# Patient Record
Sex: FEMALE | Race: WHITE | NOT HISPANIC OR LATINO | URBAN - METROPOLITAN AREA
[De-identification: names, ages, dates, MRNs, and addresses within clinical notes are randomized per-mention and may not be internally consistent; named-entity substitution may affect disease eponyms.]

---

## 2017-02-06 ENCOUNTER — APPOINTMENT (RX ONLY)
Dept: URBAN - METROPOLITAN AREA CLINIC 23 | Facility: CLINIC | Age: 77
Setting detail: DERMATOLOGY
End: 2017-02-06

## 2017-02-06 DIAGNOSIS — L57.0 ACTINIC KERATOSIS: ICD-10-CM

## 2017-02-06 DIAGNOSIS — L82.1 OTHER SEBORRHEIC KERATOSIS: ICD-10-CM

## 2017-02-06 DIAGNOSIS — Z85.828 PERSONAL HISTORY OF OTHER MALIGNANT NEOPLASM OF SKIN: ICD-10-CM

## 2017-02-06 PROBLEM — J30.1 ALLERGIC RHINITIS DUE TO POLLEN: Status: ACTIVE | Noted: 2017-02-06

## 2017-02-06 PROBLEM — L55.1 SUNBURN OF SECOND DEGREE: Status: ACTIVE | Noted: 2017-02-06

## 2017-02-06 PROBLEM — L85.3 XEROSIS CUTIS: Status: ACTIVE | Noted: 2017-02-06

## 2017-02-06 PROBLEM — D48.5 NEOPLASM OF UNCERTAIN BEHAVIOR OF SKIN: Status: ACTIVE | Noted: 2017-02-06

## 2017-02-06 PROCEDURE — A4550 SURGICAL TRAYS: HCPCS

## 2017-02-06 PROCEDURE — ? BIOPSY BY SHAVE METHOD

## 2017-02-06 PROCEDURE — 17003 DESTRUCT PREMALG LES 2-14: CPT

## 2017-02-06 PROCEDURE — 17000 DESTRUCT PREMALG LESION: CPT

## 2017-02-06 PROCEDURE — ? TREATMENT REGIMEN

## 2017-02-06 PROCEDURE — ? LIQUID NITROGEN

## 2017-02-06 PROCEDURE — 11100: CPT | Mod: 59

## 2017-02-06 PROCEDURE — 99214 OFFICE O/P EST MOD 30 MIN: CPT | Mod: 25

## 2017-02-06 PROCEDURE — ? OTHER

## 2017-02-06 PROCEDURE — ? PRESCRIPTION

## 2017-02-06 PROCEDURE — ? COUNSELING

## 2017-02-06 RX ORDER — FLUOROURACIL 2 G/40G
CREAM TOPICAL
Qty: 1 | Refills: 3 | COMMUNITY
Start: 2017-02-06

## 2017-02-06 RX ADMIN — FLUOROURACIL: 2 CREAM TOPICAL at 00:00

## 2017-02-06 ASSESSMENT — LOCATION ZONE DERM
LOCATION ZONE: LEG
LOCATION ZONE: ARM
LOCATION ZONE: NOSE
LOCATION ZONE: LIP
LOCATION ZONE: TOE
LOCATION ZONE: FACE
LOCATION ZONE: NECK

## 2017-02-06 ASSESSMENT — LOCATION DETAILED DESCRIPTION DERM
LOCATION DETAILED: RIGHT INFERIOR LATERAL NECK
LOCATION DETAILED: RIGHT INFERIOR VERMILION BORDER
LOCATION DETAILED: RIGHT PROXIMAL DORSAL FOREARM
LOCATION DETAILED: LEFT ANTERIOR DISTAL THIGH
LOCATION DETAILED: RIGHT POPLITEAL SKIN
LOCATION DETAILED: LEFT DISTAL CALF
LOCATION DETAILED: RIGHT DISTAL PRETIBIAL REGION
LOCATION DETAILED: LEFT SUPERIOR LATERAL FOREHEAD
LOCATION DETAILED: LEFT ANTERIOR LATERAL DISTAL UPPER ARM
LOCATION DETAILED: NASAL DORSUM
LOCATION DETAILED: LEFT PROXIMAL DORSAL FOREARM
LOCATION DETAILED: RIGHT SUPERIOR LATERAL FOREHEAD
LOCATION DETAILED: LEFT DISTAL PRETIBIAL REGION
LOCATION DETAILED: RIGHT SUPERIOR LATERAL MALAR CHEEK
LOCATION DETAILED: RIGHT LATERAL PROXIMAL PRETIBIAL REGION
LOCATION DETAILED: LEFT PROXIMAL LATERAL CALF
LOCATION DETAILED: LEFT POPLITEAL SKIN
LOCATION DETAILED: RIGHT LATERAL MALAR CHEEK
LOCATION DETAILED: LEFT ANTERIOR PROXIMAL UPPER ARM
LOCATION DETAILED: RIGHT DORSAL 2ND TOE
LOCATION DETAILED: RIGHT ANTERIOR DISTAL THIGH

## 2017-02-06 ASSESSMENT — LOCATION SIMPLE DESCRIPTION DERM
LOCATION SIMPLE: LEFT THIGH
LOCATION SIMPLE: RIGHT FOREHEAD
LOCATION SIMPLE: NOSE
LOCATION SIMPLE: RIGHT POPLITEAL SKIN
LOCATION SIMPLE: LEFT POPLITEAL SKIN
LOCATION SIMPLE: RIGHT 2ND TOE
LOCATION SIMPLE: LEFT FOREARM
LOCATION SIMPLE: RIGHT FOREARM
LOCATION SIMPLE: RIGHT THIGH
LOCATION SIMPLE: LEFT CALF
LOCATION SIMPLE: LEFT FOREHEAD
LOCATION SIMPLE: RIGHT CHEEK
LOCATION SIMPLE: NECK
LOCATION SIMPLE: LEFT PRETIBIAL REGION
LOCATION SIMPLE: LEFT UPPER ARM
LOCATION SIMPLE: RIGHT PRETIBIAL REGION
LOCATION SIMPLE: RIGHT LOWER LIP

## 2017-02-06 NOTE — PROCEDURE: LIQUID NITROGEN
Number Of Freeze-Thaw Cycles: 1 freeze-thaw cycle
Consent: The patient's verbal consent was obtained including but not limited to risks of crusting, scabbing, blistering, scarring, darker or lighter pigmentary change, recurrence, incomplete removal and infection.
Detail Level: Detailed
Render Post-Care Instructions In Note?: no
Post-Care Instructions: I reviewed with the patient in detail post-care instructions. Patient is to wear sunprotection, and avoid picking at any of the treated lesions. Pt may apply Vaseline to crusted or scabbing areas. Will re-check at follow up
Duration Of Freeze Thaw-Cycle (Seconds): 5

## 2017-02-06 NOTE — PROCEDURE: BIOPSY BY SHAVE METHOD
Type Of Destruction Used: Curettage
Biopsy Type: H and E
Hemostasis: Aluminum Chloride
X Size Of Lesion In Cm: 0
Post-Care Instructions: I reviewed with the patient in detail post-care instructions. Patient is to keep the biopsy site dry overnight, and then apply bacitracin twice daily until healed. Patient may apply hydrogen peroxide soaks to remove any crusting.
Detail Level: Detailed
Electrodesiccation Text: The wound bed was treated with electrodesiccation after the biopsy was performed.
Accession #: PC
Dressing: pressure dressing with telfa
Wound Care: Vaseline
Bill For Surgical Tray: yes
Billing Type: Third-Party Bill
Cryotherapy Text: The wound bed was treated with cryotherapy after the biopsy was performed.
Notification Instructions: Patient will be notified of biopsy results. However, patient instructed to call the office if not contacted within 2 weeks.
Electrodesiccation And Curettage Text: The wound bed was treated with electrodesiccation and curettage after the biopsy was performed.
Destruction After The Procedure: No
Consent: Written consent was obtained and risks were reviewed including but not limited to scarring, infection, bleeding, scabbing, incomplete removal, nerve damage and allergy to anesthesia.
Silver Nitrate Text: The wound bed was treated with silver nitrate after the biopsy was performed.
Biopsy Method: Dermablade
Anesthesia Volume In Cc: 0.5
Anesthesia Type: 1% lidocaine with 1:200,000 epinephrine and a 1:10 solution of 8.4% sodium bicarbonate

## 2017-02-06 NOTE — PROCEDURE: OTHER
Note Text (......Xxx Chief Complaint.): This diagnosis correlates with the
Detail Level: Detailed
Other (Free Text): LN2

## 2017-03-08 ENCOUNTER — APPOINTMENT (RX ONLY)
Dept: URBAN - METROPOLITAN AREA CLINIC 24 | Facility: CLINIC | Age: 77
Setting detail: DERMATOLOGY
End: 2017-03-08

## 2017-03-08 DIAGNOSIS — L57.8 OTHER SKIN CHANGES DUE TO CHRONIC EXPOSURE TO NONIONIZING RADIATION: ICD-10-CM

## 2017-03-08 PROBLEM — C44.729 SQUAMOUS CELL CARCINOMA OF SKIN OF LEFT LOWER LIMB, INCLUDING HIP: Status: ACTIVE | Noted: 2017-03-08

## 2017-03-08 PROBLEM — L55.1 SUNBURN OF SECOND DEGREE: Status: ACTIVE | Noted: 2017-03-08

## 2017-03-08 PROCEDURE — ? COUNSELING

## 2017-03-08 PROCEDURE — 11602 EXC TR-EXT MAL+MARG 1.1-2 CM: CPT

## 2017-03-08 PROCEDURE — 12032 INTMD RPR S/A/T/EXT 2.6-7.5: CPT

## 2017-03-08 PROCEDURE — ? EXCISION

## 2017-03-08 PROCEDURE — ? OTHER

## 2017-03-08 ASSESSMENT — LOCATION SIMPLE DESCRIPTION DERM
LOCATION SIMPLE: LEFT CHEEK
LOCATION SIMPLE: LEFT PRETIBIAL REGION
LOCATION SIMPLE: INFERIOR FOREHEAD
LOCATION SIMPLE: RIGHT CHEEK
LOCATION SIMPLE: RIGHT PRETIBIAL REGION

## 2017-03-08 ASSESSMENT — LOCATION ZONE DERM
LOCATION ZONE: LEG
LOCATION ZONE: FACE

## 2017-03-08 ASSESSMENT — LOCATION DETAILED DESCRIPTION DERM
LOCATION DETAILED: LEFT PROXIMAL PRETIBIAL REGION
LOCATION DETAILED: INFERIOR MID FOREHEAD
LOCATION DETAILED: RIGHT CENTRAL MALAR CHEEK
LOCATION DETAILED: RIGHT DISTAL PRETIBIAL REGION
LOCATION DETAILED: LEFT CENTRAL MALAR CHEEK

## 2017-03-08 NOTE — PROCEDURE: OTHER
Other (Free Text): Pt was counseled to continue treating forehead cheeks and eyebrows for 2 add'l weeks.  Pt can treat lips every other day or ever third day if area becomes too sensitive.  Pt would like to challenge treating the pretibial area due to her number of previous skin cancers.  Pt advised to treat for two weeks and stop if no reaction occurs or continue for 2 add'l weeks if she does she a reaction
Note Text (......Xxx Chief Complaint.): This diagnosis correlates with the
Detail Level: Simple

## 2017-03-22 ENCOUNTER — APPOINTMENT (RX ONLY)
Dept: URBAN - METROPOLITAN AREA CLINIC 23 | Facility: CLINIC | Age: 77
Setting detail: DERMATOLOGY
End: 2017-03-22

## 2017-03-22 DIAGNOSIS — Z48.01 ENCOUNTER FOR CHANGE OR REMOVAL OF SURGICAL WOUND DRESSING: ICD-10-CM

## 2017-03-22 PROCEDURE — ? SUTURE REMOVAL (GLOBAL PERIOD)

## 2017-03-22 ASSESSMENT — LOCATION SIMPLE DESCRIPTION DERM: LOCATION SIMPLE: LEFT CALF

## 2017-03-22 ASSESSMENT — LOCATION ZONE DERM: LOCATION ZONE: LEG

## 2017-03-22 ASSESSMENT — LOCATION DETAILED DESCRIPTION DERM: LOCATION DETAILED: LEFT DISTAL CALF

## 2017-03-22 NOTE — PROCEDURE: SUTURE REMOVAL (GLOBAL PERIOD)
Add 38022 Cpt? (Important Note: In 2017 The Use Of 56108 Is Being Tracked By Cms To Determine Future Global Period Reimbursement For Global Periods): no
Detail Level: Detailed

## 2017-06-26 ENCOUNTER — APPOINTMENT (RX ONLY)
Dept: URBAN - METROPOLITAN AREA CLINIC 23 | Facility: CLINIC | Age: 77
Setting detail: DERMATOLOGY
End: 2017-06-26

## 2017-06-26 DIAGNOSIS — D485 NEOPLASM OF UNCERTAIN BEHAVIOR OF SKIN: ICD-10-CM

## 2017-06-26 DIAGNOSIS — L82.0 INFLAMED SEBORRHEIC KERATOSIS: ICD-10-CM

## 2017-06-26 DIAGNOSIS — Z41.9 ENCOUNTER FOR PROCEDURE FOR PURPOSES OTHER THAN REMEDYING HEALTH STATE, UNSPECIFIED: ICD-10-CM

## 2017-06-26 DIAGNOSIS — L82.1 OTHER SEBORRHEIC KERATOSIS: ICD-10-CM

## 2017-06-26 DIAGNOSIS — Z85.828 PERSONAL HISTORY OF OTHER MALIGNANT NEOPLASM OF SKIN: ICD-10-CM

## 2017-06-26 PROBLEM — L85.3 XEROSIS CUTIS: Status: ACTIVE | Noted: 2017-06-26

## 2017-06-26 PROBLEM — L57.0 ACTINIC KERATOSIS: Status: ACTIVE | Noted: 2017-06-26

## 2017-06-26 PROBLEM — L55.1 SUNBURN OF SECOND DEGREE: Status: ACTIVE | Noted: 2017-06-26

## 2017-06-26 PROBLEM — D48.5 NEOPLASM OF UNCERTAIN BEHAVIOR OF SKIN: Status: ACTIVE | Noted: 2017-06-26

## 2017-06-26 PROCEDURE — 11100: CPT | Mod: 59

## 2017-06-26 PROCEDURE — ? LIQUID NITROGEN

## 2017-06-26 PROCEDURE — A4550 SURGICAL TRAYS: HCPCS

## 2017-06-26 PROCEDURE — 17111 DESTRUCTION B9 LESIONS 15/>: CPT

## 2017-06-26 PROCEDURE — ? BIOPSY BY SHAVE METHOD

## 2017-06-26 PROCEDURE — ? OTHER

## 2017-06-26 PROCEDURE — ? COSMETIC CONSULTATION: BROWN SPOTS

## 2017-06-26 PROCEDURE — ? COUNSELING

## 2017-06-26 PROCEDURE — 99214 OFFICE O/P EST MOD 30 MIN: CPT | Mod: 25

## 2017-06-26 ASSESSMENT — LOCATION DETAILED DESCRIPTION DERM
LOCATION DETAILED: RIGHT ANTERIOR SHOULDER
LOCATION DETAILED: RIGHT DISTAL LATERAL CALF
LOCATION DETAILED: LEFT CENTRAL TEMPLE
LOCATION DETAILED: RIGHT PROXIMAL PRETIBIAL REGION
LOCATION DETAILED: RIGHT POPLITEAL SKIN
LOCATION DETAILED: UPPER STERNUM
LOCATION DETAILED: RIGHT DISTAL CALF
LOCATION DETAILED: RIGHT LATERAL PROXIMAL PRETIBIAL REGION
LOCATION DETAILED: RIGHT CLAVICULAR NECK
LOCATION DETAILED: LEFT PROXIMAL LATERAL CALF
LOCATION DETAILED: RIGHT ANTERIOR DISTAL THIGH
LOCATION DETAILED: LEFT MEDIAL SUPERIOR CHEST
LOCATION DETAILED: STERNAL NOTCH
LOCATION DETAILED: RIGHT CLAVICULAR SKIN
LOCATION DETAILED: RIGHT DISTAL PRETIBIAL REGION
LOCATION DETAILED: RIGHT MEDIAL SUPERIOR CHEST
LOCATION DETAILED: LEFT CLAVICULAR SKIN
LOCATION DETAILED: LEFT LATERAL SUPERIOR CHEST
LOCATION DETAILED: RIGHT LATERAL SUPERIOR CHEST
LOCATION DETAILED: LEFT POPLITEAL SKIN
LOCATION DETAILED: RIGHT DORSAL 2ND TOE
LOCATION DETAILED: LEFT ANTERIOR SHOULDER
LOCATION DETAILED: LEFT DISTAL CALF
LOCATION DETAILED: RIGHT PROXIMAL CALF
LOCATION DETAILED: LEFT PROXIMAL CALF

## 2017-06-26 ASSESSMENT — LOCATION SIMPLE DESCRIPTION DERM
LOCATION SIMPLE: RIGHT THIGH
LOCATION SIMPLE: RIGHT PRETIBIAL REGION
LOCATION SIMPLE: RIGHT CLAVICULAR SKIN
LOCATION SIMPLE: RIGHT CALF
LOCATION SIMPLE: RIGHT SHOULDER
LOCATION SIMPLE: LEFT CLAVICULAR SKIN
LOCATION SIMPLE: LEFT CALF
LOCATION SIMPLE: LEFT TEMPLE
LOCATION SIMPLE: RIGHT POPLITEAL SKIN
LOCATION SIMPLE: RIGHT ANTERIOR NECK
LOCATION SIMPLE: RIGHT 2ND TOE
LOCATION SIMPLE: LEFT SHOULDER
LOCATION SIMPLE: LEFT POPLITEAL SKIN
LOCATION SIMPLE: CHEST

## 2017-06-26 ASSESSMENT — LOCATION ZONE DERM
LOCATION ZONE: LEG
LOCATION ZONE: TRUNK
LOCATION ZONE: TOE
LOCATION ZONE: NECK
LOCATION ZONE: FACE
LOCATION ZONE: ARM

## 2017-06-26 NOTE — HPI: SKIN LESION
How Severe Is Your Skin Lesion?: moderate
Has Your Skin Lesion Been Treated?: not been treated
Is This A New Presentation, Or A Follow-Up?: Skin Lesion
Additional History: Patient states at she treated the left lower with Efudex x4 months .

## 2017-06-26 NOTE — PROCEDURE: LIQUID NITROGEN
Number Of Freeze-Thaw Cycles: 1 freeze-thaw cycle
Add 52 Modifier (Optional): no
Detail Level: Detailed
Medical Necessity Clause: This procedure was medically necessary because the lesions that were treated were irritated and enlarging
Consent: The patient's verbal consent was obtained including but not limited to risks of crusting, scabbing, blistering, scarring, darker or lighter pigmentary change, recurrence, incomplete removal and infection.
Pared With?: 15 blade
Medical Necessity Information: It is in your best interest to select a reason for this procedure from the list below. All of these items fulfill various CMS LCD requirements except the new and changing color options.
Post-Care Instructions: I reviewed with the patient in detail post-care instructions. Patient is to wear sunprotection, and avoid picking at any of the treated lesions. Pt may apply Vaseline to crusted or scabbing areas.

## 2017-06-26 NOTE — PROCEDURE: BIOPSY BY SHAVE METHOD
Destruction After The Procedure: No
Electrodesiccation And Curettage Text: The wound bed was treated with electrodesiccation and curettage after the biopsy was performed.
Dressing: pressure dressing with telfa
Type Of Destruction Used: Curettage
Consent: Written consent was obtained and risks were reviewed including but not limited to scarring, infection, bleeding, scabbing, incomplete removal, nerve damage and allergy to anesthesia.
X Size Of Lesion In Cm: 0
Anesthesia Type: 1% lidocaine with 1:200,000 epinephrine and a 1:10 solution of 8.4% sodium bicarbonate
Wound Care: Vaseline
Biopsy Type: H and E
Detail Level: Detailed
Notification Instructions: Patient will be notified of biopsy results. However, patient instructed to call the office if not contacted within 2 weeks.
Anesthesia Volume In Cc: 0.5
Electrodesiccation Text: The wound bed was treated with electrodesiccation after the biopsy was performed.
Billing Type: Third-Party Bill
Post-Care Instructions: I reviewed with the patient in detail post-care instructions. Patient is to keep the biopsy site dry overnight, and then apply bacitracin twice daily until healed. Patient may apply hydrogen peroxide soaks to remove any crusting.
Biopsy Method: Dermablade
Bill For Surgical Tray: yes
Silver Nitrate Text: The wound bed was treated with silver nitrate after the biopsy was performed.
Cryotherapy Text: The wound bed was treated with cryotherapy after the biopsy was performed.
Hemostasis: Aluminum Chloride

## 2017-08-28 ENCOUNTER — APPOINTMENT (RX ONLY)
Dept: URBAN - METROPOLITAN AREA CLINIC 23 | Facility: CLINIC | Age: 77
Setting detail: DERMATOLOGY
End: 2017-08-28

## 2017-08-28 DIAGNOSIS — L82.0 INFLAMED SEBORRHEIC KERATOSIS: ICD-10-CM

## 2017-08-28 DIAGNOSIS — L98.0 PYOGENIC GRANULOMA: ICD-10-CM

## 2017-08-28 PROBLEM — I10 ESSENTIAL (PRIMARY) HYPERTENSION: Status: ACTIVE | Noted: 2017-08-28

## 2017-08-28 PROBLEM — L85.3 XEROSIS CUTIS: Status: ACTIVE | Noted: 2017-08-28

## 2017-08-28 PROCEDURE — ? SILVER NITRATE

## 2017-08-28 PROCEDURE — ? LIQUID NITROGEN

## 2017-08-28 PROCEDURE — ? COUNSELING

## 2017-08-28 PROCEDURE — 17110 DESTRUCTION B9 LES UP TO 14: CPT

## 2017-08-28 PROCEDURE — 99212 OFFICE O/P EST SF 10 MIN: CPT | Mod: 25

## 2017-08-28 ASSESSMENT — LOCATION ZONE DERM
LOCATION ZONE: LEG
LOCATION ZONE: TOE
LOCATION ZONE: FEET

## 2017-08-28 ASSESSMENT — LOCATION DETAILED DESCRIPTION DERM
LOCATION DETAILED: LEFT DORSAL FOOT
LOCATION DETAILED: RIGHT LATERAL 2ND TOE
LOCATION DETAILED: RIGHT DORSAL 2ND TOE
LOCATION DETAILED: LEFT KNEE
LOCATION DETAILED: LEFT DISTAL PRETIBIAL REGION

## 2017-08-28 ASSESSMENT — LOCATION SIMPLE DESCRIPTION DERM
LOCATION SIMPLE: LEFT FOOT
LOCATION SIMPLE: LEFT PRETIBIAL REGION
LOCATION SIMPLE: LEFT KNEE
LOCATION SIMPLE: RIGHT 2ND TOE

## 2017-08-28 NOTE — PROCEDURE: LIQUID NITROGEN
Medical Necessity Clause: This procedure was medically necessary because the lesions that were treated were irritated and enlarging
Medical Necessity Information: It is in your best interest to select a reason for this procedure from the list below. All of these items fulfill various CMS LCD requirements except the new and changing color options.
Include Z78.9 (Other Specified Conditions Influencing Health Status) As An Associated Diagnosis?: No
Post-Care Instructions: I reviewed with the patient in detail post-care instructions. Patient is to wear sunprotection, and avoid picking at any of the treated lesions. Pt may apply Vaseline to crusted or scabbing areas.
Consent: The patient's verbal consent was obtained including but not limited to risks of crusting, scabbing, blistering, scarring, darker or lighter pigmentary change, recurrence, incomplete removal and infection.
Number Of Freeze-Thaw Cycles: 1 freeze-thaw cycle
Detail Level: Detailed

## 2017-08-28 NOTE — PROCEDURE: SILVER NITRATE
Add 05882 Code?: No
Detail Level: Detailed
Procedure Details: A silver nitrate stick was used for chemical cautery.

## 2017-09-18 ENCOUNTER — HOSPITAL ENCOUNTER (OUTPATIENT)
Dept: MRI IMAGING | Age: 77
Discharge: HOME | End: 2017-09-18
Attending: PHYSICAL MEDICINE & REHABILITATION
Payer: MEDICARE

## 2017-09-18 DIAGNOSIS — R26.9 GAIT DISORDER: ICD-10-CM

## 2017-09-18 DIAGNOSIS — R26.89 SHUFFLING GAIT: ICD-10-CM

## 2017-09-18 PROCEDURE — 70551 MRI BRAIN STEM W/O DYE: CPT

## 2017-09-21 ENCOUNTER — HOSPITAL ENCOUNTER (OUTPATIENT)
Dept: GENERAL RADIOLOGY | Age: 77
Discharge: HOME | End: 2017-09-21
Payer: MEDICARE

## 2017-09-21 DIAGNOSIS — R53.1 WEAKNESS: ICD-10-CM

## 2017-09-21 DIAGNOSIS — R26.89 SHUFFLING GAIT: ICD-10-CM

## 2017-09-21 DIAGNOSIS — R26.9 GAIT DISORDER: ICD-10-CM

## 2017-09-21 PROCEDURE — 72100 X-RAY EXAM L-S SPINE 2/3 VWS: CPT

## 2017-09-25 ENCOUNTER — HOSPITAL ENCOUNTER (OUTPATIENT)
Dept: LAB | Age: 77
Discharge: HOME | End: 2017-09-25
Payer: MEDICARE

## 2017-09-25 ENCOUNTER — APPOINTMENT (OUTPATIENT)
Dept: PHYSICAL THERAPY | Age: 77
End: 2017-09-25
Attending: PHYSICAL MEDICINE & REHABILITATION

## 2017-09-25 LAB
ALBUMIN SERPL-MCNC: 3.3 G/DL (ref 3.2–4.6)
ALBUMIN/GLOB SERPL: 0.6 {RATIO} (ref 1.2–3.5)
ALP SERPL-CCNC: 60 U/L (ref 50–136)
ALT SERPL-CCNC: 30 U/L (ref 12–65)
ANION GAP SERPL CALC-SCNC: 9 MMOL/L (ref 7–16)
AST SERPL-CCNC: 24 U/L (ref 15–37)
BASOPHILS # BLD: 0 K/UL (ref 0–0.2)
BASOPHILS NFR BLD: 1 % (ref 0–2)
BILIRUB SERPL-MCNC: 0.4 MG/DL (ref 0.2–1.1)
BUN SERPL-MCNC: 41 MG/DL (ref 8–23)
CALCIUM SERPL-MCNC: 8.9 MG/DL (ref 8.3–10.4)
CHLORIDE SERPL-SCNC: 102 MMOL/L (ref 98–107)
CO2 SERPL-SCNC: 26 MMOL/L (ref 21–32)
CREAT SERPL-MCNC: 1.72 MG/DL (ref 0.6–1)
DIFFERENTIAL METHOD BLD: ABNORMAL
EOSINOPHIL # BLD: 0.1 K/UL (ref 0–0.8)
EOSINOPHIL NFR BLD: 2 % (ref 0.5–7.8)
ERYTHROCYTE [DISTWIDTH] IN BLOOD BY AUTOMATED COUNT: 13.3 % (ref 11.9–14.6)
GLOBULIN SER CALC-MCNC: 5.1 G/DL (ref 2.3–3.5)
GLUCOSE SERPL-MCNC: 107 MG/DL (ref 65–100)
HCT VFR BLD AUTO: 35.6 % (ref 35.8–46.3)
HGB BLD-MCNC: 12.5 G/DL (ref 11.7–15.4)
IMM GRANULOCYTES # BLD: 0 K/UL (ref 0–0.5)
IMM GRANULOCYTES NFR BLD: 0 % (ref 0–5)
LYMPHOCYTES # BLD: 1.5 K/UL (ref 0.5–4.6)
LYMPHOCYTES NFR BLD: 30 % (ref 13–44)
MCH RBC QN AUTO: 32.1 PG (ref 26.1–32.9)
MCHC RBC AUTO-ENTMCNC: 35.1 G/DL (ref 31.4–35)
MCV RBC AUTO: 91.5 FL (ref 79.6–97.8)
MONOCYTES # BLD: 0.3 K/UL (ref 0.1–1.3)
MONOCYTES NFR BLD: 6 % (ref 4–12)
NEUTS SEG # BLD: 3 K/UL (ref 1.7–8.2)
NEUTS SEG NFR BLD: 61 % (ref 43–78)
PLATELET # BLD AUTO: 223 K/UL (ref 150–450)
PMV BLD AUTO: 9.4 FL (ref 10.8–14.1)
POTASSIUM SERPL-SCNC: 3.7 MMOL/L (ref 3.5–5.1)
PROT SERPL-MCNC: 8.4 G/DL (ref 6.3–8.2)
RBC # BLD AUTO: 3.89 M/UL (ref 4.05–5.25)
SODIUM SERPL-SCNC: 137 MMOL/L (ref 136–145)
T4 FREE SERPL-MCNC: 1 NG/DL (ref 0.78–1.46)
T4 SERPL-MCNC: 12.8 UG/DL (ref 4.8–13.9)
TSH SERPL DL<=0.005 MIU/L-ACNC: 1.16 UIU/ML (ref 0.36–3.74)
VIT B12 SERPL-MCNC: 493 PG/ML (ref 193–986)
WBC # BLD AUTO: 4.8 K/UL (ref 4.3–11.1)

## 2017-09-25 PROCEDURE — 82607 VITAMIN B-12: CPT | Performed by: PHYSICAL MEDICINE & REHABILITATION

## 2017-09-25 PROCEDURE — 80053 COMPREHEN METABOLIC PANEL: CPT | Performed by: PHYSICAL MEDICINE & REHABILITATION

## 2017-09-25 PROCEDURE — 36415 COLL VENOUS BLD VENIPUNCTURE: CPT | Performed by: PHYSICAL MEDICINE & REHABILITATION

## 2017-09-25 PROCEDURE — 85025 COMPLETE CBC W/AUTO DIFF WBC: CPT | Performed by: PHYSICAL MEDICINE & REHABILITATION

## 2017-09-25 PROCEDURE — 84436 ASSAY OF TOTAL THYROXINE: CPT | Performed by: PHYSICAL MEDICINE & REHABILITATION

## 2017-09-25 PROCEDURE — 84439 ASSAY OF FREE THYROXINE: CPT | Performed by: PHYSICAL MEDICINE & REHABILITATION

## 2017-09-25 PROCEDURE — 84481 FREE ASSAY (FT-3): CPT | Performed by: PHYSICAL MEDICINE & REHABILITATION

## 2017-09-25 PROCEDURE — 84443 ASSAY THYROID STIM HORMONE: CPT | Performed by: PHYSICAL MEDICINE & REHABILITATION

## 2017-09-26 LAB — T3FREE SERPL-MCNC: 2.7 PG/ML (ref 2–4.4)

## 2017-10-04 ENCOUNTER — HOSPITAL ENCOUNTER (OUTPATIENT)
Dept: PHYSICAL THERAPY | Age: 77
Discharge: HOME | End: 2017-10-04
Attending: PHYSICAL MEDICINE & REHABILITATION
Payer: MEDICARE

## 2017-10-04 PROCEDURE — 97161 PT EVAL LOW COMPLEX 20 MIN: CPT

## 2017-10-04 PROCEDURE — G8980 MOBILITY D/C STATUS: HCPCS

## 2017-10-04 PROCEDURE — G8979 MOBILITY GOAL STATUS: HCPCS

## 2017-10-04 PROCEDURE — G8978 MOBILITY CURRENT STATUS: HCPCS

## 2017-10-04 NOTE — THERAPY EVALUATION
Venkat Castañeda  : 1940  [place] / [place] / Product Type: Medicare /  [address]  at [facility]  [address] [facility], [address]  [phone]   [phone]         OUTPATIENT PHYSICAL THERAPY:Initial Assessment and Discharge 10/4/2017    ICD-10: Treatment Diagnosis: Unsteadiness on feet (R26.81)  Precautions/Allergies:   Latex; Ceftin [cefuroxime axetil]; and Sulfa (sulfonamide antibiotics)   Fall Risk Score: 0 (? 5 = High Risk)  MD Orders: evaluate for gait and balance. eval and treat  MEDICAL/REFERRING DIAGNOSIS:  Gait disorder [R26.9]  Shuffling gait [R26.89]   DATE OF ONSET: progressive   REFERRING PHYSICIAN: Yoel Bailey, *  RETURN PHYSICIAN APPOINTMENT: unknown     ASSESSMENT (Date: 10/4/17):  Ms. Jose Parkinson presents to physical therapy with a history of shuffling gait, some incontinence, and worsening balance. She demonstrates no tremor or rigidity. She reports she is going to be assessed by Dr. Lizbeth Connell for normal pressure hydrocephalus. She reports that she would like to wait and start therapy after she does that. She was assessed today for gait and balance for baseline numbers to aid and assist in diagnosing and treating her in the future. She will be discharged at this time. Please reconsult PT as needed. 1.  1.      Regarding Jayla Greenfield's therapy, I certify that the treatment plan above will be carried out by a therapist or under their direction. Thank you for this referral,  Tiana West, DPT, NCS     Referring Physician Signature: Yoel Bailey, *              Date                    HISTORY:   Patient Stated Goal:        I want to get better  History of Present Injury/Illness (Reason for Referral):  Patient reports her balance and gait have been an issue for several years. Says it has gotten progressively worse. She is dragging her R foot.   She is scheduled to see  Lizbeth Connell Oct 31th. She has period incontinent. She doesn't think her has any memory issues but she said her  might think differently. She thinks she may have NPH. She doesn't think she wants to do therapy right now until she finds out more from Dr. Lizbeth Connell. Past Medical History/Comorbidities:   Ms. Jose Parkinson  has a past medical history of CAD (coronary artery disease) (4/26/2016); Gait disorder (4/26/2016); Hypertension; Hypertriglyceridemia (4/26/2016); Psychotic disorder; and Small vessel disease, cerebrovascular (4/26/2016). Ms. Jose Parkinson  has no past surgical history on file. Social History/Living Environment:     lives with .   driving  Prior Level of Function/Work/Activity:  retired    Current Medications:    Current Outpatient Prescriptions:     FLUoxetine (PROZAC) 20 mg tablet, TAKE ONE CAPSULE BY MOUTH EVERY DAY, Disp: 30 Tab, Rfl: 5    atenolol (TENORMIN) 100 mg tablet, TAKE 1 TABLET BY MOUTH EVERY DAY, Disp: 30 Tab, Rfl: 5    conjugated estrogens (PREMARIN) 0.625 mg tablet, TAKE 1 TABLET BY MOUTH EVERY DAY, Disp: 30 Tab, Rfl: 5    triamterene-hydroCHLOROthiazide (MAXZIDE) 37.5-25 mg per tablet, TAKE 1 TABLET BY MOUTH TWICE DAILY, Disp: 180 Tab, Rfl: 3    fluticasone (FLONASE) 50 mcg/actuation nasal spray, nightly., Disp: , Rfl:      Date Last Reviewed:  10/4/2017   Number of Personal Factors/Comorbidities that affect the Plan of Care: 0: LOW COMPLEXITY   EXAMINATION:   Observation/Orthostatic Postural Assessment:          Rounded shoulders, forward head   Mental Status:          functional  Palpation:          No increased muscle tone noted  Sensation:         functional  Skin Integrity:          intact  Vision:          functional  Balance:          Good static balance, fair dynamic balance     Lower Extremity:   Strength PROM   Action R L R  L    Hip Flexion       Hip Extension       Hip Abduction       Hip Adduction       Knee Flexion       Knee Extension       Dorsi Flexion Plantar Flexion       Inversion       Eversion                 Upper Extremity:           Functional Mobility:         Gait/Ambulation:  Ambulates with slow, shuffling gait pattern, decreased heel strike R>L, wide WILLY, some toe out, decreased gait speed, decreased step length. No assistive device         Transfers:  Slow, modified independent. Some UE use for balance         Bed Mobility:  WFL        Stairs:  NT        Wheelchair:  NT              Body Structures Involved:  1. Nerves  2. Joints  3. Muscles  4. Ligaments Body Functions Affected:  1. Genitourinary  2. Neuromusculoskeletal  3. Movement Related Activities and Participation Affected:  1. Mobility  2. Domestic Life  3. Interpersonal Interactions and Relationships  4. Community, Social and [place]   Number of elements that affect the Plan of Care: 4+: HIGH COMPLEXITY   CLINICAL PRESENTATION:   Presentation: Evolving clinical presentation with changing clinical characteristics: MODERATE COMPLEXITY   CLINICAL DECISION MAKING:   Outcome Measure: Tool Used: Schwab Balance Scale  Score:  Initial: 45/56 Most Recent: X/56 (Date: -- )   Interpretation of Score: Each section is scored on a 0-4 scale, 0 representing the patients inability to perform the task and 4 representing independence. The scores of each section are added together for a total score of 56. The higher the patients score, the more independent the patient is. Any score below 45 indicates increased risk for falls. Score 56 55-45 44-34 33-23 22-12 11-1 0   Modifier CH CI CJ CK CL CM CN     ? Mobility - Walking and Moving Around:     - CURRENT STATUS: CI - 1%-19% impaired, limited or restricted    - GOAL STATUS: CI - 1%-19% impaired, limited or restricted    - D/C STATUS:  CI - 1%-19% impaired, limited or restricted    Tool Used: Timed Up and Go (TUG)  Score:  Initial: 13.7 seconds Most Recent: X seconds (Date: -- )   Interpretation of Score:  The test measures, in seconds, the time taken by an individual to stand up from a standard arm chair (seat height 46 cm [18 in], arm height 65 cm [25.6 in]), walk a distance of 3 meters (118 in, approx 10 ft), turn, walk back to the chair and sit down. If the individual takes longer than 14 seconds to complete TUG, this indicates risk for falls. Score 7 7.5-10.5 11-14 14.5-17.5 18-21 21.5-24.5 25+   Modifier CH CI CJ CK CL CM CN        Use of outcome tool(s) and clinical judgement create a POC that gives a: Clear prediction of patient's progress: LOW COMPLEXITY   TREATMENT:   (In addition to Assessment/Re-Assessment sessions the following treatments were rendered)  Pre Treatment Symptoms: see above    Assessment only today, no treatment provided. Treatment/Session Assessment:  See assessment above.   · Pain/ Symptoms: Initial:   0/10 Post Session:  0/10     Total Treatment Duration:  PT Patient Time In/Time Out  Time In: 1330  Time Out: [address] [facility]

## 2017-10-06 NOTE — PROGRESS NOTES
Ambulatory/Rehab Services H2 Model Falls Risk Assessment    Risk Factor Pts. ·   Confusion/Disorientation/Impulsivity  []    4 ·   Symptomatic Depression  []   2 ·   Altered Elimination  []   1 ·   Dizziness/Vertigo  []   1 ·   Gender (Male)  []   1 ·   Any administered antiepileptics (anticonvulsants):  []   2 ·   Any administered benzodiazepines:  []   1 ·   Visual Impairment (specify):  []   1 ·   Portable Oxygen Use  []   1 ·   Orthostatic ? BP  []   1 ·   History of Recent Falls (within 3 mos.)  []   5     Ability to Rise from Chair (choose one) Pts. ·   Ability to rise in a single movement  [x]   0 ·   Pushes up, successful in one attempt  []   1 ·   Multiple attempts, but successful  []   3 ·   Unable to rise without assistance  []   4   Total: (5 or greater = High Risk) 0     Falls Prevention Plan:   []                Physical Limitations to Exercise (specify):   []                Mobility Assistance Device (type):   []                Exercise/Equipment Adaptation (specify):    ©2010 [facility] [place] Patent #[id number].  Federal Law prohibits the replication, distribution or use without written permission from [facility] [employer]

## 2017-10-31 PROBLEM — R26.9 GAIT DISTURBANCE: Status: ACTIVE | Noted: 2017-10-31

## 2017-10-31 PROBLEM — G91.2 NPH (NORMAL PRESSURE HYDROCEPHALUS) (HCC): Status: ACTIVE | Noted: 2017-10-31

## 2017-11-02 ENCOUNTER — HOSPITAL ENCOUNTER (OUTPATIENT)
Dept: INTERVENTIONAL RADIOLOGY/VASCULAR | Age: 77
Discharge: HOME | End: 2017-11-02
Attending: NEUROLOGICAL SURGERY
Payer: MEDICARE

## 2017-11-02 VITALS
RESPIRATION RATE: 17 BRPM | TEMPERATURE: 98.7 F | HEART RATE: 59 BPM | HEIGHT: 67 IN | DIASTOLIC BLOOD PRESSURE: 68 MMHG | SYSTOLIC BLOOD PRESSURE: 133 MMHG | OXYGEN SATURATION: 98 % | WEIGHT: 140 LBS | BODY MASS INDEX: 21.97 KG/M2

## 2017-11-02 DIAGNOSIS — G91.2 NPH (NORMAL PRESSURE HYDROCEPHALUS) (HCC): ICD-10-CM

## 2017-11-02 PROCEDURE — 74011000250 HC RX REV CODE- 250: Performed by: PHYSICIAN ASSISTANT

## 2017-11-02 PROCEDURE — 62270 DX LMBR SPI PNXR: CPT

## 2017-11-02 PROCEDURE — 77030003666 HC NDL SPINAL BD -A

## 2017-11-02 PROCEDURE — 77030014143 HC TY PUNC LUMBR BD -A

## 2017-11-02 RX ORDER — LIDOCAINE HYDROCHLORIDE 20 MG/ML
1-20 INJECTION, SOLUTION INFILTRATION; PERINEURAL ONCE
Status: COMPLETED | OUTPATIENT
Start: 2017-11-02 | End: 2017-11-02

## 2017-11-02 RX ADMIN — LIDOCAINE HYDROCHLORIDE 60 MG: 20 INJECTION, SOLUTION INFILTRATION; PERINEURAL at 10:17

## 2017-11-02 NOTE — PROGRESS NOTES
LP complete. Opening pressure 11. 19 ml of CSF removed. Site covered with sterile dressing. Will transfer to recovery.

## 2017-11-02 NOTE — IP AVS SNAPSHOT
[address] 
[phone] Patient: Jourdan Webster MRN: [id number] :1940 About your hospitalization You were admitted on:  2017 You last received care in the:  [facility] You were discharged on:  2017 Why you were hospitalized Your primary diagnosis was:  Not on File Discharge Orders None A check jacob indicates which time of day the medication should be taken. My Medications ASK your physician about these medications Instructions Each Dose to Equal  
 Morning Noon Evening Bedtime  
 atenolol 100 mg tablet Commonly known as:  TENORMIN Your last dose was: Your next dose is: TAKE 1 TABLET BY MOUTH EVERY DAY  
     
   
   
   
  
 conjugated estrogens 0.625 mg tablet Commonly known as:  PREMARIN Your last dose was: Your next dose is: TAKE 1 TABLET BY MOUTH EVERY DAY FLUoxetine 20 mg tablet Commonly known as:  PROzac Your last dose was: Your next dose is: TAKE ONE CAPSULE BY MOUTH EVERY DAY  
     
   
   
   
  
 fluticasone 50 mcg/actuation nasal spray Commonly known as:  Lindy Shames Your last dose was: Your next dose is:    
   
   
 nightly. triamterene-hydroCHLOROthiazide 37.5-25 mg per tablet Commonly known as:  Levie Minors Your last dose was: Your next dose is: TAKE 1 TABLET BY MOUTH TWICE DAILY Discharge Instructions [facility] [address] Department of Interventional Radiology 
[phone] Office 
[phone] Fax POST LUMBAR PUNCTURE/MYELOGRAM/INTRATHECAL CHEMOTHERAPY DISCHARGE INSTRUCTIONS General Information: 
Lumbar Puncture: A LP is done to help diagnose several disorders, like pseudo tumor, migraines, meningitis, and multiple sclerosis. It involves a puncture (usually in the lower spine) into the sac that protects the spinal column. A sample of the fluid in that space is removed and tested in the lab. Myelogram: A Myelogram involves a lumbar puncture, and instead of removing fluid, contrast will be injected into the sac surrounding the spinal column. It is done to visualize the spinal column, nerve roots, spinal canal, vertebral discs and disc space. It is usually done to diagnose back pain with unknown cause or in preparation for surgery. After the injection, a CT scan will be done, usually within two hours of the injection. Intrathecal Chemotherapy:  
   Chemotherapy can be given in many forms. Intrathecal chemo involves a lumbar puncture, and instead of removing fluid, the chemo will be injected into the space. After any of procedures, you will be asked to lie flat on your back for 4-6 hours to prevent complications. You should also rest for 24 hours after you go home, and force fluids. If you have a headache, you should take Tylenol or acetaminophen. Call If: 
   You should call your Physician and/or the Radiology Nurse if you develop a headache that is not relieved by Tylenol, and worsens when you stand and eases when you lie down, you need to call. You may have developed what is referred to as a spinal headache. Our physician's will probably advise you to be on strict bed rest for 24 hours, to drink lots of fluids and caffeine. If this does not help the head pain, call again the next day. You should call if you have bleeding other than a small spot on your bandage. You should call if you have any numbness, tingling, weakness, fever, chills, urinary retention, severe itching, rash, welts, swelling, or confusion.   
 
Follow-Up Instructions: See the doctor who ordered your procedure as he/she has instructed. If you had a Lumbar Puncture or Myelogram, your results should be available to your ordering doctor in 3-5 business days. You can remove your dressing in 24 hours and shower regularly. Do not bathe or swim for 72 hours. To Reach Us: If you have any questions about your procedure, please call the Interventional Radiology department at [phone]. After business hours (5pm) and weekends, call the answering service at [phone] and ask for the Radiologist on call to be paged. Interventional Radiology General Nurse Discharge After general anesthesia or intravenous sedation, for 24 hours or while taking prescription Narcotics: · Limit your activities · Do not drive and operate hazardous machinery · Do not make important personal or business decisions · Do  not drink alcoholic beverages · If you have not urinated within 8 hours after discharge, please contact your surgeon on call. * Please give a list of your current medications to your Primary Care Provider. * Please update this list whenever your medications are discontinued, doses are 
   changed, or new medications (including over-the-counter products) are added. * Please carry medication information at all times in case of emergency situations. These are general instructions for a healthy lifestyle: No smoking/ No tobacco products/ Avoid exposure to second hand smoke Surgeon General's Warning:  Quitting smoking now greatly reduces serious risk to your health. Obesity, smoking, and sedentary lifestyle greatly increases your risk for illness A healthy diet, regular physical exercise & weight monitoring are important for maintaining a healthy lifestyle You may be retaining fluid if you have a history of heart failure or if you experience any of the following symptoms:  Weight gain of 3 pounds or more overnight or 5 pounds in a week, increased swelling in our hands or feet or shortness of breath while lying flat in bed. Please call your doctor as soon as you notice any of these symptoms; do not wait until your next office visit. Recognize signs and symptoms of STROKE: 
F-face looks uneven A-arms unable to move or move unevenly S-speech slurred or non-existent T-time-call 911 as soon as signs and symptoms begin-DO NOT go Back to bed or wait to see if you get better-TIME IS BRAIN. Patient Signature: 
Date: 11/2/2017 Discharging Nurse: Jim Cox RN 
 
 
 
 
[facility] Transitions of Care Introducing [employer] [address] Santoyo offers a voluntary care coordination program to provide high quality service and care to [facility] fee-for-service beneficiaries. [facility] was designed to help you enhance your health and well-being through the following services: ? Transitions of Care  support for individuals who are transitioning from one care setting to another (example: Hospital to home). ? Chronic and Complex Care Coordination  support for individuals and caregivers of those with serious or chronic illnesses or with more than one chronic (ongoing) condition and those who take a number of different medications. If you meet specific medical criteria, a [address] [place] [address] Rd may call you directly to coordinate your care with your primary care physician and your other care providers. For questions about the [facility] programs, please, contact your physicians office. For general questions or additional information about Accountable Care Organizations: 
Please visit www.medicare.gov/acos. html or call 1-800-MEDICARE (8-391.889.1097) TTY users should call 4-665.950.9519. Introducing [facility]! Renetta Lawson introduces [facility] patient portal. Now you can access parts of your medical record, email your doctor's office, and request medication refills online. 1. In your internet browser, go to [email or web address] 2. Click on the First Time User? Click Here link in the Sign In box. You will see the New Member Sign Up page. 3. Enter your [employer] Access Code exactly as it appears below. You will not need to use this code after youve completed the sign-up process. If you do not sign up before the expiration date, you must request a new code. · [employer] Access Code: [email or web address] Expires: 1/7/2018  7:52 AM 
 
4. Enter the last four digits of your Social Security Number (xxxx) and Date of Birth (mm/dd/yyyy) as indicated and click Submit. You will be taken to the next sign-up page. 5. Create a T[employer] ID. This will be your [employer] login ID and cannot be changed, so think of one that is secure and easy to remember. 6. Create a [employer] password. You can change your password at any time. 7. Enter your Password Reset Question and Answer. This can be used at a later time if you forget your password. 8. Enter your e-mail address. You will receive e-mail notification when new information is available in [address] 9. Click Sign Up. You can now view and download portions of your medical record. 10. Click the Download Summary menu link to download a portable copy of your medical information. If you have questions, please visit the Frequently Asked Questions section of the [employer] website. Remember, [employer] is NOT to be used for urgent needs. For medical emergencies, dial 911. Now available from your iPhone and Android! Unresulted Labs-Please follow up with your PCP about these lab tests Order Current Status IR SPINAL PUNCTURE LUMBAR DIAGNOSTIC In process Providers Seen During Your Hospitalization Provider Specialty Primary office phone Shiva Adams MD Neurosurgery [phone] Your Primary Care Physician (PCP) Primary Care Physician Office Phone Office Fax Brandon Berry [phone] You are allergic to the following Allergen Reactions Latex Unknown (comments) Ceftin (Cefuroxime Axetil) Rash  
    
 Sulfa (Sulfonamide Antibiotics) Rash Other (comments) KIDNEY MALFUNCTION Recent Documentation Height Weight BMI OB Status Smoking Status 1.689 m 63.5 kg 22.26 kg/m2 Postmenopausal Never Smoker Emergency Contacts Name Discharge Info Relation Home Work Mobile Iván Greenfield DISCHARGE CAREGIVER [3] Spouse [3] [phone] Patient Belongings The following personal items are in your possession at time of discharge: 
                             
 
  
  
 Please provide this summary of care documentation to your next provider. Signatures-by signing, you are acknowledging that this After Visit Summary has been reviewed with you and you have received a copy. Patient Signature:  ____________________________________________________________ Date:  ____________________________________________________________  
  
[facility] Provider Signature:  ____________________________________________________________ Date:  ____________________________________________________________

## 2017-11-02 NOTE — PROCEDURES
Department of Interventional Radiology  [phone]        Interventional Radiology Brief Procedure Note    Patient: Timothy Veliz MRN: [id number]  SSN: [id number]    YOB: 1940  Age: 76 y.o.   Sex: female      Date of Procedure: 11/2/2017    Pre-Procedure Diagnosis: NPH    Post-Procedure Diagnosis: SAME    Procedure(s): Lumbar Puncture    Brief Description of Procedure: fluoro guided LP @ L4/5, 19 ml CSF removed and discarded    Performed By: Barry Alex PA-C     Assistants: None    Anesthesia:Lidocaine    Estimated Blood Loss: None    Specimens: None    Implants: None    Findings: opening pressure 11 cm M65    Complications: None    Recommendations: bedrest     Follow Up: Dr. J Luis Mendoza    Signed By: Barry Alex PA-C     November 2, 2017

## 2017-11-02 NOTE — DISCHARGE INSTRUCTIONS
[address] [facility] [address]  Department of Interventional Radiology  [phone] Office  [phone] Fax  POST LUMBAR PUNCTURE/MYELOGRAM/INTRATHECAL CHEMOTHERAPY DISCHARGE INSTRUCTIONS  General Information:  Lumbar Puncture: A LP is done to help diagnose several disorders, like pseudo tumor, migraines, meningitis, and multiple sclerosis. It involves a puncture (usually in the lower spine) into the sac that protects the spinal column. A sample of the fluid in that space is removed and tested in the lab. Myelogram:     A Myelogram involves a lumbar puncture, and instead of removing fluid, contrast will be injected into the sac surrounding the spinal column. It is done to visualize the spinal column, nerve roots, spinal canal, vertebral discs and disc space. It is usually done to diagnose back pain with unknown cause or in preparation for surgery. After the injection, a CT scan will be done, usually within two hours of the injection. Intrathecal Chemotherapy:      Chemotherapy can be given in many forms. Intrathecal chemo involves a lumbar puncture, and instead of removing fluid, the chemo will be injected into the space. After any of procedures, you will be asked to lie flat on your back for 4-6 hours to prevent complications. You should also rest for 24 hours after you go home, and force fluids. If you have a headache, you should take Tylenol or acetaminophen. Call If:     You should call your Physician and/or the Radiology Nurse if you develop a headache that is not relieved by Tylenol, and worsens when you stand and eases when you lie down, you need to call. You may have developed what is referred to as a spinal headache. Our physician's will probably advise you to be on strict bed rest for 24 hours, to drink lots of fluids and caffeine. If this does not help the head pain, call again the next day.  You should call if you have bleeding other than a small spot on your bandage. You should call if you have any numbness, tingling, weakness, fever, chills, urinary retention, severe itching, rash, welts, swelling, or confusion. Follow-Up Instructions: See the doctor who ordered your procedure as he/she has instructed. If you had a Lumbar Puncture or Myelogram, your results should be available to your ordering doctor in 3-5 business days. You can remove your dressing in 24 hours and shower regularly. Do not bathe or swim for 72 hours. To Reach Us: If you have any questions about your procedure, please call the Interventional Radiology department at [phone]. After business hours (5pm) and weekends, call the answering service at [phone] and ask for the Radiologist on call to be paged. Interventional Radiology General Nurse Discharge    After general anesthesia or intravenous sedation, for 24 hours or while taking prescription Narcotics:  · Limit your activities  · Do not drive and operate hazardous machinery  · Do not make important personal or business decisions  · Do  not drink alcoholic beverages  · If you have not urinated within 8 hours after discharge, please contact your surgeon on call. * Please give a list of your current medications to your Primary Care Provider. * Please update this list whenever your medications are discontinued, doses are     changed, or new medications (including over-the-counter products) are added. * Please carry medication information at all times in case of emergency situations. These are general instructions for a healthy lifestyle:    No smoking/ No tobacco products/ Avoid exposure to second hand smoke  Surgeon General's Warning:  Quitting smoking now greatly reduces serious risk to your health.     Obesity, smoking, and sedentary lifestyle greatly increases your risk for illness  A healthy diet, regular physical exercise & weight monitoring are important for maintaining a healthy lifestyle    You may be retaining fluid if you have a history of heart failure or if you experience any of the following symptoms:  Weight gain of 3 pounds or more overnight or 5 pounds in a week, increased swelling in our hands or feet or shortness of breath while lying flat in bed. Please call your doctor as soon as you notice any of these symptoms; do not wait until your next office visit. Recognize signs and symptoms of STROKE:  F-face looks uneven    A-arms unable to move or move unevenly    S-speech slurred or non-existent    T-time-call 911 as soon as signs and symptoms begin-DO NOT go       Back to bed or wait to see if you get better-TIME IS BRAIN. Patient Signature:  Date: 11/2/2017  Discharging Nurse:  Scott Shoemaker RN

## 2017-11-02 NOTE — IP AVS SNAPSHOT
Bridget Meza 
 
 
 [address] 
[phone] Patient: Enedelia Moore MRN: [id number] :1940 My Medications ASK your physician about these medications Instructions Each Dose to Equal  
 Morning Noon Evening Bedtime  
 atenolol 100 mg tablet Commonly known as:  TENORMIN Your last dose was: Your next dose is: TAKE 1 TABLET BY MOUTH EVERY DAY  
     
   
   
   
  
 conjugated estrogens 0.625 mg tablet Commonly known as:  PREMARIN Your last dose was: Your next dose is: TAKE 1 TABLET BY MOUTH EVERY DAY FLUoxetine 20 mg tablet Commonly known as:  PROzac Your last dose was: Your next dose is: TAKE ONE CAPSULE BY MOUTH EVERY DAY  
     
   
   
   
  
 fluticasone 50 mcg/actuation nasal spray Commonly known as:  Sabine Whitehead Your last dose was: Your next dose is:    
   
   
 nightly. triamterene-hydroCHLOROthiazide 37.5-25 mg per tablet Commonly known as:  Amari Rodríguez Your last dose was: Your next dose is: TAKE 1 TABLET BY MOUTH TWICE DAILY

## 2017-11-02 NOTE — PROGRESS NOTES
TRANSFER - OUT REPORT:    Verbal report given to \[facility]\"" RN(name) on Pascual Camacho  being transferred to IR recovery(unit) for routine post - op       Report consisted of patients Situation, Background, Assessment and   Recommendations(SBAR). Information from the following report(s) Procedure Summary and MAR was reviewed with the receiving nurse. Lines:       Opportunity for questions and clarification was provided.       Patient transported with:   Registered Nurse

## 2017-11-02 NOTE — PROGRESS NOTES
Recovery period without difficulty. Pt alert and oriented and denies pain. Dressing is clean, dry, and intact. Reviewed discharge instructions with patient and spouse, both verbalized understanding. Pt escorted to lobby discharge area via wheelchair. Vital signs and Destiny score completed.

## 2017-12-13 ENCOUNTER — APPOINTMENT (RX ONLY)
Dept: URBAN - METROPOLITAN AREA CLINIC 23 | Facility: CLINIC | Age: 77
Setting detail: DERMATOLOGY
End: 2017-12-13

## 2017-12-20 ENCOUNTER — HOSPITAL ENCOUNTER (OUTPATIENT)
Dept: PHYSICAL THERAPY | Age: 77
Discharge: HOME | End: 2017-12-20
Attending: PHYSICAL MEDICINE & REHABILITATION
Payer: MEDICARE

## 2017-12-20 DIAGNOSIS — R26.9 GAIT DISTURBANCE: ICD-10-CM

## 2017-12-20 DIAGNOSIS — G91.2 NPH (NORMAL PRESSURE HYDROCEPHALUS) (HCC): ICD-10-CM

## 2017-12-20 PROCEDURE — 97162 PT EVAL MOD COMPLEX 30 MIN: CPT

## 2017-12-20 PROCEDURE — G8978 MOBILITY CURRENT STATUS: HCPCS

## 2017-12-20 PROCEDURE — G8979 MOBILITY GOAL STATUS: HCPCS

## 2017-12-20 NOTE — THERAPY EVALUATION
Pascual   : 1940  [place]  Secondary: [address], [address], [place] [address] [facility]  [phone]   [phone]         OUTPATIENT PHYSICAL THERAPY:Initial Assessment 2017    ICD-10: Treatment Diagnosis: Unsteadiness on feet (R26.81)  Precautions/Allergies:   Latex; Ceftin [cefuroxime axetil]; and Sulfa (sulfonamide antibiotics)   Fall Risk Score: 5 (? 5 = High Risk)  MD Orders: evaluate and treat MEDICAL/REFERRING DIAGNOSIS:  NPH (normal pressure hydrocephalus) [G91.2]  Gait disturbance [R26.9]   DATE OF ONSET: few months ago  REFERRING PHYSICIAN: Ale Herrera, *  RETURN PHYSICIAN APPOINTMENT: unknown     ASSESSMENT (Date: 17):  Ms. Reinier Peña presents to physical therapy with a diagnosis of NPH and a recent visit to [facility]. At [facility], they decided to attempt conservative treatment and treat her with a diuretic. She is to report back to them in 2 month to assess for improvements. While taking the diuretic, she would benefit from skilled PT to improve her motor functional and mobility. She demonstrates decreased gait, decreased balance, and decreased functional mobility. She will be out of town for 10 days in January and will therefore not be able to attend therapy during that time. PROBLEM LIST (Impacting functional limitations):  1. Decreased Strength  2. Decreased ADL/Functional Activities  3. Decreased Transfer Abilities  4. Decreased Ambulation Ability/Technique  5. Decreased Balance  6. Decreased Activity Tolerance  7. Decreased [place] with Home Exercise Program INTERVENTIONS PLANNED:  1. Balance Exercise  2. Family Education  3. Gait Training  4. Heat  5. Home Exercise Program (HEP)  6. Manual Therapy  7. Neuromuscular Re-education/Strengthening  8. Therapeutic Activites  9. Therapeutic Exercise/Strengthening  10.  Transfer Training TREATMENT PLAN:  Effective Dates: 12/20/17 TO 3/20/18. Frequency/Duration: 2 times a week for 12 weeks  GOALS: (Goals have been discussed and agreed upon with patient.)  Short-Term Functional Goals: Time Frame: 6 weeks  1. Patient will be compliant with HEP. 2. Patient will have an increase on the tinajero balance to 42/56 in order to improve her functional balance. 3. Patient will have a decrease on the TUG to 13 seconds indicating improved gait mechanics. Discharge Goals: Time Frame: 12 weeks  1. Patient will be independent with HEP. 2. Patient will have an increase on the tinajero balance to 47/56 in order to decrease her risk of falls. 3. Patient will have a decrease on the TUG to less than 12 seconds in order to improve her functional mobility. 4. Patient will demonstrate turning with 4 steps or less indicating improve movement patterns. Rehabilitation Potential For Stated Goals: Good  Regarding Jayla Greenfield's therapy, I certify that the treatment plan above will be carried out by a therapist or under their direction. Thank you for this referral,  Jerrica Chaudhry, RICCIT, NCS     Referring Physician Signature: Ranjith Nesbitt, *              Date                    HISTORY:   Patient Stated Goal:        I want to be back to normal  History of Present Injury/Illness (Reason for Referral):  Patient reports she went to [facility] and they confirmed Dr. Ladonna London diagnosis of NPH. They are going to try diamox for 60 days which is a diuretic. At that time, they will send a video to [facility] and they will decide see if they need to do surgery. Feb 15th is 60 days. Nathan said to do PT in the meantime to help with mobility. She reports no falls but says she is getting more fearful of falling. Patient reports still having some memory issues and some incontinence as well as gait difficulties.    Past Medical History/Comorbidities:   Ms. Jaida Donovan  has a past medical history of CAD (coronary artery disease) (4/26/2016); Gait disorder (4/26/2016); Hypertension; Hypertriglyceridemia (4/26/2016); Psychotic disorder; and Small vessel disease, cerebrovascular (4/26/2016). Ms. Nathaniel Enriquez  has no past surgical history on file. Social History/Living Environment:     lives with   Prior Level of Function/Work/Activity:  Retired. Use to exercises 3 days a week but is currently unable to    Current Medications:    Current Outpatient Prescriptions:     atenolol (TENORMIN) 50 mg tablet, Take 2 tab by mouth daily, Disp: 60 Tab, Rfl: 3    FLUoxetine (PROZAC) 20 mg tablet, TAKE ONE CAPSULE BY MOUTH EVERY DAY, Disp: 30 Tab, Rfl: 5    conjugated estrogens (PREMARIN) 0.625 mg tablet, TAKE 1 TABLET BY MOUTH EVERY DAY, Disp: 30 Tab, Rfl: 5    triamterene-hydroCHLOROthiazide (MAXZIDE) 37.5-25 mg per tablet, TAKE 1 TABLET BY MOUTH TWICE DAILY, Disp: 180 Tab, Rfl: 3    fluticasone (FLONASE) 50 mcg/actuation nasal spray, nightly., Disp: , Rfl:    Diamox     Date Last Reviewed:  12/20/2017   Number of Personal Factors/Comorbidities that affect the Plan of Care: 1-2: MODERATE COMPLEXITY   EXAMINATION:   Observation/Orthostatic Postural Assessment:           Forward head, rounded shoulders   Mental Status:          Functional but does report some episodes of memory issues  Palpation:          No increased muscle tone noted  Sensation:         intact  Skin Integrity:          intact  Vision:          Functional   Balance:          Good static balance, decreased dynamic balance     Lower Extremity:   Strength PROM   Action R L R  L    Hip Flexion 4      Hip Extension 4      Hip Abduction 4      Hip Adduction       Knee Flexion       Knee Extension 4+      Dorsi Flexion 4+      Plantar Flexion       Inversion       Eversion                 Upper Extremity:         Grossly 4+/5  Functional Mobility:         Gait/Ambulation:  Ambulates with slow gait speed, shuffling gait pattern with decreased heel strike R>L, wide WILLY, flexed knees, some toe out B, decreased step length, no assistive device         Transfers:  Slow, push to stand, wide WILLY, few attempts to stand. Slow, shuffling turn with several steps during turning        Bed Mobility:  Functional         Stairs:  NT        Wheelchair:  NT              Body Structures Involved:  1. Nerves  2. Bones  3. Joints  4. Muscles Body Functions Affected:  1. Mental  2. Genitourinary  3. Neuromusculoskeletal  4. Movement Related Activities and Participation Affected:  1. Mobility  2. Self Care  3. Domestic Life  4. Interpersonal Interactions and Relationships   Number of elements that affect the Plan of Care: 4+: HIGH COMPLEXITY   CLINICAL PRESENTATION:   Presentation: Evolving clinical presentation with changing clinical characteristics: MODERATE COMPLEXITY   CLINICAL DECISION MAKING:   Outcome Measure: Tool Used: Schwab Balance Scale  Score:  Initial: 39/56 Most Recent: X/56 (Date: -- )   Interpretation of Score: Each section is scored on a 0-4 scale, 0 representing the patients inability to perform the task and 4 representing independence. The scores of each section are added together for a total score of 56. The higher the patients score, the more independent the patient is. Any score below 45 indicates increased risk for falls. Score 56 55-45 44-34 33-23 22-12 11-1 0   Modifier CH CI CJ CK CL CM CN     ? Mobility - Walking and Moving Around:     - CURRENT STATUS: CJ - 20%-39% impaired, limited or restricted    - GOAL STATUS: CI - 1%-19% impaired, limited or restricted    - D/C STATUS:  ---------------To be determined---------------    Tool Used: Timed Up and Go (TUG)  Score:  Initial: 15.16 seconds Most Recent: X seconds (Date: -- )   Interpretation of Score:  The test measures, in seconds, the time taken by an individual to stand up from a standard arm chair (seat height 46 cm [18 in], arm height 65 cm [25.6 in]), walk a distance of 3 meters (118 in, approx 10 ft), turn, walk back to the chair and sit down. If the individual takes longer than 14 seconds to complete TUG, this indicates risk for falls. Score 7 7.5-10.5 11-14 14.5-17.5 18-21 21.5-24.5 25+   Modifier CH CI CJ CK CL CM CN         Medical Necessity:   · Patient is expected to demonstrate progress in balance, coordination and functional technique to improve safety during ADLs and IADLs. · Patient demonstrates good rehab potential due to higher previous functional level. Reason for Services/Other Comments:  · Patient continues to require modification of therapeutic interventions to increase complexity of exercises. Use of outcome tool(s) and clinical judgement create a POC that gives a: Questionable prediction of patient's progress: MODERATE COMPLEXITY   TREATMENT:   (In addition to Assessment/Re-Assessment sessions the following treatments were rendered)  Pre Treatment Symptoms: see above. Therapeutic Exercise: ( ):  Exercises per grid below to improve mobility, balance and coordination. Required moderate visual, verbal and tactile cues to promote proper body alignment and promote proper body mechanics. Progressed complexity of movement as indicated. Date:   Date:   Date:     Activity/Exercise Parameters Parameters Parameters   NuStep      Heel raises with 3 second hold      LSVT sit to stand      PWR standing reach for weight shift      PWR marching gait with arm swing      LSVT forward step out      LSVT side step out            Treatment/Session Assessment:  See assessment above. · Pain/ Symptoms: Initial:   0/10 Post Session:  0/10 ·   Compliance with Program/Exercises: Will assess as treatment progresses. · Recommendations/Intent for next treatment session: \"Next visit will focus on advancements to more challenging activities and reduction in assistance provided\".   Total Treatment Duration:  PT Patient Time In/Time Out  Time In: 0830  Time Out: [address] [facility], T

## 2017-12-20 NOTE — PROGRESS NOTES
Ambulatory/Rehab Services H2 Model Falls Risk Assessment    Risk Factor Pts. ·   Confusion/Disorientation/Impulsivity  [x]    4 ·   Symptomatic Depression  []   2 ·   Altered Elimination  []   1 ·   Dizziness/Vertigo  []   1 ·   Gender (Male)  []   1 ·   Any administered antiepileptics (anticonvulsants):  []   2 ·   Any administered benzodiazepines:  []   1 ·   Visual Impairment (specify):  []   1 ·   Portable Oxygen Use  []   1 ·   Orthostatic ? BP  []   1 ·   History of Recent Falls (within 3 mos.)  []   5     Ability to Rise from Chair (choose one) Pts. ·   Ability to rise in a single movement  []   0 ·   Pushes up, successful in one attempt  [x]   1 ·   Multiple attempts, but successful  []   3 ·   Unable to rise without assistance  []   4   Total: (5 or greater = High Risk) 5     Falls Prevention Plan:   []                Physical Limitations to Exercise (specify):   []                Mobility Assistance Device (type):   []                Exercise/Equipment Adaptation (specify):    ©2010 [facility] of [address] Patent #[id number].  Federal Law prohibits the replication, distribution or use without written permission from [facility] [employer]

## 2017-12-27 ENCOUNTER — HOSPITAL ENCOUNTER (OUTPATIENT)
Dept: PHYSICAL THERAPY | Age: 77
End: 2017-12-27
Attending: PHYSICAL MEDICINE & REHABILITATION
Payer: MEDICARE

## 2017-12-29 ENCOUNTER — HOSPITAL ENCOUNTER (OUTPATIENT)
Dept: PHYSICAL THERAPY | Age: 77
Discharge: HOME | End: 2017-12-29
Attending: PHYSICAL MEDICINE & REHABILITATION
Payer: MEDICARE

## 2017-12-29 PROCEDURE — 97110 THERAPEUTIC EXERCISES: CPT

## 2017-12-29 NOTE — PROGRESS NOTES
Angelica Bryson  : 1940  [place]  [place] [address] [place] [address] at [facility]  [address] [facility], [address]  [phone]   [phone]         OUTPATIENT PHYSICAL THERAPY:Daily Note 2017    ICD-10: Treatment Diagnosis: Unsteadiness on feet (R26.81)  Precautions/Allergies:   Latex; Ceftin [cefuroxime axetil]; and Sulfa (sulfonamide antibiotics)   Fall Risk Score: 5 (? 5 = High Risk)  MD Orders: evaluate and treat MEDICAL/REFERRING DIAGNOSIS:  gait distrubance   DATE OF ONSET: few months ago  REFERRING PHYSICIAN: Aliyah Cruz, *  RETURN PHYSICIAN APPOINTMENT: unknown     ASSESSMENT (Date: 17):  Ms. Nuha Blankenship presents to physical therapy with a diagnosis of NPH and a recent visit to [facility]. At [facility], they decided to attempt conservative treatment and treat her with a diuretic. She is to report back to them in 2 month to assess for improvements. While taking the diuretic, she would benefit from skilled PT to improve her motor functional and mobility. She demonstrates decreased gait, decreased balance, and decreased functional mobility. She will be out of town for 10 days in January and will therefore not be able to attend therapy during that time. PROBLEM LIST (Impacting functional limitations):  1. Decreased Strength  2. Decreased ADL/Functional Activities  3. Decreased Transfer Abilities  4. Decreased Ambulation Ability/Technique  5. Decreased Balance  6. Decreased Activity Tolerance  7. Decreased [place] with Home Exercise Program INTERVENTIONS PLANNED:  1. Balance Exercise  2. Family Education  3. Gait Training  4. Heat  5. Home Exercise Program (HEP)  6. Manual Therapy  7. Neuromuscular Re-education/Strengthening  8. Therapeutic Activites  9. Therapeutic Exercise/Strengthening  10. Transfer Training   TREATMENT PLAN:  Effective Dates: 17 TO 3/20/18. Frequency/Duration: 2 times a week for 12 weeks  GOALS: (Goals have been discussed and agreed upon with patient.)  Short-Term Functional Goals: Time Frame: 6 weeks  1. Patient will be compliant with HEP. 2. Patient will have an increase on the tinajero balance to 42/56 in order to improve her functional balance. 3. Patient will have a decrease on the TUG to 13 seconds indicating improved gait mechanics. Discharge Goals: Time Frame: 12 weeks  1. Patient will be independent with HEP. 2. Patient will have an increase on the tinajero balance to 47/56 in order to decrease her risk of falls. 3. Patient will have a decrease on the TUG to less than 12 seconds in order to improve her functional mobility. 4. Patient will demonstrate turning with 4 steps or less indicating improve movement patterns. Rehabilitation Potential For Stated Goals: Good  Regarding Jayla Grenefield's therapy, I certify that the treatment plan above will be carried out by a therapist or under their direction. Thank you for this referral,  Oralia Chow, PTA, NCS     Referring Physician Signature: Crispin Mauricio, *              Date                    HISTORY:   Patient Stated Goal:        I want to be back to normal  History of Present Injury/Illness (Reason for Referral):  Patient reports she went to [facility] and they confirmed Dr. Roldan Diss diagnosis of NPH. They are going to try diamox for 60 days which is a diuretic. At that time, they will send a video to [facility] and they will decide see if they need to do surgery. Feb 15th is 60 days. Nathan said to do PT in the meantime to help with mobility. She reports no falls but says she is getting more fearful of falling. Patient reports still having some memory issues and some incontinence as well as gait difficulties. Past Medical History/Comorbidities:   Ms. Jevon Payton  has a past medical history of CAD (coronary artery disease) (4/26/2016); Gait disorder (4/26/2016);  Hypertension; Hypertriglyceridemia (4/26/2016); Psychotic disorder; and Small vessel disease, cerebrovascular (4/26/2016). Ms. Abner Bradford  has no past surgical history on file. Social History/Living Environment:     lives with   Prior Level of Function/Work/Activity:  Retired. Use to exercises 3 days a week but is currently unable to    Current Medications:    Current Outpatient Prescriptions:     niacinamide 500 mg tablet, TK 1 T PO  BID, Disp: , Rfl: 3    atenolol (TENORMIN) 50 mg tablet, Take 2 tab by mouth daily, Disp: 60 Tab, Rfl: 3    FLUoxetine (PROZAC) 20 mg tablet, TAKE ONE CAPSULE BY MOUTH EVERY DAY, Disp: 30 Tab, Rfl: 5    conjugated estrogens (PREMARIN) 0.625 mg tablet, TAKE 1 TABLET BY MOUTH EVERY DAY, Disp: 30 Tab, Rfl: 5    triamterene-hydroCHLOROthiazide (MAXZIDE) 37.5-25 mg per tablet, TAKE 1 TABLET BY MOUTH TWICE DAILY, Disp: 180 Tab, Rfl: 3    fluticasone (FLONASE) 50 mcg/actuation nasal spray, nightly., Disp: , Rfl:    Diamox     Date Last Reviewed:  12/29/2017   Number of Personal Factors/Comorbidities that affect the Plan of Care: 1-2: MODERATE COMPLEXITY   EXAMINATION:   Observation/Orthostatic Postural Assessment:           Forward head, rounded shoulders   Mental Status:          Functional but does report some episodes of memory issues  Palpation:          No increased muscle tone noted  Sensation:         intact  Skin Integrity:          intact  Vision:          Functional   Balance:          Good static balance, decreased dynamic balance     Lower Extremity:   Strength PROM   Action R L R  L    Hip Flexion 4      Hip Extension 4      Hip Abduction 4      Hip Adduction       Knee Flexion       Knee Extension 4+      Dorsi Flexion 4+      Plantar Flexion       Inversion       Eversion                 Upper Extremity:         Grossly 4+/5  Functional Mobility:         Gait/Ambulation:  Ambulates with slow gait speed, shuffling gait pattern with decreased heel strike R>L, wide WILLY, flexed knees, some toe out B, decreased step length, no assistive device         Transfers:  Slow, push to stand, wide WILLY, few attempts to stand. Slow, shuffling turn with several steps during turning        Bed Mobility:  Functional         Stairs:  NT        Wheelchair:  NT              Body Structures Involved:  1. Nerves  2. Bones  3. Joints  4. Muscles Body Functions Affected:  1. Mental  2. Genitourinary  3. Neuromusculoskeletal  4. Movement Related Activities and Participation Affected:  1. Mobility  2. Self Care  3. Domestic Life  4. Interpersonal Interactions and Relationships   Number of elements that affect the Plan of Care: 4+: HIGH COMPLEXITY   CLINICAL PRESENTATION:   Presentation: Evolving clinical presentation with changing clinical characteristics: MODERATE COMPLEXITY   CLINICAL DECISION MAKING:   Outcome Measure: Tool Used: Schwab Balance Scale  Score:  Initial: 39/56 Most Recent: X/56 (Date: -- )   Interpretation of Score: Each section is scored on a 0-4 scale, 0 representing the patients inability to perform the task and 4 representing independence. The scores of each section are added together for a total score of 56. The higher the patients score, the more independent the patient is. Any score below 45 indicates increased risk for falls. Score 56 55-45 44-34 33-23 22-12 11-1 0   Modifier CH CI CJ CK CL CM CN     ? Mobility - Walking and Moving Around:     - CURRENT STATUS: CJ - 20%-39% impaired, limited or restricted    - GOAL STATUS: CI - 1%-19% impaired, limited or restricted    - D/C STATUS:  ---------------To be determined---------------    Tool Used: Timed Up and Go (TUG)  Score:  Initial: 15.16 seconds Most Recent: X seconds (Date: -- )   Interpretation of Score:  The test measures, in seconds, the time taken by an individual to stand up from a standard arm chair (seat height 46 cm [18 in], arm height 65 cm [25.6 in]), walk a distance of 3 meters (118 in, approx 10 ft), turn, walk back to the chair and sit down. If the individual takes longer than 14 seconds to complete TUG, this indicates risk for falls. Score 7 7.5-10.5 11-14 14.5-17.5 18-21 21.5-24.5 25+   Modifier CH CI CJ CK CL CM CN         Medical Necessity:   · Patient is expected to demonstrate progress in balance, coordination and functional technique to improve safety during ADLs and IADLs. · Patient demonstrates good rehab potential due to higher previous functional level. Reason for Services/Other Comments:  · Patient continues to require modification of therapeutic interventions to increase complexity of exercises. Use of outcome tool(s) and clinical judgement create a POC that gives a: Questionable prediction of patient's progress: MODERATE COMPLEXITY   TREATMENT:   (In addition to Assessment/Re-Assessment sessions the following treatments were rendered)  Pre Treatment Symptoms: Reports she went to [place] and back with no difficulties. No complaints of pain today. Therapeutic Exercise: ( 50 minutes ):  Exercises per grid below to improve mobility, balance and coordination. Required moderate visual, verbal and tactile cues to promote proper body alignment and promote proper body mechanics. Progressed complexity of movement as indicated. Date:  12-29-17 Date:   Date:     Activity/Exercise Parameters Parameters Parameters   NuStep Level 2  10 minutes      Heel raises with 3 second hold 10 x 3 sec hold      LSVT sit to stand X 10 with no hands     PWR standing reach for weight shift 2 x 10 B     PWR marching gait with arm swing 3 laps on track with cues for arm swing     LSVT forward step out X 10 B with 1rail     LSVT side step out X 10 with 1 rail           Treatment/Session Assessment:  Patient needed verbal cues for bigger steps and more weight shift with certain activities. We tried the walking poles to assist with arm swing, but was confusing for patient.   She performed better with just verbal cues for arm swing and bigger steps. No pain upon departure. She was given handouts for 3 exercises for HEP. · Pain/ Symptoms: Initial:   0/10 Post Session:  0/10 ·   Compliance with Program/Exercises: Will assess as treatment progresses. · Recommendations/Intent for next treatment session: \"Next visit will focus on advancements to more challenging activities and reduction in assistance provided\".   Total Treatment Duration: 50 minutes   PT Patient Time In/Time Out  Time In: 0830  Time Out: 25 [facility]

## 2018-01-02 ENCOUNTER — HOSPITAL ENCOUNTER (OUTPATIENT)
Dept: PHYSICAL THERAPY | Age: 78
Discharge: HOME | End: 2018-01-02
Attending: PHYSICAL MEDICINE & REHABILITATION
Payer: MEDICARE

## 2018-01-02 PROCEDURE — 97110 THERAPEUTIC EXERCISES: CPT

## 2018-01-02 NOTE — PROGRESS NOTES
Levi Guevara  : 1940  [place]  [place] [address] [place] [address] at [facility], 101 [facility], [address]  [phone]   [phone]         OUTPATIENT PHYSICAL THERAPY:Daily Note 2018    ICD-10: Treatment Diagnosis: Unsteadiness on feet (R26.81)  Precautions/Allergies:   Latex; Ceftin [cefuroxime axetil]; and Sulfa (sulfonamide antibiotics)   Fall Risk Score: 5 (? 5 = High Risk)  MD Orders: evaluate and treat MEDICAL/REFERRING DIAGNOSIS:  gait distrubance   DATE OF ONSET: few months ago  REFERRING PHYSICIAN: Melanie Diaz, *  RETURN PHYSICIAN APPOINTMENT: unknown     ASSESSMENT (Date: 17):  Ms. Shun Corona presents to physical therapy with a diagnosis of NPH and a recent visit to [facility]. At [facility], they decided to attempt conservative treatment and treat her with a diuretic. She is to report back to them in 2 month to assess for improvements. While taking the diuretic, she would benefit from skilled PT to improve her motor functional and mobility. She demonstrates decreased gait, decreased balance, and decreased functional mobility. She will be out of town for 10 days in January and will therefore not be able to attend therapy during that time. PROBLEM LIST (Impacting functional limitations):  1. Decreased Strength  2. Decreased ADL/Functional Activities  3. Decreased Transfer Abilities  4. Decreased Ambulation Ability/Technique  5. Decreased Balance  6. Decreased Activity Tolerance  7. Decreased [place] with Home Exercise Program INTERVENTIONS PLANNED:  1. Balance Exercise  2. Family Education  3. Gait Training  4. Heat  5. Home Exercise Program (HEP)  6. Manual Therapy  7. Neuromuscular Re-education/Strengthening  8. Therapeutic Activites  9. Therapeutic Exercise/Strengthening  10. Transfer Training   TREATMENT PLAN:  Effective Dates: 17 TO 3/20/18. Frequency/Duration: 2 times a week for 12 weeks  GOALS: (Goals have been discussed and agreed upon with patient.)  Short-Term Functional Goals: Time Frame: 6 weeks  1. Patient will be compliant with HEP. 2. Patient will have an increase on the tinajero balance to 42/56 in order to improve her functional balance. 3. Patient will have a decrease on the TUG to 13 seconds indicating improved gait mechanics. Discharge Goals: Time Frame: 12 weeks  1. Patient will be independent with HEP. 2. Patient will have an increase on the tinajero balance to 47/56 in order to decrease her risk of falls. 3. Patient will have a decrease on the TUG to less than 12 seconds in order to improve her functional mobility. 4. Patient will demonstrate turning with 4 steps or less indicating improve movement patterns. Rehabilitation Potential For Stated Goals: Good  Regarding Jayla Greenfield's therapy, I certify that the treatment plan above will be carried out by a therapist or under their direction. Thank you for this referral,  Penny Rapp, DPT, NCS     Referring Physician Signature: Yojana April, *              Date                    HISTORY:   Patient Stated Goal:        I want to be back to normal  History of Present Injury/Illness (Reason for Referral):  Patient reports she went to [facility] and they confirmed Dr. Henyr Rascon diagnosis of NPH. They are going to try diamox for 60 days which is a diuretic. At that time, they will send a video to [facility] and they will decide see if they need to do surgery. Feb 15th is 60 days. Nathan said to do PT in the meantime to help with mobility. She reports no falls but says she is getting more fearful of falling. Patient reports still having some memory issues and some incontinence as well as gait difficulties. Past Medical History/Comorbidities:   Ms. Arslan Manzo  has a past medical history of CAD (coronary artery disease) (4/26/2016); Gait disorder (4/26/2016);  Hypertension; Hypertriglyceridemia (4/26/2016); Psychotic disorder; and Small vessel disease, cerebrovascular (4/26/2016). Ms. Nara Mitchell  has no past surgical history on file. Social History/Living Environment:     lives with   Prior Level of Function/Work/Activity:  Retired. Use to exercises 3 days a week but is currently unable to    Current Medications:    Current Outpatient Prescriptions:     niacinamide 500 mg tablet, TK 1 T PO  BID, Disp: , Rfl: 3    atenolol (TENORMIN) 50 mg tablet, Take 2 tab by mouth daily, Disp: 60 Tab, Rfl: 3    FLUoxetine (PROZAC) 20 mg tablet, TAKE ONE CAPSULE BY MOUTH EVERY DAY, Disp: 30 Tab, Rfl: 5    conjugated estrogens (PREMARIN) 0.625 mg tablet, TAKE 1 TABLET BY MOUTH EVERY DAY, Disp: 30 Tab, Rfl: 5    triamterene-hydroCHLOROthiazide (MAXZIDE) 37.5-25 mg per tablet, TAKE 1 TABLET BY MOUTH TWICE DAILY, Disp: 180 Tab, Rfl: 3    fluticasone (FLONASE) 50 mcg/actuation nasal spray, nightly., Disp: , Rfl:    Diamox     Date Last Reviewed:  1/2/2018   Number of Personal Factors/Comorbidities that affect the Plan of Care: 1-2: MODERATE COMPLEXITY   EXAMINATION:   Observation/Orthostatic Postural Assessment:           Forward head, rounded shoulders   Mental Status:          Functional but does report some episodes of memory issues  Palpation:          No increased muscle tone noted  Sensation:         intact  Skin Integrity:          intact  Vision:          Functional   Balance:          Good static balance, decreased dynamic balance     Lower Extremity:   Strength PROM   Action R L R  L    Hip Flexion 4      Hip Extension 4      Hip Abduction 4      Hip Adduction       Knee Flexion       Knee Extension 4+      Dorsi Flexion 4+      Plantar Flexion       Inversion       Eversion                 Upper Extremity:         Grossly 4+/5  Functional Mobility:         Gait/Ambulation:  Ambulates with slow gait speed, shuffling gait pattern with decreased heel strike R>L, wide WILLY, flexed knees, some toe out B, decreased step length, no assistive device         Transfers:  Slow, push to stand, wide WILLY, few attempts to stand. Slow, shuffling turn with several steps during turning        Bed Mobility:  Functional         Stairs:  NT        Wheelchair:  NT              Body Structures Involved:  1. Nerves  2. Bones  3. Joints  4. Muscles Body Functions Affected:  1. Mental  2. Genitourinary  3. Neuromusculoskeletal  4. Movement Related Activities and Participation Affected:  1. Mobility  2. Self Care  3. Domestic Life  4. Interpersonal Interactions and Relationships   Number of elements that affect the Plan of Care: 4+: HIGH COMPLEXITY   CLINICAL PRESENTATION:   Presentation: Evolving clinical presentation with changing clinical characteristics: MODERATE COMPLEXITY   CLINICAL DECISION MAKING:   Outcome Measure: Tool Used: Schwab Balance Scale  Score:  Initial: 39/56 Most Recent: X/56 (Date: -- )   Interpretation of Score: Each section is scored on a 0-4 scale, 0 representing the patients inability to perform the task and 4 representing independence. The scores of each section are added together for a total score of 56. The higher the patients score, the more independent the patient is. Any score below 45 indicates increased risk for falls. Score 56 55-45 44-34 33-23 22-12 11-1 0   Modifier CH CI CJ CK CL CM CN     ? Mobility - Walking and Moving Around:     - CURRENT STATUS: CJ - 20%-39% impaired, limited or restricted    - GOAL STATUS: CI - 1%-19% impaired, limited or restricted    - D/C STATUS:  ---------------To be determined---------------    Tool Used: Timed Up and Go (TUG)  Score:  Initial: 15.16 seconds Most Recent: X seconds (Date: -- )   Interpretation of Score:  The test measures, in seconds, the time taken by an individual to stand up from a standard arm chair (seat height 46 cm [18 in], arm height 65 cm [25.6 in]), walk a distance of 3 meters (118 in, approx 10 ft), turn, walk back to the chair and sit down. If the individual takes longer than 14 seconds to complete TUG, this indicates risk for falls. Score 7 7.5-10.5 11-14 14.5-17.5 18-21 21.5-24.5 25+   Modifier CH CI CJ CK CL CM CN         Medical Necessity:   · Patient is expected to demonstrate progress in balance, coordination and functional technique to improve safety during ADLs and IADLs. · Patient demonstrates good rehab potential due to higher previous functional level. Reason for Services/Other Comments:  · Patient continues to require modification of therapeutic interventions to increase complexity of exercises. Use of outcome tool(s) and clinical judgement create a POC that gives a: Questionable prediction of patient's progress: MODERATE COMPLEXITY   TREATMENT:   (In addition to Assessment/Re-Assessment sessions the following treatments were rendered)  Pre Treatment Symptoms: Patient reports R sided soreness today. No pain. Therapeutic Exercise: ( 40 minutes ):  Exercises per grid below to improve mobility, balance and coordination. Required moderate visual, verbal and tactile cues to promote proper body alignment and promote proper body mechanics. Progressed complexity of movement as indicated. Date:  12-29-17 Date:  1/2/17 Date:     Activity/Exercise Parameters Parameters Parameters   NuStep Level 2  10 minutes  Level 2 x 8 minutes     Calf stretch on incline board  2 x 60 sec hold    Heel raises with 3 second hold 10 x 3 sec hold  X 10 reps    Gait initiation   Holding to half wall with visual markers to increase step size.   Also cues for start and stop to break up the movements    LSVT sit to stand X 10 with no hands     PWR standing reach for weight shift 2 x 10 B     PWR marching gait with arm swing 3 laps on track with cues for arm swing     LSVT forward step out X 10 B with 1rail X 10 reps at half wall with a lot of tactile cues    LSVT side step out X 10 with 1 rail X 10 reps B at half wall with a lot of tactile cues          Treatment/Session Assessment:  Patient had a hard time following commands today and needed tactile cues to perform exercises. She continues to need a lot of cueing to improve movement size and accuracy. She continues to benefit from skilled PT to improve functional mobility and balance. · Pain/ Symptoms: Initial:   0/10 Post Session:  0/10 ·   Compliance with Program/Exercises: Will assess as treatment progresses. · Recommendations/Intent for next treatment session: \"Next visit will focus on advancements to more challenging activities and reduction in assistance provided\".   Total Treatment Duration:   PT Patient Time In/Time Out  Time In: 0900  Time Out: [address], DPT

## 2018-01-04 ENCOUNTER — HOSPITAL ENCOUNTER (OUTPATIENT)
Dept: PHYSICAL THERAPY | Age: 78
Discharge: HOME | End: 2018-01-04
Attending: PHYSICAL MEDICINE & REHABILITATION
Payer: MEDICARE

## 2018-01-04 PROCEDURE — 97110 THERAPEUTIC EXERCISES: CPT

## 2018-01-04 NOTE — PROGRESS NOTES
Patrick Fournier  : 1940  [place]  [place]: [place] [address] [place] [address] at [facility], 101 [facility], [address]  [phone]   [phone]         OUTPATIENT PHYSICAL THERAPY:Daily Note 2018    ICD-10: Treatment Diagnosis: Unsteadiness on feet (R26.81)  Precautions/Allergies:   Latex; Ceftin [cefuroxime axetil]; and Sulfa (sulfonamide antibiotics)   Fall Risk Score: 5 (? 5 = High Risk)  MD Orders: evaluate and treat MEDICAL/REFERRING DIAGNOSIS:  gait distrubance   DATE OF ONSET: few months ago  REFERRING PHYSICIAN: Blake Cortes, *  RETURN PHYSICIAN APPOINTMENT: unknown     ASSESSMENT (Date: 17):  Ms. Triny Gracia presents to physical therapy with a diagnosis of NPH and a recent visit to [facility]. At [facility], they decided to attempt conservative treatment and treat her with a diuretic. She is to report back to them in 2 month to assess for improvements. While taking the diuretic, she would benefit from skilled PT to improve her motor functional and mobility. She demonstrates decreased gait, decreased balance, and decreased functional mobility. She will be out of town for 10 days in January and will therefore not be able to attend therapy during that time. PROBLEM LIST (Impacting functional limitations):  1. Decreased Strength  2. Decreased ADL/Functional Activities  3. Decreased Transfer Abilities  4. Decreased Ambulation Ability/Technique  5. Decreased Balance  6. Decreased Activity Tolerance  7. Decreased [place] with Home Exercise Program INTERVENTIONS PLANNED:  1. Balance Exercise  2. Family Education  3. Gait Training  4. Heat  5. Home Exercise Program (HEP)  6. Manual Therapy  7. Neuromuscular Re-education/Strengthening  8. Therapeutic Activites  9. Therapeutic Exercise/Strengthening  10. Transfer Training   TREATMENT PLAN:  Effective Dates: 17 TO 3/20/18. Frequency/Duration: 2 times a week for 12 weeks  GOALS: (Goals have been discussed and agreed upon with patient.)  Short-Term Functional Goals: Time Frame: 6 weeks  1. Patient will be compliant with HEP. 2. Patient will have an increase on the tinajero balance to 42/56 in order to improve her functional balance. 3. Patient will have a decrease on the TUG to 13 seconds indicating improved gait mechanics. Discharge Goals: Time Frame: 12 weeks  1. Patient will be independent with HEP. 2. Patient will have an increase on the tinajero balance to 47/56 in order to decrease her risk of falls. 3. Patient will have a decrease on the TUG to less than 12 seconds in order to improve her functional mobility. 4. Patient will demonstrate turning with 4 steps or less indicating improve movement patterns. Rehabilitation Potential For Stated Goals: Good  Regarding Jayla Greenfield's therapy, I certify that the treatment plan above will be carried out by a therapist or under their direction. Thank you for this referral,  Berwyn Spatz, DPT, NCS     Referring Physician Signature: Marjorie Case, *              Date                    HISTORY:   Patient Stated Goal:        I want to be back to normal  History of Present Injury/Illness (Reason for Referral):  Patient reports she went to [facility] and they confirmed Dr. Anel Mayo diagnosis of NPH. They are going to try diamox for 60 days which is a diuretic. At that time, they will send a video to [facility] and they will decide see if they need to do surgery. Feb 15th is 60 days. Nathan said to do PT in the meantime to help with mobility. She reports no falls but says she is getting more fearful of falling. Patient reports still having some memory issues and some incontinence as well as gait difficulties. Past Medical History/Comorbidities:   Ms. Connor Hollis  has a past medical history of CAD (coronary artery disease) (4/26/2016); Gait disorder (4/26/2016);  Hypertension; Hypertriglyceridemia (4/26/2016); Psychotic disorder; and Small vessel disease, cerebrovascular (4/26/2016). Ms. Nuha Blankenship  has no past surgical history on file. Social History/Living Environment:     lives with   Prior Level of Function/Work/Activity:  Retired. Use to exercises 3 days a week but is currently unable to    Current Medications:    Current Outpatient Prescriptions:     niacinamide 500 mg tablet, TK 1 T PO  BID, Disp: , Rfl: 3    atenolol (TENORMIN) 50 mg tablet, Take 2 tab by mouth daily, Disp: 60 Tab, Rfl: 3    FLUoxetine (PROZAC) 20 mg tablet, TAKE ONE CAPSULE BY MOUTH EVERY DAY, Disp: 30 Tab, Rfl: 5    conjugated estrogens (PREMARIN) 0.625 mg tablet, TAKE 1 TABLET BY MOUTH EVERY DAY, Disp: 30 Tab, Rfl: 5    triamterene-hydroCHLOROthiazide (MAXZIDE) 37.5-25 mg per tablet, TAKE 1 TABLET BY MOUTH TWICE DAILY, Disp: 180 Tab, Rfl: 3    fluticasone (FLONASE) 50 mcg/actuation nasal spray, nightly., Disp: , Rfl:    Diamox     Date Last Reviewed:  1/4/2018   Number of Personal Factors/Comorbidities that affect the Plan of Care: 1-2: MODERATE COMPLEXITY   EXAMINATION:   Observation/Orthostatic Postural Assessment:           Forward head, rounded shoulders   Mental Status:          Functional but does report some episodes of memory issues  Palpation:          No increased muscle tone noted  Sensation:         intact  Skin Integrity:          intact  Vision:          Functional   Balance:          Good static balance, decreased dynamic balance     Lower Extremity:   Strength PROM   Action R L R  L    Hip Flexion 4      Hip Extension 4      Hip Abduction 4      Hip Adduction       Knee Flexion       Knee Extension 4+      Dorsi Flexion 4+      Plantar Flexion       Inversion       Eversion                 Upper Extremity:         Grossly 4+/5  Functional Mobility:         Gait/Ambulation:  Ambulates with slow gait speed, shuffling gait pattern with decreased heel strike R>L, wide WILLY, flexed knees, some toe out B, decreased step length, no assistive device         Transfers:  Slow, push to stand, wide WILLY, few attempts to stand. Slow, shuffling turn with several steps during turning        Bed Mobility:  Functional         Stairs:  NT        Wheelchair:  NT              Body Structures Involved:  1. Nerves  2. Bones  3. Joints  4. Muscles Body Functions Affected:  1. Mental  2. Genitourinary  3. Neuromusculoskeletal  4. Movement Related Activities and Participation Affected:  1. Mobility  2. Self Care  3. Domestic Life  4. Interpersonal Interactions and Relationships   Number of elements that affect the Plan of Care: 4+: HIGH COMPLEXITY   CLINICAL PRESENTATION:   Presentation: Evolving clinical presentation with changing clinical characteristics: MODERATE COMPLEXITY   CLINICAL DECISION MAKING:   Outcome Measure: Tool Used: Schwab Balance Scale  Score:  Initial: 39/56 Most Recent: X/56 (Date: -- )   Interpretation of Score: Each section is scored on a 0-4 scale, 0 representing the patients inability to perform the task and 4 representing independence. The scores of each section are added together for a total score of 56. The higher the patients score, the more independent the patient is. Any score below 45 indicates increased risk for falls. Score 56 55-45 44-34 33-23 22-12 11-1 0   Modifier CH CI CJ CK CL CM CN     ? Mobility - Walking and Moving Around:     - CURRENT STATUS: CJ - 20%-39% impaired, limited or restricted    - GOAL STATUS: CI - 1%-19% impaired, limited or restricted    - D/C STATUS:  ---------------To be determined---------------    Tool Used: Timed Up and Go (TUG)  Score:  Initial: 15.16 seconds Most Recent: X seconds (Date: -- )   Interpretation of Score:  The test measures, in seconds, the time taken by an individual to stand up from a standard arm chair (seat height 46 cm [18 in], arm height 65 cm [25.6 in]), walk a distance of 3 meters (118 in, approx 10 ft), turn, walk back to the chair and sit down. If the individual takes longer than 14 seconds to complete TUG, this indicates risk for falls. Score 7 7.5-10.5 11-14 14.5-17.5 18-21 21.5-24.5 25+   Modifier CH CI CJ CK CL CM CN         Medical Necessity:   · Patient is expected to demonstrate progress in balance, coordination and functional technique to improve safety during ADLs and IADLs. · Patient demonstrates good rehab potential due to higher previous functional level. Reason for Services/Other Comments:  · Patient continues to require modification of therapeutic interventions to increase complexity of exercises. Use of outcome tool(s) and clinical judgement create a POC that gives a: Questionable prediction of patient's progress: MODERATE COMPLEXITY   TREATMENT:   (In addition to Assessment/Re-Assessment sessions the following treatments were rendered)  Pre Treatment Symptoms: Patient reports feeling like therapy is helping. Therapeutic Exercise: ( 40 minutes ):  Exercises per grid below to improve mobility, balance and coordination. Required moderate visual, verbal and tactile cues to promote proper body alignment and promote proper body mechanics. Progressed complexity of movement as indicated. Date:  12-29-17 Date:  1/2/18 Date:  1/4/18   Activity/Exercise Parameters Parameters Parameters   NuStep Level 2  10 minutes  Level 2 x 8 minutes  Level 2 x 8 minutes - verbal cues to maintain BIG movements   Calf stretch on incline board  2 x 60 sec hold X 60 sec hold   Heel raises with 3 second hold 10 x 3 sec hold  X 10 reps X 15 reps   Gait initiation   Holding to half wall with visual markers to increase step size. Also cues for start and stop to break up the movements Holding to half wall with visual markers to increase step size.   Also cues for start and stop to break up the movements   LSVT sit to stand X 10 with no hands     PWR standing reach for weight shift 2 x 10 B     PWR marching gait with arm swing 3 laps on track with cues for arm swing     LSVT forward step out X 10 B with 1rail X 10 reps at half wall with a lot of tactile cues    LSVT side step out X 10 with 1 rail X 10 reps B at half wall with a lot of tactile cues X 10 reps B at half wall with less cuing today   BIG walking   Verbal and visual cues for arm swing x 120'   PWR backward step   X 10 reps B with lots of cues   Ambulation    X 120' with cues to maintain arm swing         Treatment/Session Assessment:  Patient continues to need cues to improve functional mobility. She does improve well with verbal and visual cues but needs frequent reminders. She continues to benefit from skilled PT to improve functional mobility and balance. · Pain/ Symptoms: Initial:   0/10 Post Session:  0/10 ·   Compliance with Program/Exercises: Will assess as treatment progresses. · Recommendations/Intent for next treatment session: \"Next visit will focus on advancements to more challenging activities and reduction in assistance provided\".   Total Treatment Duration:   PT Patient Time In/Time Out  Time In: 1305  Time Out: [address] DPT

## 2018-01-17 ENCOUNTER — HOSPITAL ENCOUNTER (OUTPATIENT)
Dept: PHYSICAL THERAPY | Age: 78
Discharge: HOME | End: 2018-01-17
Attending: PHYSICAL MEDICINE & REHABILITATION
Payer: MEDICARE

## 2018-01-18 NOTE — PROGRESS NOTES
Valdez Beasley  : 1940  [place]  Secondary: [address]  [address], [address], [place], [facility]  [phone]   [phone]         OUTPATIENT PHYSICAL THERAPY:Daily Note 2018    ICD-10: Treatment Diagnosis: Unsteadiness on feet (R26.81)  Precautions/Allergies:   Latex; Ceftin [cefuroxime axetil]; and Sulfa (sulfonamide antibiotics)   Fall Risk Score: 5 (? 5 = High Risk)  MD Orders: evaluate and treat MEDICAL/REFERRING DIAGNOSIS:  gait distrubance   DATE OF ONSET: few months ago  REFERRING PHYSICIAN: Prasad Cleveland, *  RETURN PHYSICIAN APPOINTMENT: unknown     ASSESSMENT (Date: 17):  Ms. Miko Wood presents to physical therapy with a diagnosis of NPH and a recent visit to [facility]. At [facility], they decided to attempt conservative treatment and treat her with a diuretic. She is to report back to them in 2 month to assess for improvements. While taking the diuretic, she would benefit from skilled PT to improve her motor functional and mobility. She demonstrates decreased gait, decreased balance, and decreased functional mobility. She will be out of town for 10 days in January and will therefore not be able to attend therapy during that time. PROBLEM LIST (Impacting functional limitations):  1. Decreased Strength  2. Decreased ADL/Functional Activities  3. Decreased Transfer Abilities  4. Decreased Ambulation Ability/Technique  5. Decreased Balance  6. Decreased Activity Tolerance  7. Decreased Bronson with Home Exercise Program INTERVENTIONS PLANNED:  1. Balance Exercise  2. Family Education  3. Gait Training  4. Heat  5. Home Exercise Program (HEP)  6. Manual Therapy  7. Neuromuscular Re-education/Strengthening  8. Therapeutic Activites  9. Therapeutic Exercise/Strengthening  10. Transfer Training   TREATMENT PLAN:  Effective Dates: 17 TO 3/20/18. Frequency/Duration: 2 times a week for 12 weeks  GOALS: (Goals have been discussed and agreed upon with patient.)  Short-Term Functional Goals: Time Frame: 6 weeks  1. Patient will be compliant with HEP. 2. Patient will have an increase on the tinajero balance to 42/56 in order to improve her functional balance. 3. Patient will have a decrease on the TUG to 13 seconds indicating improved gait mechanics. Discharge Goals: Time Frame: 12 weeks  1. Patient will be independent with HEP. 2. Patient will have an increase on the tinajero balance to 47/56 in order to decrease her risk of falls. 3. Patient will have a decrease on the TUG to less than 12 seconds in order to improve her functional mobility. 4. Patient will demonstrate turning with 4 steps or less indicating improve movement patterns. Rehabilitation Potential For Stated Goals: Good  Regarding Jayla Greenfield's therapy, I certify that the treatment plan above will be carried out by a therapist or under their direction. Thank you for this referral,  Seth Deluca DPT, NCS     Referring Physician Signature: Jason Bassett, *              Date                    HISTORY:   Patient Stated Goal:        I want to be back to normal  History of Present Injury/Illness (Reason for Referral):  Patient reports she went to [facility] and they confirmed Dr. Dejon Rodriguez diagnosis of NPH. They are going to try diamox for 60 days which is a diuretic. At that time, they will send a video to [facility] and they will decide see if they need to do surgery. Feb 15th is 60 days. Nathan said to do PT in the meantime to help with mobility. She reports no falls but says she is getting more fearful of falling. Patient reports still having some memory issues and some incontinence as well as gait difficulties. Past Medical History/Comorbidities:   Ms. Abner Bradford  has a past medical history of CAD (coronary artery disease) (4/26/2016); Gait disorder (4/26/2016);  Hypertension; Hypertriglyceridemia (4/26/2016); Psychotic disorder; and Small vessel disease, cerebrovascular (4/26/2016). Ms. Connor Hollis  has no past surgical history on file. Social History/Living Environment:     lives with   Prior Level of Function/Work/Activity:  Retired. Use to exercises 3 days a week but is currently unable to    Current Medications:    Current Outpatient Prescriptions:     FLUoxetine (PROZAC) 20 mg tablet, TAKE ONE CAPSULE BY MOUTH EVERY DAY, Disp: 30 Tab, Rfl: 5    conjugated estrogens (PREMARIN) 0.625 mg tablet, TAKE 1 TABLET BY MOUTH EVERY DAY, Disp: 30 Tab, Rfl: 5    triamterene-hydroCHLOROthiazide (MAXZIDE) 37.5-25 mg per tablet, TAKE 1 TABLET BY MOUTH TWICE DAILY, Disp: 180 Tab, Rfl: 3    niacinamide 500 mg tablet, TK 1 T PO  BID, Disp: , Rfl: 3    atenolol (TENORMIN) 50 mg tablet, Take 2 tab by mouth daily, Disp: 60 Tab, Rfl: 3    fluticasone (FLONASE) 50 mcg/actuation nasal spray, nightly., Disp: , Rfl:    Diamox     Date Last Reviewed:  1/19/2018   Number of Personal Factors/Comorbidities that affect the Plan of Care: 1-2: MODERATE COMPLEXITY   EXAMINATION:   Observation/Orthostatic Postural Assessment:           Forward head, rounded shoulders   Mental Status:          Functional but does report some episodes of memory issues  Palpation:          No increased muscle tone noted  Sensation:         intact  Skin Integrity:          intact  Vision:          Functional   Balance:          Good static balance, decreased dynamic balance     Lower Extremity:   Strength PROM   Action R L R  L    Hip Flexion 4      Hip Extension 4      Hip Abduction 4      Hip Adduction       Knee Flexion       Knee Extension 4+      Dorsi Flexion 4+      Plantar Flexion       Inversion       Eversion                 Upper Extremity:         Grossly 4+/5  Functional Mobility:         Gait/Ambulation:  Ambulates with slow gait speed, shuffling gait pattern with decreased heel strike R>L, wide WILLY, flexed knees, some toe out B, decreased step length, no assistive device         Transfers:  Slow, push to stand, wide WILLY, few attempts to stand. Slow, shuffling turn with several steps during turning        Bed Mobility:  Functional         Stairs:  NT        Wheelchair:  NT              Body Structures Involved:  1. Nerves  2. Bones  3. Joints  4. Muscles Body Functions Affected:  1. Mental  2. Genitourinary  3. Neuromusculoskeletal  4. Movement Related Activities and Participation Affected:  1. Mobility  2. Self Care  3. Domestic Life  4. Interpersonal Interactions and Relationships   Number of elements that affect the Plan of Care: 4+: HIGH COMPLEXITY   CLINICAL PRESENTATION:   Presentation: Evolving clinical presentation with changing clinical characteristics: MODERATE COMPLEXITY   CLINICAL DECISION MAKING:   Outcome Measure: Tool Used: Schwab Balance Scale  Score:  Initial: 39/56 Most Recent: X/56 (Date: -- )   Interpretation of Score: Each section is scored on a 0-4 scale, 0 representing the patients inability to perform the task and 4 representing independence. The scores of each section are added together for a total score of 56. The higher the patients score, the more independent the patient is. Any score below 45 indicates increased risk for falls. Score 56 55-45 44-34 33-23 22-12 11-1 0   Modifier CH CI CJ CK CL CM CN     ? Mobility - Walking and Moving Around:     - CURRENT STATUS: CJ - 20%-39% impaired, limited or restricted    - GOAL STATUS: CI - 1%-19% impaired, limited or restricted    - D/C STATUS:  ---------------To be determined---------------    Tool Used: Timed Up and Go (TUG)  Score:  Initial: 15.16 seconds Most Recent: X seconds (Date: -- )   Interpretation of Score:  The test measures, in seconds, the time taken by an individual to stand up from a standard arm chair (seat height 46 cm [18 in], arm height 65 cm [25.6 in]), walk a distance of 3 meters (118 in, approx 10 ft), turn, walk back to the chair and sit down. If the individual takes longer than 14 seconds to complete TUG, this indicates risk for falls. Score 7 7.5-10.5 11-14 14.5-17.5 18-21 21.5-24.5 25+   Modifier CH CI CJ CK CL CM CN         Medical Necessity:   · Patient is expected to demonstrate progress in balance, coordination and functional technique to improve safety during ADLs and IADLs. · Patient demonstrates good rehab potential due to higher previous functional level. Reason for Services/Other Comments:  · Patient continues to require modification of therapeutic interventions to increase complexity of exercises. Use of outcome tool(s) and clinical judgement create a POC that gives a: Questionable prediction of patient's progress: MODERATE COMPLEXITY   TREATMENT:   (In addition to Assessment/Re-Assessment sessions the following treatments were rendered)  Pre Treatment Symptoms: Patient reports she saw Dr. Ron Hall at [address] this week. He wants an MRI and another lumbar puncture. She doesn't get that until April. Therapeutic Exercise: ( 40 minutes ):  Exercises per grid below to improve mobility, balance and coordination. Required moderate visual, verbal and tactile cues to promote proper body alignment and promote proper body mechanics. Progressed complexity of movement as indicated. Date:  12-29-17 Date:  1/2/18 Date:  1/4/18 Date:  1/19/18   Activity/Exercise Parameters Parameters Parameters Parameters    NuStep Level 2  10 minutes  Level 2 x 8 minutes  Level 2 x 8 minutes - verbal cues to maintain BIG movements X 10 minutes level 1 on new NuStep    Calf stretch on incline board  2 x 60 sec hold X 60 sec hold    Heel raises with 3 second hold 10 x 3 sec hold  X 10 reps X 15 reps X 20 reps    Gait initiation   Holding to half wall with visual markers to increase step size. Also cues for start and stop to break up the movements Holding to half wall with visual markers to increase step size.   Also cues for start and stop to break up the movements    LSVT sit to stand X 10 with no hands      PWR standing reach for weight shift 2 x 10 B      PWR marching gait with arm swing 3 laps on track with cues for arm swing      LSVT forward step out X 10 B with 1rail X 10 reps at half wall with a lot of tactile cues  X 10 reps B at half wall stepping over dots with cues for starts and stops   LSVT side step out X 10 with 1 rail X 10 reps B at half wall with a lot of tactile cues X 10 reps B at half wall with less cuing today X 10 reps B at half wall stepping over dots with cues for starts and stops   BIG walking   Verbal and visual cues for arm swing x 120' Verbal and visual cues for arm swing, step size, foot clearance and turns. Working on starts and stops, in and out of tight spaces. Forward and back walking with cuing. Blocked practice x 20 minutes. PWR backward step   X 10 reps B with lots of cues    Ambulation    X 120' with cues to maintain arm swing          Treatment/Session Assessment:  Patient reported she stopped taking the diuretic because she didn't think it was helping. She is following up with a new MD who is going to run more tests. She was educated on strategies to improve step size and mobility. She demonstrated understanding but will assess at next visit for carryover. She has some difficulty following commands. · Pain/ Symptoms: Initial:   0/10 Post Session:  0/10 ·   Compliance with Program/Exercises: Will assess as treatment progresses. · Recommendations/Intent for next treatment session: \"Next visit will focus on advancements to more challenging activities and reduction in assistance provided\".   Total Treatment Duration:   PT Patient Time In/Time Out  Time In: 0900  Time Out: [address] [facility], [facility]

## 2018-01-19 ENCOUNTER — HOSPITAL ENCOUNTER (OUTPATIENT)
Dept: PHYSICAL THERAPY | Age: 78
Discharge: HOME | End: 2018-01-19
Attending: PHYSICAL MEDICINE & REHABILITATION
Payer: MEDICARE

## 2018-01-19 PROCEDURE — 97110 THERAPEUTIC EXERCISES: CPT

## 2018-01-22 ENCOUNTER — HOSPITAL ENCOUNTER (OUTPATIENT)
Dept: ULTRASOUND IMAGING | Age: 78
Discharge: HOME | End: 2018-01-22
Attending: FAMILY MEDICINE
Payer: MEDICARE

## 2018-01-22 DIAGNOSIS — E01.0 THYROMEGALY: ICD-10-CM

## 2018-01-22 PROCEDURE — 76536 US EXAM OF HEAD AND NECK: CPT

## 2018-01-24 ENCOUNTER — APPOINTMENT (RX ONLY)
Dept: URBAN - METROPOLITAN AREA CLINIC 23 | Facility: CLINIC | Age: 78
Setting detail: DERMATOLOGY
End: 2018-01-24

## 2018-01-24 DIAGNOSIS — L72.0 EPIDERMAL CYST: ICD-10-CM

## 2018-01-24 DIAGNOSIS — D49.2 NEOPLASM OF UNSPECIFIED BEHAVIOR OF BONE, SOFT TISSUE, AND SKIN: ICD-10-CM

## 2018-01-24 PROBLEM — L85.3 XEROSIS CUTIS: Status: ACTIVE | Noted: 2018-01-24

## 2018-01-24 PROBLEM — D04.72 CARCINOMA IN SITU OF SKIN OF LEFT LOWER LIMB, INCLUDING HIP: Status: ACTIVE | Noted: 2018-01-24

## 2018-01-24 PROCEDURE — 11100: CPT | Mod: 59

## 2018-01-24 PROCEDURE — 10040 EXTRACTION: CPT

## 2018-01-24 PROCEDURE — ? ACNE SURGERY

## 2018-01-24 PROCEDURE — 17262 DSTRJ MAL LES T/A/L 1.1-2.0: CPT

## 2018-01-24 PROCEDURE — ? BIOPSY BY SHAVE METHOD

## 2018-01-24 PROCEDURE — ? CURETTAGE AND DESTRUCTION

## 2018-01-24 PROCEDURE — A4550 SURGICAL TRAYS: HCPCS

## 2018-01-24 ASSESSMENT — LOCATION SIMPLE DESCRIPTION DERM
LOCATION SIMPLE: POSTERIOR SCALP
LOCATION SIMPLE: RIGHT 2ND TOE

## 2018-01-24 ASSESSMENT — LOCATION DETAILED DESCRIPTION DERM
LOCATION DETAILED: RIGHT LATERAL 2ND TOE
LOCATION DETAILED: RIGHT SUPERIOR OCCIPITAL SCALP

## 2018-01-24 ASSESSMENT — LOCATION ZONE DERM
LOCATION ZONE: SCALP
LOCATION ZONE: TOE

## 2018-01-24 NOTE — HPI: SKIN LESION (SQUAMOUS CELL CARCINOMA)
How Severe Is Your Skin Cancer?: mild
Is This A New Presentation, Or A Follow-Up?: Squamous Cell Carcinoma
When Was Squamous Cell Carcinoma Biopsied? (Optional): 12/13/17 by Dr. Kelvin Morgan at the [facility] by showed squamous cell in situ

## 2018-01-24 NOTE — PROCEDURE: ACNE SURGERY
Render Post-Care Instructions In Note?: no
Consent was obtained and risks were reviewed including but not limited to scarring, infection, bleeding, scabbing, incomplete removal, and allergy to anesthesia.
Prep Text (Optional): Prior to removal the treatment areas were prepped in the usual fashion.
Detail Level: Zone
Post-Care Instructions: I reviewed with the patient in detail post-care instructions. Patient is to keep the treatment areaas dry overnight, and then apply bacitracin twice daily until healed. Patient may apply hydrogen peroxide soaks to remove any crusting.
Acne Type: Comedonal Lesions
Extraction Method: 30 gauge needle and comedo extractor

## 2018-01-24 NOTE — PROCEDURE: CURETTAGE AND DESTRUCTION
Size Of Lesion In Cm: 0.9
Anesthesia Volume In Cc: 3
Cautery Type: electrodesiccation
Additional Information: (Optional): The wound was cleaned, and a pressure dressing was applied.  The patient received detailed post-op instructions.\\nAfter initial curretting procedure was converted from Mohs to ED&C
Medication Injected: 5-Fluorouracil
Render Post-Care Instructions In Note?: no
Total Volume (Ccs): 1
Post-Care Instructions: I reviewed with the patient in detail post-care instructions. Patient is to keep the area dry for 48 hours, and not to engage in any swimming until the area is healed. Should the patient develop any fevers, chills, bleeding, severe pain patient will contact the office immediately.
Anesthesia Type: 1% lidocaine with epinephrine
Size Of Lesion After Curettage: 1.5
Bill For Surgical Tray: yes
Bill As A Line Item Or As Units: Line Item
What Was Performed First?: Curettage
Consent was obtained from the patient. The risks, benefits and alternatives to therapy were discussed in detail. Specifically, the risks of infection, scarring, bleeding, prolonged wound healing, nerve injury, incomplete removal, allergy to anesthesia and recurrence were addressed. Alternatives to ED&C, such as: surgical removal and XRT were also discussed.  Prior to the procedure, the treatment site was clearly identified and confirmed by the patient.
Detail Level: Detailed

## 2018-01-24 NOTE — PROCEDURE: BIOPSY BY SHAVE METHOD
Dressing: pressure dressing with telfa
Detail Level: Detailed
Anesthesia Volume In Cc: 1
Additional Anesthesia Volume In Cc (Will Not Render If 0): 0
Biopsy Method: Dermablade
Render Post-Care Instructions In Note?: no
Accession #: pc
Electrodesiccation Text: The wound bed was treated with electrodesiccation after the biopsy was performed.
Hemostasis: Electrocautery
Biopsy Type: H and E
Notification Instructions: Patient will be notified of biopsy results. However, patient instructed to call the office if not contacted within 2 weeks.
Outside Pathology Billing: outside pathology read only
Silver Nitrate Text: The wound bed was treated with silver nitrate after the biopsy was performed.
Post-Care Instructions: I reviewed with the patient in detail post-care instructions. Patient is to keep the biopsy site dry overnight, and then apply bacitracin twice daily until healed. Patient may apply hydrogen peroxide soaks to remove any crusting.
Anesthesia Type: 1% lidocaine with 1:200,000 epinephrine and a 1:10 solution of 8.4% sodium bicarbonate
Wound Care: Vaseline
Type Of Destruction Used: Curettage
Cryotherapy Text: The wound bed was treated with cryotherapy after the biopsy was performed.
Consent: Written consent was obtained and risks were reviewed including but not limited to scarring, infection, bleeding, scabbing, incomplete removal, nerve damage and allergy to anesthesia.
Electrodesiccation And Curettage Text: The wound bed was treated with electrodesiccation and curettage after the biopsy was performed.
Billing Type: Third-Party Bill
Bill For Surgical Tray: yes

## 2018-01-25 ENCOUNTER — HOSPITAL ENCOUNTER (OUTPATIENT)
Dept: PHYSICAL THERAPY | Age: 78
End: 2018-01-25
Attending: PHYSICAL MEDICINE & REHABILITATION
Payer: MEDICARE

## 2018-01-30 ENCOUNTER — APPOINTMENT (OUTPATIENT)
Dept: PHYSICAL THERAPY | Age: 78
End: 2018-01-30
Attending: PHYSICAL MEDICINE & REHABILITATION
Payer: MEDICARE

## 2018-02-01 ENCOUNTER — APPOINTMENT (OUTPATIENT)
Dept: PHYSICAL THERAPY | Age: 78
End: 2018-02-01
Attending: PHYSICAL MEDICINE & REHABILITATION

## 2018-02-06 ENCOUNTER — APPOINTMENT (OUTPATIENT)
Dept: PHYSICAL THERAPY | Age: 78
End: 2018-02-06
Attending: PHYSICAL MEDICINE & REHABILITATION

## 2018-02-08 ENCOUNTER — APPOINTMENT (OUTPATIENT)
Dept: PHYSICAL THERAPY | Age: 78
End: 2018-02-08
Attending: PHYSICAL MEDICINE & REHABILITATION

## 2018-02-13 ENCOUNTER — RX ONLY (OUTPATIENT)
Age: 78
Setting detail: RX ONLY
End: 2018-02-13

## 2018-02-13 ENCOUNTER — APPOINTMENT (RX ONLY)
Dept: URBAN - METROPOLITAN AREA CLINIC 24 | Facility: CLINIC | Age: 78
Setting detail: DERMATOLOGY
End: 2018-02-13

## 2018-02-13 PROBLEM — C44.42 SQUAMOUS CELL CARCINOMA OF SKIN OF SCALP AND NECK: Status: ACTIVE | Noted: 2018-02-13

## 2018-02-13 PROCEDURE — A4550 SURGICAL TRAYS: HCPCS

## 2018-02-13 PROCEDURE — 17311 MOHS 1 STAGE H/N/HF/G: CPT

## 2018-02-13 PROCEDURE — ? MOHS SURGERY

## 2018-02-13 PROCEDURE — 12032 INTMD RPR S/A/T/EXT 2.6-7.5: CPT

## 2018-02-13 RX ORDER — CEPHALEXIN 500 MG/1
CAPSULE ORAL
Qty: 21 | Refills: 0 | Status: ERX

## 2018-02-13 NOTE — PROCEDURE: MOHS SURGERY
Additional Secondary Defect Width (In Cm): -
Advancement Flap (Double) Text: The defect edges were debeveled with a #15 scalpel blade.  Given the location of the defect and the proximity to free margins a double advancement flap was deemed most appropriate.  Using a sterile surgical marker, the appropriate advancement flaps were drawn incorporating the defect and placing the expected incisions within the relaxed skin tension lines where possible.    The area thus outlined was incised deep to adipose tissue with a #15 scalpel blade.  The skin margins were undermined to an appropriate distance in all directions utilizing iris scissors.
Primary Defect Width In Cm (Final Defect Size - Required For Flaps/Grafts): 0
Island Pedicle Flap-Requiring Vessel Identification Text: The defect edges were debeveled with a #15 scalpel blade.  Given the location of the defect, shape of the defect and the proximity to free margins an island pedicle advancement flap was deemed most appropriate.  Using a sterile surgical marker, an appropriate advancement flap was drawn, based on the axial vessel mentioned above, incorporating the defect, outlining the appropriate donor tissue and placing the expected incisions within the relaxed skin tension lines where possible.    The area thus outlined was incised deep to adipose tissue with a #15 scalpel blade.  The skin margins were undermined to an appropriate distance in all directions around the primary defect and laterally outward around the island pedicle utilizing iris scissors.  There was minimal undermining beneath the pedicle flap.
Quadrant Reporting?: no
Manual Repair Warning Statement: We plan on removing the manually selected variable below in favor of our much easier automatic structured text blocks found in the previous tab. We decided to do this to help make the flow better and give you the full power of structured data. Manual selection is never going to be ideal in our platform and I would encourage you to avoid using manual selection from this point on, especially since I will be sunsetting this feature. It is important that you do one of two things with the customized text below. First, you can save all of the text in a word file so you can have it for future reference. Second, transfer the text to the appropriate area in the Library tab. Lastly, if there is a flap or graft type which we do not have you need to let us know right away so I can add it in before the variable is hidden. No need to panic, we plan to give you roughly 6 months to make the change.
Graft Basting Suture (Optional): 5-0 PDS
Consent (Marginal Mandibular)/Introductory Paragraph: The rationale for Mohs was explained to the patient and consent was obtained. The risks, benefits and alternatives to therapy were discussed in detail. Specifically, the risks of damage to the marginal mandibular branch of the facial nerve, infection, scarring, bleeding, prolonged wound healing, incomplete removal, allergy to anesthesia, and recurrence were addressed. Prior to the procedure, the treatment site was clearly identified and confirmed by the patient. All components of Universal Protocol/PAUSE Rule completed.
Ear Wedge Repair Text: A wedge excision was completed by carrying down an excision through the full thickness of the ear and cartilage with an inward facing Burow's triangle. The wound was then closed in a layered fashion.
Trilobed Flap Text: The defect edges were debeveled with a #15 scalpel blade.  Given the location of the defect and the proximity to free margins a trilobed flap was deemed most appropriate.  Using a sterile surgical marker, an appropriate trilobed flap drawn around the defect.    The area thus outlined was incised deep to adipose tissue with a #15 scalpel blade.  The skin margins were undermined to an appropriate distance in all directions utilizing iris scissors.
Stage 15: Additional Anesthesia Type: 1% lidocaine with epinephrine
Show Quadrant Variables In The Stage Tabs: Yes
Purse String (Simple) Text: Given the location of the defect and the characteristics of the surrounding skin a pursestring closure was deemed most appropriate.  Undermining was performed circumfirentially around the surgical defect.  A purstring suture was then placed and tightened.
Location Indication Override (Is Already Calculated Based On Selected Body Location): Area M
Closure 4 Information: This tab is for additional flaps and grafts above and beyond our usual structured repairs.  Please note if you enter information here it will not currently bill and you will need to add the billing information manually.
Estimated Blood Loss (Cc): minimal
Keystone Flap Text: The defect edges were debeveled with a #15 scalpel blade.  Given the location of the defect, shape of the defect a keystone flap was deemed most appropriate.  Using a sterile surgical marker, an appropriate keystone flap was drawn incorporating the defect, outlining the appropriate donor tissue and placing the expected incisions within the relaxed skin tension lines where possible. The area thus outlined was incised deep to adipose tissue with a #15 scalpel blade.  The skin margins were undermined to an appropriate distance in all directions around the primary defect and laterally outward around the flap utilizing iris scissors.
Hemostasis: Electrocautery
Transposition Flap Text: The defect edges were debeveled with a #15 scalpel blade.  Given the location of the defect and the proximity to free margins a transposition flap was deemed most appropriate.  Using a sterile surgical marker, an appropriate transposition flap was drawn incorporating the defect.    The area thus outlined was incised deep to adipose tissue with a #15 scalpel blade.  The skin margins were undermined to an appropriate distance in all directions utilizing iris scissors.
Intermediate Repair Preamble Text (Leave Blank If You Do Not Want): Undermining was performed with blunt dissection.
Consent (Ear)/Introductory Paragraph: The rationale for Mohs was explained to the patient and consent was obtained. The risks, benefits and alternatives to therapy were discussed in detail. Specifically, the risks of ear deformity, infection, scarring, bleeding, prolonged wound healing, incomplete removal, allergy to anesthesia, nerve injury and recurrence were addressed. Prior to the procedure, the treatment site was clearly identified and confirmed by the patient. All components of Universal Protocol/PAUSE Rule completed.
Anesthesia Volume In Cc: 4.5
Anesthesia Volume In Cc: 2
Consent (Spinal Accessory)/Introductory Paragraph: The rationale for Mohs was explained to the patient and consent was obtained. The risks, benefits and alternatives to therapy were discussed in detail. Specifically, the risks of damage to the spinal accessory nerve, infection, scarring, bleeding, prolonged wound healing, incomplete removal, allergy to anesthesia, and recurrence were addressed. Prior to the procedure, the treatment site was clearly identified and confirmed by the patient. All components of Universal Protocol/PAUSE Rule completed.
Bilobed Transposition Flap Text: The defect edges were debeveled with a #15 scalpel blade.  Given the location of the defect and the proximity to free margins a bilobed transposition flap was deemed most appropriate.  Using a sterile surgical marker, an appropriate bilobe flap drawn around the defect.    The area thus outlined was incised deep to adipose tissue with a #15 scalpel blade.  The skin margins were undermined to an appropriate distance in all directions utilizing iris scissors.
Z Plasty Text: The lesion was extirpated to the level of the fat with a #15 scalpel blade.  Given the location of the defect, shape of the defect and the proximity to free margins a Z-plasty was deemed most appropriate for repair.  Using a sterile surgical marker, the appropriate transposition arms of the Z-plasty were drawn incorporating the defect and placing the expected incisions within the relaxed skin tension lines where possible.    The area thus outlined was incised deep to adipose tissue with a #15 scalpel blade.  The skin margins were undermined to an appropriate distance in all directions utilizing iris scissors.  The opposing transposition arms were then transposed into place in opposite direction and anchored with interrupted buried subcutaneous sutures.
Consent (Temporal Branch)/Introductory Paragraph: The rationale for Mohs was explained to the patient and consent was obtained. The risks, benefits and alternatives to therapy were discussed in detail. Specifically, the risks of damage to the temporal branch of the facial nerve, infection, scarring, bleeding, prolonged wound healing, incomplete removal, allergy to anesthesia, and recurrence were addressed. Prior to the procedure, the treatment site was clearly identified and confirmed by the patient. All components of Universal Protocol/PAUSE Rule completed.
Mauc Instructions: By selecting yes to the question below the MAUC number will be added into the note.  This will be calculated automatically based on the diagnosis chosen, the size entered, the body zone selected (H,M,L) and the specific indications you chose. You will also have the option to override the Mohs AUC if you disagree with the automatically calculated number and this option is found in the Case Summary tab.
Consent 2/Introductory Paragraph: Mohs surgery was explained to the patient and consent was obtained. The risks, benefits and alternatives to therapy were discussed in detail. Specifically, the risks of infection, scarring, bleeding, prolonged wound healing, incomplete removal, allergy to anesthesia, nerve injury and recurrence were addressed. Prior to the procedure, the treatment site was clearly identified and confirmed by the patient. All components of Universal Protocol/PAUSE Rule completed.
Complex Repair Preamble Text (Leave Blank If You Do Not Want): Extensive wide undermining was performed.
Mohs Histo Method Verbiage: The section(s) were then chromacoded and processed in the Mohs lab using the Mohs protocol and submitted for frozen section.
Stage 3: Additional Anesthesia Type: 0.5% lidocaine with 1:200,000 epinephrine and a 1:10 solution of 8.4% sodium bicarbonate
Simple / Intermediate / Complex Repair - Final Wound Length In Cm: 4
Helical Rim Advancement Flap Text: The defect edges were debeveled with a #15 blade scalpel.  Given the location of the defect and the proximity to free margins (helical rim) a double helical rim advancement flap was deemed most appropriate.  Using a sterile surgical marker, the appropriate advancement flaps were drawn incorporating the defect and placing the expected incisions between the helical rim and antihelix where possible.  The area thus outlined was incised through and through with a #15 scalpel blade.  With a skin hook and iris scissors, the flaps were gently and sharply undermined and freed up.
V-Y Plasty Text: The defect edges were debeveled with a #15 scalpel blade.  Given the location of the defect, shape of the defect and the proximity to free margins an V-Y advancement flap was deemed most appropriate.  Using a sterile surgical marker, an appropriate advancement flap was drawn incorporating the defect and placing the expected incisions within the relaxed skin tension lines where possible.    The area thus outlined was incised deep to adipose tissue with a #15 scalpel blade.  The skin margins were undermined to an appropriate distance in all directions utilizing iris scissors.
Referred To Plastics For Closure Text (Leave Blank If You Do Not Want): After obtaining clear surgical margins the patient was sent to plastics for surgical repair.  The patient understands they will receive post-surgical care and follow-up from the referring physician's office.
Modified Advancement Flap Text: The defect edges were debeveled with a #15 scalpel blade.  Given the location of the defect, shape of the defect and the proximity to free margins a modified advancement flap was deemed most appropriate.  Using a sterile surgical marker, an appropriate advancement flap was drawn incorporating the defect and placing the expected incisions within the relaxed skin tension lines where possible.    The area thus outlined was incised deep to adipose tissue with a #15 scalpel blade.  The skin margins were undermined to an appropriate distance in all directions utilizing iris scissors.
Eye Protection Verbiage: Before proceeding with the stage, a plastic scleral shield was inserted. The globe was anesthetized with a few drops of 1% lidocaine with 1:100,000 epinephrine. Then, an appropriate sized scleral shield was chosen and coated with lacrilube ointment. The shield was gently inserted and left in place for the duration of each stage. After the stage was completed, the shield was gently removed.
Muscle Hinge Flap Text: The defect edges were debeveled with a #15 scalpel blade.  Given the size, depth and location of the defect and the proximity to free margins a muscle hinge flap was deemed most appropriate.  Using a sterile surgical marker, an appropriate hinge flap was drawn incorporating the defect. The area thus outlined was incised with a #15 scalpel blade.  The skin margins were undermined to an appropriate distance in all directions utilizing iris scissors.
Composite Graft Text: The defect edges were debeveled with a #15 scalpel blade.  Given the location of the defect, shape of the defect, the proximity to free margins and the fact the defect was full thickness a composite graft was deemed most appropriate.  The defect was outline and then transferred to the donor site.  A full thickness graft was then excised from the donor site. The graft was then placed in the primary defect, oriented appropriately and then sutured into place.  The secondary defect was then repaired using a primary closure.
Bi-Rhombic Flap Text: The defect edges were debeveled with a #15 scalpel blade.  Given the location of the defect and the proximity to free margins a bi-rhombic flap was deemed most appropriate.  Using a sterile surgical marker, an appropriate rhombic flap was drawn incorporating the defect. The area thus outlined was incised deep to adipose tissue with a #15 scalpel blade.  The skin margins were undermined to an appropriate distance in all directions utilizing iris scissors.
Complex Repair And Flap Additional Text (Will Appearing After The Standard Complex Repair Text): The complex repair was not sufficient to completely close the primary defect. The remaining additional defect was repaired with the flap mentioned below.
W Plasty Text: The lesion was extirpated to the level of the fat with a #15 scalpel blade.  Given the location of the defect, shape of the defect and the proximity to free margins a W-plasty was deemed most appropriate for repair.  Using a sterile surgical marker, the appropriate transposition arms of the W-plasty were drawn incorporating the defect and placing the expected incisions within the relaxed skin tension lines where possible.    The area thus outlined was incised deep to adipose tissue with a #15 scalpel blade.  The skin margins were undermined to an appropriate distance in all directions utilizing iris scissors.  The opposing transposition arms were then transposed into place in opposite direction and anchored with interrupted buried subcutaneous sutures.
Bilobed Flap Text: The defect edges were debeveled with a #15 scalpel blade.  Given the location of the defect and the proximity to free margins a bilobe flap was deemed most appropriate.  Using a sterile surgical marker, an appropriate bilobe flap drawn around the defect.    The area thus outlined was incised deep to adipose tissue with a #15 scalpel blade.  The skin margins were undermined to an appropriate distance in all directions utilizing iris scissors.
Paramedian Forehead Flap Text: A decision was made to reconstruct the defect utilizing an interpolation axial flap and a staged reconstruction.  A telfa template was made of the defect.  This telfa template was then used to outline the paramedian forehead pedicle flap.  The donor area for the pedicle flap was then injected with anesthesia.  The flap was excised through the skin and subcutaneous tissue down to the layer of the underlying musculature.  The pedicle flap was carefully excised within this deep plane to maintain its blood supply.  The edges of the donor site were undermined.   The donor site was closed in a primary fashion.  The pedicle was then rotated into position and sutured.  Once the tube was sutured into place, adequate blood supply was confirmed with blanching and refill.  The pedicle was then wrapped with xeroform gauze and dressed appropriately with a telfa and gauze bandage to ensure continued blood supply and protect the attached pedicle.
Island Pedicle Flap Text: The defect edges were debeveled with a #15 scalpel blade.  Given the location of the defect, shape of the defect and the proximity to free margins an island pedicle advancement flap was deemed most appropriate.  Using a sterile surgical marker, an appropriate advancement flap was drawn incorporating the defect, outlining the appropriate donor tissue and placing the expected incisions within the relaxed skin tension lines where possible.    The area thus outlined was incised deep to adipose tissue with a #15 scalpel blade.  The skin margins were undermined to an appropriate distance in all directions around the primary defect and laterally outward around the island pedicle utilizing iris scissors.  There was minimal undermining beneath the pedicle flap.
Purse String (Intermediate) Text: Given the location of the defect and the characteristics of the surrounding skin a pursestring intermediate closure was deemed most appropriate.  Undermining was performed circumfirentially around the surgical defect.  A purstring suture was then placed and tightened.
Hatchet Flap Text: The defect edges were debeveled with a #15 scalpel blade.  Given the location of the defect, shape of the defect and the proximity to free margins a hatchet flap was deemed most appropriate.  Using a sterile surgical marker, an appropriate hatchet flap was drawn incorporating the defect and placing the expected incisions within the relaxed skin tension lines where possible.    The area thus outlined was incised deep to adipose tissue with a #15 scalpel blade.  The skin margins were undermined to an appropriate distance in all directions utilizing iris scissors.
Cheiloplasty (Less Than 50%) Text: A decision was made to reconstruct the defect with a  cheiloplasty.  The defect was undermined extensively.  Additional obicularis oris muscle was excised with a 15 blade scalpel.  The defect was converted into a full thickness wedge, of less than 50% of the vertical height of the lip, to facilite a better cosmetic result.  Small vessels were then tied off with 5-0 monocyrl. The obicularis oris, superficial fascia, adipose and dermis were then reapproximated.  After the deeper layers were approximated the epidermis was reapproximated with particular care given to realign the vermillion border.
Postop Diagnosis: same
No Residual Tumor Seen Histology Text: There were no malignant cells seen in the sections examined.
Subsequent Stages Histo Method Verbiage: Using a similar technique to that described above, a thin layer of tissue was removed from all areas where tumor was visible on the previous stage.  The tissue was again oriented, mapped, dyed, and processed as above.
Bcc Histology Text: There were numerous aggregates of basaloid cells.
O-Z Plasty Text: The defect edges were debeveled with a #15 scalpel blade.  Given the location of the defect, shape of the defect and the proximity to free margins an O-Z plasty (double transposition flap) was deemed most appropriate.  Using a sterile surgical marker, the appropriate transposition flaps were drawn incorporating the defect and placing the expected incisions within the relaxed skin tension lines where possible.    The area thus outlined was incised deep to adipose tissue with a #15 scalpel blade.  The skin margins were undermined to an appropriate distance in all directions utilizing iris scissors.  Hemostasis was achieved with electrocautery.  The flaps were then transposed into place, one clockwise and the other counterclockwise, and anchored with interrupted buried subcutaneous sutures.
Cheek Interpolation Flap Text: A decision was made to reconstruct the defect utilizing an interpolation axial flap and a staged reconstruction.  A telfa template was made of the defect.  This telfa template was then used to outline the Cheek Interpolation flap.  The donor area for the pedicle flap was then injected with anesthesia.  The flap was excised through the skin and subcutaneous tissue down to the layer of the underlying musculature.  The interpolation flap was carefully excised within this deep plane to maintain its blood supply.  The edges of the donor site were undermined.   The donor site was closed in a primary fashion.  The pedicle was then rotated into position and sutured.  Once the tube was sutured into place, adequate blood supply was confirmed with blanching and refill.  The pedicle was then wrapped with xeroform gauze and dressed appropriately with a telfa and gauze bandage to ensure continued blood supply and protect the attached pedicle.
Bcc Infiltrative Histology Text: There were numerous aggregates of basaloid cells demonstrating an infiltrative pattern.
Burow's Advancement Flap Text: The defect edges were debeveled with a #15 scalpel blade.  Given the location of the defect and the proximity to free margins a Burow's advancement flap was deemed most appropriate.  Using a sterile surgical marker, the appropriate advancement flap was drawn incorporating the defect and placing the expected incisions within the relaxed skin tension lines where possible.    The area thus outlined was incised deep to adipose tissue with a #15 scalpel blade.  The skin margins were undermined to an appropriate distance in all directions utilizing iris scissors.
Medical Necessity Statement: Based on my medical judgement, Mohs surgery is the most appropriate treatment for this cancer compared to other treatments.
Referred To Asc For Closure Text (Leave Blank If You Do Not Want): After obtaining clear surgical margins the patient was sent to an ASC for surgical repair.  The patient understands they will receive post-surgical care and follow-up from the ASC physician.
Referred To Otolaryngology For Closure Text (Leave Blank If You Do Not Want): After obtaining clear surgical margins the patient was sent to otolaryngology for surgical repair.  The patient understands they will receive post-surgical care and follow-up from the referring physician's office.
Dorsal Nasal Flap Text: The defect edges were debeveled with a #15 scalpel blade.  Given the location of the defect and the proximity to free margins a dorsal nasal flap was deemed most appropriate.  Using a sterile surgical marker, an appropriate dorsal nasal flap was drawn around the defect.    The area thus outlined was incised deep to adipose tissue with a #15 scalpel blade.  The skin margins were undermined to an appropriate distance in all directions utilizing iris scissors.
Surgeon: Hitesh gonzales
Bilateral Helical Rim Advancement Flap Text: The defect edges were debeveled with a #15 blade scalpel.  Given the location of the defect and the proximity to free margins (helical rim) a bilateral helical rim advancement flap was deemed most appropriate.  Using a sterile surgical marker, the appropriate advancement flaps were drawn incorporating the defect and placing the expected incisions between the helical rim and antihelix where possible.  The area thus outlined was incised through and through with a #15 scalpel blade.  With a skin hook and iris scissors, the flaps were gently and sharply undermined and freed up.
O-T Plasty Text: The defect edges were debeveled with a #15 scalpel blade.  Given the location of the defect, shape of the defect and the proximity to free margins an O-T plasty was deemed most appropriate.  Using a sterile surgical marker, an appropriate O-T plasty was drawn incorporating the defect and placing the expected incisions within the relaxed skin tension lines where possible.    The area thus outlined was incised deep to adipose tissue with a #15 scalpel blade.  The skin margins were undermined to an appropriate distance in all directions utilizing iris scissors.
Detail Level: Detailed
Inflammation Suggestive Of Cancer Camouflage Histology Text: There was a dense lymphocytic infiltrate which prevented adequate histologic evaluation of adjacent structures.
Graft Donor Site Bandage (Optional-Leave Blank If You Don't Want In Note): Steri-strips and a pressure bandage were applied to the donor site.
Ear Star Wedge Flap Text: The defect edges were debeveled with a #15 blade scalpel.  Given the location of the defect and the proximity to free margins (helical rim) an ear star wedge flap was deemed most appropriate.  Using a sterile surgical marker, the appropriate flap was drawn incorporating the defect and placing the expected incisions between the helical rim and antihelix where possible.  The area thus outlined was incised through and through with a #15 scalpel blade.
Referred To Mid-Level For Closure Text (Leave Blank If You Do Not Want): After obtaining clear surgical margins the patient was sent to a mid-level provider for surgical repair.  The patient understands they will receive post-surgical care and follow-up from the mid-level provider.
O-T Advancement Flap Text: The defect edges were debeveled with a #15 scalpel blade.  Given the location of the defect, shape of the defect and the proximity to free margins an O-T advancement flap was deemed most appropriate.  Using a sterile surgical marker, an appropriate advancement flap was drawn incorporating the defect and placing the expected incisions within the relaxed skin tension lines where possible.    The area thus outlined was incised deep to adipose tissue with a #15 scalpel blade.  The skin margins were undermined to an appropriate distance in all directions utilizing iris scissors.
Advancement-Rotation Flap Text: The defect edges were debeveled with a #15 scalpel blade.  Given the location of the defect, shape of the defect and the proximity to free margins an advancement-rotation flap was deemed most appropriate.  Using a sterile surgical marker, an appropriate flap was drawn incorporating the defect and placing the expected incisions within the relaxed skin tension lines where possible. The area thus outlined was incised deep to adipose tissue with a #15 scalpel blade.  The skin margins were undermined to an appropriate distance in all directions utilizing iris scissors.
Donor Site Anesthesia Type: same as repair anesthesia
Consent Type: Consent 1 (Standard)
Repair Anesthesia Method: local infiltration
Consent (Scalp)/Introductory Paragraph: The rationale for Mohs was explained to the patient and consent was obtained. The risks, benefits and alternatives to therapy were discussed in detail. Specifically, the risks of changes in hair growth pattern secondary to repair, infection, scarring, bleeding, prolonged wound healing, incomplete removal, allergy to anesthesia, nerve injury and recurrence were addressed. Prior to the procedure, the treatment site was clearly identified and confirmed by the patient. All components of Universal Protocol/PAUSE Rule completed.
Dermal Autograft Text: The defect edges were debeveled with a #15 scalpel blade.  Given the location of the defect, shape of the defect and the proximity to free margins a dermal autograft was deemed most appropriate.  Using a sterile surgical marker, the primary defect shape was transferred to the donor site. The area thus outlined was incised deep to adipose tissue with a #15 scalpel blade.  The harvested graft was then trimmed of adipose and epidermal tissue until only dermis was left.  The skin graft was then placed in the primary defect and oriented appropriately.
Cartilage Graft Text: The defect edges were debeveled with a #15 scalpel blade.  Given the location of the defect, shape of the defect, the fact the defect involved a full thickness cartilage defect a cartilage graft was deemed most appropriate.  An appropriate donor site was identified, cleansed, and anesthetized. The cartilage graft was then harvested and transferred to the recipient site, oriented appropriately and then sutured into place.  The secondary defect was then repaired using a primary closure.
Full Thickness Lip Wedge Repair (Flap) Text: Given the location of the defect and the proximity to free margins a full thickness wedge repair was deemed most appropriate.  Using a sterile surgical marker, the appropriate repair was drawn incorporating the defect and placing the expected incisions perpendicular to the vermillion border.  The vermillion border was also meticulously outlined to ensure appropriate reapproximation during the repair.  The area thus outlined was incised through and through with a #15 scalpel blade.  The muscularis and dermis were reaproximated with deep sutures following hemostasis. Care was taken to realign the vermillion border before proceeding with the superficial closure.  Once the vermillion was realigned the superfical and mucosal closure was finished.
Area L Indication Text: Tumors in this location are included in Area L (trunk and extremities).  Mohs surgery is indicated for larger tumors, 2 cm or larger, in these anatomic locations.
Posterior Auricular Interpolation Flap Text: A decision was made to reconstruct the defect utilizing an interpolation axial flap and a staged reconstruction.  A telfa template was made of the defect.  This telfa template was then used to outline the posterior auricular interpolation flap.  The donor area for the pedicle flap was then injected with anesthesia.  The flap was excised through the skin and subcutaneous tissue down to the layer of the underlying musculature.  The pedicle flap was carefully excised within this deep plane to maintain its blood supply.  The edges of the donor site were undermined.   The donor site was closed in a primary fashion.  The pedicle was then rotated into position and sutured.  Once the tube was sutured into place, adequate blood supply was confirmed with blanching and refill.  The pedicle was then wrapped with xeroform gauze and dressed appropriately with a telfa and gauze bandage to ensure continued blood supply and protect the attached pedicle.
Mohs Method Verbiage: An incision at a 45 degree angle following the standard Mohs approach was done and the specimen was harvested as a microscopic controlled layer.
Initial Size Of Lesion: 1.3
No Repair - Repaired With Adjacent Surgical Defect Text (Leave Blank If You Do Not Want): After obtaining clear surgical margins the defect was repaired concurrently with another surgical defect which was in close approximation.
Epidermal Sutures: 4-0 Prolene
Rhombic Flap Text: The defect edges were debeveled with a #15 scalpel blade.  Given the location of the defect and the proximity to free margins a rhombic flap was deemed most appropriate.  Using a sterile surgical marker, an appropriate rhombic flap was drawn incorporating the defect.    The area thus outlined was incised deep to adipose tissue with a #15 scalpel blade.  The skin margins were undermined to an appropriate distance in all directions utilizing iris scissors.
Area H Indication Text: Tumors in this location are included in Area H (eyelids, eyebrows, nose, lips, chin, ear, pre-auricular, post-auricular, temple, genitalia, hands, feet, ankles and areola).  Tissue conservation is critical in these anatomic locations.
Secondary Intention Text (Leave Blank If You Do Not Want): The defect will heal with secondary intention.
Mohs Rapid Report Verbiage: The area of clinically evident tumor was marked with skin marking ink and appropriately hatched.  The initial incision was made following the Mohs approach through the skin.  The specimen was taken to the lab, divided into the necessary number of pieces, chromacoded and processed according to the Mohs protocol.  This was repeated in successive stages until a tumor free defect was achieved.
V-Y Flap Text: The defect edges were debeveled with a #15 scalpel blade.  Given the location of the defect, shape of the defect and the proximity to free margins a V-Y flap was deemed most appropriate.  Using a sterile surgical marker, an appropriate advancement flap was drawn incorporating the defect and placing the expected incisions within the relaxed skin tension lines where possible.    The area thus outlined was incised deep to adipose tissue with a #15 scalpel blade.  The skin margins were undermined to an appropriate distance in all directions utilizing iris scissors.
Dressing: dry sterile dressing
O-L Flap Text: The defect edges were debeveled with a #15 scalpel blade.  Given the location of the defect, shape of the defect and the proximity to free margins an O-L flap was deemed most appropriate.  Using a sterile surgical marker, an appropriate advancement flap was drawn incorporating the defect and placing the expected incisions within the relaxed skin tension lines where possible.    The area thus outlined was incised deep to adipose tissue with a #15 scalpel blade.  The skin margins were undermined to an appropriate distance in all directions utilizing iris scissors.
Rotation Flap Text: The defect edges were debeveled with a #15 scalpel blade.  Given the location of the defect, shape of the defect and the proximity to free margins a rotation flap was deemed most appropriate.  Using a sterile surgical marker, an appropriate rotation flap was drawn incorporating the defect and placing the expected incisions within the relaxed skin tension lines where possible.    The area thus outlined was incised deep to adipose tissue with a #15 scalpel blade.  The skin margins were undermined to an appropriate distance in all directions utilizing iris scissors.
Tarsorrhaphy Text: A tarsorrhaphy was performed using Frost sutures.
Star Wedge Flap Text: The defect edges were debeveled with a #15 scalpel blade.  Given the location of the defect, shape of the defect and the proximity to free margins a star wedge flap was deemed most appropriate.  Using a sterile surgical marker, an appropriate rotation flap was drawn incorporating the defect and placing the expected incisions within the relaxed skin tension lines where possible. The area thus outlined was incised deep to adipose tissue with a #15 scalpel blade.  The skin margins were undermined to an appropriate distance in all directions utilizing iris scissors.
Melolabial Transposition Flap Text: The defect edges were debeveled with a #15 scalpel blade.  Given the location of the defect and the proximity to free margins a melolabial flap was deemed most appropriate.  Using a sterile surgical marker, an appropriate melolabial transposition flap was drawn incorporating the defect.    The area thus outlined was incised deep to adipose tissue with a #15 scalpel blade.  The skin margins were undermined to an appropriate distance in all directions utilizing iris scissors.
Skin Substitute Text: The defect edges were debeveled with a #15 scalpel blade.  Given the location of the defect, shape of the defect and the proximity to free margins a skin substitute graft was deemed most appropriate.  The graft material was trimmed to fit the size of the defect. The graft was then placed in the primary defect and oriented appropriately.
Consent (Nose)/Introductory Paragraph: The rationale for Mohs was explained to the patient and consent was obtained. The risks, benefits and alternatives to therapy were discussed in detail. Specifically, the risks of nasal deformity, changes in the flow of air through the nose, infection, scarring, bleeding, prolonged wound healing, incomplete removal, allergy to anesthesia, nerve injury and recurrence were addressed. Prior to the procedure, the treatment site was clearly identified and confirmed by the patient. All components of Universal Protocol/PAUSE Rule completed.
Melolabial Interpolation Flap Text: A decision was made to reconstruct the defect utilizing an interpolation axial flap and a staged reconstruction.  A telfa template was made of the defect.  This telfa template was then used to outline the melolabial interpolation flap.  The donor area for the pedicle flap was then injected with anesthesia.  The flap was excised through the skin and subcutaneous tissue down to the layer of the underlying musculature.  The pedicle flap was carefully excised within this deep plane to maintain its blood supply.  The edges of the donor site were undermined.   The donor site was closed in a primary fashion.  The pedicle was then rotated into position and sutured.  Once the tube was sutured into place, adequate blood supply was confirmed with blanching and refill.  The pedicle was then wrapped with xeroform gauze and dressed appropriately with a telfa and gauze bandage to ensure continued blood supply and protect the attached pedicle.
Island Pedicle Flap With Canthal Suspension Text: The defect edges were debeveled with a #15 scalpel blade.  Given the location of the defect, shape of the defect and the proximity to free margins an island pedicle advancement flap was deemed most appropriate.  Using a sterile surgical marker, an appropriate advancement flap was drawn incorporating the defect, outlining the appropriate donor tissue and placing the expected incisions within the relaxed skin tension lines where possible. The area thus outlined was incised deep to adipose tissue with a #15 scalpel blade.  The skin margins were undermined to an appropriate distance in all directions around the primary defect and laterally outward around the island pedicle utilizing iris scissors.  There was minimal undermining beneath the pedicle flap. A suspension suture was placed in the canthal tendon to prevent tension and prevent ectropion.
S Plasty Text: Given the location and shape of the defect, and the orientation of relaxed skin tension lines, an S-plasty was deemed most appropriate for repair.  Using a sterile surgical marker, the appropriate outline of the S-plasty was drawn, incorporating the defect and placing the expected incisions within the relaxed skin tension lines where possible.  The area thus outlined was incised deep to adipose tissue with a #15 scalpel blade.  The skin margins were undermined to an appropriate distance in all directions utilizing iris scissors. The skin flaps were advanced over the defect.  The opposing margins were then approximated with interrupted buried subcutaneous sutures.
Same Histology In Subsequent Stages Text: The pattern and morphology of the tumor is as described in the first stage.
Consent 3/Introductory Paragraph: I gave the patient a chance to ask questions they had about the procedure.  Following this I explained the Mohs procedure and consent was obtained. The risks, benefits and alternatives to therapy were discussed in detail. Specifically, the risks of infection, scarring, bleeding, prolonged wound healing, incomplete removal, allergy to anesthesia, nerve injury and recurrence were addressed. Prior to the procedure, the treatment site was clearly identified and confirmed by the patient. All components of Universal Protocol/PAUSE Rule completed.
Graft Donor Site Epidermal Sutures (Optional): 6-0 Prolene
Wound Care (No Sutures): Petrolatum
Referred To Oculoplastics For Closure Text (Leave Blank If You Do Not Want): After obtaining clear surgical margins the patient was sent to oculoplastics for surgical repair.  The patient understands they will receive post-surgical care and follow-up from the referring physician's office.
Unna Boot Text: An Unna boot was placed to help immobilize the limb and facilitate more rapid healing.
Mucosal Advancement Flap Text: Given the location of the defect, shape of the defect and the proximity to free margins a mucosal advancement flap was deemed most appropriate. Incisions were made with a 15 blade scalpel in the appropriate fashion along the cutaneous vermillion border and the mucosal lip. The remaining actinically damaged mucosal tissue was excised.  The mucosal advancement flap was then elevated to the gingival sulcus with care taken to preserve the neurovascular structures and advanced into the primary defect. Care was taken to ensure that precise realignment of the vermillion border was achieved.
H Plasty Text: Given the location of the defect, shape of the defect and the proximity to free margins a H-plasty was deemed most appropriate for repair.  Using a sterile surgical marker, the appropriate advancement arms of the H-plasty were drawn incorporating the defect and placing the expected incisions within the relaxed skin tension lines where possible. The area thus outlined was incised deep to adipose tissue with a #15 scalpel blade. The skin margins were undermined to an appropriate distance in all directions utilizing iris scissors.  The opposing advancement arms were then advanced into place in opposite direction and anchored with interrupted buried subcutaneous sutures.
Interpolation Flap Text: A decision was made to reconstruct the defect utilizing an interpolation axial flap and a staged reconstruction.  A telfa template was made of the defect.  This telfa template was then used to outline the interpolation flap.  The donor area for the pedicle flap was then injected with anesthesia.  The flap was excised through the skin and subcutaneous tissue down to the layer of the underlying musculature.  The interpolation flap was carefully excised within this deep plane to maintain its blood supply.  The edges of the donor site were undermined.   The donor site was closed in a primary fashion.  The pedicle was then rotated into position and sutured.  Once the tube was sutured into place, adequate blood supply was confirmed with blanching and refill.  The pedicle was then wrapped with xeroform gauze and dressed appropriately with a telfa and gauze bandage to ensure continued blood supply and protect the attached pedicle.
Localized Dermabrasion With Wire Brush Text: The patient was draped in routine manner.  Localized dermabrasion using 3 x 17 mm wire brush was performed in routine manner to papillary dermis. This spot dermabrasion is being performed to complete skin cancer reconstruction. It also will eliminate the other sun damaged precancerous cells that are known to be part of the regional effect of a lifetime's worth of sun exposure. This localized dermabrasion is therapeutic and should not be considered cosmetic in any regard.
Consent (Lip)/Introductory Paragraph: The rationale for Mohs was explained to the patient and consent was obtained. The risks, benefits and alternatives to therapy were discussed in detail. Specifically, the risks of lip deformity, changes in the oral aperture, infection, scarring, bleeding, prolonged wound healing, incomplete removal, allergy to anesthesia, nerve injury and recurrence were addressed. Prior to the procedure, the treatment site was clearly identified and confirmed by the patient. All components of Universal Protocol/PAUSE Rule completed.
Home Suture Removal Text: Patient was provided instructions on removing sutures and will remove their sutures at home.  If they have any questions or difficulties they will call the office.
Cheek-To-Nose Interpolation Flap Text: A decision was made to reconstruct the defect utilizing an interpolation axial flap and a staged reconstruction.  A telfa template was made of the defect.  This telfa template was then used to outline the Cheek-To-Nose Interpolation flap.  The donor area for the pedicle flap was then injected with anesthesia.  The flap was excised through the skin and subcutaneous tissue down to the layer of the underlying musculature.  The interpolation flap was carefully excised within this deep plane to maintain its blood supply.  The edges of the donor site were undermined.   The donor site was closed in a primary fashion.  The pedicle was then rotated into position and sutured.  Once the tube was sutured into place, adequate blood supply was confirmed with blanching and refill.  The pedicle was then wrapped with xeroform gauze and dressed appropriately with a telfa and gauze bandage to ensure continued blood supply and protect the attached pedicle.
Epidermal Closure: running
Consent (Near Eyelid Margin)/Introductory Paragraph: The rationale for Mohs was explained to the patient and consent was obtained. The risks, benefits and alternatives to therapy were discussed in detail. Specifically, the risks of ectropion or eyelid deformity, infection, scarring, bleeding, prolonged wound healing, incomplete removal, allergy to anesthesia, nerve injury and recurrence were addressed. Prior to the procedure, the treatment site was clearly identified and confirmed by the patient. All components of Universal Protocol/PAUSE Rule completed.
Advancement Flap (Single) Text: The defect edges were debeveled with a #15 scalpel blade.  Given the location of the defect and the proximity to free margins a single advancement flap was deemed most appropriate.  Using a sterile surgical marker, an appropriate advancement flap was drawn incorporating the defect and placing the expected incisions within the relaxed skin tension lines where possible.    The area thus outlined was incised deep to adipose tissue with a #15 scalpel blade.  The skin margins were undermined to an appropriate distance in all directions utilizing iris scissors.
Alar Island Pedicle Flap Text: The defect edges were debeveled with a #15 scalpel blade.  Given the location of the defect, shape of the defect and the proximity to the alar rim an island pedicle advancement flap was deemed most appropriate.  Using a sterile surgical marker, an appropriate advancement flap was drawn incorporating the defect, outlining the appropriate donor tissue and placing the expected incisions within the nasal ala running parallel to the alar rim. The area thus outlined was incised with a #15 scalpel blade.  The skin margins were undermined minimally to an appropriate distance in all directions around the primary defect and laterally outward around the island pedicle utilizing iris scissors.  There was minimal undermining beneath the pedicle flap.
Complex Repair And Graft Additional Text (Will Appearing After The Standard Complex Repair Text): The complex repair was not sufficient to completely close the primary defect. The remaining additional defect was repaired with the graft mentioned below.
Area M Indication Text: Tumors in this location are included in Area M (cheek, forehead, scalp, neck, jawline and pretibial skin).  Mohs surgery is indicated for tumors 1 cm or larger in these anatomic locations.
Cheiloplasty (Complex) Text: A decision was made to reconstruct the defect with a  cheiloplasty.  The defect was undermined extensively.  Additional obicularis oris muscle was excised with a 15 blade scalpel.  The defect was converted into a full thickness wedge to facilite a better cosmetic result.  Small vessels were then tied off with 5-0 monocyrl. The obicularis oris, superficial fascia, adipose and dermis were then reapproximated.  After the deeper layers were approximated the epidermis was reapproximated with particular care given to realign the vermillion border.
Additional Epidermal Closure (Optional): vertical mattress
Number Of Stages: 1
A-T Advancement Flap Text: The defect edges were debeveled with a #15 scalpel blade.  Given the location of the defect, shape of the defect and the proximity to free margins an A-T advancement flap was deemed most appropriate.  Using a sterile surgical marker, an appropriate advancement flap was drawn incorporating the defect and placing the expected incisions within the relaxed skin tension lines where possible.    The area thus outlined was incised deep to adipose tissue with a #15 scalpel blade.  The skin margins were undermined to an appropriate distance in all directions utilizing iris scissors.
Repair Type: Intermediate Layered Repair
Tissue Cultured Epidermal Autograft Text: The defect edges were debeveled with a #15 scalpel blade.  Given the location of the defect, shape of the defect and the proximity to free margins a tissue cultured epidermal autograft was deemed most appropriate.  The graft was then trimmed to fit the size of the defect.  The graft was then placed in the primary defect and oriented appropriately.
Tumor Debulked?: curette
Wound Care: Bacitracin
Xenograft Text: The defect edges were debeveled with a #15 scalpel blade.  Given the location of the defect, shape of the defect and the proximity to free margins a xenograft was deemed most appropriate.  The graft was then trimmed to fit the size of the defect.  The graft was then placed in the primary defect and oriented appropriately.
Epidermal Autograft Text: The defect edges were debeveled with a #15 scalpel blade.  Given the location of the defect, shape of the defect and the proximity to free margins an epidermal autograft was deemed most appropriate.  Using a sterile surgical marker, the primary defect shape was transferred to the donor site. The epidermal graft was then harvested.  The skin graft was then placed in the primary defect and oriented appropriately.
Post-Care Instructions: I reviewed with the patient in detail post-care instructions. Patient is not to engage in any heavy lifting, exercise, or swimming for the next 14 days. Should the patient develop any fevers, chills, bleeding, severe pain patient will contact the office immediately..\\n.
Mastoid Interpolation Flap Text: A decision was made to reconstruct the defect utilizing an interpolation axial flap and a staged reconstruction.  A telfa template was made of the defect.  This telfa template was then used to outline the mastoid interpolation flap.  The donor area for the pedicle flap was then injected with anesthesia.  The flap was excised through the skin and subcutaneous tissue down to the layer of the underlying musculature.  The pedicle flap was carefully excised within this deep plane to maintain its blood supply.  The edges of the donor site were undermined.   The donor site was closed in a primary fashion.  The pedicle was then rotated into position and sutured.  Once the tube was sutured into place, adequate blood supply was confirmed with blanching and refill.  The pedicle was then wrapped with xeroform gauze and dressed appropriately with a telfa and gauze bandage to ensure continued blood supply and protect the attached pedicle.
Spiral Flap Text: The defect edges were debeveled with a #15 scalpel blade.  Given the location of the defect, shape of the defect and the proximity to free margins a spiral flap was deemed most appropriate.  Using a sterile surgical marker, an appropriate rotation flap was drawn incorporating the defect and placing the expected incisions within the relaxed skin tension lines where possible. The area thus outlined was incised deep to adipose tissue with a #15 scalpel blade.  The skin margins were undermined to an appropriate distance in all directions utilizing iris scissors.
Deep Sutures: 4-0 PDS
Mohs Case Number: [id number]
Partial Purse String (Intermediate) Text: Given the location of the defect and the characteristics of the surrounding skin an intermediate purse string closure was deemed most appropriate.  Undermining was performed circumfirentially around the surgical defect.  A purse string suture was then placed and tightened. Wound tension only allowed a partial closure of the circular defect.
Stage 2: Additional Anesthesia Type: 1% lidocaine with 1:100,000 epinephrine
Repair Performed By Another Provider Text (Leave Blank If You Do Not Want): After obtaining clear surgical margins the defect was repaired by another provider.
Ftsg Text: The defect edges were debeveled with a #15 scalpel blade.  Given the location of the defect, shape of the defect and the proximity to free margins a full thickness skin graft was deemed most appropriate.  Using a sterile surgical marker, the primary defect shape was transferred to the donor site. The area thus outlined was incised deep to adipose tissue with a #15 scalpel blade.  The harvested graft was then trimmed of adipose tissue until only dermis and epidermis was left.  The skin margins of the secondary defect were undermined to an appropriate distance in all directions utilizing iris scissors.  The secondary defect was closed with interrupted buried subcutaneous sutures.  The skin edges were then re-apposed with running  sutures.  The skin graft was then placed in the primary defect and oriented appropriately.
Split-Thickness Skin Graft Text: The defect edges were debeveled with a #15 scalpel blade.  Given the location of the defect, shape of the defect and the proximity to free margins a split thickness skin graft was deemed most appropriate.  Using a sterile surgical marker, the primary defect shape was transferred to the donor site. The split thickness graft was then harvested.  The skin graft was then placed in the primary defect and oriented appropriately.
Surgeon/Pathologist Verbiage (Will Incorporate Name Of Surgeon From Intro If Not Blank): operated in two distinct and integrated capacities as the surgeon and pathologist.
Closure 2 Information: This tab is for additional flaps and grafts, including complex repair and grafts and complex repair and flaps. You can also specify a different location for the additional defect, if the location is the same you do not need to select a new one. We will insert the automated text for the repair you select below just as we do for solitary flaps and grafts. Please note that at this time if you select a location with a different insurance zone you will need to override the ICD10 and CPT if appropriate.
Anesthesia Type: 1% lidocaine without epinephrine
Partial Purse String (Simple) Text: Given the location of the defect and the characteristics of the surrounding skin a simple purse string closure was deemed most appropriate.  Undermining was performed circumfirentially around the surgical defect.  A purse string suture was then placed and tightened. Wound tension only allowed a partial closure of the circular defect.
Alternatives Discussed Intro (Do Not Add Period): I discussed alternative treatments to Mohs surgery and specifically discussed the risks and benefits of
Anesthesia Type: 1% lidocaine without epinephrine and a 1:10 solution of 8.4% sodium bicarbonate
Consent 1/Introductory Paragraph: The rationale for Mohs was explained to the patient and consent was obtained. The risks, benefits and alternatives to therapy were discussed in detail. Specifically, the risks of infection, scarring, bleeding, prolonged wound healing, incomplete removal, allergy to anesthesia, nerve injury and recurrence were addressed. Prior to the procedure, the treatment site was clearly identified and confirmed by the patient. All components of Universal Protocol/PAUSE Rule completed.
Crescentic Advancement Flap Text: The defect edges were debeveled with a #15 scalpel blade.  Given the location of the defect and the proximity to free margins a crescentic advancement flap was deemed most appropriate.  Using a sterile surgical marker, the appropriate advancement flap was drawn incorporating the defect and placing the expected incisions within the relaxed skin tension lines where possible.    The area thus outlined was incised deep to adipose tissue with a #15 scalpel blade.  The skin margins were undermined to an appropriate distance in all directions utilizing iris scissors.
Double Island Pedicle Flap Text: The defect edges were debeveled with a #15 scalpel blade.  Given the location of the defect, shape of the defect and the proximity to free margins a double island pedicle advancement flap was deemed most appropriate.  Using a sterile surgical marker, an appropriate advancement flap was drawn incorporating the defect, outlining the appropriate donor tissue and placing the expected incisions within the relaxed skin tension lines where possible.    The area thus outlined was incised deep to adipose tissue with a #15 scalpel blade.  The skin margins were undermined to an appropriate distance in all directions around the primary defect and laterally outward around the island pedicle utilizing iris scissors.  There was minimal undermining beneath the pedicle flap.

## 2018-02-23 NOTE — PROGRESS NOTES
I am accessing Ms. Greenfield's chart as a part of our department's internal chart auditing process. I certify that Ms. Nuha Blankenship is, or was, a patient in our department.   Thank you,  Nina Espinoza, PTA  2/23/2018

## 2018-02-26 ENCOUNTER — APPOINTMENT (RX ONLY)
Dept: URBAN - METROPOLITAN AREA CLINIC 23 | Facility: CLINIC | Age: 78
Setting detail: DERMATOLOGY
End: 2018-02-26

## 2018-02-26 DIAGNOSIS — Z48.01 ENCOUNTER FOR CHANGE OR REMOVAL OF SURGICAL WOUND DRESSING: ICD-10-CM

## 2018-02-26 PROCEDURE — ? SUTURE REMOVAL (GLOBAL PERIOD)

## 2018-02-26 ASSESSMENT — LOCATION SIMPLE DESCRIPTION DERM: LOCATION SIMPLE: SCALP

## 2018-02-26 ASSESSMENT — LOCATION DETAILED DESCRIPTION DERM: LOCATION DETAILED: LEFT SUPERIOR PARIETAL SCALP

## 2018-02-26 ASSESSMENT — LOCATION ZONE DERM: LOCATION ZONE: SCALP

## 2018-02-26 NOTE — PROCEDURE: SUTURE REMOVAL (GLOBAL PERIOD)
Detail Level: Detailed
Add 95170 Cpt? (Important Note: In 2017 The Use Of 31372 Is Being Tracked By Cms To Determine Future Global Period Reimbursement For Global Periods): no

## 2018-03-21 ENCOUNTER — HOSPITAL ENCOUNTER (OUTPATIENT)
Dept: PHYSICAL THERAPY | Age: 78
Discharge: HOME | End: 2018-03-21
Payer: MEDICARE

## 2018-03-21 PROCEDURE — G8978 MOBILITY CURRENT STATUS: HCPCS

## 2018-03-21 PROCEDURE — G8979 MOBILITY GOAL STATUS: HCPCS

## 2018-03-21 PROCEDURE — 97162 PT EVAL MOD COMPLEX 30 MIN: CPT

## 2018-03-21 PROCEDURE — 97110 THERAPEUTIC EXERCISES: CPT

## 2018-03-21 NOTE — PROGRESS NOTES
Beverly Vazquez  : 1940  [place]  [place]: [place] [address] [place] [address] [facility], [address]  [phone]   [phone]         OUTPATIENT PHYSICAL THERAPY:Discontinuation Summary 3/21/2018    ICD-10: Treatment Diagnosis: Unsteadiness on feet (R26.81)  Precautions/Allergies:   Latex; Ceftin [cefuroxime axetil]; and Sulfa (sulfonamide antibiotics)   Fall Risk Score: 5 (? 5 = High Risk)  MD Orders: evaluate and treat MEDICAL/REFERRING DIAGNOSIS:  gait distrubance   DATE OF ONSET: few months ago  REFERRING PHYSICIAN: Xiomara Pope, *  RETURN PHYSICIAN APPOINTMENT: unknown     ASSESSMENT (Date 3/1/18): Beverly Vazquez has been seen in physical therapy from 17 to 18 for 5 visits. Treatment has been discontinued at this time due to patient deciding to have surgery. The below goals were met prior to discontinuation. Some goals were not met due to patients current condition. Thank you for this referral.        PROBLEM LIST (Impacting functional limitations):  1. Decreased Strength  2. Decreased ADL/Functional Activities  3. Decreased Transfer Abilities  4. Decreased Ambulation Ability/Technique  5. Decreased Balance  6. Decreased Activity Tolerance  7. Decreased [place] with Home Exercise Program INTERVENTIONS PLANNED:  1. Balance Exercise  2. Family Education  3. Gait Training  4. Heat  5. Home Exercise Program (HEP)  6. Manual Therapy  7. Neuromuscular Re-education/Strengthening  8. Therapeutic Activites  9. Therapeutic Exercise/Strengthening  10. Transfer Training   TREATMENT PLAN:  Effective Dates: 17 TO 3/20/18. Frequency/Duration: 2 times a week for 12 weeks  GOALS: (Goals have been discussed and agreed upon with patient.)  Short-Term Functional Goals: Time Frame: 6 weeks  1. Patient will be compliant with HEP.   2. Patient will have an increase on the tinajero balance to 42/56 in order to improve her functional balance. 3. Patient will have a decrease on the TUG to 13 seconds indicating improved gait mechanics. Discharge Goals: Time Frame: 12 weeks  1. Patient will be independent with HEP. 2. Patient will have an increase on the tinajero balance to 47/56 in order to decrease her risk of falls. 3. Patient will have a decrease on the TUG to less than 12 seconds in order to improve her functional mobility. 4. Patient will demonstrate turning with 4 steps or less indicating improve movement patterns. Rehabilitation Potential For Stated Goals: Good  Regarding Jayla Greenfield's therapy, I certify that the treatment plan above will be carried out by a therapist or under their direction.   Thank you for this referral,  Sienna Rosales, RICCIT, NCS

## 2018-03-21 NOTE — PROGRESS NOTES
Margarita Ruiz  : 1940  [place]  Secondary: [address] [facility] [address], [place], [facility]  [phone]   [phone]         OUTPATIENT PHYSICAL THERAPY:Initial Assessment 3/22/2018    ICD-10: Treatment Diagnosis: Unsteadiness on feet (R26.81)  Precautions/Allergies:   Latex; Ceftin [cefuroxime axetil]; and Sulfa (sulfonamide antibiotics)   Fall Risk Score: 5 (? 5 = High Risk)  MD Orders: evaluate and treat MEDICAL/REFERRING DIAGNOSIS:  Presence of cerebrospinal fluid drainage device [Z98.2]  Unspecified abnormalities of gait and mobility [R26.9]  (Idiopathic) normal pressure hydrocephalus [G91.2]   DATE OF ONSET: few months ago  REFERRING PHYSICIAN: Manisha Swartz MD  RETURN PHYSICIAN APPOINTMENT: Monday      ASSESSMENT (Date: 3/21/18):  Ms. Erica Morrell presents to physical therapy with a diagnosis of NPH. She was seen by me a few months ago and now returns s/p shunt placement. Since the shunt placement, she has improved her tinajero balance score by 5 points and decreased her timed up and go by 2 seconds. She is still a high risk of falls and has multiple gait deficits that need to be addressed. She is also stiff and weak from months of decrease mobility. She would benefit from skilled PT to improve functional mobility and decrease her risk of falls. PROBLEM LIST (Impacting functional limitations):  1. Decreased Strength  2. Decreased ADL/Functional Activities  3. Decreased Transfer Abilities  4. Decreased Ambulation Ability/Technique  5. Decreased Balance  6. Decreased Activity Tolerance  7. Decreased [place] with Home Exercise Program INTERVENTIONS PLANNED:  1. Balance Exercise  2. Family Education  3. Gait Training  4. Heat  5. Home Exercise Program (HEP)  6. Manual Therapy  7. Neuromuscular Re-education/Strengthening  8. Therapeutic Activites  9.  Therapeutic Exercise/Strengthening  10. Transfer Training   TREATMENT PLAN:  Effective Dates: 3/21/18 to 6/19/2018. Frequency/Duration: 2 times a week for 12 weeks  GOALS: (Goals have been discussed and agreed upon with patient.)  Short-Term Functional Goals: Time Frame: 6 weeks  1. Patient will be compliant with HEP. 2. Patient will have an increase on the tinajero balance to 46/56 in order to improve her functional balance. 3. Patient will have a decrease on the TUG to 12 seconds indicating improved gait mechanics. Discharge Goals: Time Frame: 12 weeks  1. Patient will be independent with HEP. 2. Patient will have an increase on the tinajero balance to 48/56 in order to decrease her risk of falls. 3. Patient will have a decrease on the TUG to less than 11 seconds in order to improve her functional mobility. 4. Patient will demonstrate turning with 4 steps or less indicating improve movement patterns. 5. Patient will have an increase on the 6 minute walk test to 1100' indicating improve community mobility. Rehabilitation Potential For Stated Goals: Good  Regarding Jayla Greenfield's therapy, I certify that the treatment plan above will be carried out by a therapist or under their direction. Thank you for this referral,  Yael Marie DPT, NCS     Referring Physician Signature: Sheryl King MD              Date                    HISTORY:   Patient Stated Goal:        I want to be back to normal  History of Present Injury/Illness (Reason for Referral):  March 2018\" Shunt placement on the R side March 6th. Goes back to Dr. Ke Heaton on Monday.  is happy with her progress. She says she is now able to back up easier and her stride is getting longer. Sleeps with her head slightly elevated. Has been driving since surgery. Has not been using a walker since she went home. Dec 2017:   Patient reports she went to [facility] and they confirmed Dr. Jeanie Reed diagnosis of NPH.   They are going to try diamox for 60 days which is a diuretic. At that time, they will send a video to [facility] and they will decide see if they need to do surgery. Feb 15th is 60 days. Nathan said to do PT in the meantime to help with mobility. She reports no falls but says she is getting more fearful of falling. Patient reports still having some memory issues and some incontinence as well as gait difficulties. Past Medical History/Comorbidities:   Ms. Travis Johnson  has a past medical history of CAD (coronary artery disease) (4/26/2016); Gait disorder (4/26/2016); Hypertension; Hypertriglyceridemia (4/26/2016); Psychotic disorder; and Small vessel disease, cerebrovascular (4/26/2016). Ms. Travis Johnson  has no past surgical history on file. Social History/Living Environment:     lives with   Prior Level of Function/Work/Activity:  Retired. Use to exercises 3 days a week but is currently unable to    Current Medications:    Current Outpatient Prescriptions:     triamterene-hydroCHLOROthiazide (MAXZIDE) 37.5-25 mg per tablet, TAKE 1 TABLET BY MOUTH TWICE DAILY, Disp: 180 Tab, Rfl: 3    pantoprazole (PROTONIX) 40 mg tablet, Take 1 Tab by mouth daily. , Disp: 30 Tab, Rfl: 5    doxycycline (MONODOX) 100 mg capsule, Take 1 Cap by mouth two (2) times a day., Disp: 20 Cap, Rfl: 0    FLUoxetine (PROZAC) 20 mg tablet, TAKE ONE CAPSULE BY MOUTH EVERY DAY, Disp: 30 Tab, Rfl: 5    conjugated estrogens (PREMARIN) 0.625 mg tablet, TAKE 1 TABLET BY MOUTH EVERY DAY, Disp: 30 Tab, Rfl: 5    triamterene-hydroCHLOROthiazide (MAXZIDE) 37.5-25 mg per tablet, TAKE 1 TABLET BY MOUTH TWICE DAILY, Disp: 180 Tab, Rfl: 3    niacinamide 500 mg tablet, TK 1 T PO  BID, Disp: , Rfl: 3    atenolol (TENORMIN) 50 mg tablet, Take 2 tab by mouth daily, Disp: 60 Tab, Rfl: 3    fluticasone (FLONASE) 50 mcg/actuation nasal spray, nightly., Disp: , Rfl:    Diamox     Date Last Reviewed:  3/21/2018   Number of Personal Factors/Comorbidities that affect the Plan of Care: 1-2: MODERATE COMPLEXITY   EXAMINATION:   Observation/Orthostatic Postural Assessment: Forward head, rounded shoulders. Decreased trunk mobility with gait. Stands with wide WILLY  Mental Status:          Functional but does report some episodes of memory issues  Palpation:          No increased muscle tone noted  Sensation:         intact  Skin Integrity:          intact  Vision:          Functional   Balance:          Good static balance, decreased dynamic balance     Lower Extremity:   Strength PROM   Action R L R  L    Hip Flexion 4 4+     Hip Extension 4 4+     Hip Abduction 4 4+     Hip Adduction       Knee Flexion       Knee Extension 4+ 4+     Dorsi Flexion 4+ 4+     Plantar Flexion 4 4     Inversion       Eversion                 Upper Extremity:         Grossly 4+/5  Functional Mobility:         Gait/Ambulation:  Ambulates with slow gait speed, shuffling gait pattern with decreased heel strike R>L, wide WILLY, flexed knees, some toe out B, fluctuating step length- getting smaller with distance , no assistive device   940' today in 6 minute walk         Transfers:  Slow, able to stand without UE support, wide WILLY, few attempts to stand. Slow, shuffling turn with still with several steps during turning        Bed Mobility:  Functional         Stairs:  NT        Wheelchair:  NT              Body Structures Involved:  1. Nerves  2. Bones  3. Joints  4. Muscles Body Functions Affected:  1. Mental  2. Genitourinary  3. Neuromusculoskeletal  4. Movement Related Activities and Participation Affected:  1. Mobility  2. Self Care  3. Domestic Life  4. Interpersonal Interactions and Relationships   Number of elements that affect the Plan of Care: 4+: HIGH COMPLEXITY   CLINICAL PRESENTATION:   Presentation: Evolving clinical presentation with changing clinical characteristics: MODERATE COMPLEXITY   CLINICAL DECISION MAKING:   Outcome Measure:    Tool Used: Schwab Balance Scale  Score:  Initial: 44/56 Most Recent: X/56 (Date: -- ) Interpretation of Score: Each section is scored on a 0-4 scale, 0 representing the patients inability to perform the task and 4 representing independence. The scores of each section are added together for a total score of 56. The higher the patients score, the more independent the patient is. Any score below 45 indicates increased risk for falls. Score 56 55-45 44-34 33-23 22-12 11-1 0   Modifier CH CI CJ CK CL CM CN     ? Mobility - Walking and Moving Around:     - CURRENT STATUS: CJ - 20%-39% impaired, limited or restricted    - GOAL STATUS: CI - 1%-19% impaired, limited or restricted    - D/C STATUS:  ---------------To be determined---------------    Tool Used: Timed Up and Go (TUG)  Score:  Initial: 13.5 seconds Most Recent: X seconds (Date: -- )   Interpretation of Score: The test measures, in seconds, the time taken by an individual to stand up from a standard arm chair (seat height 46 cm [18 in], arm height 65 cm [25.6 in]), walk a distance of 3 meters (118 in, approx 10 ft), turn, walk back to the chair and sit down. If the individual takes longer than 14 seconds to complete TUG, this indicates risk for falls. Score 7 7.5-10.5 11-14 14.5-17.5 18-21 21.5-24.5 25+   Modifier CH CI CJ CK CL CM CN         Medical Necessity:   · Patient is expected to demonstrate progress in balance, coordination and functional technique to improve safety during ADLs and IADLs. · Patient demonstrates good rehab potential due to higher previous functional level. Reason for Services/Other Comments:  · Patient continues to require modification of therapeutic interventions to increase complexity of exercises.    Use of outcome tool(s) and clinical judgement create a POC that gives a: Questionable prediction of patient's progress: MODERATE COMPLEXITY   TREATMENT:   (In addition to Assessment/Re-Assessment sessions the following treatments were rendered)  Pre Treatment Symptoms: see above    Therapeutic Exercise: ( 10 minutes ):  Exercises per grid below to improve mobility, balance and coordination. Required moderate visual, verbal and tactile cues to promote proper body alignment and promote proper body mechanics. Progressed complexity of movement as indicated. Date:  1/2/18 Date:  1/4/18 Date:  1/19/18 Date:  3/22/18   Activity/Exercise Parameters Parameters Parameters  Parameters   NuStep Level 2 x 8 minutes  Level 2 x 8 minutes - verbal cues to maintain BIG movements X 10 minutes level 1 on new NuStep     Calf stretch on incline board 2 x 60 sec hold X 60 sec hold  2 x 30 sec hold   Heel raises with 3 second hold X 10 reps X 15 reps X 20 reps  X 20 reps B with cues for Power! Gait initiation  Holding to half wall with visual markers to increase step size. Also cues for start and stop to break up the movements Holding to half wall with visual markers to increase step size. Also cues for start and stop to break up the movements     LSVT sit to stand       PWR standing reach for weight shift       PWR marching gait with arm swing       LSVT forward step out X 10 reps at half wall with a lot of tactile cues  X 10 reps B at half wall stepping over dots with cues for starts and stops    LSVT side step out X 10 reps B at half wall with a lot of tactile cues X 10 reps B at half wall with less cuing today X 10 reps B at half wall stepping over dots with cues for starts and stops    BIG walking  Verbal and visual cues for arm swing x 120' Verbal and visual cues for arm swing, step size, foot clearance and turns. Working on starts and stops, in and out of tight spaces. Forward and back walking with cuing. Blocked practice x 20 minutes. Cues for arm swing that carried over throughout treatment session 3 x 120'    PWR backward step  X 10 reps B with lots of cues     Ambulation   X 120' with cues to maintain arm swing           Treatment/Session Assessment:  See assessment above.      · Pain/ Symptoms: Initial: 0/10 Post Session:  0/10 ·   Compliance with Program/Exercises: Will assess as treatment progresses. · Recommendations/Intent for next treatment session: \"Next visit will focus on advancements to more challenging activities and reduction in assistance provided\".   Total Treatment Duration:   PT Patient Time In/Time Out  Time In: 1030  Time Out: [address] [facility], [facility]

## 2018-03-22 NOTE — PROGRESS NOTES
Ambulatory/Rehab Services H2 Model Falls Risk Assessment    Risk Factor Pts. ·   Confusion/Disorientation/Impulsivity  []    4 ·   Symptomatic Depression  []   2 ·   Altered Elimination  []   1 ·   Dizziness/Vertigo  []   1 ·   Gender (Male)  []   1 ·   Any administered antiepileptics (anticonvulsants):  []   2 ·   Any administered benzodiazepines:  []   1 ·   Visual Impairment (specify):  []   1 ·   Portable Oxygen Use  []   1 ·   Orthostatic ? BP  []   1 ·   History of Recent Falls (within 3 mos.)  [x]   5     Ability to Rise from Chair (choose one) Pts. ·   Ability to rise in a single movement  [x]   0 ·   Pushes up, successful in one attempt  []   1 ·   Multiple attempts, but successful  []   3 ·   Unable to rise without assistance  []   4   Total: (5 or greater = High Risk) 5     Falls Prevention Plan:   []                Physical Limitations to Exercise (specify):   []                Mobility Assistance Device (type):   []                Exercise/Equipment Adaptation (specify):    ©2010 [facility] [place] Patent #[id number].  Federal Law prohibits the replication, distribution or use without written permission from [facility] [employer]

## 2018-03-23 ENCOUNTER — HOSPITAL ENCOUNTER (OUTPATIENT)
Dept: PHYSICAL THERAPY | Age: 78
Discharge: HOME | End: 2018-03-23
Payer: MEDICARE

## 2018-03-23 PROCEDURE — 97110 THERAPEUTIC EXERCISES: CPT

## 2018-03-23 NOTE — PROGRESS NOTES
Estiven Moise  : 1940  [place]  [place] [address] [place] [address] at [facility], [facility], [address]  [phone]   [phone]         OUTPATIENT PHYSICAL THERAPY:Daily Note 3/23/2018    ICD-10: Treatment Diagnosis: Unsteadiness on feet (R26.81)  Precautions/Allergies:   Latex; Ceftin [cefuroxime axetil]; and Sulfa (sulfonamide antibiotics)   Fall Risk Score: 5 (? 5 = High Risk)  MD Orders: evaluate and treat MEDICAL/REFERRING DIAGNOSIS:  Presence of cerebrospinal fluid drainage device [Z98.2]  Unspecified abnormalities of gait and mobility [R26.9]  (Idiopathic) normal pressure hydrocephalus [G91.2]   DATE OF ONSET: few months ago  REFERRING PHYSICIAN: Flor Marino MD  RETURN PHYSICIAN APPOINTMENT: Monday      ASSESSMENT (Date: 3/21/18):  Ms. Tressie Najjar presents to physical therapy with a diagnosis of NPH. She was seen by me a few months ago and now returns s/p shunt placement. Since the shunt placement, she has improved her tinajero balance score by 5 points and decreased her timed up and go by 2 seconds. She is still a high risk of falls and has multiple gait deficits that need to be addressed. She is also stiff and weak from months of decrease mobility. She would benefit from skilled PT to improve functional mobility and decrease her risk of falls. PROBLEM LIST (Impacting functional limitations):  1. Decreased Strength  2. Decreased ADL/Functional Activities  3. Decreased Transfer Abilities  4. Decreased Ambulation Ability/Technique  5. Decreased Balance  6. Decreased Activity Tolerance  7. Decreased [place] with Home Exercise Program INTERVENTIONS PLANNED:  1. Balance Exercise  2. Family Education  3. Gait Training  4. Heat  5. Home Exercise Program (HEP)  6. Manual Therapy  7. Neuromuscular Re-education/Strengthening  8. Therapeutic Activites  9.  Therapeutic Exercise/Strengthening  10. Transfer Training   TREATMENT PLAN:  Effective Dates: 3/21/18 to 6/19/2018. Frequency/Duration: 2 times a week for 12 weeks  GOALS: (Goals have been discussed and agreed upon with patient.)  Short-Term Functional Goals: Time Frame: 6 weeks  1. Patient will be compliant with HEP. 2. Patient will have an increase on the tinajero balance to 46/56 in order to improve her functional balance. 3. Patient will have a decrease on the TUG to 12 seconds indicating improved gait mechanics. Discharge Goals: Time Frame: 12 weeks  1. Patient will be independent with HEP. 2. Patient will have an increase on the tinajero balance to 48/56 in order to decrease her risk of falls. 3. Patient will have a decrease on the TUG to less than 11 seconds in order to improve her functional mobility. 4. Patient will demonstrate turning with 4 steps or less indicating improve movement patterns. 5. Patient will have an increase on the 6 minute walk test to 1100' indicating improve community mobility. Rehabilitation Potential For Stated Goals: Good  Regarding Jayla Greenfield's therapy, I certify that the treatment plan above will be carried out by a therapist or under their direction. Thank you for this referral,  RICCI MinorT, NCS     Referring Physician Signature: Chante Bradshaw MD              Date                    HISTORY:   Patient Stated Goal:        I want to be back to normal  History of Present Injury/Illness (Reason for Referral):  March 2018\" Shunt placement on the R side March 6th. Goes back to Dr. Adalberto Scott on Monday.  is happy with her progress. She says she is now able to back up easier and her stride is getting longer. Sleeps with her head slightly elevated. Has been driving since surgery. Has not been using a walker since she went home. Dec 2017:   Patient reports she went to [facility] and they confirmed Dr. Amy Carnes diagnosis of NPH.   They are going to try diamox for 60 days which is a diuretic. At that time, they will send a video to  [facility] and they will decide see if they need to do surgery. Feb 15th is 60 days. Nathan said to do PT in the meantime to help with mobility. She reports no falls but says she is getting more fearful of falling. Patient reports still having some memory issues and some incontinence as well as gait difficulties. Past Medical History/Comorbidities:   Ms. Miko Wood  has a past medical history of CAD (coronary artery disease) (4/26/2016); Gait disorder (4/26/2016); Hypertension; Hypertriglyceridemia (4/26/2016); Psychotic disorder; and Small vessel disease, cerebrovascular (4/26/2016). Ms. Miko Wood  has no past surgical history on file. Social History/Living Environment:     lives with   Prior Level of Function/Work/Activity:  Retired. Use to exercises 3 days a week but is currently unable to    Current Medications:    Current Outpatient Prescriptions:     triamterene-hydroCHLOROthiazide (MAXZIDE) 37.5-25 mg per tablet, TAKE 1 TABLET BY MOUTH TWICE DAILY, Disp: 180 Tab, Rfl: 3    pantoprazole (PROTONIX) 40 mg tablet, Take 1 Tab by mouth daily. , Disp: 30 Tab, Rfl: 5    doxycycline (MONODOX) 100 mg capsule, Take 1 Cap by mouth two (2) times a day., Disp: 20 Cap, Rfl: 0    FLUoxetine (PROZAC) 20 mg tablet, TAKE ONE CAPSULE BY MOUTH EVERY DAY, Disp: 30 Tab, Rfl: 5    conjugated estrogens (PREMARIN) 0.625 mg tablet, TAKE 1 TABLET BY MOUTH EVERY DAY, Disp: 30 Tab, Rfl: 5    triamterene-hydroCHLOROthiazide (MAXZIDE) 37.5-25 mg per tablet, TAKE 1 TABLET BY MOUTH TWICE DAILY, Disp: 180 Tab, Rfl: 3    niacinamide 500 mg tablet, TK 1 T PO  BID, Disp: , Rfl: 3    atenolol (TENORMIN) 50 mg tablet, Take 2 tab by mouth daily, Disp: 60 Tab, Rfl: 3    fluticasone (FLONASE) 50 mcg/actuation nasal spray, nightly., Disp: , Rfl:    Diamox     Date Last Reviewed:  3/23/2018   Number of Personal Factors/Comorbidities that affect the Plan of Care: 1-2: MODERATE COMPLEXITY   EXAMINATION:   Observation/Orthostatic Postural Assessment: Forward head, rounded shoulders. Decreased trunk mobility with gait. Stands with wide WILLY  Mental Status:          Functional but does report some episodes of memory issues  Palpation:          No increased muscle tone noted  Sensation:         intact  Skin Integrity:          intact  Vision:          Functional   Balance:          Good static balance, decreased dynamic balance     Lower Extremity:   Strength PROM   Action R L R  L    Hip Flexion 4 4+     Hip Extension 4 4+     Hip Abduction 4 4+     Hip Adduction       Knee Flexion       Knee Extension 4+ 4+     Dorsi Flexion 4+ 4+     Plantar Flexion 4 4     Inversion       Eversion                 Upper Extremity:         Grossly 4+/5  Functional Mobility:         Gait/Ambulation:  Ambulates with slow gait speed, shuffling gait pattern with decreased heel strike R>L, wide WILLY, flexed knees, some toe out B, fluctuating step length- getting smaller with distance , no assistive device   940' today in 6 minute walk         Transfers:  Slow, able to stand without UE support, wide WILLY, few attempts to stand. Slow, shuffling turn with still with several steps during turning        Bed Mobility:  Functional         Stairs:  NT        Wheelchair:  NT              Body Structures Involved:  1. Nerves  2. Bones  3. Joints  4. Muscles Body Functions Affected:  1. Mental  2. Genitourinary  3. Neuromusculoskeletal  4. Movement Related Activities and Participation Affected:  1. Mobility  2. Self Care  3. Domestic Life  4. Interpersonal Interactions and Relationships   Number of elements that affect the Plan of Care: 4+: HIGH COMPLEXITY   CLINICAL PRESENTATION:   Presentation: Evolving clinical presentation with changing clinical characteristics: MODERATE COMPLEXITY   CLINICAL DECISION MAKING:   Outcome Measure:    Tool Used: Schwab Balance Scale  Score:  Initial: 44/56 Most Recent: X/56 (Date: -- ) Interpretation of Score: Each section is scored on a 0-4 scale, 0 representing the patients inability to perform the task and 4 representing independence. The scores of each section are added together for a total score of 56. The higher the patients score, the more independent the patient is. Any score below 45 indicates increased risk for falls. Score 56 55-45 44-34 33-23 22-12 11-1 0   Modifier CH CI CJ CK CL CM CN     ? Mobility - Walking and Moving Around:     - CURRENT STATUS: CJ - 20%-39% impaired, limited or restricted    - GOAL STATUS: CI - 1%-19% impaired, limited or restricted    - D/C STATUS:  ---------------To be determined---------------    Tool Used: Timed Up and Go (TUG)  Score:  Initial: 13.5 seconds Most Recent: X seconds (Date: -- )   Interpretation of Score: The test measures, in seconds, the time taken by an individual to stand up from a standard arm chair (seat height 46 cm [18 in], arm height 65 cm [25.6 in]), walk a distance of 3 meters (118 in, approx 10 ft), turn, walk back to the chair and sit down. If the individual takes longer than 14 seconds to complete TUG, this indicates risk for falls. Score 7 7.5-10.5 11-14 14.5-17.5 18-21 21.5-24.5 25+   Modifier CH CI CJ CK CL CM CN         Medical Necessity:   · Patient is expected to demonstrate progress in balance, coordination and functional technique to improve safety during ADLs and IADLs. · Patient demonstrates good rehab potential due to higher previous functional level. Reason for Services/Other Comments:  · Patient continues to require modification of therapeutic interventions to increase complexity of exercises.    Use of outcome tool(s) and clinical judgement create a POC that gives a: Questionable prediction of patient's progress: MODERATE COMPLEXITY   TREATMENT:   (In addition to Assessment/Re-Assessment sessions the following treatments were rendered)  Pre Treatment Symptoms: patient reports no pain today.      Therapeutic Exercise: ( 40 minutes ):  Exercises per grid below to improve mobility, balance and coordination. Required moderate visual, verbal and tactile cues to promote proper body alignment and promote proper body mechanics. Progressed complexity of movement as indicated. Date:  1/4/18 Date:  1/19/18 Date:  3/21/18 Date:  3/23/18   Activity/Exercise Parameters Parameters  Parameters Parameters    NuStep Level 2 x 8 minutes - verbal cues to maintain BIG movements X 10 minutes level 1 on new NuStep   X 10 minutes level 3    Calf stretch on incline board X 60 sec hold  2 x 30 sec hold 2 x 30 sec hold   Heel raises with 3 second hold X 15 reps X 20 reps  X 20 reps B with cues for Power! X 20 reps with cues for Power! Gait initiation  Holding to half wall with visual markers to increase step size. Also cues for start and stop to break up the movements      Lunging into bosu working on BIG push off    X 20 reps B with verbal and visual cues for power! tramopline    Marching x 20 reps  Squat into heel raise (mini jump) to increase power x 15 reps   PWR marching gait with arm swing       LSVT forward step out  X 10 reps B at half wall stepping over dots with cues for starts and stops     LSVT side step out X 10 reps B at half wall with less cuing today X 10 reps B at half wall stepping over dots with cues for starts and stops     BIG walking Verbal and visual cues for arm swing x 120' Verbal and visual cues for arm swing, step size, foot clearance and turns. Working on starts and stops, in and out of tight spaces. Forward and back walking with cuing. Blocked practice x 20 minutes. Cues for arm swing that carried over throughout treatment session 3 x 120'  Cues to BIG push off, almost airborn to the next step x 20 reps    2 x 120' with good arm swing.   Step size diminishes with distance   PWR backward step X 10 reps B with lots of cues      Ambulation  X 120' with cues to maintain arm swing HS stretch    2 x 30 sec B   LTR    X 5 reps to each side   Hip flexor stretch off EOM    2 x 60 sec hold B         Treatment/Session Assessment:  Treatment focused on improving mobility thought increased ROM and movement patterns. She did well with cues. She continues to benefit from skilled PT to improve functional mobility and return to her PLOF. · Pain/ Symptoms: Initial:   0/10 Post Session:  0/10 ·   Compliance with Program/Exercises: Will assess as treatment progresses. · Recommendations/Intent for next treatment session: \"Next visit will focus on advancements to more challenging activities and reduction in assistance provided\".   Total Treatment Duration:   PT Patient Time In/Time Out  Time In: 0945  Time Out: [address] [facility], [facility]

## 2018-03-28 ENCOUNTER — HOSPITAL ENCOUNTER (OUTPATIENT)
Dept: PHYSICAL THERAPY | Age: 78
Discharge: HOME | End: 2018-03-28
Payer: MEDICARE

## 2018-03-28 PROCEDURE — 97110 THERAPEUTIC EXERCISES: CPT

## 2018-03-28 NOTE — PROGRESS NOTES
Selena Tejada  : 1940  [place]  [place] [address] [place] [address] at [facility], [facility], [address]  [phone]   [phone]         OUTPATIENT PHYSICAL THERAPY:Daily Note 3/28/2018    ICD-10: Treatment Diagnosis: Unsteadiness on feet (R26.81)  Precautions/Allergies:   Latex; Ceftin [cefuroxime axetil]; and Sulfa (sulfonamide antibiotics)   Fall Risk Score: 5 (? 5 = High Risk)  MD Orders: evaluate and treat MEDICAL/REFERRING DIAGNOSIS:  Presence of cerebrospinal fluid drainage device [Z98.2]  Unspecified abnormalities of gait and mobility [R26.9]  (Idiopathic) normal pressure hydrocephalus [G91.2]   DATE OF ONSET: few months ago  REFERRING PHYSICIAN: Jil Peters MD  RETURN PHYSICIAN APPOINTMENT: Monday      ASSESSMENT (Date: 3/21/18):  Ms. Dayami Novoa presents to physical therapy with a diagnosis of NPH. She was seen by me a few months ago and now returns s/p shunt placement. Since the shunt placement, she has improved her tinajero balance score by 5 points and decreased her timed up and go by 2 seconds. She is still a high risk of falls and has multiple gait deficits that need to be addressed. She is also stiff and weak from months of decrease mobility. She would benefit from skilled PT to improve functional mobility and decrease her risk of falls. PROBLEM LIST (Impacting functional limitations):  1. Decreased Strength  2. Decreased ADL/Functional Activities  3. Decreased Transfer Abilities  4. Decreased Ambulation Ability/Technique  5. Decreased Balance  6. Decreased Activity Tolerance  7. Decreased [place] with Home Exercise Program INTERVENTIONS PLANNED:  1. Balance Exercise  2. Family Education  3. Gait Training  4. Heat  5. Home Exercise Program (HEP)  6. Manual Therapy  7. Neuromuscular Re-education/Strengthening  8. Therapeutic Activites  9.  Therapeutic Exercise/Strengthening  10. Transfer Training   TREATMENT PLAN:  Effective Dates: 3/21/18 to 6/19/2018. Frequency/Duration: 2 times a week for 12 weeks  GOALS: (Goals have been discussed and agreed upon with patient.)  Short-Term Functional Goals: Time Frame: 6 weeks  1. Patient will be compliant with HEP. 2. Patient will have an increase on the tinajero balance to 46/56 in order to improve her functional balance. 3. Patient will have a decrease on the TUG to 12 seconds indicating improved gait mechanics. Discharge Goals: Time Frame: 12 weeks  1. Patient will be independent with HEP. 2. Patient will have an increase on the tinajero balance to 48/56 in order to decrease her risk of falls. 3. Patient will have a decrease on the TUG to less than 11 seconds in order to improve her functional mobility. 4. Patient will demonstrate turning with 4 steps or less indicating improve movement patterns. 5. Patient will have an increase on the 6 minute walk test to 1100' indicating improve community mobility. Rehabilitation Potential For Stated Goals: Good  Regarding Jayla Greenfield's therapy, I certify that the treatment plan above will be carried out by a therapist or under their direction. Thank you for this referral,  Joel Prieto, DPT, NCS     Referring Physician Signature: Myesha Cooper MD              Date                    HISTORY:   Patient Stated Goal:        I want to be back to normal  History of Present Injury/Illness (Reason for Referral):  March 2018\" Shunt placement on the R side March 6th. Goes back to Dr. Bertha Ceballos on Monday.  is happy with her progress. She says she is now able to back up easier and her stride is getting longer. Sleeps with her head slightly elevated. Has been driving since surgery. Has not been using a walker since she went home. Dec 2017:   Patient reports she went to [facility] and they confirmed Dr. Nathaly Payne diagnosis of NPH.   They are going to try diamox for 60 days which is a diuretic. At that time, they will send a video to [facility] and they will decide see if they need to do surgery. Feb 15th is 60 days. Nathan said to do PT in the meantime to help with mobility. She reports no falls but says she is getting more fearful of falling. Patient reports still having some memory issues and some incontinence as well as gait difficulties. Past Medical History/Comorbidities:   Ms. Noemi Phelps  has a past medical history of CAD (coronary artery disease) (4/26/2016); Gait disorder (4/26/2016); Hypertension; Hypertriglyceridemia (4/26/2016); Psychotic disorder; and Small vessel disease, cerebrovascular (4/26/2016). Ms. Noemi Phelps  has no past surgical history on file. Social History/Living Environment:     lives with   Prior Level of Function/Work/Activity:  Retired. Use to exercises 3 days a week but is currently unable to    Current Medications:    Current Outpatient Prescriptions:     atenolol (TENORMIN) 50 mg tablet, TAKE 2 TABLETS BY MOUTH DAILY, Disp: 60 Tab, Rfl: 3    triamterene-hydroCHLOROthiazide (MAXZIDE) 37.5-25 mg per tablet, TAKE 1 TABLET BY MOUTH TWICE DAILY, Disp: 180 Tab, Rfl: 3    pantoprazole (PROTONIX) 40 mg tablet, Take 1 Tab by mouth daily. , Disp: 30 Tab, Rfl: 5    doxycycline (MONODOX) 100 mg capsule, Take 1 Cap by mouth two (2) times a day., Disp: 20 Cap, Rfl: 0    FLUoxetine (PROZAC) 20 mg tablet, TAKE ONE CAPSULE BY MOUTH EVERY DAY, Disp: 30 Tab, Rfl: 5    conjugated estrogens (PREMARIN) 0.625 mg tablet, TAKE 1 TABLET BY MOUTH EVERY DAY, Disp: 30 Tab, Rfl: 5    triamterene-hydroCHLOROthiazide (MAXZIDE) 37.5-25 mg per tablet, TAKE 1 TABLET BY MOUTH TWICE DAILY, Disp: 180 Tab, Rfl: 3    niacinamide 500 mg tablet, TK 1 T PO  BID, Disp: , Rfl: 3    fluticasone (FLONASE) 50 mcg/actuation nasal spray, nightly., Disp: , Rfl:    Diamox     Date Last Reviewed:  3/28/2018   Number of Personal Factors/Comorbidities that affect the Plan of Care: 1-2: MODERATE COMPLEXITY   EXAMINATION:   Observation/Orthostatic Postural Assessment: Forward head, rounded shoulders. Decreased trunk mobility with gait. Stands with wide WILLY  Mental Status:          Functional but does report some episodes of memory issues  Palpation:          No increased muscle tone noted  Sensation:         intact  Skin Integrity:          intact  Vision:          Functional   Balance:          Good static balance, decreased dynamic balance     Lower Extremity:   Strength PROM   Action R L R  L    Hip Flexion 4 4+     Hip Extension 4 4+     Hip Abduction 4 4+     Hip Adduction       Knee Flexion       Knee Extension 4+ 4+     Dorsi Flexion 4+ 4+     Plantar Flexion 4 4     Inversion       Eversion                 Upper Extremity:         Grossly 4+/5  Functional Mobility:         Gait/Ambulation:  Ambulates with slow gait speed, shuffling gait pattern with decreased heel strike R>L, wide WILLY, flexed knees, some toe out B, fluctuating step length- getting smaller with distance , no assistive device   940' today in 6 minute walk         Transfers:  Slow, able to stand without UE support, wide WILLY, few attempts to stand. Slow, shuffling turn with still with several steps during turning        Bed Mobility:  Functional         Stairs:  NT        Wheelchair:  NT              Body Structures Involved:  1. Nerves  2. Bones  3. Joints  4. Muscles Body Functions Affected:  1. Mental  2. Genitourinary  3. Neuromusculoskeletal  4. Movement Related Activities and Participation Affected:  1. Mobility  2. Self Care  3. Domestic Life  4. Interpersonal Interactions and Relationships   Number of elements that affect the Plan of Care: 4+: HIGH COMPLEXITY   CLINICAL PRESENTATION:   Presentation: Evolving clinical presentation with changing clinical characteristics: MODERATE COMPLEXITY   CLINICAL DECISION MAKING:   Outcome Measure:    Tool Used: Schwab Balance Scale  Score:  Initial: 44/56 Most Recent: X/56 (Date: -- ) Interpretation of Score: Each section is scored on a 0-4 scale, 0 representing the patients inability to perform the task and 4 representing independence. The scores of each section are added together for a total score of 56. The higher the patients score, the more independent the patient is. Any score below 45 indicates increased risk for falls. Score 56 55-45 44-34 33-23 22-12 11-1 0   Modifier CH CI CJ CK CL CM CN     ? Mobility - Walking and Moving Around:     - CURRENT STATUS: CJ - 20%-39% impaired, limited or restricted    - GOAL STATUS: CI - 1%-19% impaired, limited or restricted    - D/C STATUS:  ---------------To be determined---------------    Tool Used: Timed Up and Go (TUG)  Score:  Initial: 13.5 seconds Most Recent: X seconds (Date: -- )   Interpretation of Score: The test measures, in seconds, the time taken by an individual to stand up from a standard arm chair (seat height 46 cm [18 in], arm height 65 cm [25.6 in]), walk a distance of 3 meters (118 in, approx 10 ft), turn, walk back to the chair and sit down. If the individual takes longer than 14 seconds to complete TUG, this indicates risk for falls. Score 7 7.5-10.5 11-14 14.5-17.5 18-21 21.5-24.5 25+   Modifier CH CI CJ CK CL CM CN         Medical Necessity:   · Patient is expected to demonstrate progress in balance, coordination and functional technique to improve safety during ADLs and IADLs. · Patient demonstrates good rehab potential due to higher previous functional level. Reason for Services/Other Comments:  · Patient continues to require modification of therapeutic interventions to increase complexity of exercises.    Use of outcome tool(s) and clinical judgement create a POC that gives a: Questionable prediction of patient's progress: MODERATE COMPLEXITY   TREATMENT:   (In addition to Assessment/Re-Assessment sessions the following treatments were rendered)  Pre Treatment Symptoms: patient reports no pain today. Says she did an exercises video yesterday and really needs to work on her trunk mobility. Therapeutic Exercise: ( 40 minutes ):  Exercises per grid below to improve mobility, balance and coordination. Required moderate visual, verbal and tactile cues to promote proper body alignment and promote proper body mechanics. Progressed complexity of movement as indicated. Date:  1/19/18 Date:  3/21/18 Date:  3/23/18 Date:  3/28/18   Activity/Exercise Parameters  Parameters Parameters  Parameters    NuStep X 10 minutes level 1 on new NuStep   X 10 minutes level 3  X 10 minutes level 3    Calf stretch on incline board  2 x 30 sec hold 2 x 30 sec hold X 60 sec B    Heel raises with 3 second hold X 20 reps  X 20 reps B with cues for Power! X 20 reps with cues for Power! PWR marching      Verbal and visual cues for movement pattern x 50'    Lunging into bosu working on BIG push off   X 20 reps B with verbal and visual cues for power! X 20 reps B with decreased cuing needed today    tramopline   Marching x 20 reps  Squat into heel raise (mini jump) to increase power x 15 reps    PWR marching gait with arm swing       LSVT forward step out X 10 reps B at half wall stepping over dots with cues for starts and stops      LSVT side step out X 10 reps B at half wall stepping over dots with cues for starts and stops      BIG walking Verbal and visual cues for arm swing, step size, foot clearance and turns. Working on starts and stops, in and out of tight spaces. Forward and back walking with cuing. Blocked practice x 20 minutes. Cues for arm swing that carried over throughout treatment session 3 x 120'  Cues to BIG push off, almost airborn to the next step x 20 reps    2 x 120' with good arm swing. Step size diminishes with distance With walking poles, therapist assisting with arm swing. Then verbal and visual cues for arm swing and heel strike.   X 500' total    PWR sidelying twist    X 10 reps each side   PWR supine reaching     Reaching for therapist hand x 5 reps B   HS stretch   2 x 30 sec B X 30 sec hold R   LTR   X 5 reps to each side X 5 reps to each side   Hip flexor stretch off EOM   2 x 60 sec hold B In sidelying 2 x 60 sec hold          Treatment/Session Assessment: patient did well with exercises today. Good carry over from last weeks exercises but lots of cues needed for new exercises. Still needs cues to correct gait pattern. Still very stiff gait pattern. She continues to benefit from skilled PT to improve functional mobility. · Pain/ Symptoms: Initial:   0/10 Post Session:  0/10 ·   Compliance with Program/Exercises: Will assess as treatment progresses. · Recommendations/Intent for next treatment session: \"Next visit will focus on advancements to more challenging activities and reduction in assistance provided\".   Total Treatment Duration:   PT Patient Time In/Time Out  Time In: 1100  Time Out: [address]

## 2018-04-03 ENCOUNTER — HOSPITAL ENCOUNTER (OUTPATIENT)
Dept: PHYSICAL THERAPY | Age: 78
Discharge: HOME | End: 2018-04-03
Payer: MEDICARE

## 2018-04-03 PROCEDURE — 97110 THERAPEUTIC EXERCISES: CPT

## 2018-04-03 NOTE — PROGRESS NOTES
Marisol Lind  : 1940  [place]  [place] [facility]  [facility] [place] [facility], [address] [facility], [address]  [phone]   [phone]         OUTPATIENT PHYSICAL THERAPY:Daily Note 4/3/2018    ICD-10: Treatment Diagnosis: Unsteadiness on feet (R26.81)  Precautions/Allergies:   Latex; Ceftin [cefuroxime axetil]; and Sulfa (sulfonamide antibiotics)   Fall Risk Score: 5 (? 5 = High Risk)  MD Orders: evaluate and treat MEDICAL/REFERRING DIAGNOSIS:  Presence of cerebrospinal fluid drainage device [Z98.2]  Unspecified abnormalities of gait and mobility [R26.9]  (Idiopathic) normal pressure hydrocephalus [G91.2]   DATE OF ONSET: few months ago  REFERRING PHYSICIAN: Alisa Zapata MD  RETURN PHYSICIAN APPOINTMENT: Monday      ASSESSMENT (Date: 3/21/18):  Ms. Yoav Bailey presents to physical therapy with a diagnosis of NPH. She was seen by me a few months ago and now returns s/p shunt placement. Since the shunt placement, she has improved her tinajero balance score by 5 points and decreased her timed up and go by 2 seconds. She is still a high risk of falls and has multiple gait deficits that need to be addressed. She is also stiff and weak from months of decrease mobility. She would benefit from skilled PT to improve functional mobility and decrease her risk of falls. PROBLEM LIST (Impacting functional limitations):  1. Decreased Strength  2. Decreased ADL/Functional Activities  3. Decreased Transfer Abilities  4. Decreased Ambulation Ability/Technique  5. Decreased Balance  6. Decreased Activity Tolerance  7. Decreased [place] with Home Exercise Program INTERVENTIONS PLANNED:  1. Balance Exercise  2. Family Education  3. Gait Training  4. Heat  5. Home Exercise Program (HEP)  6. Manual Therapy  7. Neuromuscular Re-education/Strengthening  8. Therapeutic Activites  9.  Therapeutic Exercise/Strengthening  10. Transfer Training   TREATMENT PLAN:  Effective Dates: 3/21/18 to 6/19/2018. Frequency/Duration: 2 times a week for 12 weeks  GOALS: (Goals have been discussed and agreed upon with patient.)  Short-Term Functional Goals: Time Frame: 6 weeks  1. Patient will be compliant with HEP. 2. Patient will have an increase on the tinajero balance to 46/56 in order to improve her functional balance. 3. Patient will have a decrease on the TUG to 12 seconds indicating improved gait mechanics. Discharge Goals: Time Frame: 12 weeks  1. Patient will be independent with HEP. 2. Patient will have an increase on the tinajero balance to 48/56 in order to decrease her risk of falls. 3. Patient will have a decrease on the TUG to less than 11 seconds in order to improve her functional mobility. 4. Patient will demonstrate turning with 4 steps or less indicating improve movement patterns. 5. Patient will have an increase on the 6 minute walk test to 1100' indicating improve community mobility. Rehabilitation Potential For Stated Goals: Good  Regarding Jayla Greenfield's therapy, I certify that the treatment plan above will be carried out by a therapist or under their direction. Thank you for this referral,  Leonardo Mcburney, DPT, NCS     Referring Physician Signature: Reyna Cruz MD              Date                    HISTORY:   Patient Stated Goal:        I want to be back to normal  History of Present Injury/Illness (Reason for Referral):  March 2018\" Shunt placement on the R side March 6th. Goes back to Dr. Amado Lovett on Monday.  is happy with her progress. She says she is now able to back up easier and her stride is getting longer. Sleeps with her head slightly elevated. Has been driving since surgery. Has not been using a walker since she went home. Dec 2017:   Patient reports she went to [facility] and they confirmed Dr. Eliecer Mendoza diagnosis of NPH.   They are going to try diamox for 60 days which is a diuretic. At that time, they will send a video to [facility] and they will decide see if they need to do surgery. Feb 15th is 60 days. Nathan said to do PT in the meantime to help with mobility. She reports no falls but says she is getting more fearful of falling. Patient reports still having some memory issues and some incontinence as well as gait difficulties. Past Medical History/Comorbidities:   Ms. Kellee Douglas  has a past medical history of CAD (coronary artery disease) (4/26/2016); Gait disorder (4/26/2016); Hypertension; Hypertriglyceridemia (4/26/2016); Psychotic disorder; and Small vessel disease, cerebrovascular (4/26/2016). Ms. Klelee Douglas  has no past surgical history on file. Social History/Living Environment:     lives with   Prior Level of Function/Work/Activity:  Retired. Use to exercises 3 days a week but is currently unable to    Current Medications:    Current Outpatient Prescriptions:     atenolol (TENORMIN) 50 mg tablet, TAKE 2 TABLETS BY MOUTH DAILY, Disp: 60 Tab, Rfl: 3    triamterene-hydroCHLOROthiazide (MAXZIDE) 37.5-25 mg per tablet, TAKE 1 TABLET BY MOUTH TWICE DAILY, Disp: 180 Tab, Rfl: 3    pantoprazole (PROTONIX) 40 mg tablet, Take 1 Tab by mouth daily. , Disp: 30 Tab, Rfl: 5    doxycycline (MONODOX) 100 mg capsule, Take 1 Cap by mouth two (2) times a day., Disp: 20 Cap, Rfl: 0    FLUoxetine (PROZAC) 20 mg tablet, TAKE ONE CAPSULE BY MOUTH EVERY DAY, Disp: 30 Tab, Rfl: 5    conjugated estrogens (PREMARIN) 0.625 mg tablet, TAKE 1 TABLET BY MOUTH EVERY DAY, Disp: 30 Tab, Rfl: 5    triamterene-hydroCHLOROthiazide (MAXZIDE) 37.5-25 mg per tablet, TAKE 1 TABLET BY MOUTH TWICE DAILY, Disp: 180 Tab, Rfl: 3    niacinamide 500 mg tablet, TK 1 T PO  BID, Disp: , Rfl: 3    fluticasone (FLONASE) 50 mcg/actuation nasal spray, nightly., Disp: , Rfl:    Diamox     Date Last Reviewed:  4/3/2018   Number of Personal Factors/Comorbidities that affect the Plan of Care: 1-2: MODERATE COMPLEXITY   EXAMINATION:   Observation/Orthostatic Postural Assessment: Forward head, rounded shoulders. Decreased trunk mobility with gait. Stands with wide WILLY  Mental Status:          Functional but does report some episodes of memory issues  Palpation:          No increased muscle tone noted  Sensation:         intact  Skin Integrity:          intact  Vision:          Functional   Balance:          Good static balance, decreased dynamic balance     Lower Extremity:   Strength PROM   Action R L R  L    Hip Flexion 4 4+     Hip Extension 4 4+     Hip Abduction 4 4+     Hip Adduction       Knee Flexion       Knee Extension 4+ 4+     Dorsi Flexion 4+ 4+     Plantar Flexion 4 4     Inversion       Eversion                 Upper Extremity:         Grossly 4+/5  Functional Mobility:         Gait/Ambulation:  Ambulates with slow gait speed, shuffling gait pattern with decreased heel strike R>L, wide WILLY, flexed knees, some toe out B, fluctuating step length- getting smaller with distance , no assistive device   940' today in 6 minute walk         Transfers:  Slow, able to stand without UE support, wide WILLY, few attempts to stand. Slow, shuffling turn with still with several steps during turning        Bed Mobility:  Functional         Stairs:  NT        Wheelchair:  NT              Body Structures Involved:  1. Nerves  2. Bones  3. Joints  4. Muscles Body Functions Affected:  1. Mental  2. Genitourinary  3. Neuromusculoskeletal  4. Movement Related Activities and Participation Affected:  1. Mobility  2. Self Care  3. Domestic Life  4. Interpersonal Interactions and Relationships   Number of elements that affect the Plan of Care: 4+: HIGH COMPLEXITY   CLINICAL PRESENTATION:   Presentation: Evolving clinical presentation with changing clinical characteristics: MODERATE COMPLEXITY   CLINICAL DECISION MAKING:   Outcome Measure:    Tool Used: Schwab Balance Scale  Score:  Initial: 44/56 Most Recent: X/56 (Date: -- ) Interpretation of Score: Each section is scored on a 0-4 scale, 0 representing the patients inability to perform the task and 4 representing independence. The scores of each section are added together for a total score of 56. The higher the patients score, the more independent the patient is. Any score below 45 indicates increased risk for falls. Score 56 55-45 44-34 33-23 22-12 11-1 0   Modifier CH CI CJ CK CL CM CN     ? Mobility - Walking and Moving Around:     - CURRENT STATUS: CJ - 20%-39% impaired, limited or restricted    - GOAL STATUS: CI - 1%-19% impaired, limited or restricted    - D/C STATUS:  ---------------To be determined---------------    Tool Used: Timed Up and Go (TUG)  Score:  Initial: 13.5 seconds Most Recent: X seconds (Date: -- )   Interpretation of Score: The test measures, in seconds, the time taken by an individual to stand up from a standard arm chair (seat height 46 cm [18 in], arm height 65 cm [25.6 in]), walk a distance of 3 meters (118 in, approx 10 ft), turn, walk back to the chair and sit down. If the individual takes longer than 14 seconds to complete TUG, this indicates risk for falls. Score 7 7.5-10.5 11-14 14.5-17.5 18-21 21.5-24.5 25+   Modifier CH CI CJ CK CL CM CN         Medical Necessity:   · Patient is expected to demonstrate progress in balance, coordination and functional technique to improve safety during ADLs and IADLs. · Patient demonstrates good rehab potential due to higher previous functional level. Reason for Services/Other Comments:  · Patient continues to require modification of therapeutic interventions to increase complexity of exercises.    Use of outcome tool(s) and clinical judgement create a POC that gives a: Questionable prediction of patient's progress: MODERATE COMPLEXITY   TREATMENT:   (In addition to Assessment/Re-Assessment sessions the following treatments were rendered)  Pre Treatment Symptoms: patient reports she needs to be better about doing her HEP. No pain. No falls. Therapeutic Exercise: ( 40 minutes ):  Exercises per grid below to improve mobility, balance and coordination. Required moderate visual, verbal and tactile cues to promote proper body alignment and promote proper body mechanics. Progressed complexity of movement as indicated. Date:  3/21/18 Date:  3/23/18 Date:  3/28/18 Date:  4/3/18   Activity/Exercise Parameters Parameters  Parameters  Parameters   NuStep  X 10 minutes level 3  X 10 minutes level 3  X 8 minutes level 3- needing cues for BIG movements   Calf stretch on incline board 2 x 30 sec hold 2 x 30 sec hold X 60 sec B  X 60 sec hold   Heel raises with 3 second hold X 20 reps B with cues for Power! X 20 reps with cues for Power! PWR marching     Verbal and visual cues for movement pattern x 50'     Lunging into bosu working on BIG push off  X 20 reps B with verbal and visual cues for power! X 20 reps B with decreased cuing needed today  X 20 reps B with improving ability   tramopline  Marching x 20 reps  Squat into heel raise (mini jump) to increase power x 15 reps     [place] walking on colored dots place far apart for BIG step     X 10 reps with a lot of cuing needed   LSVT forward step out       LSVT side step out       BIG walking Cues for arm swing that carried over throughout treatment session 3 x 120'  Cues to BIG push off, almost airborn to the next step x 20 reps    2 x 120' with good arm swing. Step size diminishes with distance With walking poles, therapist assisting with arm swing. Then verbal and visual cues for arm swing and heel strike. X 500' total  Cues for BIGness and cues to \"push yourself forward\". Good ability to correct, fair carryover.     PWR sidelying twist   X 10 reps each side X 10 reps each side   PWR supine reaching    Reaching for therapist hand x 5 reps B    HS stretch  2 x 30 sec B X 30 sec hold R X 30 sec hold B   LTR  X 5 reps to each side X 5 reps to each side    Hip flexor stretch off EOM  2 x 60 sec hold B In sidelying 2 x 60 sec hold  In sidelying 2 x 60 sec hold   LSVT twist    X 10 reps B with a lot of cues needed                       Treatment/Session Assessment: patient continues to have difficulty with gait and functional mobility. She continues to need therapy to address these things. If patient doesn't show progress, she may benefit from a neurology consult and possibly a sinemet trial.     · Pain/ Symptoms: Initial:   0/10 Post Session:  0/10 ·   Compliance with Program/Exercises: Will assess as treatment progresses. · Recommendations/Intent for next treatment session: \"Next visit will focus on advancements to more challenging activities and reduction in assistance provided\".   Total Treatment Duration:   PT Patient Time In/Time Out  Time In: 1105  Time Out: [address] [facility], [facility]

## 2018-04-05 ENCOUNTER — HOSPITAL ENCOUNTER (OUTPATIENT)
Dept: PHYSICAL THERAPY | Age: 78
Discharge: HOME | End: 2018-04-05
Payer: MEDICARE

## 2018-04-05 PROCEDURE — 97110 THERAPEUTIC EXERCISES: CPT

## 2018-04-05 NOTE — PROGRESS NOTES
Danielito Barbosa  : 1940  [place]  [place] [address] [place] [address] at [address], [address]  [phone]   [phone]         OUTPATIENT PHYSICAL THERAPY:Daily Note 2018    ICD-10: Treatment Diagnosis: Unsteadiness on feet (R26.81)  Precautions/Allergies:   Latex; Ceftin [cefuroxime axetil]; and Sulfa (sulfonamide antibiotics)   Fall Risk Score: 5 (? 5 = High Risk)  MD Orders: evaluate and treat MEDICAL/REFERRING DIAGNOSIS:  Presence of cerebrospinal fluid drainage device [Z98.2]  Unspecified abnormalities of gait and mobility [R26.9]  (Idiopathic) normal pressure hydrocephalus [G91.2]   DATE OF ONSET: few months ago  REFERRING PHYSICIAN: Andrew Melendez MD  RETURN PHYSICIAN APPOINTMENT: no more follow up scheduled      ASSESSMENT (Date: 3/21/18):  Ms. Brady Orosco presents to physical therapy with a diagnosis of NPH. She was seen by me a few months ago and now returns s/p shunt placement. Since the shunt placement, she has improved her tinajero balance score by 5 points and decreased her timed up and go by 2 seconds. She is still a high risk of falls and has multiple gait deficits that need to be addressed. She is also stiff and weak from months of decrease mobility. She would benefit from skilled PT to improve functional mobility and decrease her risk of falls. PROBLEM LIST (Impacting functional limitations):  1. Decreased Strength  2. Decreased ADL/Functional Activities  3. Decreased Transfer Abilities  4. Decreased Ambulation Ability/Technique  5. Decreased Balance  6. Decreased Activity Tolerance  7. Decreased [place] with Home Exercise Program INTERVENTIONS PLANNED:  1. Balance Exercise  2. Family Education  3. Gait Training  4. Heat  5. Home Exercise Program (HEP)  6. Manual Therapy  7. Neuromuscular Re-education/Strengthening  8. Therapeutic Activites  9.  Therapeutic Exercise/Strengthening  10. Transfer Training   TREATMENT PLAN:  Effective Dates: 3/21/18 to 6/19/2018. Frequency/Duration: 2 times a week for 12 weeks  GOALS: (Goals have been discussed and agreed upon with patient.)  Short-Term Functional Goals: Time Frame: 6 weeks  1. Patient will be compliant with HEP. 2. Patient will have an increase on the tinajero balance to 46/56 in order to improve her functional balance. 3. Patient will have a decrease on the TUG to 12 seconds indicating improved gait mechanics. Discharge Goals: Time Frame: 12 weeks  1. Patient will be independent with HEP. 2. Patient will have an increase on the tinajero balance to 48/56 in order to decrease her risk of falls. 3. Patient will have a decrease on the TUG to less than 11 seconds in order to improve her functional mobility. 4. Patient will demonstrate turning with 4 steps or less indicating improve movement patterns. 5. Patient will have an increase on the 6 minute walk test to 1100' indicating improve community mobility. Rehabilitation Potential For Stated Goals: Good  Regarding Jayla Greenfield's therapy, I certify that the treatment plan above will be carried out by a therapist or under their direction. Thank you for this referral,  Baldwin Closs, DPT, NCS     Referring Physician Signature: Fransisco Morgan MD              Date                    HISTORY:   Patient Stated Goal:        I want to be back to normal  History of Present Injury/Illness (Reason for Referral):  March 2018\" Shunt placement on the R side March 6th. Goes back to Dr. Christi Garcia on Monday.  is happy with her progress. She says she is now able to back up easier and her stride is getting longer. Sleeps with her head slightly elevated. Has been driving since surgery. Has not been using a walker since she went home. Dec 2017:   Patient reports she went to [facility] and they confirmed Dr. Ivone Baeza diagnosis of NPH.   They are going to try diamox for 60 days which is a diuretic. At that time, they will send a video to [facility] and they will decide see if they need to do surgery. Feb 15th is 60 days. Nathan said to do PT in the meantime to help with mobility. She reports no falls but says she is getting more fearful of falling. Patient reports still having some memory issues and some incontinence as well as gait difficulties. Past Medical History/Comorbidities:   Ms. Michael Gray  has a past medical history of CAD (coronary artery disease) (4/26/2016); Gait disorder (4/26/2016); Hypertension; Hypertriglyceridemia (4/26/2016); Psychotic disorder; and Small vessel disease, cerebrovascular (4/26/2016). Ms. Michael Gray  has no past surgical history on file. Social History/Living Environment:     lives with   Prior Level of Function/Work/Activity:  Retired. Use to exercises 3 days a week but is currently unable to    Current Medications:    Current Outpatient Prescriptions:     atenolol (TENORMIN) 50 mg tablet, TAKE 2 TABLETS BY MOUTH DAILY, Disp: 60 Tab, Rfl: 3    triamterene-hydroCHLOROthiazide (MAXZIDE) 37.5-25 mg per tablet, TAKE 1 TABLET BY MOUTH TWICE DAILY, Disp: 180 Tab, Rfl: 3    pantoprazole (PROTONIX) 40 mg tablet, Take 1 Tab by mouth daily. , Disp: 30 Tab, Rfl: 5    doxycycline (MONODOX) 100 mg capsule, Take 1 Cap by mouth two (2) times a day., Disp: 20 Cap, Rfl: 0    FLUoxetine (PROZAC) 20 mg tablet, TAKE ONE CAPSULE BY MOUTH EVERY DAY, Disp: 30 Tab, Rfl: 5    conjugated estrogens (PREMARIN) 0.625 mg tablet, TAKE 1 TABLET BY MOUTH EVERY DAY, Disp: 30 Tab, Rfl: 5    triamterene-hydroCHLOROthiazide (MAXZIDE) 37.5-25 mg per tablet, TAKE 1 TABLET BY MOUTH TWICE DAILY, Disp: 180 Tab, Rfl: 3    niacinamide 500 mg tablet, TK 1 T PO  BID, Disp: , Rfl: 3    fluticasone (FLONASE) 50 mcg/actuation nasal spray, nightly., Disp: , Rfl:    Diamox     Date Last Reviewed:  4/5/2018   Number of Personal Factors/Comorbidities that affect the Plan of Care: 1-2: MODERATE COMPLEXITY   EXAMINATION:   Observation/Orthostatic Postural Assessment: Forward head, rounded shoulders. Decreased trunk mobility with gait. Stands with wide WILLY  Mental Status:          Functional but does report some episodes of memory issues  Palpation:          No increased muscle tone noted  Sensation:         intact  Skin Integrity:          intact  Vision:          Functional   Balance:          Good static balance, decreased dynamic balance     Lower Extremity:   Strength PROM   Action R L R  L    Hip Flexion 4 4+     Hip Extension 4 4+     Hip Abduction 4 4+     Hip Adduction       Knee Flexion       Knee Extension 4+ 4+     Dorsi Flexion 4+ 4+     Plantar Flexion 4 4     Inversion       Eversion                 Upper Extremity:         Grossly 4+/5  Functional Mobility:         Gait/Ambulation:  Ambulates with slow gait speed, shuffling gait pattern with decreased heel strike R>L, wide WILLY, flexed knees, some toe out B, fluctuating step length- getting smaller with distance , no assistive device   940' today in 6 minute walk         Transfers:  Slow, able to stand without UE support, wide WILLY, few attempts to stand. Slow, shuffling turn with still with several steps during turning        Bed Mobility:  Functional         Stairs:  NT        Wheelchair:  NT              Body Structures Involved:  1. Nerves  2. Bones  3. Joints  4. Muscles Body Functions Affected:  1. Mental  2. Genitourinary  3. Neuromusculoskeletal  4. Movement Related Activities and Participation Affected:  1. Mobility  2. Self Care  3. Domestic Life  4. Interpersonal Interactions and Relationships   Number of elements that affect the Plan of Care: 4+: HIGH COMPLEXITY   CLINICAL PRESENTATION:   Presentation: Evolving clinical presentation with changing clinical characteristics: MODERATE COMPLEXITY   CLINICAL DECISION MAKING:   Outcome Measure:    Tool Used: Schwab Balance Scale  Score:  Initial: 44/56 Most Recent: X/56 (Date: -- ) Interpretation of Score: Each section is scored on a 0-4 scale, 0 representing the patients inability to perform the task and 4 representing independence. The scores of each section are added together for a total score of 56. The higher the patients score, the more independent the patient is. Any score below 45 indicates increased risk for falls. Score 56 55-45 44-34 33-23 22-12 11-1 0   Modifier CH CI CJ CK CL CM CN     ? Mobility - Walking and Moving Around:     - CURRENT STATUS: CJ - 20%-39% impaired, limited or restricted    - GOAL STATUS: CI - 1%-19% impaired, limited or restricted    - D/C STATUS:  ---------------To be determined---------------    Tool Used: Timed Up and Go (TUG)  Score:  Initial: 13.5 seconds Most Recent: X seconds (Date: -- )   Interpretation of Score: The test measures, in seconds, the time taken by an individual to stand up from a standard arm chair (seat height 46 cm [18 in], arm height 65 cm [25.6 in]), walk a distance of 3 meters (118 in, approx 10 ft), turn, walk back to the chair and sit down. If the individual takes longer than 14 seconds to complete TUG, this indicates risk for falls. Score 7 7.5-10.5 11-14 14.5-17.5 18-21 21.5-24.5 25+   Modifier CH CI CJ CK CL CM CN         Medical Necessity:   · Patient is expected to demonstrate progress in balance, coordination and functional technique to improve safety during ADLs and IADLs. · Patient demonstrates good rehab potential due to higher previous functional level. Reason for Services/Other Comments:  · Patient continues to require modification of therapeutic interventions to increase complexity of exercises.    Use of outcome tool(s) and clinical judgement create a POC that gives a: Questionable prediction of patient's progress: MODERATE COMPLEXITY   TREATMENT:   (In addition to Assessment/Re-Assessment sessions the following treatments were rendered)  Pre Treatment Symptoms: patient reports she hasn't been doing her homework and she knows she needs to. She can tell that she is getting better. Therapeutic Exercise: ( 40 minutes ):  Exercises per grid below to improve mobility, balance and coordination. Required moderate visual, verbal and tactile cues to promote proper body alignment and promote proper body mechanics. Progressed complexity of movement as indicated. Date:  3/23/18 Date:  3/28/18 Date:  4/3/18 Date:  4/5/18   Activity/Exercise Parameters  Parameters  Parameters Parameters    NuStep X 10 minutes level 3  X 10 minutes level 3  X 8 minutes level 3- needing cues for BIG movements X 5 minutes level 3   Calf stretch on incline board 2 x 30 sec hold X 60 sec B  X 60 sec hold X 60 reps B   Heel raises with 3 second hold X 20 reps with cues for Power! X 30 reps with good power   PWR marching    Verbal and visual cues for movement pattern x 50'   Verbal and visual cues 2 x 50'    Lunging into bosu working on BIG push off X 20 reps B with verbal and visual cues for power! X 20 reps B with decreased cuing needed today  X 20 reps B with improving ability    tramopline Marching x 20 reps  Squat into heel raise (mini jump) to increase power x 15 reps      [place] walking on colored dots place far apart for BIG step    X 10 reps with a lot of cuing needed    Gait initiation     Broke up the task into parts and then put the whole thing together. A lot of cues for weight shift over stable foot x 5 minutes blocked practice   LSVT side step out       BIG walking Cues to BIG push off, almost airborn to the next step x 20 reps    2 x 120' with good arm swing. Step size diminishes with distance With walking poles, therapist assisting with arm swing. Then verbal and visual cues for arm swing and heel strike. X 500' total  Cues for BIGness and cues to \"push yourself forward\". Good ability to correct, fair carryover.   Decreasing cues needed 2 x 120'    PWR sidelying twist  X 10 reps each side X 10 reps each side X 10 reps each side   PWR supine reaching   Reaching for therapist hand x 5 reps B  X 10 reps B    HS stretch 2 x 30 sec B X 30 sec hold R X 30 sec hold B    LTR X 5 reps to each side X 5 reps to each side     Hip flexor stretch off EOM 2 x 60 sec hold B In sidelying 2 x 60 sec hold  In sidelying 2 x 60 sec hold In sidelying 2 x 60 sec hold   LSVT twist   X 10 reps B with a lot of cues needed 2 x 10 reps B with more cues needed for L hip IR                       Treatment/Session Assessment: patient demonstrating slow progress at each visit. She was educated on the importance of completing her HEP. She continues to benefit from skilled PT to improve balance and functional mobility. · Pain/ Symptoms: Initial:   0/10 Post Session:  0/10 ·   Compliance with Program/Exercises: Will assess as treatment progresses. · Recommendations/Intent for next treatment session: \"Next visit will focus on advancements to more challenging activities and reduction in assistance provided\".   Total Treatment Duration:   PT Patient Time In/Time Out  Time In: 1115  Time Out: [address], DPT

## 2018-04-10 ENCOUNTER — HOSPITAL ENCOUNTER (OUTPATIENT)
Dept: PHYSICAL THERAPY | Age: 78
Discharge: HOME | End: 2018-04-10
Payer: MEDICARE

## 2018-04-10 PROCEDURE — 97110 THERAPEUTIC EXERCISES: CPT

## 2018-04-10 NOTE — PROGRESS NOTES
Denis Ambrocio  : 1940  [place]  [place] [facility] [address], [address] [facility], [address]  [phone]   [phone]         OUTPATIENT PHYSICAL THERAPY:Daily Note 4/10/2018    ICD-10: Treatment Diagnosis: Unsteadiness on feet (R26.81)  Precautions/Allergies:   Latex; Ceftin [cefuroxime axetil]; and Sulfa (sulfonamide antibiotics)   Fall Risk Score: 5 (? 5 = High Risk)  MD Orders: evaluate and treat MEDICAL/REFERRING DIAGNOSIS:  Presence of cerebrospinal fluid drainage device [Z98.2]  Unspecified abnormalities of gait and mobility [R26.9]  (Idiopathic) normal pressure hydrocephalus [G91.2]   DATE OF ONSET: few months ago  REFERRING PHYSICIAN: Tigre Winchester MD  RETURN PHYSICIAN APPOINTMENT: no more follow up scheduled      ASSESSMENT (Date: 3/21/18):  Ms. Kary Bean presents to physical therapy with a diagnosis of NPH. She was seen by me a few months ago and now returns s/p shunt placement. Since the shunt placement, she has improved her tinajero balance score by 5 points and decreased her timed up and go by 2 seconds. She is still a high risk of falls and has multiple gait deficits that need to be addressed. She is also stiff and weak from months of decrease mobility. She would benefit from skilled PT to improve functional mobility and decrease her risk of falls. PROBLEM LIST (Impacting functional limitations):  1. Decreased Strength  2. Decreased ADL/Functional Activities  3. Decreased Transfer Abilities  4. Decreased Ambulation Ability/Technique  5. Decreased Balance  6. Decreased Activity Tolerance  7. Decreased [place] with Home Exercise Program INTERVENTIONS PLANNED:  1. Balance Exercise  2. Family Education  3. Gait Training  4. Heat  5. Home Exercise Program (HEP)  6. Manual Therapy  7. Neuromuscular Re-education/Strengthening  8.  Therapeutic Activites  9. Therapeutic Exercise/Strengthening  10. Transfer Training   TREATMENT PLAN:  Effective Dates: 3/21/18 to 6/19/2018. Frequency/Duration: 2 times a week for 12 weeks  GOALS: (Goals have been discussed and agreed upon with patient.)  Short-Term Functional Goals: Time Frame: 6 weeks  1. Patient will be compliant with HEP. 2. Patient will have an increase on the tinajero balance to 46/56 in order to improve her functional balance. 3. Patient will have a decrease on the TUG to 12 seconds indicating improved gait mechanics. Discharge Goals: Time Frame: 12 weeks  1. Patient will be independent with HEP. 2. Patient will have an increase on the tinajero balance to 48/56 in order to decrease her risk of falls. 3. Patient will have a decrease on the TUG to less than 11 seconds in order to improve her functional mobility. 4. Patient will demonstrate turning with 4 steps or less indicating improve movement patterns. 5. Patient will have an increase on the 6 minute walk test to 1100' indicating improve community mobility. Rehabilitation Potential For Stated Goals: Good  Regarding Jayla Greenfield's therapy, I certify that the treatment plan above will be carried out by a therapist or under their direction. Thank you for this referral,  Daphnie Rebollar DPT, NCS     Referring Physician Signature: Amber Walker MD              Date                    HISTORY:   Patient Stated Goal:        I want to be back to normal  History of Present Injury/Illness (Reason for Referral):  March 2018\" Shunt placement on the R side March 6th. Goes back to Dr. Sherri Ledesma on Monday.  is happy with her progress. She says she is now able to back up easier and her stride is getting longer. Sleeps with her head slightly elevated. Has been driving since surgery. Has not been using a walker since she went home. Dec 2017:   Patient reports she went to [facility] and they confirmed Dr. Heidy Gallardo diagnosis of NPH.   They are going to try diamox for 60 days which is a diuretic. At that time, they will send a [facility] to [facility] and they will decide see if they need to do surgery. Feb 15th is 60 days. Nathan said to do PT in the meantime to help with mobility. She reports no falls but says she is getting more fearful of falling. Patient reports still having some memory issues and some incontinence as well as gait difficulties. Past Medical History/Comorbidities:   Ms. Dino Torres  has a past medical history of CAD (coronary artery disease) (4/26/2016); Gait disorder (4/26/2016); Hypertension; Hypertriglyceridemia (4/26/2016); Psychotic disorder; and Small vessel disease, cerebrovascular (4/26/2016). Ms. Dino Torres  has no past surgical history on file. Social History/Living Environment:     lives with   Prior Level of Function/Work/Activity:  Retired. Use to exercises 3 days a week but is currently unable to    Current Medications:    Current Outpatient Prescriptions:     atenolol (TENORMIN) 50 mg tablet, TAKE 2 TABLETS BY MOUTH DAILY, Disp: 60 Tab, Rfl: 3    triamterene-hydroCHLOROthiazide (MAXZIDE) 37.5-25 mg per tablet, TAKE 1 TABLET BY MOUTH TWICE DAILY, Disp: 180 Tab, Rfl: 3    pantoprazole (PROTONIX) 40 mg tablet, Take 1 Tab by mouth daily. , Disp: 30 Tab, Rfl: 5    doxycycline (MONODOX) 100 mg capsule, Take 1 Cap by mouth two (2) times a day., Disp: 20 Cap, Rfl: 0    FLUoxetine (PROZAC) 20 mg tablet, TAKE ONE CAPSULE BY MOUTH EVERY DAY, Disp: 30 Tab, Rfl: 5    conjugated estrogens (PREMARIN) 0.625 mg tablet, TAKE 1 TABLET BY MOUTH EVERY DAY, Disp: 30 Tab, Rfl: 5    triamterene-hydroCHLOROthiazide (MAXZIDE) 37.5-25 mg per tablet, TAKE 1 TABLET BY MOUTH TWICE DAILY, Disp: 180 Tab, Rfl: 3    niacinamide 500 mg tablet, TK 1 T PO  BID, Disp: , Rfl: 3    fluticasone (FLONASE) 50 mcg/actuation nasal spray, nightly., Disp: , Rfl:    Diamox     Date Last Reviewed:  4/10/2018   Number of Personal Factors/Comorbidities that affect the Plan of Care: 1-2: MODERATE COMPLEXITY   EXAMINATION:   Observation/Orthostatic Postural Assessment: Forward head, rounded shoulders. Decreased trunk mobility with gait. Stands with wide WILLY  Mental Status:          Functional but does report some episodes of memory issues  Palpation:          No increased muscle tone noted  Sensation:         intact  Skin Integrity:          intact  Vision:          Functional   Balance:          Good static balance, decreased dynamic balance     Lower Extremity:   Strength PROM   Action R L R  L    Hip Flexion 4 4+     Hip Extension 4 4+     Hip Abduction 4 4+     Hip Adduction       Knee Flexion       Knee Extension 4+ 4+     Dorsi Flexion 4+ 4+     Plantar Flexion 4 4     Inversion       Eversion                 Upper Extremity:         Grossly 4+/5  Functional Mobility:         Gait/Ambulation:  Ambulates with slow gait speed, shuffling gait pattern with decreased heel strike R>L, wide WILLY, flexed knees, some toe out B, fluctuating step length- getting smaller with distance , no assistive device   940' today in 6 minute walk         Transfers:  Slow, able to stand without UE support, wide WILLY, few attempts to stand. Slow, shuffling turn with still with several steps during turning        Bed Mobility:  Functional         Stairs:  NT        Wheelchair:  NT              Body Structures Involved:  1. Nerves  2. Bones  3. Joints  4. Muscles Body Functions Affected:  1. Mental  2. Genitourinary  3. Neuromusculoskeletal  4. Movement Related Activities and Participation Affected:  1. Mobility  2. Self Care  3. Domestic Life  4. Interpersonal Interactions and Relationships   Number of elements that affect the Plan of Care: 4+: HIGH COMPLEXITY   CLINICAL PRESENTATION:   Presentation: Evolving clinical presentation with changing clinical characteristics: MODERATE COMPLEXITY   CLINICAL DECISION MAKING:   Outcome Measure:    Tool Used: Schwab Balance Scale  Score:  Initial: 44/56 Most Recent: X/56 (Date: -- )   Interpretation of Score: Each section is scored on a 0-4 scale, 0 representing the patients inability to perform the task and 4 representing independence. The scores of each section are added together for a total score of 56. The higher the patients score, the more independent the patient is. Any score below 45 indicates increased risk for falls. Score 56 55-45 44-34 33-23 22-12 11-1 0   Modifier CH CI CJ CK CL CM CN     ? Mobility - Walking and Moving Around:     - CURRENT STATUS: CJ - 20%-39% impaired, limited or restricted    - GOAL STATUS: CI - 1%-19% impaired, limited or restricted    - D/C STATUS:  ---------------To be determined---------------    Tool Used: Timed Up and Go (TUG)  Score:  Initial: 13.5 seconds Most Recent: X seconds (Date: -- )   Interpretation of Score: The test measures, in seconds, the time taken by an individual to stand up from a standard arm chair (seat height 46 cm [18 in], arm height 65 cm [25.6 in]), walk a distance of 3 meters (118 in, approx 10 ft), turn, walk back to the chair and sit down. If the individual takes longer than 14 seconds to complete TUG, this indicates risk for falls. Score 7 7.5-10.5 11-14 14.5-17.5 18-21 21.5-24.5 25+   Modifier CH CI CJ CK CL CM CN         Medical Necessity:   · Patient is expected to demonstrate progress in balance, coordination and functional technique to improve safety during ADLs and IADLs. · Patient demonstrates good rehab potential due to higher previous functional level. Reason for Services/Other Comments:  · Patient continues to require modification of therapeutic interventions to increase complexity of exercises.    Use of outcome tool(s) and clinical judgement create a POC that gives a: Questionable prediction of patient's progress: MODERATE COMPLEXITY   TREATMENT:   (In addition to Assessment/Re-Assessment sessions the following treatments were rendered)  Pre Treatment Symptoms: patient reports she walked over the weekend and that was her exercise. She reports no pain today. Therapeutic Exercise: ( 40 minutes ):  Exercises per grid below to improve mobility, balance and coordination. Required moderate visual, verbal and tactile cues to promote proper body alignment and promote proper body mechanics. Progressed complexity of movement as indicated. Date:  3/28/18 Date:  4/3/18 Date:  4/5/18 Date:  4/10/18   Activity/Exercise Parameters  Parameters Parameters  Parameters    NuStep X 10 minutes level 3  X 8 minutes level 3- needing cues for BIG movements X 5 minutes level 3 X 8 minutes level 3- very frequent cues for BIG movements    Calf stretch on incline board X 60 sec B  X 60 sec hold X 60 reps B    Heel raises with 3 second hold   X 30 reps with good power    PWR marching   Verbal and visual cues for movement pattern x 50'   Verbal and visual cues 2 x 50'  X 50'    Lunging into bosu working on BIG push off X 20 reps B with decreased cuing needed today  X 20 reps B with improving ability  X 15 reps B with visual and verbal cues needed to improve movement   RetAPPs       [place] rock walking on colored dots place far apart for BIG step   X 10 reps with a lot of cuing needed     Gait initiation    Broke up the task into parts and then put the whole thing together. A lot of cues for weight shift over stable foot x 5 minutes blocked practice    LSVT side step out       BIG walking With walking poles, therapist assisting with arm swing. Then verbal and visual cues for arm swing and heel strike. X 500' total  Cues for BIGness and cues to \"push yourself forward\". Good ability to correct, fair carryover.   Decreasing cues needed 2 x 120'  X 300' with cues to \"turn and keep walking\"  Good turns, fair gait with some cues needed   PWR sidelying twist X 10 reps each side X 10 reps each side X 10 reps each side X 10 reps to each side- good ability    PWR supine reaching  Reaching for therapist hand x 5 reps B  X 10 reps B  X 10 reps B   Standing PWR reach    X 5 reps to each side with min A for balance and a lot of cues   HS stretch X 30 sec hold R X 30 sec hold B     LTR X 5 reps to each side      Hip flexor stretch off EOM In sidelying 2 x 60 sec hold  In sidelying 2 x 60 sec hold In sidelying 2 x 60 sec hold Sidelying 2 x 60 sec hold B   LSVT twist  X 10 reps B with a lot of cues needed 2 x 10 reps B with more cues needed for L hip IR X 10 reps to each side with tactile cues for hip rotation                        Treatment/Session Assessment: patient was again educated on the importance of doing her HEP in order to improve the quality of her movements. She again verbalized understanding. She continues to benefit from skilled PT to improve her overall functional mobility and decrease her risk of falls. · Pain/ Symptoms: Initial:   0/10 Post Session:  0/10 ·   Compliance with Program/Exercises: Will assess as treatment progresses. · Recommendations/Intent for next treatment session: \"Next visit will focus on advancements to more challenging activities and reduction in assistance provided\".   Total Treatment Duration:   PT Patient Time In/Time Out  Time In: 1105  Time Out: 2005 GURJIT Feliciano DPT

## 2018-04-12 ENCOUNTER — HOSPITAL ENCOUNTER (OUTPATIENT)
Dept: PHYSICAL THERAPY | Age: 78
Discharge: HOME | End: 2018-04-12
Payer: MEDICARE

## 2018-04-12 PROCEDURE — 97110 THERAPEUTIC EXERCISES: CPT

## 2018-04-12 NOTE — PROGRESS NOTES
Neno Moser  : 1940  [place]  [place] [address] [place] [address] [facility], [facility], [address]  [phone]   [phone]         OUTPATIENT PHYSICAL THERAPY:Daily Note 2018    ICD-10: Treatment Diagnosis: Unsteadiness on feet (R26.81)  Precautions/Allergies:   Latex; Ceftin [cefuroxime axetil]; and Sulfa (sulfonamide antibiotics)   Fall Risk Score: 5 (? 5 = High Risk)  MD Orders: evaluate and treat MEDICAL/REFERRING DIAGNOSIS:  Presence of cerebrospinal fluid drainage device [Z98.2]  Unspecified abnormalities of gait and mobility [R26.9]  (Idiopathic) normal pressure hydrocephalus [G91.2]   DATE OF ONSET: few months ago  REFERRING PHYSICIAN: Umu London MD  RETURN PHYSICIAN APPOINTMENT: no more follow up scheduled      ASSESSMENT (Date: 3/21/18):  Ms. Sera Kennedy presents to physical therapy with a diagnosis of NPH. She was seen by me a few months ago and now returns s/p shunt placement. Since the shunt placement, she has improved her tinajero balance score by 5 points and decreased her timed up and go by 2 seconds. She is still a high risk of falls and has multiple gait deficits that need to be addressed. She is also stiff and weak from months of decrease mobility. She would benefit from skilled PT to improve functional mobility and decrease her risk of falls. PROBLEM LIST (Impacting functional limitations):  1. Decreased Strength  2. Decreased ADL/Functional Activities  3. Decreased Transfer Abilities  4. Decreased Ambulation Ability/Technique  5. Decreased Balance  6. Decreased Activity Tolerance  7. Decreased Elma with Home Exercise Program INTERVENTIONS PLANNED:  1. Balance Exercise  2. Family Education  3. Gait Training  4. Heat  5. Home Exercise Program (HEP)  6. Manual Therapy  7. Neuromuscular Re-education/Strengthening  8.  Therapeutic Activites  9. Therapeutic Exercise/Strengthening  10. Transfer Training   TREATMENT PLAN:  Effective Dates: 3/21/18 to 6/19/2018. Frequency/Duration: 2 times a week for 12 weeks  GOALS: (Goals have been discussed and agreed upon with patient.)  Short-Term Functional Goals: Time Frame: 6 weeks  1. Patient will be compliant with HEP. 2. Patient will have an increase on the tinajero balance to 46/56 in order to improve her functional balance. 3. Patient will have a decrease on the TUG to 12 seconds indicating improved gait mechanics. Discharge Goals: Time Frame: 12 weeks  1. Patient will be independent with HEP. 2. Patient will have an increase on the tinajero balance to 48/56 in order to decrease her risk of falls. 3. Patient will have a decrease on the TUG to less than 11 seconds in order to improve her functional mobility. 4. Patient will demonstrate turning with 4 steps or less indicating improve movement patterns. 5. Patient will have an increase on the 6 minute walk test to 1100' indicating improve community mobility. Rehabilitation Potential For Stated Goals: Good  Regarding Jayla Greenfield's therapy, I certify that the treatment plan above will be carried out by a therapist or under their direction. Thank you for this referral,  Aly Wolf DPT, NCS     Referring Physician Signature: Chiquita Cornejo MD              Date                    HISTORY:   Patient Stated Goal:        I want to be back to normal  History of Present Injury/Illness (Reason for Referral):  March 2018\" Shunt placement on the R side March 6th. Goes back to Dr. Reji Dumas on Monday.  is happy with her progress. She says she is now able to back up easier and her stride is getting longer. Sleeps with her head slightly elevated. Has been driving since surgery. Has not been using a walker since she went home. Dec 2017:   Patient reports she went to [facility] and they confirmed Dr. Liliana Acosta diagnosis of NPH.   They are going to try diamox for 60 days which is a diuretic. At that time, they will send a video to [facility] and they will decide see if they need to do surgery. Feb 15th is 60 days. Nathan said to do PT in the meantime to help with mobility. She reports no falls but says she is getting more fearful of falling. Patient reports still having some memory issues and some incontinence as well as gait difficulties. Past Medical History/Comorbidities:   Ms. Cherylann Rinne  has a past medical history of CAD (coronary artery disease) (4/26/2016); Gait disorder (4/26/2016); Hypertension; Hypertriglyceridemia (4/26/2016); Psychotic disorder; and Small vessel disease, cerebrovascular (4/26/2016). Ms. Cherylann Rinne  has no past surgical history on file. Social History/Living Environment:     lives with   Prior Level of Function/Work/Activity:  Retired. Use to exercises 3 days a week but is currently unable to    Current Medications:    Current Outpatient Prescriptions:     atenolol (TENORMIN) 50 mg tablet, TAKE 2 TABLETS BY MOUTH DAILY, Disp: 60 Tab, Rfl: 3    triamterene-hydroCHLOROthiazide (MAXZIDE) 37.5-25 mg per tablet, TAKE 1 TABLET BY MOUTH TWICE DAILY, Disp: 180 Tab, Rfl: 3    pantoprazole (PROTONIX) 40 mg tablet, Take 1 Tab by mouth daily. , Disp: 30 Tab, Rfl: 5    doxycycline (MONODOX) 100 mg capsule, Take 1 Cap by mouth two (2) times a day., Disp: 20 Cap, Rfl: 0    FLUoxetine (PROZAC) 20 mg tablet, TAKE ONE CAPSULE BY MOUTH EVERY DAY, Disp: 30 Tab, Rfl: 5    conjugated estrogens (PREMARIN) 0.625 mg tablet, TAKE 1 TABLET BY MOUTH EVERY DAY, Disp: 30 Tab, Rfl: 5    triamterene-hydroCHLOROthiazide (MAXZIDE) 37.5-25 mg per tablet, TAKE 1 TABLET BY MOUTH TWICE DAILY, Disp: 180 Tab, Rfl: 3    niacinamide 500 mg tablet, TK 1 T PO  BID, Disp: , Rfl: 3    fluticasone (FLONASE) 50 mcg/actuation nasal spray, nightly., Disp: , Rfl:    Diamox     Date Last Reviewed:  4/12/2018   Number of Personal Factors/Comorbidities that affect the Plan of Care: 1-2: MODERATE COMPLEXITY   EXAMINATION:   Observation/Orthostatic Postural Assessment: Forward head, rounded shoulders. Decreased trunk mobility with gait. Stands with wide WILLY  Mental Status:          Functional but does report some episodes of memory issues  Palpation:          No increased muscle tone noted  Sensation:         intact  Skin Integrity:          intact  Vision:          Functional   Balance:          Good static balance, decreased dynamic balance     Lower Extremity:   Strength PROM   Action R L R  L    Hip Flexion 4 4+     Hip Extension 4 4+     Hip Abduction 4 4+     Hip Adduction       Knee Flexion       Knee Extension 4+ 4+     Dorsi Flexion 4+ 4+     Plantar Flexion 4 4     Inversion       Eversion                 Upper Extremity:         Grossly 4+/5  Functional Mobility:         Gait/Ambulation:  Ambulates with slow gait speed, shuffling gait pattern with decreased heel strike R>L, wide WILLY, flexed knees, some toe out B, fluctuating step length- getting smaller with distance , no assistive device   940' today in 6 minute walk         Transfers:  Slow, able to stand without UE support, wide WILLY, few attempts to stand. Slow, shuffling turn with still with several steps during turning        Bed Mobility:  Functional         Stairs:  NT        Wheelchair:  NT              Body Structures Involved:  1. Nerves  2. Bones  3. Joints  4. Muscles Body Functions Affected:  1. Mental  2. Genitourinary  3. Neuromusculoskeletal  4. Movement Related Activities and Participation Affected:  1. Mobility  2. Self Care  3. Domestic Life  4. Interpersonal Interactions and Relationships   Number of elements that affect the Plan of Care: 4+: HIGH COMPLEXITY   CLINICAL PRESENTATION:   Presentation: Evolving clinical presentation with changing clinical characteristics: MODERATE COMPLEXITY   CLINICAL DECISION MAKING:   Outcome Measure:    Tool Used: Schwab Balance Scale  Score:  Initial: 44/56 Most Recent: X/56 (Date: -- )   Interpretation of Score: Each section is scored on a 0-4 scale, 0 representing the patients inability to perform the task and 4 representing independence. The scores of each section are added together for a total score of 56. The higher the patients score, the more independent the patient is. Any score below 45 indicates increased risk for falls. Score 56 55-45 44-34 33-23 22-12 11-1 0   Modifier CH CI CJ CK CL CM CN     ? Mobility - Walking and Moving Around:     - CURRENT STATUS: CJ - 20%-39% impaired, limited or restricted    - GOAL STATUS: CI - 1%-19% impaired, limited or restricted    - D/C STATUS:  ---------------To be determined---------------    Tool Used: Timed Up and Go (TUG)  Score:  Initial: 13.5 seconds Most Recent: X seconds (Date: -- )   Interpretation of Score: The test measures, in seconds, the time taken by an individual to stand up from a standard arm chair (seat height 46 cm [18 in], arm height 65 cm [25.6 in]), walk a distance of 3 meters (118 in, approx 10 ft), turn, walk back to the chair and sit down. If the individual takes longer than 14 seconds to complete TUG, this indicates risk for falls. Score 7 7.5-10.5 11-14 14.5-17.5 18-21 21.5-24.5 25+   Modifier CH CI CJ CK CL CM CN         Medical Necessity:   · Patient is expected to demonstrate progress in balance, coordination and functional technique to improve safety during ADLs and IADLs. · Patient demonstrates good rehab potential due to higher previous functional level. Reason for Services/Other Comments:  · Patient continues to require modification of therapeutic interventions to increase complexity of exercises.    Use of outcome tool(s) and clinical judgement create a POC that gives a: Questionable prediction of patient's progress: MODERATE COMPLEXITY   TREATMENT:   (In addition to Assessment/Re-Assessment sessions the following treatments were rendered)  Pre Treatment Symptoms: patient reports she walked over the weekend and that was her exercise. She reports no pain today. Therapeutic Exercise: ( 40 minutes ):  Exercises per grid below to improve mobility, balance and coordination. Required moderate visual, verbal and tactile cues to promote proper body alignment and promote proper body mechanics. Progressed complexity of movement as indicated. Date:  4/3/18 Date:  4/5/18 Date:  4/10/18 Date:  4/12/18   Activity/Exercise Parameters Parameters  Parameters  Parameters    NuStep X 8 minutes level 3- needing cues for BIG movements X 5 minutes level 3 X 8 minutes level 3- very frequent cues for BIG movements  X 8 minutes level 1- blue bike. Cues for BIG movements   Calf stretch on incline board X 60 sec hold X 60 reps B  X 60 sec hold   Heel raises with 3 second hold  X 30 reps with good power     PWR marching    Verbal and visual cues 2 x 50'  X 50'  In supine, stepping over cones x 10 reps B   Lunging into bosu working on BIG push off X 20 reps B with improving ability  X 15 reps B with visual and verbal cues needed to improve movement    Axial       Floyd rock walking on colored dots place far apart for BIG step  X 10 reps with a lot of cuing needed      Gait initiation   Broke up the task into parts and then put the whole thing together. A lot of cues for weight shift over stable foot x 5 minutes blocked practice     PWR seated reach    3 reps x 5 sec hold B   BIG walking Cues for BIGness and cues to \"push yourself forward\". Good ability to correct, fair carryover. Decreasing cues needed 2 x 120'  X 300' with cues to \"turn and keep walking\"  Good turns, fair gait with some cues needed X 300' with cues to increased speed and step length.   Fair arm swing   PWR sidelying twist X 10 reps each side X 10 reps each side X 10 reps to each side- good ability  X 10 reps to each side   PWR supine reaching   X 10 reps B  X 10 reps B    Standing PWR reach   X 5 reps to each side with min A for balance and a lot of cues    HS stretch X 30 sec hold B      LTR       Hip flexor stretch off EOM In sidelying 2 x 60 sec hold In sidelying 2 x 60 sec hold Sidelying 2 x 60 sec hold B Sidelying 2 x 60 sec hold B   LSVT twist X 10 reps B with a lot of cues needed 2 x 10 reps B with more cues needed for L hip IR X 10 reps to each side with tactile cues for hip rotation     Step overs    Stepping over green theraband 3\" off the floor x 10 reps B with some difficulty with clearance. Then gave a target to increase step length x 10 reps with poor ability to adjust steps to meet the target                Treatment/Session Assessment: patient has a hard time following directions and problem solving some tasks. She couldn't figure out what I wanted when I told her to [facility] straight on the bed\". She couldn't figure out how to move her hips/shoulders/feet to line up. Spent about 5 minutes trying to figure this out. This may be contributing to her issues as some things have to be explained each time she comes. · Pain/ Symptoms: Initial:   0/10 Post Session:  0/10 ·   Compliance with Program/Exercises: Will assess as treatment progresses. · Recommendations/Intent for next treatment session: \"Next visit will focus on advancements to more challenging activities and reduction in assistance provided\".   Total Treatment Duration:   PT Patient Time In/Time Out  Time In: 1105  Time Out: 5[address] [facility], [facility]

## 2018-04-20 ENCOUNTER — HOSPITAL ENCOUNTER (OUTPATIENT)
Dept: PHYSICAL THERAPY | Age: 78
Discharge: HOME | End: 2018-04-20
Payer: MEDICARE

## 2018-04-20 PROCEDURE — 97110 THERAPEUTIC EXERCISES: CPT

## 2018-04-20 PROCEDURE — G8979 MOBILITY GOAL STATUS: HCPCS

## 2018-04-20 PROCEDURE — G8978 MOBILITY CURRENT STATUS: HCPCS

## 2018-04-20 NOTE — PROGRESS NOTES
Phoebe Foreman  : 1940  [place]  Secondary: [address], [address], [place] [address] [address] [facility]  [phone]   [phone]         OUTPATIENT PHYSICAL THERAPY:Progress Report 2018    ICD-10: Treatment Diagnosis: Unsteadiness on feet (R26.81)  Precautions/Allergies:   Latex; Ceftin [cefuroxime axetil]; and Sulfa (sulfonamide antibiotics)   Fall Risk Score: 5 (? 5 = High Risk)  MD Orders: evaluate and treat MEDICAL/REFERRING DIAGNOSIS:  Presence of cerebrospinal fluid drainage device [Z98.2]  Unspecified abnormalities of gait and mobility [R26.9]  (Idiopathic) normal pressure hydrocephalus [G91.2]   DATE OF ONSET: few months ago  REFERRING PHYSICIAN: Jeramy Mendez MD  RETURN PHYSICIAN APPOINTMENT: no more follow up scheduled      ASSESSMENT (Date: 18):  Ms. Edith Alejandre has been seen in physical therapy for 8 visits since 3/21/18 s/p shunt placement for NPH. She has met 3/3 of her short term goals and is making progress toward her long term goals. Her gait is showing signs of progress but still ambulates very rigid. She continues to benefit from skilled PT to improve her functional mobility and return to her PLOF. PROBLEM LIST (Impacting functional limitations):  1. Decreased Strength  2. Decreased ADL/Functional Activities  3. Decreased Transfer Abilities  4. Decreased Ambulation Ability/Technique  5. Decreased Balance  6. Decreased Activity Tolerance  7. Decreased Morris with Home Exercise Program INTERVENTIONS PLANNED:  1. Balance Exercise  2. Family Education  3. Gait Training  4. Heat  5. Home Exercise Program (HEP)  6. Manual Therapy  7. Neuromuscular Re-education/Strengthening  8. Therapeutic Activites  9. Therapeutic Exercise/Strengthening  10. Transfer Training   TREATMENT PLAN:  Effective Dates: 3/21/18 to 2018.   Frequency/Duration: 2 times a week for 12 weeks  GOALS: (Goals have been discussed and agreed upon with patient.)  Short-Term Functional Goals: Time Frame: 6 weeks  1. Patient will be compliant with HEP. MET  2. Patient will have an increase on the tinajero balance to 46/56 in order to improve her functional balance. MET  3. Patient will have a decrease on the TUG to 12 seconds indicating improved gait mechanics. MET  Discharge Goals: Time Frame: 12 weeks  1. Patient will be independent with HEP. 2. Patient will have an increase on the tinajero balance to 48/56 in order to decrease her risk of falls. MET  3. Patient will have a decrease on the TUG to less than 11 seconds in order to improve her functional mobility. 4. Patient will demonstrate turning with 4 steps or less indicating improve movement patterns. 5. Patient will have an increase on the 6 minute walk test to 1100' indicating improve community mobility. Rehabilitation Potential For Stated Goals: Good  Regarding Garadha GABBY AbrahamJean Pierre's therapy, I certify that the treatment plan above will be carried out by a therapist or under their direction. Thank you for this referral,  Leonardo Mcburney, DPT, NCS     Referring Physician Signature: Reyna Cruz MD              Date                    HISTORY:   Patient Stated Goal:        I want to be back to normal  History of Present Injury/Illness (Reason for Referral):  March 2018\" Shunt placement on the R side March 6th. Goes back to Dr. Amado Lovett on Monday.  is happy with her progress. She says she is now able to back up easier and her stride is getting longer. Sleeps with her head slightly elevated. Has been driving since surgery. Has not been using a walker since she went home. Dec 2017:   Patient reports she went to [facility] and they confirmed Dr. Eliecer Mendoza diagnosis of NPH. They are going to try diamox for 60 days which is a diuretic. At that time, they will send a video to [facility] and they will decide see if they need to do surgery.   Feb 15th is 60 days. Evans said to do PT in the meantime to help with mobility. She reports no falls but says she is getting more fearful of falling. Patient reports still having some memory issues and some incontinence as well as gait difficulties. Past Medical History/Comorbidities:   Ms. Pancho Calderón  has a past medical history of CAD (coronary artery disease) (4/26/2016); Gait disorder (4/26/2016); Hypertension; Hypertriglyceridemia (4/26/2016); Psychotic disorder; and Small vessel disease, cerebrovascular (4/26/2016). Ms. Pancho Calderón  has no past surgical history on file. Social History/Living Environment:     lives with   Prior Level of Function/Work/Activity:  Retired. Use to exercises 3 days a week but is currently unable to    Current Medications:    Current Outpatient Prescriptions:     azithromycin (ZITHROMAX Z-LUL) 250 mg tablet, Take two tablets today then one tablet daily, Disp: 6 Tab, Rfl: 0    atenolol (TENORMIN) 50 mg tablet, TAKE 2 TABLETS BY MOUTH DAILY, Disp: 60 Tab, Rfl: 3    triamterene-hydroCHLOROthiazide (MAXZIDE) 37.5-25 mg per tablet, TAKE 1 TABLET BY MOUTH TWICE DAILY, Disp: 180 Tab, Rfl: 3    pantoprazole (PROTONIX) 40 mg tablet, Take 1 Tab by mouth daily. , Disp: 30 Tab, Rfl: 5    doxycycline (MONODOX) 100 mg capsule, Take 1 Cap by mouth two (2) times a day., Disp: 20 Cap, Rfl: 0    FLUoxetine (PROZAC) 20 mg tablet, TAKE ONE CAPSULE BY MOUTH EVERY DAY, Disp: 30 Tab, Rfl: 5    conjugated estrogens (PREMARIN) 0.625 mg tablet, TAKE 1 TABLET BY MOUTH EVERY DAY, Disp: 30 Tab, Rfl: 5    triamterene-hydroCHLOROthiazide (MAXZIDE) 37.5-25 mg per tablet, TAKE 1 TABLET BY MOUTH TWICE DAILY, Disp: 180 Tab, Rfl: 3    niacinamide 500 mg tablet, TK 1 T PO  BID, Disp: , Rfl: 3    fluticasone (FLONASE) 50 mcg/actuation nasal spray, nightly., Disp: , Rfl:    Diamox     Date Last Reviewed:  4/20/2018   Number of Personal Factors/Comorbidities that affect the Plan of Care: 1-2: MODERATE COMPLEXITY EXAMINATION:   Observation/Orthostatic Postural Assessment: Forward head, rounded shoulders. Decreased trunk mobility with gait. Stands with wide WILLY  Mental Status:          Functional but does report some episodes of memory issues  Palpation:          No increased muscle tone noted  Sensation:         intact  Skin Integrity:          intact  Vision:          Functional   Balance:          Good static balance, decreased dynamic balance     Lower Extremity:   Strength PROM   Action R L R  L    Hip Flexion 4 4+     Hip Extension 4 4+     Hip Abduction 4 4+     Hip Adduction       Knee Flexion       Knee Extension 4+ 4+     Dorsi Flexion 4+ 4+     Plantar Flexion 4 4     Inversion       Eversion                 Upper Extremity:         Grossly 4+/5  Functional Mobility:         Gait/Ambulation:  Ambulates with slow gait speed, shuffling gait pattern with decreased heel strike R>L, wide WILLY, flexed knees, some toe out B, improved step length   1050' today in 6 minute walk         Transfers:  Slow, able to stand without UE support, wide WILLY, few attempts to stand. Slow, shuffling turn with still with several steps during turning        Bed Mobility:  Functional         Stairs:  NT        Wheelchair:  NT              Body Structures Involved:  1. Nerves  2. Bones  3. Joints  4. Muscles Body Functions Affected:  1. Mental  2. Genitourinary  3. Neuromusculoskeletal  4. Movement Related Activities and Participation Affected:  1. Mobility  2. Self Care  3. Domestic Life  4. Interpersonal Interactions and Relationships   Number of elements that affect the Plan of Care: 4+: HIGH COMPLEXITY   CLINICAL PRESENTATION:   Presentation: Evolving clinical presentation with changing clinical characteristics: MODERATE COMPLEXITY   CLINICAL DECISION MAKING:   Outcome Measure:    Tool Used: Schwab Balance Scale  Score:  Initial: 44/56 Most Recent: 48/56 (Date: 4/20/18 )   Interpretation of Score: Each section is scored on a 0-4 scale, 0 representing the patients inability to perform the task and 4 representing independence. The scores of each section are added together for a total score of 56. The higher the patients score, the more independent the patient is. Any score below 45 indicates increased risk for falls. Score 56 55-45 44-34 33-23 22-12 11-1 0   Modifier CH CI CJ CK CL CM CN     Tool Used: Timed Up and Go (TUG)  Score:  Initial: 13.5 seconds Most Recent: 13.5 seconds (Date: 4/20/18 )   Interpretation of Score: The test measures, in seconds, the time taken by an individual to stand up from a standard arm chair (seat height 46 cm [18 in], arm height 65 cm [25.6 in]), walk a distance of 3 meters (118 in, approx 10 ft), turn, walk back to the chair and sit down. If the individual takes longer than 14 seconds to complete TUG, this indicates risk for falls. Score 7 7.5-10.5 11-14 14.5-17.5 18-21 21.5-24.5 25+   Modifier CH CI CJ CK CL CM CN     ? Mobility - Walking and Moving Around:     - CURRENT STATUS: CJ - 20%-39% impaired, limited or restricted    - GOAL STATUS: CI - 1%-19% impaired, limited or restricted    - D/C STATUS:  ---------------To be determined---------------      Medical Necessity:   · Patient is expected to demonstrate progress in balance, coordination and functional technique to improve safety during ADLs and IADLs. · Patient demonstrates good rehab potential due to higher previous functional level. Reason for Services/Other Comments:  · Patient continues to require modification of therapeutic interventions to increase complexity of exercises. Use of outcome tool(s) and clinical judgement create a POC that gives a: Questionable prediction of patient's progress: MODERATE COMPLEXITY   TREATMENT:   (In addition to Assessment/Re-Assessment sessions the following treatments were rendered)  Pre Treatment Symptoms: patient reports she has an ear infection so she can't hear very well today.   No pain.      Therapeutic Exercise: ( 40 minutes ):  Exercises per grid below to improve mobility, balance and coordination. Required moderate visual, verbal and tactile cues to promote proper body alignment and promote proper body mechanics. Progressed complexity of movement as indicated. Date:  4/5/18 Date:  4/10/18 Date:  4/12/18 Date:  4/20/18   Activity/Exercise Parameters  Parameters  Parameters  Parameters   NuStep X 5 minutes level 3 X 8 minutes level 3- very frequent cues for BIG movements  X 8 minutes level 1- blue bike. Cues for BIG movements X 6 minute walk for endurance   Calf stretch on incline board X 60 reps B  X 60 sec hold    Heel raises with 3 second hold X 30 reps with good power      PWR marching   Verbal and visual cues 2 x 50'  X 50'  In supine, stepping over cones x 10 reps B X 50' with verbal and visual cues   Lunging into bosu working on BIG push off  X 15 reps B with visual and verbal cues needed to improve movement     clams    X 20 reps B   [place] walking on colored dots place far apart for BIG step        Gait initiation  Broke up the task into parts and then put the whole thing together. A lot of cues for weight shift over stable foot x 5 minutes blocked practice      PWR seated reach   3 reps x 5 sec hold B    BIG walking Decreasing cues needed 2 x 120'  X 300' with cues to \"turn and keep walking\"  Good turns, fair gait with some cues needed X 300' with cues to increased speed and step length.   Fair arm swing 2 x 120' with decreasing cues   PWR sidelying twist X 10 reps each side X 10 reps to each side- good ability  X 10 reps to each side    PWR supine reaching  X 10 reps B  X 10 reps B     Standing PWR reach  X 5 reps to each side with min A for balance and a lot of cues  X 10 reps side to side then x 10 reps straight up work on \"jump\" for push off motion   HS stretch       LTR       Hip flexor stretch off EOM In sidelying 2 x 60 sec hold Sidelying 2 x 60 sec hold B Sidelying 2 x 60 sec hold B Sidelying 2 x 60 sec B   LSVT twist 2 x 10 reps B with more cues needed for L hip IR X 10 reps to each side with tactile cues for hip rotation   X 10 reps B then doing it with gait to improve pelvic and trunk mobility    Step overs   Stepping over green theraband 3\" off the floor x 10 reps B with some difficulty with clearance. Then gave a target to increase step length x 10 reps with poor ability to adjust steps to meet the target                 Treatment/Session Assessment: see assessment above. · Pain/ Symptoms: Initial:   0/10 Post Session:  0/10 ·   Compliance with Program/Exercises: Will assess as treatment progresses. · Recommendations/Intent for next treatment session: \"Next visit will focus on advancements to more challenging activities and reduction in assistance provided\".   Total Treatment Duration:   PT Patient Time In/Time Out  Time In: 1300  Time Out: [address], DPT

## 2018-04-23 ENCOUNTER — HOSPITAL ENCOUNTER (OUTPATIENT)
Dept: PHYSICAL THERAPY | Age: 78
Discharge: HOME | End: 2018-04-23
Payer: MEDICARE

## 2018-04-23 PROCEDURE — 97110 THERAPEUTIC EXERCISES: CPT

## 2018-04-23 NOTE — PROGRESS NOTES
Marisol Lind  : 1940  [place]  [place] [address] [place] [address] at [facility], [facility], [address]  [phone]   [phone]         OUTPATIENT PHYSICAL THERAPY:Daily Note 2018    ICD-10: Treatment Diagnosis: Unsteadiness on feet (R26.81)  Precautions/Allergies:   Latex; Ceftin [cefuroxime axetil]; and Sulfa (sulfonamide antibiotics)   Fall Risk Score: 5 (? 5 = High Risk)  MD Orders: evaluate and treat MEDICAL/REFERRING DIAGNOSIS:  Presence of cerebrospinal fluid drainage device [Z98.2]  Unspecified abnormalities of gait and mobility [R26.9]  (Idiopathic) normal pressure hydrocephalus [G91.2]   DATE OF ONSET: few months ago  REFERRING PHYSICIAN: Alisa Zapata MD  RETURN PHYSICIAN APPOINTMENT: no more follow up scheduled      ASSESSMENT (Date: 18):  Ms. Yoav Bailey has been seen in physical therapy for 8 visits since 3/21/18 s/p shunt placement for NPH. She has met 3/3 of her short term goals and is making progress toward her long term goals. Her gait is showing signs of progress but still ambulates very rigid. She continues to benefit from skilled PT to improve her functional mobility and return to her PLOF. PROBLEM LIST (Impacting functional limitations):  1. Decreased Strength  2. Decreased ADL/Functional Activities  3. Decreased Transfer Abilities  4. Decreased Ambulation Ability/Technique  5. Decreased Balance  6. Decreased Activity Tolerance  7. Decreased [place] with Home Exercise Program INTERVENTIONS PLANNED:  1. Balance Exercise  2. Family Education  3. Gait Training  4. Heat  5. Home Exercise Program (HEP)  6. Manual Therapy  7. Neuromuscular Re-education/Strengthening  8. Therapeutic Activites  9. Therapeutic Exercise/Strengthening  10. Transfer Training   TREATMENT PLAN:  Effective Dates: 3/21/18 to 2018.   Frequency/Duration: 2 times a week for 12 weeks  GOALS: (Goals have been discussed and agreed upon with patient.)  Short-Term Functional Goals: Time Frame: 6 weeks  1. Patient will be compliant with HEP. MET  2. Patient will have an increase on the tinajero balance to 46/56 in order to improve her functional balance. MET  3. Patient will have a decrease on the TUG to 12 seconds indicating improved gait mechanics. MET  Discharge Goals: Time Frame: 12 weeks  1. Patient will be independent with HEP. 2. Patient will have an increase on the tinajero balance to 48/56 in order to decrease her risk of falls. MET  3. Patient will have a decrease on the TUG to less than 11 seconds in order to improve her functional mobility. 4. Patient will demonstrate turning with 4 steps or less indicating improve movement patterns. 5. Patient will have an increase on the 6 minute walk test to 1100' indicating improve community mobility. Rehabilitation Potential For Stated Goals: Good  Regarding Garadha GABBY AbrahamJean Pierre's therapy, I certify that the treatment plan above will be carried out by a therapist or under their direction. Thank you for this referral,  Shital Uribe DPT, NCS     Referring Physician Signature: Rigoberto Lehman MD              Date                    HISTORY:   Patient Stated Goal:        I want to be back to normal  History of Present Injury/Illness (Reason for Referral):  March 2018\" Shunt placement on the R side March 6th. Goes back to Dr. Brayan Mchugh on Monday.  is happy with her progress. She says she is now able to back up easier and her stride is getting longer. Sleeps with her head slightly elevated. Has been driving since surgery. Has not been using a walker since she went home. Dec 2017:   Patient reports she went to [facility] and they confirmed Dr. Aye Dong diagnosis of NPH. They are going to try diamox for 60 days which is a diuretic. At that time, they will send a video to [facility] and they will decide see if they need to do surgery.   Feb 15th is 60 days. [place] said to do PT in the meantime to help with mobility. She reports no falls but says she is getting more fearful of falling. Patient reports still having some memory issues and some incontinence as well as gait difficulties. Past Medical History/Comorbidities:   Ms. Patti Boles  has a past medical history of CAD (coronary artery disease) (4/26/2016); Gait disorder (4/26/2016); Hypertension; Hypertriglyceridemia (4/26/2016); Psychotic disorder; and Small vessel disease, cerebrovascular (4/26/2016). Ms. Patti Boles  has no past surgical history on file. Social History/Living Environment:     lives with   Prior Level of Function/Work/Activity:  Retired. Use to exercises 3 days a week but is currently unable to    Current Medications:    Current Outpatient Prescriptions:     azithromycin (ZITHROMAX Z-LUL) 250 mg tablet, Take two tablets today then one tablet daily, Disp: 6 Tab, Rfl: 0    atenolol (TENORMIN) 50 mg tablet, TAKE 2 TABLETS BY MOUTH DAILY, Disp: 60 Tab, Rfl: 3    triamterene-hydroCHLOROthiazide (MAXZIDE) 37.5-25 mg per tablet, TAKE 1 TABLET BY MOUTH TWICE DAILY, Disp: 180 Tab, Rfl: 3    pantoprazole (PROTONIX) 40 mg tablet, Take 1 Tab by mouth daily. , Disp: 30 Tab, Rfl: 5    doxycycline (MONODOX) 100 mg capsule, Take 1 Cap by mouth two (2) times a day., Disp: 20 Cap, Rfl: 0    FLUoxetine (PROZAC) 20 mg tablet, TAKE ONE CAPSULE BY MOUTH EVERY DAY, Disp: 30 Tab, Rfl: 5    conjugated estrogens (PREMARIN) 0.625 mg tablet, TAKE 1 TABLET BY MOUTH EVERY DAY, Disp: 30 Tab, Rfl: 5    triamterene-hydroCHLOROthiazide (MAXZIDE) 37.5-25 mg per tablet, TAKE 1 TABLET BY MOUTH TWICE DAILY, Disp: 180 Tab, Rfl: 3    niacinamide 500 mg tablet, TK 1 T PO  BID, Disp: , Rfl: 3    fluticasone (FLONASE) 50 mcg/actuation nasal spray, nightly., Disp: , Rfl:    Diamox     Date Last Reviewed:  4/23/2018   Number of Personal Factors/Comorbidities that affect the Plan of Care: 1-2: MODERATE COMPLEXITY EXAMINATION:   Observation/Orthostatic Postural Assessment: Forward head, rounded shoulders. Decreased trunk mobility with gait. Stands with wide WILLY  Mental Status:          Functional but does report some episodes of memory issues  Palpation:          No increased muscle tone noted  Sensation:         intact  Skin Integrity:          intact  Vision:          Functional   Balance:          Good static balance, decreased dynamic balance     Lower Extremity:   Strength PROM   Action R L R  L    Hip Flexion 4 4+     Hip Extension 4 4+     Hip Abduction 4 4+     Hip Adduction       Knee Flexion       Knee Extension 4+ 4+     Dorsi Flexion 4+ 4+     Plantar Flexion 4 4     Inversion       Eversion                 Upper Extremity:         Grossly 4+/5  Functional Mobility:         Gait/Ambulation:  Ambulates with slow gait speed, shuffling gait pattern with decreased heel strike R>L, wide WILLY, flexed knees, some toe out B, improved step length   1050' today in 6 minute walk         Transfers:  Slow, able to stand without UE support, wide WILLY, few attempts to stand. Slow, shuffling turn with still with several steps during turning        Bed Mobility:  Functional         Stairs:  NT        Wheelchair:  NT              Body Structures Involved:  1. Nerves  2. Bones  3. Joints  4. Muscles Body Functions Affected:  1. Mental  2. Genitourinary  3. Neuromusculoskeletal  4. Movement Related Activities and Participation Affected:  1. Mobility  2. Self Care  3. Domestic Life  4. Interpersonal Interactions and Relationships   Number of elements that affect the Plan of Care: 4+: HIGH COMPLEXITY   CLINICAL PRESENTATION:   Presentation: Evolving clinical presentation with changing clinical characteristics: MODERATE COMPLEXITY   CLINICAL DECISION MAKING:   Outcome Measure:    Tool Used: Schwab Balance Scale  Score:  Initial: 44/56 Most Recent: 48/56 (Date: 4/20/18 )   Interpretation of Score: Each section is scored on a 0-4 scale, 0 representing the patients inability to perform the task and 4 representing independence. The scores of each section are added together for a total score of 56. The higher the patients score, the more independent the patient is. Any score below 45 indicates increased risk for falls. Score 56 55-45 44-34 33-23 22-12 11-1 0   Modifier CH CI CJ CK CL CM CN     Tool Used: Timed Up and Go (TUG)  Score:  Initial: 13.5 seconds Most Recent: 13.5 seconds (Date: 4/20/18 )   Interpretation of Score: The test measures, in seconds, the time taken by an individual to stand up from a standard arm chair (seat height 46 cm [18 in], arm height 65 cm [25.6 in]), walk a distance of 3 meters (118 in, approx 10 ft), turn, walk back to the chair and sit down. If the individual takes longer than 14 seconds to complete TUG, this indicates risk for falls. Score 7 7.5-10.5 11-14 14.5-17.5 18-21 21.5-24.5 25+   Modifier CH CI CJ CK CL CM CN     ? Mobility - Walking and Moving Around:     - CURRENT STATUS: CJ - 20%-39% impaired, limited or restricted    - GOAL STATUS: CI - 1%-19% impaired, limited or restricted    - D/C STATUS:  ---------------To be determined---------------      Medical Necessity:   · Patient is expected to demonstrate progress in balance, coordination and functional technique to improve safety during ADLs and IADLs. · Patient demonstrates good rehab potential due to higher previous functional level. Reason for Services/Other Comments:  · Patient continues to require modification of therapeutic interventions to increase complexity of exercises. Use of outcome tool(s) and clinical judgement create a POC that gives a: Questionable prediction of patient's progress: MODERATE COMPLEXITY   TREATMENT:   (In addition to Assessment/Re-Assessment sessions the following treatments were rendered)  Pre Treatment Symptoms: patient reports she had a busy weekend and wasn't able to work on her exercises. Therapeutic Exercise: ( 40 minutes ):  Exercises per grid below to improve mobility, balance and coordination. Required moderate visual, verbal and tactile cues to promote proper body alignment and promote proper body mechanics. Progressed complexity of movement as indicated. Date:  4/10/18 Date:  4/12/18 Date:  4/20/18 Date:  4/23/18   Activity/Exercise Parameters  Parameters  Parameters Parameters    NuStep X 8 minutes level 3- very frequent cues for BIG movements  X 8 minutes level 1- blue bike. Cues for BIG movements X 6 minute walk for endurance X 8 minutes level 2   Calf stretch on incline board  X 60 sec hold  X 60 sec hold   Heel raises with 3 second hold       PWR marching   X 50'  In supine, stepping over cones x 10 reps B X 50' with verbal and visual cues    Lunging into bosu working on BIG push off X 15 reps B with visual and verbal cues needed to improve movement      clams   X 20 reps B    Standing arm swing     X 30 sec cues for bigness   Punching bag holding 1 kg balls    Cues for powerful punch 3 x 10 sec   PWR marching    3 x 50' with cues for sharp movements   PWR seated reach  3 reps x 5 sec hold B     BIG walking X 300' with cues to \"turn and keep walking\"  Good turns, fair gait with some cues needed X 300' with cues to increased speed and step length.   Fair arm swing 2 x 120' with decreasing cues 3 x 120' throughout session    Big steps with trunk twist x 10 reps to each side  Backward walking x 50'    PWR sidelying twist X 10 reps to each side- good ability  X 10 reps to each side  X 15 reps B    PWR supine reaching  X 10 reps B      Standing PWR reach X 5 reps to each side with min A for balance and a lot of cues  X 10 reps side to side then x 10 reps straight up work on \"jump\" for push off motion    HS stretch       LTR       Hip flexor stretch off EOM Sidelying 2 x 60 sec hold B Sidelying 2 x 60 sec hold B Sidelying 2 x 60 sec B Prone 2 x 60 sec    Prone hip extension     X 10 reps B    LSVT twist X 10 reps to each side with tactile cues for hip rotation   X 10 reps B then doing it with gait to improve pelvic and trunk mobility     Step overs  Stepping over green theraband 3\" off the floor x 10 reps B with some difficulty with clearance. Then gave a target to increase step length x 10 reps with poor ability to adjust steps to meet the target     PWR seated reach    X 5 reps to each side         Treatment/Session Assessment: patient was again educated that she will make more progress if she does her exercises at home. She continues to benefit from skilled PT to improve mobility and balance. Slowly decreasing trunk stiffness. · Pain/ Symptoms: Initial:   0/10 Post Session:  0/10 ·   Compliance with Program/Exercises: Will assess as treatment progresses. · Recommendations/Intent for next treatment session: \"Next visit will focus on advancements to more challenging activities and reduction in assistance provided\".   Total Treatment Duration:   PT Patient Time In/Time Out  Time In: 1340  Time Out: 154 [facility], [facility]

## 2018-04-25 ENCOUNTER — HOSPITAL ENCOUNTER (OUTPATIENT)
Dept: PHYSICAL THERAPY | Age: 78
Discharge: HOME | End: 2018-04-25
Payer: MEDICARE

## 2018-04-25 PROCEDURE — 97110 THERAPEUTIC EXERCISES: CPT

## 2018-04-25 NOTE — PROGRESS NOTES
Tushar Bruno  : 1940  [place]  [place] [facility], [address], [facility], [address]  [phone]   [phone]         OUTPATIENT PHYSICAL THERAPY:Daily Note 2018    ICD-10: Treatment Diagnosis: Unsteadiness on feet (R26.81)  Precautions/Allergies:   Latex; Ceftin [cefuroxime axetil]; and Sulfa (sulfonamide antibiotics)   Fall Risk Score: 5 (? 5 = High Risk)  MD Orders: evaluate and treat MEDICAL/REFERRING DIAGNOSIS:  Presence of cerebrospinal fluid drainage device [Z98.2]  Unspecified abnormalities of gait and mobility [R26.9]  (Idiopathic) normal pressure hydrocephalus [G91.2]   DATE OF ONSET: few months ago  REFERRING PHYSICIAN: Amber Walker MD  RETURN PHYSICIAN APPOINTMENT: no more follow up scheduled      ASSESSMENT (Date: 18):  Ms. Patti Boles has been seen in physical therapy for 8 visits since 3/21/18 s/p shunt placement for NPH. She has met 3/3 of her short term goals and is making progress toward her long term goals. Her gait is showing signs of progress but still ambulates very rigid. She continues to benefit from skilled PT to improve her functional mobility and return to her PLOF. PROBLEM LIST (Impacting functional limitations):  1. Decreased Strength  2. Decreased ADL/Functional Activities  3. Decreased Transfer Abilities  4. Decreased Ambulation Ability/Technique  5. Decreased Balance  6. Decreased Activity Tolerance  7. Decreased [place] with Home Exercise Program INTERVENTIONS PLANNED:  1. Balance Exercise  2. Family Education  3. Gait Training  4. Heat  5. Home Exercise Program (HEP)  6. Manual Therapy  7. Neuromuscular Re-education/Strengthening  8. Therapeutic Activites  9. Therapeutic Exercise/Strengthening  10. Transfer Training   TREATMENT PLAN:  Effective Dates: 3/21/18 to 2018.   Frequency/Duration: 2 times a week for 12 weeks  GOALS: (Goals have been discussed and agreed upon with patient.)  Short-Term Functional Goals: Time Frame: 6 weeks  1. Patient will be compliant with HEP. MET  2. Patient will have an increase on the tinajero balance to 46/56 in order to improve her functional balance. MET  3. Patient will have a decrease on the TUG to 12 seconds indicating improved gait mechanics. MET  Discharge Goals: Time Frame: 12 weeks  1. Patient will be independent with HEP. 2. Patient will have an increase on the tinajero balance to 48/56 in order to decrease her risk of falls. MET  3. Patient will have a decrease on the TUG to less than 11 seconds in order to improve her functional mobility. 4. Patient will demonstrate turning with 4 steps or less indicating improve movement patterns. 5. Patient will have an increase on the 6 minute walk test to 1100' indicating improve community mobility. Rehabilitation Potential For Stated Goals: Good  Regarding Garadha GABBY AbrahamJean Pierre's therapy, I certify that the treatment plan above will be carried out by a therapist or under their direction. Thank you for this referral,  Jannet Nguyễn DPT, NCS     Referring Physician Signature: Alfonso English MD              Date                    HISTORY:   Patient Stated Goal:        I want to be back to normal  History of Present Injury/Illness (Reason for Referral):  March 2018\" Shunt placement on the R side March 6th. Goes back to Dr. Lino Finney on Monday.  is happy with her progress. She says she is now able to back up easier and her stride is getting longer. Sleeps with her head slightly elevated. Has been driving since surgery. Has not been using a walker since she went home. Dec 2017:   Patient reports she went to [facility] and they confirmed Dr. Freddy Martinez diagnosis of NPH. They are going to try diamox for 60 days which is a diuretic. At that time, they will send a video to [facility] and they will decide see if they need to do surgery.   Feb 15th is 60 days. Evans said to do PT in the meantime to help with mobility. She reports no falls but says she is getting more fearful of falling. Patient reports still having some memory issues and some incontinence as well as gait difficulties. Past Medical History/Comorbidities:   Ms. Bárbara Don  has a past medical history of CAD (coronary artery disease) (4/26/2016); Gait disorder (4/26/2016); Hypertension; Hypertriglyceridemia (4/26/2016); Psychotic disorder; and Small vessel disease, cerebrovascular (4/26/2016). Ms. Bárbara Don  has no past surgical history on file. Social History/Living Environment:     lives with   Prior Level of Function/Work/Activity:  Retired. Use to exercises 3 days a week but is currently unable to    Current Medications:    Current Outpatient Prescriptions:     atenolol (TENORMIN) 50 mg tablet, TAKE 2 TABLETS BY MOUTH DAILY, Disp: 60 Tab, Rfl: 3    triamterene-hydroCHLOROthiazide (MAXZIDE) 37.5-25 mg per tablet, TAKE 1 TABLET BY MOUTH TWICE DAILY, Disp: 180 Tab, Rfl: 3    pantoprazole (PROTONIX) 40 mg tablet, Take 1 Tab by mouth daily. , Disp: 30 Tab, Rfl: 5    doxycycline (MONODOX) 100 mg capsule, Take 1 Cap by mouth two (2) times a day., Disp: 20 Cap, Rfl: 0    FLUoxetine (PROZAC) 20 mg tablet, TAKE ONE CAPSULE BY MOUTH EVERY DAY, Disp: 30 Tab, Rfl: 5    conjugated estrogens (PREMARIN) 0.625 mg tablet, TAKE 1 TABLET BY MOUTH EVERY DAY, Disp: 30 Tab, Rfl: 5    triamterene-hydroCHLOROthiazide (MAXZIDE) 37.5-25 mg per tablet, TAKE 1 TABLET BY MOUTH TWICE DAILY, Disp: 180 Tab, Rfl: 3    niacinamide 500 mg tablet, TK 1 T PO  BID, Disp: , Rfl: 3    fluticasone (FLONASE) 50 mcg/actuation nasal spray, nightly., Disp: , Rfl:    Diamox     Date Last Reviewed:  4/25/2018   Number of Personal Factors/Comorbidities that affect the Plan of Care: 1-2: MODERATE COMPLEXITY   EXAMINATION:   Observation/Orthostatic Postural Assessment: Forward head, rounded shoulders.   Decreased trunk mobility with gait. Stands with wide WILLY  Mental Status:          Functional but does report some episodes of memory issues  Palpation:          No increased muscle tone noted  Sensation:         intact  Skin Integrity:          intact  Vision:          Functional   Balance:          Good static balance, decreased dynamic balance     Lower Extremity:   Strength PROM   Action R L R  L    Hip Flexion 4 4+     Hip Extension 4 4+     Hip Abduction 4 4+     Hip Adduction       Knee Flexion       Knee Extension 4+ 4+     Dorsi Flexion 4+ 4+     Plantar Flexion 4 4     Inversion       Eversion                 Upper Extremity:         Grossly 4+/5  Functional Mobility:         Gait/Ambulation:  Ambulates with slow gait speed, shuffling gait pattern with decreased heel strike R>L, wide WILLY, flexed knees, some toe out B, improved step length   1050' today in 6 minute walk         Transfers:  Slow, able to stand without UE support, wide WILLY, few attempts to stand. Slow, shuffling turn with still with several steps during turning        Bed Mobility:  Functional         Stairs:  NT        Wheelchair:  NT              Body Structures Involved:  1. Nerves  2. Bones  3. Joints  4. Muscles Body Functions Affected:  1. Mental  2. Genitourinary  3. Neuromusculoskeletal  4. Movement Related Activities and Participation Affected:  1. Mobility  2. Self Care  3. Domestic Life  4. Interpersonal Interactions and Relationships   Number of elements that affect the Plan of Care: 4+: HIGH COMPLEXITY   CLINICAL PRESENTATION:   Presentation: Evolving clinical presentation with changing clinical characteristics: MODERATE COMPLEXITY   CLINICAL DECISION MAKING:   Outcome Measure: Tool Used: Schwab Balance Scale  Score:  Initial: 44/56 Most Recent: 48/56 (Date: 4/20/18 )   Interpretation of Score: Each section is scored on a 0-4 scale, 0 representing the patients inability to perform the task and 4 representing independence.   The scores of each section are added together for a total score of 56. The higher the patients score, the more independent the patient is. Any score below 45 indicates increased risk for falls. Score 56 55-45 44-34 33-23 22-12 11-1 0   Modifier CH CI CJ CK CL CM CN     Tool Used: Timed Up and Go (TUG)  Score:  Initial: 13.5 seconds Most Recent: 13.5 seconds (Date: 4/20/18 )   Interpretation of Score: The test measures, in seconds, the time taken by an individual to stand up from a standard arm chair (seat height 46 cm [18 in], arm height 65 cm [25.6 in]), walk a distance of 3 meters (118 in, approx 10 ft), turn, walk back to the chair and sit down. If the individual takes longer than 14 seconds to complete TUG, this indicates risk for falls. Score 7 7.5-10.5 11-14 14.5-17.5 18-21 21.5-24.5 25+   Modifier CH CI CJ CK CL CM CN     ? Mobility - Walking and Moving Around:     - CURRENT STATUS: CJ - 20%-39% impaired, limited or restricted    - GOAL STATUS: CI - 1%-19% impaired, limited or restricted    - D/C STATUS:  ---------------To be determined---------------      Medical Necessity:   · Patient is expected to demonstrate progress in balance, coordination and functional technique to improve safety during ADLs and IADLs. · Patient demonstrates good rehab potential due to higher previous functional level. Reason for Services/Other Comments:  · Patient continues to require modification of therapeutic interventions to increase complexity of exercises. Use of outcome tool(s) and clinical judgement create a POC that gives a: Questionable prediction of patient's progress: MODERATE COMPLEXITY   TREATMENT:   (In addition to Assessment/Re-Assessment sessions the following treatments were rendered)  Pre Treatment Symptoms: patient reports she went to an 78th birthday party last night. No pain. Hasn't had time to work on her exercises.       Therapeutic Exercise: ( 40 minutes ):  Exercises per grid below to improve mobility, balance and coordination. Required moderate visual, verbal and tactile cues to promote proper body alignment and promote proper body mechanics. Progressed complexity of movement as indicated. Date:  4/12/18 Date:  4/20/18 Date:  4/23/18 Date:  4/25/18   Activity/Exercise Parameters  Parameters Parameters  Parameters    NuStep X 8 minutes level 1- blue bike. Cues for BIG movements X 6 minute walk for endurance X 8 minutes level 2 X 8 minutes level 3    Calf stretch on incline board X 60 sec hold  X 60 sec hold    Heel raises with 3 second hold    On trampoline x 20 reps   PWR marching   In supine, stepping over cones x 10 reps B X 50' with verbal and visual cues     Lunging into bosu working on BIG push off    X 15 reps B, cues for bigness   clams  X 20 reps B     Standing arm swing    X 30 sec cues for bigness    Punching bag holding 1 kg balls   Cues for powerful punch 3 x 10 sec Cues for big power 4 x 15 sec    PWR marching   3 x 50' with cues for sharp movements X 20 reps on trampoline, holding on for balance   PWR seated reach 3 reps x 5 sec hold B   3 x 5 sec hold B   BIG walking X 300' with cues to increased speed and step length.   Fair arm swing 2 x 120' with decreasing cues 3 x 120' throughout session    Big steps with trunk twist x 10 reps to each side  Backward walking x 50'     PWR sidelying twist X 10 reps to each side  X 15 reps B  X 15 reps B    PWR supine reaching        Standing PWR reach  X 10 reps side to side then x 10 reps straight up work on \"jump\" for push off motion     HS stretch       LSVT rock and reach    First just arms x 20 sec when added rocking x 20 sec with each foot forward   Hip flexor stretch  Sidelying 2 x 60 sec hold B Sidelying 2 x 60 sec B Prone 2 x 60 sec  Sidelying 2 x 60 sec B   Prone hip extension    X 10 reps B     LSVT twist  X 10 reps B then doing it with gait to improve pelvic and trunk mobility   4 x 10 reps with gait, dots on the floor for cues to increase step size. Few LOB   Step overs Stepping over green theraband 3\" off the floor x 10 reps B with some difficulty with clearance. Then gave a target to increase step length x 10 reps with poor ability to adjust steps to meet the target         X 5 reps to each side          Treatment/Session Assessment: patients gait is slowly improving. She improved her lunging exercises as well today demonstrating better balance and control. She continues to benefit from skilled PT to improve balance and functional mobility. · Pain/ Symptoms: Initial:   0/10 Post Session:  0/10 ·   Compliance with Program/Exercises: Will assess as treatment progresses. · Recommendations/Intent for next treatment session: \"Next visit will focus on advancements to more challenging activities and reduction in assistance provided\".   Total Treatment Duration:   PT Patient Time In/Time Out  Time In: 1045  Time Out: 602 [facility], [facility]

## 2018-05-01 ENCOUNTER — APPOINTMENT (OUTPATIENT)
Dept: PHYSICAL THERAPY | Age: 78
End: 2018-05-01
Payer: MEDICARE

## 2018-05-02 ENCOUNTER — HOSPITAL ENCOUNTER (OUTPATIENT)
Dept: PHYSICAL THERAPY | Age: 78
Discharge: HOME | End: 2018-05-02
Payer: MEDICARE

## 2018-05-02 PROCEDURE — 97110 THERAPEUTIC EXERCISES: CPT

## 2018-05-02 NOTE — PROGRESS NOTES
Marisol Lind  : 1940  [place]  [place] [facility] [address], [address] [facility], [address]  [phone]   [phone]         OUTPATIENT PHYSICAL THERAPY:Daily Note 2018    ICD-10: Treatment Diagnosis: Unsteadiness on feet (R26.81)  Precautions/Allergies:   Latex; Ceftin [cefuroxime axetil]; and Sulfa (sulfonamide antibiotics)   Fall Risk Score: 5 (? 5 = High Risk)  MD Orders: evaluate and treat MEDICAL/REFERRING DIAGNOSIS:  Presence of cerebrospinal fluid drainage device [Z98.2]  Unspecified abnormalities of gait and mobility [R26.9]  (Idiopathic) normal pressure hydrocephalus [G91.2]   DATE OF ONSET: few months ago  REFERRING PHYSICIAN: Alisa Zapata MD  RETURN PHYSICIAN APPOINTMENT: no more follow up scheduled      ASSESSMENT (Date: 18):  Ms. Yoav Bailey has been seen in physical therapy for 8 visits since 3/21/18 s/p shunt placement for NPH. She has met 3/3 of her short term goals and is making progress toward her long term goals. Her gait is showing signs of progress but still ambulates very rigid. She continues to benefit from skilled PT to improve her functional mobility and return to her PLOF. PROBLEM LIST (Impacting functional limitations):  1. Decreased Strength  2. Decreased ADL/Functional Activities  3. Decreased Transfer Abilities  4. Decreased Ambulation Ability/Technique  5. Decreased Balance  6. Decreased Activity Tolerance  7. Decreased [place] with Home Exercise Program INTERVENTIONS PLANNED:  1. Balance Exercise  2. Family Education  3. Gait Training  4. Heat  5. Home Exercise Program (HEP)  6. Manual Therapy  7. Neuromuscular Re-education/Strengthening  8. Therapeutic Activites  9. Therapeutic Exercise/Strengthening  10. Transfer Training   TREATMENT PLAN:  Effective Dates: 3/21/18 to 2018.   Frequency/Duration: 2 times a week for 12 weeks  GOALS: (Goals have been discussed and agreed upon with patient.)  Short-Term Functional Goals: Time Frame: 6 weeks  1. Patient will be compliant with HEP. MET  2. Patient will have an increase on the tinajero balance to 46/56 in order to improve her functional balance. MET  3. Patient will have a decrease on the TUG to 12 seconds indicating improved gait mechanics. MET  Discharge Goals: Time Frame: 12 weeks  1. Patient will be independent with HEP. 2. Patient will have an increase on the tinajero balance to 48/56 in order to decrease her risk of falls. MET  3. Patient will have a decrease on the TUG to less than 11 seconds in order to improve her functional mobility. 4. Patient will demonstrate turning with 4 steps or less indicating improve movement patterns. 5. Patient will have an increase on the 6 minute walk test to 1100' indicating improve community mobility. Rehabilitation Potential For Stated Goals: Good  Regarding Garadha GABBY AbrahamJean Pierre's therapy, I certify that the treatment plan above will be carried out by a therapist or under their direction. Thank you for this referral,  Jere Raza DPT, NCS     Referring Physician Signature: Kathlyn Goltz, MD              Date                    HISTORY:   Patient Stated Goal:        I want to be back to normal  History of Present Injury/Illness (Reason for Referral):  March 2018\" Shunt placement on the R side March 6th. Goes back to Dr. Ariadna Martinez on Monday.  is happy with her progress. She says she is now able to back up easier and her stride is getting longer. Sleeps with her head slightly elevated. Has been driving since surgery. Has not been using a walker since she went home. Dec 2017:   Patient reports she went to [facility] and they confirmed Dr. Bibiana Sage diagnosis of NPH. They are going to try diamox for 60 days which is a diuretic. At that time, they will send a video to [facility] and they will decide see if they need to do surgery.   Feb 15th is 60 days. [facility] said to do PT in the meantime to help with mobility. She reports no falls but says she is getting more fearful of falling. Patient reports still having some memory issues and some incontinence as well as gait difficulties. Past Medical History/Comorbidities:   Ms. Alexandra Rosado  has a past medical history of CAD (coronary artery disease) (4/26/2016); Gait disorder (4/26/2016); Hypertension; Hypertriglyceridemia (4/26/2016); Psychotic disorder; and Small vessel disease, cerebrovascular (4/26/2016). Ms. Alexandra Rosado  has no past surgical history on file. Social History/Living Environment:     lives with   Prior Level of Function/Work/Activity:  Retired. Use to exercises 3 days a week but is currently unable to    Current Medications:    Current Outpatient Prescriptions:     doxycycline (MONODOX) 100 mg capsule, Take 1 Cap by mouth two (2) times a day for 7 days. , Disp: 14 Cap, Rfl: 0    atenolol (TENORMIN) 50 mg tablet, TAKE 2 TABLETS BY MOUTH DAILY, Disp: 60 Tab, Rfl: 3    triamterene-hydroCHLOROthiazide (MAXZIDE) 37.5-25 mg per tablet, TAKE 1 TABLET BY MOUTH TWICE DAILY, Disp: 180 Tab, Rfl: 3    pantoprazole (PROTONIX) 40 mg tablet, Take 1 Tab by mouth daily. , Disp: 30 Tab, Rfl: 5    doxycycline (MONODOX) 100 mg capsule, Take 1 Cap by mouth two (2) times a day., Disp: 20 Cap, Rfl: 0    FLUoxetine (PROZAC) 20 mg tablet, TAKE ONE CAPSULE BY MOUTH EVERY DAY, Disp: 30 Tab, Rfl: 5    conjugated estrogens (PREMARIN) 0.625 mg tablet, TAKE 1 TABLET BY MOUTH EVERY DAY, Disp: 30 Tab, Rfl: 5    triamterene-hydroCHLOROthiazide (MAXZIDE) 37.5-25 mg per tablet, TAKE 1 TABLET BY MOUTH TWICE DAILY, Disp: 180 Tab, Rfl: 3    niacinamide 500 mg tablet, TK 1 T PO  BID, Disp: , Rfl: 3    fluticasone (FLONASE) 50 mcg/actuation nasal spray, nightly., Disp: , Rfl:    Diamox     Date Last Reviewed:  5/2/2018   Number of Personal Factors/Comorbidities that affect the Plan of Care: 1-2: MODERATE COMPLEXITY EXAMINATION:   Observation/Orthostatic Postural Assessment: Forward head, rounded shoulders. Decreased trunk mobility with gait. Stands with wide WILLY  Mental Status:          Functional but does report some episodes of memory issues  Palpation:          No increased muscle tone noted  Sensation:         intact  Skin Integrity:          intact  Vision:          Functional   Balance:          Good static balance, decreased dynamic balance     Lower Extremity:   Strength PROM   Action R L R  L    Hip Flexion 4 4+     Hip Extension 4 4+     Hip Abduction 4 4+     Hip Adduction       Knee Flexion       Knee Extension 4+ 4+     Dorsi Flexion 4+ 4+     Plantar Flexion 4 4     Inversion       Eversion                 Upper Extremity:         Grossly 4+/5  Functional Mobility:         Gait/Ambulation:  Ambulates with slow gait speed, shuffling gait pattern with decreased heel strike R>L, wide WILLY, flexed knees, some toe out B, improved step length   1050' today in 6 minute walk         Transfers:  Slow, able to stand without UE support, wide WILLY, few attempts to stand. Slow, shuffling turn with still with several steps during turning        Bed Mobility:  Functional         Stairs:  NT        Wheelchair:  NT              Body Structures Involved:  1. Nerves  2. Bones  3. Joints  4. Muscles Body Functions Affected:  1. Mental  2. Genitourinary  3. Neuromusculoskeletal  4. Movement Related Activities and Participation Affected:  1. Mobility  2. Self Care  3. Domestic Life  4. Interpersonal Interactions and Relationships   Number of elements that affect the Plan of Care: 4+: HIGH COMPLEXITY   CLINICAL PRESENTATION:   Presentation: Evolving clinical presentation with changing clinical characteristics: MODERATE COMPLEXITY   CLINICAL DECISION MAKING:   Outcome Measure:    Tool Used: Schwab Balance Scale  Score:  Initial: 44/56 Most Recent: 48/56 (Date: 4/20/18 )   Interpretation of Score: Each section is scored on a 0-4 scale, 0 representing the patients inability to perform the task and 4 representing independence. The scores of each section are added together for a total score of 56. The higher the patients score, the more independent the patient is. Any score below 45 indicates increased risk for falls. Score 56 55-45 44-34 33-23 22-12 11-1 0   Modifier CH CI CJ CK CL CM CN     Tool Used: Timed Up and Go (TUG)  Score:  Initial: 13.5 seconds Most Recent: 13.5 seconds (Date: 4/20/18 )   Interpretation of Score: The test measures, in seconds, the time taken by an individual to stand up from a standard arm chair (seat height 46 cm [18 in], arm height 65 cm [25.6 in]), walk a distance of 3 meters (118 in, approx 10 ft), turn, walk back to the chair and sit down. If the individual takes longer than 14 seconds to complete TUG, this indicates risk for falls. Score 7 7.5-10.5 11-14 14.5-17.5 18-21 21.5-24.5 25+   Modifier CH CI CJ CK CL CM CN     ? Mobility - Walking and Moving Around:     - CURRENT STATUS: CJ - 20%-39% impaired, limited or restricted    - GOAL STATUS: CI - 1%-19% impaired, limited or restricted    - D/C STATUS:  ---------------To be determined---------------      Medical Necessity:   · Patient is expected to demonstrate progress in balance, coordination and functional technique to improve safety during ADLs and IADLs. · Patient demonstrates good rehab potential due to higher previous functional level. Reason for Services/Other Comments:  · Patient continues to require modification of therapeutic interventions to increase complexity of exercises.    Use of outcome tool(s) and clinical judgement create a POC that gives a: Questionable prediction of patient's progress: MODERATE COMPLEXITY   TREATMENT:   (In addition to Assessment/Re-Assessment sessions the following treatments were rendered)  Pre Treatment Symptoms: patient reports she did an exercises video yesterday and her arms are sore today.  No pain. Therapeutic Exercise: ( 40 minutes ):  Exercises per grid below to improve mobility, balance and coordination. Required moderate visual, verbal and tactile cues to promote proper body alignment and promote proper body mechanics. Progressed complexity of movement as indicated.    Date:  4/20/18 Date:  4/23/18 Date:  4/25/18 Date:  5/2/18   Activity/Exercise Parameters Parameters  Parameters  Parameters    NuStep X 6 minute walk for endurance X 8 minutes level 2 X 8 minutes level 3  X 8 minutes   Level 3    Calf stretch on incline board  X 60 sec hold     Heel raises with 3 second hold   On trampoline x 20 reps X 20 reps on trampoline    PWR marching   X 48' with verbal and visual cues      LTR    X 10 reps B    Lunging into bosu working on BIG push off   X 15 reps B, cues for bigness    clams X 20 reps B      Standing arm swing   X 30 sec cues for bigness     Punching bag holding 1 kg balls  Cues for powerful punch 3 x 10 sec Cues for big power 4 x 15 sec  Good power when cued 3 x 20 sec    PWR marching  3 x 50' with cues for sharp movements X 20 reps on trampoline, holding on for balance X 20 reps on tampoline with hand hold  3 x 30' around track with quick fatigue   PWR seated reach   3 x 5 sec hold B    LSVT lateral step outs    X 10 reps B with some LOB   LSVT forward step outs    X 10 reps B with some LOB   BIG walking 2 x 120' with decreasing cues 3 x 120' throughout session    Big steps with trunk twist x 10 reps to each side  Backward walking x 50'   3 x 120' with verbal cuing for heel strike and gait speed    Backwards x 30' with cues for step clearance   PWR sidelying twist  X 15 reps B  X 15 reps B  X 15 reps B    PWR supine reaching        Standing PWR reach X 10 reps side to side then x 10 reps straight up work on \"jump\" for push off motion   2 x 10 reps to each side with cues for BIG push off    HS stretch       LSVT rock and reach   First just arms x 20 sec when added rocking x 20 sec with each foot forward    Hip flexor stretch  Sidelying 2 x 60 sec B Prone 2 x 60 sec  Sidelying 2 x 60 sec B Sidelying 2 x 60 sec B    Prone hip extension   X 10 reps B      LSVT twist X 10 reps B then doing it with gait to improve pelvic and trunk mobility   4 x 10 reps with gait, dots on the floor for cues to increase step size. Few LOB    Step overs         X 5 reps to each side           Treatment/Session Assessment: patient continues to need cues to correct movement mechanics. Still limited by ROM. She continues to benefit from skilled PT to improve functional mobility. · Pain/ Symptoms: Initial:   0/10 Post Session:  0/10 ·   Compliance with Program/Exercises: Will assess as treatment progresses. · Recommendations/Intent for next treatment session: \"Next visit will focus on advancements to more challenging activities and reduction in assistance provided\".   Total Treatment Duration:   PT Patient Time In/Time Out  Time In: 1330  Time Out: Janeth Mckeon, DPMIRTA

## 2018-05-03 ENCOUNTER — HOSPITAL ENCOUNTER (OUTPATIENT)
Dept: PHYSICAL THERAPY | Age: 78
Discharge: HOME | End: 2018-05-03
Payer: MEDICARE

## 2018-05-03 PROCEDURE — 97110 THERAPEUTIC EXERCISES: CPT

## 2018-05-03 NOTE — PROGRESS NOTES
Tushar Bruno  : 1940  [place]  Secondary: [address]  [facility], [address] [place] [address], [address]  [phone]   [phone]         OUTPATIENT PHYSICAL THERAPY:Daily Note 5/3/2018    ICD-10: Treatment Diagnosis: Unsteadiness on feet (R26.81)  Precautions/Allergies:   Latex; Ceftin [cefuroxime axetil]; and Sulfa (sulfonamide antibiotics)   Fall Risk Score: 5 (? 5 = High Risk)  MD Orders: evaluate and treat MEDICAL/REFERRING DIAGNOSIS:  Presence of cerebrospinal fluid drainage device [Z98.2]  Unspecified abnormalities of gait and mobility [R26.9]  (Idiopathic) normal pressure hydrocephalus [G91.2]   DATE OF ONSET: few months ago  REFERRING PHYSICIAN: Amber Walker MD  RETURN PHYSICIAN APPOINTMENT: no more follow up scheduled      ASSESSMENT (Date: 18):  Ms. Patti Boles has been seen in physical therapy for 8 visits since 3/21/18 s/p shunt placement for NPH. She has met 3/3 of her short term goals and is making progress toward her long term goals. Her gait is showing signs of progress but still ambulates very rigid. She continues to benefit from skilled PT to improve her functional mobility and return to her PLOF. PROBLEM LIST (Impacting functional limitations):  1. Decreased Strength  2. Decreased ADL/Functional Activities  3. Decreased Transfer Abilities  4. Decreased Ambulation Ability/Technique  5. Decreased Balance  6. Decreased Activity Tolerance  7. Decreased [place] with Home Exercise Program INTERVENTIONS PLANNED:  1. Balance Exercise  2. Family Education  3. Gait Training  4. Heat  5. Home Exercise Program (HEP)  6. Manual Therapy  7. Neuromuscular Re-education/Strengthening  8. Therapeutic Activites  9. Therapeutic Exercise/Strengthening  10. Transfer Training   TREATMENT PLAN:  Effective Dates: 3/21/18 to 2018.   Frequency/Duration: 2 times a week for 12 weeks  GOALS: (Goals have been discussed and agreed upon with patient.)  Short-Term Functional Goals: Time Frame: 6 weeks  1. Patient will be compliant with HEP. MET  2. Patient will have an increase on the tinajero balance to 46/56 in order to improve her functional balance. MET  3. Patient will have a decrease on the TUG to 12 seconds indicating improved gait mechanics. MET  Discharge Goals: Time Frame: 12 weeks  1. Patient will be independent with HEP. 2. Patient will have an increase on the tinajero balance to 48/56 in order to decrease her risk of falls. MET  3. Patient will have a decrease on the TUG to less than 11 seconds in order to improve her functional mobility. 4. Patient will demonstrate turning with 4 steps or less indicating improve movement patterns. 5. Patient will have an increase on the 6 minute walk test to 1100' indicating improve community mobility. Rehabilitation Potential For Stated Goals: Good  Regarding Jayla Greenfield's therapy, I certify that the treatment plan above will be carried out by a therapist or under their direction. Thank you for this referral,  Baldwin Closs, DPT, NCS     Referring Physician Signature: Fransisco Morgan MD              Date                    HISTORY:   Patient Stated Goal:        I want to be back to normal  History of Present Injury/Illness (Reason for Referral):  March 2018\" Shunt placement on the R side March 6th. Goes back to Dr. Christi Garcia on Monday.  is happy with her progress. She says she is now able to back up easier and her stride is getting longer. Sleeps with her head slightly elevated. Has been driving since surgery. Has not been using a walker since she went home. Dec 2017:   Patient reports she went to [facility] and they confirmed Dr. Ivone Baeza diagnosis of NPH. They are going to try diamox for 60 days which is a diuretic. At that time, they will send a video to [facility] and they will decide see if they need to do surgery.   Feb 15th is 60 days. Dexter said to do PT in the meantime to help with mobility. She reports no falls but says she is getting more fearful of falling. Patient reports still having some memory issues and some incontinence as well as gait difficulties. Past Medical History/Comorbidities:   Ms. Romi Valero  has a past medical history of CAD (coronary artery disease) (4/26/2016); Gait disorder (4/26/2016); Hypertension; Hypertriglyceridemia (4/26/2016); Psychotic disorder; and Small vessel disease, cerebrovascular (4/26/2016). Ms. Romi Valero  has no past surgical history on file. Social History/Living Environment:     lives with   Prior Level of Function/Work/Activity:  Retired. Use to exercises 3 days a week but is currently unable to    Current Medications:    Current Outpatient Prescriptions:     doxycycline (MONODOX) 100 mg capsule, Take 1 Cap by mouth two (2) times a day for 7 days. , Disp: 14 Cap, Rfl: 0    atenolol (TENORMIN) 50 mg tablet, TAKE 2 TABLETS BY MOUTH DAILY, Disp: 60 Tab, Rfl: 3    triamterene-hydroCHLOROthiazide (MAXZIDE) 37.5-25 mg per tablet, TAKE 1 TABLET BY MOUTH TWICE DAILY, Disp: 180 Tab, Rfl: 3    pantoprazole (PROTONIX) 40 mg tablet, Take 1 Tab by mouth daily. , Disp: 30 Tab, Rfl: 5    doxycycline (MONODOX) 100 mg capsule, Take 1 Cap by mouth two (2) times a day., Disp: 20 Cap, Rfl: 0    FLUoxetine (PROZAC) 20 mg tablet, TAKE ONE CAPSULE BY MOUTH EVERY DAY, Disp: 30 Tab, Rfl: 5    conjugated estrogens (PREMARIN) 0.625 mg tablet, TAKE 1 TABLET BY MOUTH EVERY DAY, Disp: 30 Tab, Rfl: 5    triamterene-hydroCHLOROthiazide (MAXZIDE) 37.5-25 mg per tablet, TAKE 1 TABLET BY MOUTH TWICE DAILY, Disp: 180 Tab, Rfl: 3    niacinamide 500 mg tablet, TK 1 T PO  BID, Disp: , Rfl: 3    fluticasone (FLONASE) 50 mcg/actuation nasal spray, nightly., Disp: , Rfl:    Diamox     Date Last Reviewed:  5/3/2018   Number of Personal Factors/Comorbidities that affect the Plan of Care: 1-2: MODERATE COMPLEXITY EXAMINATION:   Observation/Orthostatic Postural Assessment: Forward head, rounded shoulders. Decreased trunk mobility with gait. Stands with wide WILLY  Mental Status:          Functional but does report some episodes of memory issues  Palpation:          No increased muscle tone noted  Sensation:         intact  Skin Integrity:          intact  Vision:          Functional   Balance:          Good static balance, decreased dynamic balance     Lower Extremity:   Strength PROM   Action R L R  L    Hip Flexion 4 4+     Hip Extension 4 4+     Hip Abduction 4 4+     Hip Adduction       Knee Flexion       Knee Extension 4+ 4+     Dorsi Flexion 4+ 4+     Plantar Flexion 4 4     Inversion       Eversion                 Upper Extremity:         Grossly 4+/5  Functional Mobility:         Gait/Ambulation:  Ambulates with slow gait speed, shuffling gait pattern with decreased heel strike R>L, wide WILLY, flexed knees, some toe out B, improved step length   1050' today in 6 minute walk         Transfers:  Slow, able to stand without UE support, wide WILLY, few attempts to stand. Slow, shuffling turn with still with several steps during turning        Bed Mobility:  Functional         Stairs:  NT        Wheelchair:  NT              Body Structures Involved:  1. Nerves  2. Bones  3. Joints  4. Muscles Body Functions Affected:  1. Mental  2. Genitourinary  3. Neuromusculoskeletal  4. Movement Related Activities and Participation Affected:  1. Mobility  2. Self Care  3. Domestic Life  4. Interpersonal Interactions and Relationships   Number of elements that affect the Plan of Care: 4+: HIGH COMPLEXITY   CLINICAL PRESENTATION:   Presentation: Evolving clinical presentation with changing clinical characteristics: MODERATE COMPLEXITY   CLINICAL DECISION MAKING:   Outcome Measure:    Tool Used: Schwab Balance Scale  Score:  Initial: 44/56 Most Recent: 48/56 (Date: 4/20/18 )   Interpretation of Score: Each section is scored on a 0-4 scale, 0 representing the patients inability to perform the task and 4 representing independence. The scores of each section are added together for a total score of 56. The higher the patients score, the more independent the patient is. Any score below 45 indicates increased risk for falls. Score 56 55-45 44-34 33-23 22-12 11-1 0   Modifier CH CI CJ CK CL CM CN     Tool Used: Timed Up and Go (TUG)  Score:  Initial: 13.5 seconds Most Recent: 13.5 seconds (Date: 4/20/18 )   Interpretation of Score: The test measures, in seconds, the time taken by an individual to stand up from a standard arm chair (seat height 46 cm [18 in], arm height 65 cm [25.6 in]), walk a distance of 3 meters (118 in, approx 10 ft), turn, walk back to the chair and sit down. If the individual takes longer than 14 seconds to complete TUG, this indicates risk for falls. Score 7 7.5-10.5 11-14 14.5-17.5 18-21 21.5-24.5 25+   Modifier CH CI CJ CK CL CM CN     ? Mobility - Walking and Moving Around:     - CURRENT STATUS: CJ - 20%-39% impaired, limited or restricted    - GOAL STATUS: CI - 1%-19% impaired, limited or restricted    - D/C STATUS:  ---------------To be determined---------------      Medical Necessity:   · Patient is expected to demonstrate progress in balance, coordination and functional technique to improve safety during ADLs and IADLs. · Patient demonstrates good rehab potential due to higher previous functional level. Reason for Services/Other Comments:  · Patient continues to require modification of therapeutic interventions to increase complexity of exercises.    Use of outcome tool(s) and clinical judgement create a POC that gives a: Questionable prediction of patient's progress: MODERATE COMPLEXITY   TREATMENT:   (In addition to Assessment/Re-Assessment sessions the following treatments were rendered)  Pre Treatment Symptoms: patient reports she felt like she did too much exercise yesterday because she had a hard time falling asleep. No pain today. Therapeutic Exercise: ( 40 minutes ):  Exercises per grid below to improve mobility, balance and coordination. Required moderate visual, verbal and tactile cues to promote proper body alignment and promote proper body mechanics. Progressed complexity of movement as indicated.    Date:  4/23/18 Date:  4/25/18 Date:  5/2/18 Date:  5/3/18   Activity/Exercise Parameters  Parameters  Parameters  Parameters   NuStep X 8 minutes level 2 X 8 minutes level 3  X 8 minutes   Level 3  X 10 minutes level 2   Calf stretch on incline board X 60 sec hold      Heel raises with 3 second hold  On trampoline x 20 reps X 20 reps on trampoline     PWR marching         LTR   X 10 reps B  X 10 reps B    Lunging into bosu working on BIG push off  X 15 reps B, cues for bigness     clams    X 15 reps B   Standing arm swing  X 30 sec cues for bigness      Punching bag holding 1 kg balls Cues for powerful punch 3 x 10 sec Cues for big power 4 x 15 sec  Good power when cued 3 x 20 sec  Good power, cues for cross body movements 4 x 20 seconds   PWR marching 3 x 50' with cues for sharp movements X 20 reps on trampoline, holding on for balance X 20 reps on tampoline with hand hold  3 x 30' around track with quick fatigue    LSVT seated floor to ceiling     X 5 reps   PWR seated reach  3 x 5 sec hold B  3 x 5 sec hold B   LSVT lateral step outs   X 10 reps B with some LOB    LSVT forward step outs   X 10 reps B with some LOB    BIG walking 3 x 120' throughout session    Big steps with trunk twist x 10 reps to each side  Backward walking x 50'   3 x 120' with verbal cuing for heel strike and gait speed    Backwards x 30' with cues for step clearance X 240' with cues to decrease scuffing    PWR sidelying twist X 15 reps B  X 15 reps B  X 15 reps B     PWR supine reaching        Standing PWR reach   2 x 10 reps to each side with cues for BIG push off     HS stretch       LSVT rock and reach  First just arms x 20 sec when added rocking x 20 sec with each foot forward     Hip flexor stretch  Prone 2 x 60 sec  Sidelying 2 x 60 sec B Sidelying 2 x 60 sec B  Sidelying 2 x 10 reps B   Prone hip flexor stretch  X 10 reps B    3 x 30 sec hold   LSVT twist  4 x 10 reps with gait, dots on the floor for cues to increase step size. Few LOB     Step overs        X 5 reps to each side            Treatment/Session Assessment: treatment focused on improving flexibility to improve quality of movement. Avoid certain exercises as she was just seen yesterday. She continues to benefit from skilled PT to improve functional mobility. · Pain/ Symptoms: Initial:   0/10 Post Session:  0/10 ·   Compliance with Program/Exercises: Will assess as treatment progresses. · Recommendations/Intent for next treatment session: \"Next visit will focus on advancements to more challenging activities and reduction in assistance provided\".   Total Treatment Duration:   PT Patient Time In/Time Out  Time In: 1030  Time Out: Daniel Schuler DPT

## 2018-05-09 ENCOUNTER — HOSPITAL ENCOUNTER (OUTPATIENT)
Dept: PHYSICAL THERAPY | Age: 78
Discharge: HOME | End: 2018-05-09
Payer: MEDICARE

## 2018-05-09 PROCEDURE — 97110 THERAPEUTIC EXERCISES: CPT

## 2018-05-09 NOTE — PROGRESS NOTES
Tushar Bruno  : 1940  [place]  [place] [address] [place] [address] at [facility], [address] \[facility] [place] [facility]\"", [address]  [phone]   [phone]         OUTPATIENT PHYSICAL THERAPY:Daily Note 2018    ICD-10: Treatment Diagnosis: Unsteadiness on feet (R26.81)  Precautions/Allergies:   Latex; Ceftin [cefuroxime axetil]; and Sulfa (sulfonamide antibiotics)   Fall Risk Score: 5 (? 5 = High Risk)  MD Orders: evaluate and treat MEDICAL/REFERRING DIAGNOSIS:  Presence of cerebrospinal fluid drainage device [Z98.2]  Unspecified abnormalities of gait and mobility [R26.9]  (Idiopathic) normal pressure hydrocephalus [G91.2]   DATE OF ONSET: few months ago  REFERRING PHYSICIAN: Amber Walker MD  RETURN PHYSICIAN APPOINTMENT: no more follow up scheduled      ASSESSMENT (Date: 18):  Ms. Patti Boles has been seen in physical therapy for 8 visits since 3/21/18 s/p shunt placement for NPH. She has met 3/3 of her short term goals and is making progress toward her long term goals. Her gait is showing signs of progress but still ambulates very rigid. She continues to benefit from skilled PT to improve her functional mobility and return to her PLOF. PROBLEM LIST (Impacting functional limitations):  1. Decreased Strength  2. Decreased ADL/Functional Activities  3. Decreased Transfer Abilities  4. Decreased Ambulation Ability/Technique  5. Decreased Balance  6. Decreased Activity Tolerance  7. Decreased [place] with Home Exercise Program INTERVENTIONS PLANNED:  1. Balance Exercise  2. Family Education  3. Gait Training  4. Heat  5. Home Exercise Program (HEP)  6. Manual Therapy  7. Neuromuscular Re-education/Strengthening  8. Therapeutic Activites  9. Therapeutic Exercise/Strengthening  10. Transfer Training   TREATMENT PLAN:  Effective Dates: 3/21/18 to 2018.   Frequency/Duration: 2 times a week for 12 weeks  GOALS: (Goals have been discussed and agreed upon with patient.)  Short-Term Functional Goals: Time Frame: 6 weeks  1. Patient will be compliant with HEP. MET  2. Patient will have an increase on the tinajero balance to 46/56 in order to improve her functional balance. MET  3. Patient will have a decrease on the TUG to 12 seconds indicating improved gait mechanics. MET  Discharge Goals: Time Frame: 12 weeks  1. Patient will be independent with HEP. 2. Patient will have an increase on the tinajero balance to 48/56 in order to decrease her risk of falls. MET  3. Patient will have a decrease on the TUG to less than 11 seconds in order to improve her functional mobility. 4. Patient will demonstrate turning with 4 steps or less indicating improve movement patterns. 5. Patient will have an increase on the 6 minute walk test to 1100' indicating improve community mobility. Rehabilitation Potential For Stated Goals: Good  Regarding Garadha GABBY AbrahamJean Pierre's therapy, I certify that the treatment plan above will be carried out by a therapist or under their direction. Thank you for this referral,  Sherryle Balint, DPT, NCS     Referring Physician Signature: Alisa Zapata MD              Date                    HISTORY:   Patient Stated Goal:        I want to be back to normal  History of Present Injury/Illness (Reason for Referral):  March 2018\" Shunt placement on the R side March 6th. Goes back to Dr. Lainey Pardo on Monday.  is happy with her progress. She says she is now able to back up easier and her stride is getting longer. Sleeps with her head slightly elevated. Has been driving since surgery. Has not been using a walker since she went home. Dec 2017:   Patient reports she went to [facility] and they confirmed Dr. Yon Ponce diagnosis of NPH. They are going to try diamox for 60 days which is a diuretic. At that time, they will send a video to [facility] and they will decide see if they need to do surgery.   Feb 15th is 60 days. [place] said to do PT in the meantime to help with mobility. She reports no falls but says she is getting more fearful of falling. Patient reports still having some memory issues and some incontinence as well as gait difficulties. Past Medical History/Comorbidities:   Ms. Kary Bean  has a past medical history of CAD (coronary artery disease) (4/26/2016); Gait disorder (4/26/2016); Hypertension; Hypertriglyceridemia (4/26/2016); Psychotic disorder; and Small vessel disease, cerebrovascular (4/26/2016). Ms. Kary Bean  has no past surgical history on file. Social History/Living Environment:     lives with   Prior Level of Function/Work/Activity:  Retired. Use to exercises 3 days a week but is currently unable to    Current Medications:    Current Outpatient Prescriptions:     atenolol (TENORMIN) 50 mg tablet, TAKE 2 TABLETS BY MOUTH DAILY, Disp: 60 Tab, Rfl: 3    triamterene-hydroCHLOROthiazide (MAXZIDE) 37.5-25 mg per tablet, TAKE 1 TABLET BY MOUTH TWICE DAILY, Disp: 180 Tab, Rfl: 3    pantoprazole (PROTONIX) 40 mg tablet, Take 1 Tab by mouth daily. , Disp: 30 Tab, Rfl: 5    doxycycline (MONODOX) 100 mg capsule, Take 1 Cap by mouth two (2) times a day., Disp: 20 Cap, Rfl: 0    FLUoxetine (PROZAC) 20 mg tablet, TAKE ONE CAPSULE BY MOUTH EVERY DAY, Disp: 30 Tab, Rfl: 5    conjugated estrogens (PREMARIN) 0.625 mg tablet, TAKE 1 TABLET BY MOUTH EVERY DAY, Disp: 30 Tab, Rfl: 5    triamterene-hydroCHLOROthiazide (MAXZIDE) 37.5-25 mg per tablet, TAKE 1 TABLET BY MOUTH TWICE DAILY, Disp: 180 Tab, Rfl: 3    niacinamide 500 mg tablet, TK 1 T PO  BID, Disp: , Rfl: 3    fluticasone (FLONASE) 50 mcg/actuation nasal spray, nightly., Disp: , Rfl:    Diamox     Date Last Reviewed:  5/9/2018   Number of Personal Factors/Comorbidities that affect the Plan of Care: 1-2: MODERATE COMPLEXITY   EXAMINATION:   Observation/Orthostatic Postural Assessment: Forward head, rounded shoulders.   Decreased trunk mobility with gait. Stands with wide WILLY  Mental Status:          Functional but does report some episodes of memory issues  Palpation:          No increased muscle tone noted  Sensation:         intact  Skin Integrity:          intact  Vision:          Functional   Balance:          Good static balance, decreased dynamic balance     Lower Extremity:   Strength PROM   Action R L R  L    Hip Flexion 4 4+     Hip Extension 4 4+     Hip Abduction 4 4+     Hip Adduction       Knee Flexion       Knee Extension 4+ 4+     Dorsi Flexion 4+ 4+     Plantar Flexion 4 4     Inversion       Eversion                 Upper Extremity:         Grossly 4+/5  Functional Mobility:         Gait/Ambulation:  Ambulates with slow gait speed, shuffling gait pattern with decreased heel strike R>L, wide WILLY, flexed knees, some toe out B, improved step length   1050' today in 6 minute walk         Transfers:  Slow, able to stand without UE support, wide WILLY, few attempts to stand. Slow, shuffling turn with still with several steps during turning        Bed Mobility:  Functional         Stairs:  NT        Wheelchair:  NT              Body Structures Involved:  1. Nerves  2. Bones  3. Joints  4. Muscles Body Functions Affected:  1. Mental  2. Genitourinary  3. Neuromusculoskeletal  4. Movement Related Activities and Participation Affected:  1. Mobility  2. Self Care  3. Domestic Life  4. Interpersonal Interactions and Relationships   Number of elements that affect the Plan of Care: 4+: HIGH COMPLEXITY   CLINICAL PRESENTATION:   Presentation: Evolving clinical presentation with changing clinical characteristics: MODERATE COMPLEXITY   CLINICAL DECISION MAKING:   Outcome Measure: Tool Used: Schwab Balance Scale  Score:  Initial: 44/56 Most Recent: 48/56 (Date: 4/20/18 )   Interpretation of Score: Each section is scored on a 0-4 scale, 0 representing the patients inability to perform the task and 4 representing independence.   The scores of each section are added together for a total score of 56. The higher the patients score, the more independent the patient is. Any score below 45 indicates increased risk for falls. Score 56 55-45 44-34 33-23 22-12 11-1 0   Modifier CH CI CJ CK CL CM CN     Tool Used: Timed Up and Go (TUG)  Score:  Initial: 13.5 seconds Most Recent: 13.5 seconds (Date: 4/20/18 )   Interpretation of Score: The test measures, in seconds, the time taken by an individual to stand up from a standard arm chair (seat height 46 cm [18 in], arm height 65 cm [25.6 in]), walk a distance of 3 meters (118 in, approx 10 ft), turn, walk back to the chair and sit down. If the individual takes longer than 14 seconds to complete TUG, this indicates risk for falls. Score 7 7.5-10.5 11-14 14.5-17.5 18-21 21.5-24.5 25+   Modifier CH CI CJ CK CL CM CN     ? Mobility - Walking and Moving Around:     - CURRENT STATUS: CJ - 20%-39% impaired, limited or restricted    - GOAL STATUS: CI - 1%-19% impaired, limited or restricted    - D/C STATUS:  ---------------To be determined---------------      Medical Necessity:   · Patient is expected to demonstrate progress in balance, coordination and functional technique to improve safety during ADLs and IADLs. · Patient demonstrates good rehab potential due to higher previous functional level. Reason for Services/Other Comments:  · Patient continues to require modification of therapeutic interventions to increase complexity of exercises. Use of outcome tool(s) and clinical judgement create a POC that gives a: Questionable prediction of patient's progress: MODERATE COMPLEXITY   TREATMENT:   (In addition to Assessment/Re-Assessment sessions the following treatments were rendered)  Pre Treatment Symptoms: patient reports \"I hope you can stretch me a lot today because I was on my feet for 6 hours on Saturday\". No pain.      Therapeutic Exercise: ( 40 minutes ):  Exercises per grid below to improve mobility, balance and coordination. Required moderate visual, verbal and tactile cues to promote proper body alignment and promote proper body mechanics. Progressed complexity of movement as indicated. Date:  4/25/18 Date:  5/2/18 Date:  5/3/18 Date:  5/9/18   Activity/Exercise Parameters  Parameters  Parameters Parameters   NuStep X 8 minutes level 3  X 8 minutes   Level 3  X 10 minutes level 2 X 10 minutes level 3    Calf stretch on incline board    2 X 60 sec hold   Heel raises with 3 second hold On trampoline x 20 reps X 20 reps on trampoline      PWR marching         LTR  X 10 reps B  X 10 reps B  X 10 reps B with arms out to the side   Lunging into bosu working on BIG push off X 15 reps B, cues for bigness      clams   X 15 reps B    Standing arm swing        Punching bag holding 1 kg balls Cues for big power 4 x 15 sec  Good power when cued 3 x 20 sec  Good power, cues for cross body movements 4 x 20 seconds    PWR marching X 20 reps on trampoline, holding on for balance X 20 reps on tampoline with hand hold  3 x 30' around track with quick fatigue  On ground, working on timing of movements, very difficult.     LSVT seated floor to ceiling    X 5 reps    PWR seated reach 3 x 5 sec hold B  3 x 5 sec hold B    LSVT lateral step outs  X 10 reps B with some LOB     LSVT forward step outs  X 10 reps B with some LOB     BIG walking  3 x 120' with verbal cuing for heel strike and gait speed    Backwards x 30' with cues for step clearance X 240' with cues to decrease scuffing  X 240'    PWR sidelying twist X 15 reps B  X 15 reps B   X 10 reps B   PWR supine reaching        Standing PWR reach  2 x 10 reps to each side with cues for BIG push off      HS stretch    2 x 30 sec B    LSVT rock and reach First just arms x 20 sec when added rocking x 20 sec with each foot forward      Hip flexor stretch  Sidelying 2 x 60 sec B Sidelying 2 x 60 sec B  Sidelying 2 x 10 reps B Sidelying 3 x 45 sec hold    Prone hip flexor stretch    3 x 30 sec hold    LSVT twist 4 x 10 reps with gait, dots on the floor for cues to increase step size. Few LOB      Step overs                    Treatment/Session Assessment:   Patient continues to have limited ROM that limits her ability to improve her functional mobility. She is showing progress but continues to need reinforcement to work on her HEP. She continues to benefit from skilled PT to improve functional mobility. · Pain/ Symptoms: Initial:   0/10 Post Session:  0/10 ·   Compliance with Program/Exercises: Will assess as treatment progresses. · Recommendations/Intent for next treatment session: \"Next visit will focus on advancements to more challenging activities and reduction in assistance provided\".   Total Treatment Duration:   PT Patient Time In/Time Out  Time In: 0945  Time Out: [address] [facility], [facility]

## 2018-05-11 ENCOUNTER — HOSPITAL ENCOUNTER (OUTPATIENT)
Dept: PHYSICAL THERAPY | Age: 78
Discharge: HOME | End: 2018-05-11
Payer: MEDICARE

## 2018-05-11 PROCEDURE — 97110 THERAPEUTIC EXERCISES: CPT

## 2018-05-11 NOTE — PROGRESS NOTES
Sancho Garnett  : 1940  [place]  Secondary: [address], [place], [facility]  [phone]   [phone]         OUTPATIENT PHYSICAL THERAPY:Daily Note 2018    ICD-10: Treatment Diagnosis: Unsteadiness on feet (R26.81)  Precautions/Allergies:   Latex; Ceftin [cefuroxime axetil]; and Sulfa (sulfonamide antibiotics)   Fall Risk Score: 5 (? 5 = High Risk)  MD Orders: evaluate and treat MEDICAL/REFERRING DIAGNOSIS:  Presence of cerebrospinal fluid drainage device [Z98.2]  Unspecified abnormalities of gait and mobility [R26.9]  (Idiopathic) normal pressure hydrocephalus [G91.2]   DATE OF ONSET: few months ago  REFERRING PHYSICIAN: Myesha Cooper MD  RETURN PHYSICIAN APPOINTMENT: no more follow up scheduled      ASSESSMENT (Date: 18):  Ms. Serene Simms has been seen in physical therapy for 8 visits since 3/21/18 s/p shunt placement for NPH. She has met 3/3 of her short term goals and is making progress toward her long term goals. Her gait is showing signs of progress but still ambulates very rigid. She continues to benefit from skilled PT to improve her functional mobility and return to her PLOF. PROBLEM LIST (Impacting functional limitations):  1. Decreased Strength  2. Decreased ADL/Functional Activities  3. Decreased Transfer Abilities  4. Decreased Ambulation Ability/Technique  5. Decreased Balance  6. Decreased Activity Tolerance  7. Decreased [place] with Home Exercise Program INTERVENTIONS PLANNED:  1. Balance Exercise  2. Family Education  3. Gait Training  4. Heat  5. Home Exercise Program (HEP)  6. Manual Therapy  7. Neuromuscular Re-education/Strengthening  8. Therapeutic Activites  9. Therapeutic Exercise/Strengthening  10. Transfer Training   TREATMENT PLAN:  Effective Dates: 3/21/18 to 2018.   Frequency/Duration: 2 times a week for 12 weeks  GOALS: (Goals have been discussed and agreed upon with patient.)  Short-Term Functional Goals: Time Frame: 6 weeks  1. Patient will be compliant with HEP. MET  2. Patient will have an increase on the tinajero balance to 46/56 in order to improve her functional balance. MET  3. Patient will have a decrease on the TUG to 12 seconds indicating improved gait mechanics. MET  Discharge Goals: Time Frame: 12 weeks  1. Patient will be independent with HEP. 2. Patient will have an increase on the tinajero balance to 48/56 in order to decrease her risk of falls. MET  3. Patient will have a decrease on the TUG to less than 11 seconds in order to improve her functional mobility. 4. Patient will demonstrate turning with 4 steps or less indicating improve movement patterns. 5. Patient will have an increase on the 6 minute walk test to 1100' indicating improve community mobility. Rehabilitation Potential For Stated Goals: Good  Regarding Jayla PIERRE Jean Pierre's therapy, I certify that the treatment plan above will be carried out by a therapist or under their direction. Thank you for this referral,  Joel Prieto, DPT, NCS     Referring Physician Signature: Myesha Cooper MD              Date                    HISTORY:   Patient Stated Goal:        I want to be back to normal  History of Present Injury/Illness (Reason for Referral):  March 2018\" Shunt placement on the R side March 6th. Goes back to Dr. Bertha Ceballos on Monday.  is happy with her progress. She says she is now able to back up easier and her stride is getting longer. Sleeps with her head slightly elevated. Has been driving since surgery. Has not been using a walker since she went home. Dec 2017:   Patient reports she went to [facility] and they confirmed Dr. Nathaly Payne diagnosis of NPH. They are going to try diamox for 60 days which is a diuretic. At that time, they will send a video to [facility] and they will decide see if they need to do surgery.   Feb 15th is 60 days. [place] said to do PT in the meantime to help with mobility. She reports no falls but says she is getting more fearful of falling. Patient reports still having some memory issues and some incontinence as well as gait difficulties. Past Medical History/Comorbidities:   Ms. Kwame Chahal  has a past medical history of CAD (coronary artery disease) (4/26/2016); Gait disorder (4/26/2016); Hypertension; Hypertriglyceridemia (4/26/2016); Psychotic disorder; and Small vessel disease, cerebrovascular (4/26/2016). Ms. Kwame Chahal  has no past surgical history on file. Social History/Living Environment:     lives with   Prior Level of Function/Work/Activity:  Retired. Use to exercises 3 days a week but is currently unable to    Current Medications:    Current Outpatient Prescriptions:     atenolol (TENORMIN) 50 mg tablet, TAKE 2 TABLETS BY MOUTH DAILY, Disp: 60 Tab, Rfl: 3    triamterene-hydroCHLOROthiazide (MAXZIDE) 37.5-25 mg per tablet, TAKE 1 TABLET BY MOUTH TWICE DAILY, Disp: 180 Tab, Rfl: 3    pantoprazole (PROTONIX) 40 mg tablet, Take 1 Tab by mouth daily. , Disp: 30 Tab, Rfl: 5    doxycycline (MONODOX) 100 mg capsule, Take 1 Cap by mouth two (2) times a day., Disp: 20 Cap, Rfl: 0    FLUoxetine (PROZAC) 20 mg tablet, TAKE ONE CAPSULE BY MOUTH EVERY DAY, Disp: 30 Tab, Rfl: 5    conjugated estrogens (PREMARIN) 0.625 mg tablet, TAKE 1 TABLET BY MOUTH EVERY DAY, Disp: 30 Tab, Rfl: 5    triamterene-hydroCHLOROthiazide (MAXZIDE) 37.5-25 mg per tablet, TAKE 1 TABLET BY MOUTH TWICE DAILY, Disp: 180 Tab, Rfl: 3    niacinamide 500 mg tablet, TK 1 T PO  BID, Disp: , Rfl: 3    fluticasone (FLONASE) 50 mcg/actuation nasal spray, nightly., Disp: , Rfl:    Diamox     Date Last Reviewed:  5/11/2018   Number of Personal Factors/Comorbidities that affect the Plan of Care: 1-2: MODERATE COMPLEXITY   EXAMINATION:   Observation/Orthostatic Postural Assessment: Forward head, rounded shoulders.   Decreased trunk mobility with gait. Stands with wide WILLY  Mental Status:          Functional but does report some episodes of memory issues  Palpation:          No increased muscle tone noted  Sensation:         intact  Skin Integrity:          intact  Vision:          Functional   Balance:          Good static balance, decreased dynamic balance     Lower Extremity:   Strength PROM   Action R L R  L    Hip Flexion 4 4+     Hip Extension 4 4+     Hip Abduction 4 4+     Hip Adduction       Knee Flexion       Knee Extension 4+ 4+     Dorsi Flexion 4+ 4+     Plantar Flexion 4 4     Inversion       Eversion                 Upper Extremity:         Grossly 4+/5  Functional Mobility:         Gait/Ambulation:  Ambulates with slow gait speed, shuffling gait pattern with decreased heel strike R>L, wide WILLY, flexed knees, some toe out B, improved step length   1050' today in 6 minute walk         Transfers:  Slow, able to stand without UE support, wide WILLY, few attempts to stand. Slow, shuffling turn with still with several steps during turning        Bed Mobility:  Functional         Stairs:  NT        Wheelchair:  NT              Body Structures Involved:  1. Nerves  2. Bones  3. Joints  4. Muscles Body Functions Affected:  1. Mental  2. Genitourinary  3. Neuromusculoskeletal  4. Movement Related Activities and Participation Affected:  1. Mobility  2. Self Care  3. Domestic Life  4. Interpersonal Interactions and Relationships   Number of elements that affect the Plan of Care: 4+: HIGH COMPLEXITY   CLINICAL PRESENTATION:   Presentation: Evolving clinical presentation with changing clinical characteristics: MODERATE COMPLEXITY   CLINICAL DECISION MAKING:   Outcome Measure: Tool Used: Schwab Balance Scale  Score:  Initial: 44/56 Most Recent: 48/56 (Date: 4/20/18 )   Interpretation of Score: Each section is scored on a 0-4 scale, 0 representing the patients inability to perform the task and 4 representing independence.   The scores of each section are added together for a total score of 56. The higher the patients score, the more independent the patient is. Any score below 45 indicates increased risk for falls. Score 56 55-45 44-34 33-23 22-12 11-1 0   Modifier CH CI CJ CK CL CM CN     Tool Used: Timed Up and Go (TUG)  Score:  Initial: 13.5 seconds Most Recent: 13.5 seconds (Date: 4/20/18 )   Interpretation of Score: The test measures, in seconds, the time taken by an individual to stand up from a standard arm chair (seat height 46 cm [18 in], arm height 65 cm [25.6 in]), walk a distance of 3 meters (118 in, approx 10 ft), turn, walk back to the chair and sit down. If the individual takes longer than 14 seconds to complete TUG, this indicates risk for falls. Score 7 7.5-10.5 11-14 14.5-17.5 18-21 21.5-24.5 25+   Modifier CH CI CJ CK CL CM CN     ? Mobility - Walking and Moving Around:     - CURRENT STATUS: CJ - 20%-39% impaired, limited or restricted    - GOAL STATUS: CI - 1%-19% impaired, limited or restricted    - D/C STATUS:  ---------------To be determined---------------      Medical Necessity:   · Patient is expected to demonstrate progress in balance, coordination and functional technique to improve safety during ADLs and IADLs. · Patient demonstrates good rehab potential due to higher previous functional level. Reason for Services/Other Comments:  · Patient continues to require modification of therapeutic interventions to increase complexity of exercises. Use of outcome tool(s) and clinical judgement create a POC that gives a: Questionable prediction of patient's progress: MODERATE COMPLEXITY   TREATMENT:   (In addition to Assessment/Re-Assessment sessions the following treatments were rendered)  Pre Treatment Symptoms: patient reports feeling good today. No pain. Therapeutic Exercise: ( 40 minutes ):  Exercises per grid below to improve mobility, balance and coordination.   Required moderate visual, verbal and tactile cues to promote proper body alignment and promote proper body mechanics. Progressed complexity of movement as indicated. Date:  5/2/18 Date:  5/3/18 Date:  5/9/18 Date:  5/11/8   Activity/Exercise Parameters  Parameters Parameters Parameters   NuStep X 8 minutes   Level 3  X 10 minutes level 2 X 10 minutes level 3  X 10 minutes level 3   Calf stretch on incline board   2 X 60 sec hold X 60 sec hold    Heel raises with 3 second hold X 20 reps on trampoline       Seated arm with opposite hip movements    X 20 times'  X 20 times with mirror with improved timing    LTR X 10 reps B  X 10 reps B  X 10 reps B with arms out to the side X 10 reps B with arms out to side    Lunging into bosu working on BIG push off       clams  X 15 reps B  X 20 reps B    rebounder    X 20 throws working on timing   Punching bag holding 1 kg balls Good power when cued 3 x 20 sec  Good power, cues for cross body movements 4 x 20 seconds     PWR marching X 20 reps on tampoline with hand hold  3 x 30' around track with quick fatigue  On ground, working on timing of movements, very difficult. Braking movement up into static standing, single side and the progressing into the full movement. A lot of cues needed.  X 15 minutes total time   LSVT seated floor to ceiling   X 5 reps     PWR seated reach  3 x 5 sec hold B     LSVT lateral step outs X 10 reps B with some LOB      LSVT forward step outs X 10 reps B with some LOB   X 10 reps B with good timing   BIG walking 3 x 120' with verbal cuing for heel strike and gait speed    Backwards x 30' with cues for step clearance X 240' with cues to decrease scuffing  X 240'  X 240'    PWR sidelying twist X 15 reps B   X 10 reps B    PWR supine reaching        Standing PWR reach 2 x 10 reps to each side with cues for BIG push off       HS stretch   2 x 30 sec B     LSVT rock and reach       Hip flexor stretch  Sidelying 2 x 60 sec B  Sidelying 2 x 10 reps B Sidelying 3 x 45 sec hold  Sidelying 2 x 45 sec hold   Prone hip flexor stretch   3 x 30 sec hold     LSVT twist       Step overs                    Treatment/Session Assessment:   Patient improved with her timing of movements but still not perfect. She continues to benefit from skilled PT to improve her movement quality. · Pain/ Symptoms: Initial:   0/10 Post Session:  0/10 ·   Compliance with Program/Exercises: Will assess as treatment progresses. · Recommendations/Intent for next treatment session: \"Next visit will focus on advancements to more challenging activities and reduction in assistance provided\".   Total Treatment Duration:   PT Patient Time In/Time Out  Time In: 1055  Time Out: [address]ηλη[address] 17, DPT

## 2018-05-14 ENCOUNTER — HOSPITAL ENCOUNTER (OUTPATIENT)
Dept: PHYSICAL THERAPY | Age: 78
Discharge: HOME | End: 2018-05-14
Payer: MEDICARE

## 2018-05-14 PROCEDURE — 97110 THERAPEUTIC EXERCISES: CPT

## 2018-05-14 NOTE — PROGRESS NOTES
Jesus Manuel Morris  : 1940  [place]  [place] [address] [place] [address] [facility] [address] \[facility] [place] [facility]\"", [address]  [phone]   [phone]         OUTPATIENT PHYSICAL THERAPY:Daily Note 2018    ICD-10: Treatment Diagnosis: Unsteadiness on feet (R26.81)  Precautions/Allergies:   Latex; Ceftin [cefuroxime axetil]; and Sulfa (sulfonamide antibiotics)   Fall Risk Score: 5 (? 5 = High Risk)  MD Orders: evaluate and treat MEDICAL/REFERRING DIAGNOSIS:  Presence of cerebrospinal fluid drainage device [Z98.2]  Unspecified abnormalities of gait and mobility [R26.9]  (Idiopathic) normal pressure hydrocephalus [G91.2]   DATE OF ONSET: few months ago  REFERRING PHYSICIAN: Alfonso English MD  RETURN PHYSICIAN APPOINTMENT: no more follow up scheduled      ASSESSMENT (Date: 18):  Ms. Michael Gray has been seen in physical therapy for 8 visits since 3/21/18 s/p shunt placement for NPH. She has met 3/3 of her short term goals and is making progress toward her long term goals. Her gait is showing signs of progress but still ambulates very rigid. She continues to benefit from skilled PT to improve her functional mobility and return to her PLOF. PROBLEM LIST (Impacting functional limitations):  1. Decreased Strength  2. Decreased ADL/Functional Activities  3. Decreased Transfer Abilities  4. Decreased Ambulation Ability/Technique  5. Decreased Balance  6. Decreased Activity Tolerance  7. Decreased [place] with Home Exercise Program INTERVENTIONS PLANNED:  1. Balance Exercise  2. Family Education  3. Gait Training  4. Heat  5. Home Exercise Program (HEP)  6. Manual Therapy  7. Neuromuscular Re-education/Strengthening  8. Therapeutic Activites  9. Therapeutic Exercise/Strengthening  10. Transfer Training   TREATMENT PLAN:  Effective Dates: 3/21/18 to 2018.   Frequency/Duration: 2 times a week for 12 weeks  GOALS: (Goals have been discussed and agreed upon with patient.)  Short-Term Functional Goals: Time Frame: 6 weeks  1. Patient will be compliant with HEP. MET  2. Patient will have an increase on the tinajero balance to 46/56 in order to improve her functional balance. MET  3. Patient will have a decrease on the TUG to 12 seconds indicating improved gait mechanics. MET  Discharge Goals: Time Frame: 12 weeks  1. Patient will be independent with HEP. 2. Patient will have an increase on the tinajero balance to 48/56 in order to decrease her risk of falls. MET  3. Patient will have a decrease on the TUG to less than 11 seconds in order to improve her functional mobility. 4. Patient will demonstrate turning with 4 steps or less indicating improve movement patterns. 5. Patient will have an increase on the 6 minute walk test to 1100' indicating improve community mobility. Rehabilitation Potential For Stated Goals: Good  Regarding Jayla Greenfield's therapy, I certify that the treatment plan above will be carried out by a therapist or under their direction. Thank you for this referral,  Jose Cruz Tobin DPT, NCS     Referring Physician Signature: Patrica Schwab, MD              Date                    HISTORY:   Patient Stated Goal:        I want to be back to normal  History of Present Injury/Illness (Reason for Referral):  March 2018\" Shunt placement on the R side March 6th. Goes back to Dr. Irwin Perera on Monday.  is happy with her progress. She says she is now able to back up easier and her stride is getting longer. Sleeps with her head slightly elevated. Has been driving since surgery. Has not been using a walker since she went home. Dec 2017:   Patient reports she went to [facility] and they confirmed Dr. Yrn Sousa diagnosis of NPH. They are going to try diamox for 60 days which is a diuretic. At that time, they will send a video to [facility] and they will decide see if they need to do surgery.   Feb 15th is 60 days. [place] said to do PT in the meantime to help with mobility. She reports no falls but says she is getting more fearful of falling. Patient reports still having some memory issues and some incontinence as well as gait difficulties. Past Medical History/Comorbidities:   Ms. Romi Valero  has a past medical history of CAD (coronary artery disease) (4/26/2016); Gait disorder (4/26/2016); Hypertension; Hypertriglyceridemia (4/26/2016); Psychotic disorder; and Small vessel disease, cerebrovascular (4/26/2016). Ms. Romi Valero  has no past surgical history on file. Social History/Living Environment:     lives with   Prior Level of Function/Work/Activity:  Retired. Use to exercises 3 days a week but is currently unable to    Current Medications:    Current Outpatient Prescriptions:     atenolol (TENORMIN) 50 mg tablet, TAKE 2 TABLETS BY MOUTH DAILY, Disp: 60 Tab, Rfl: 3    triamterene-hydroCHLOROthiazide (MAXZIDE) 37.5-25 mg per tablet, TAKE 1 TABLET BY MOUTH TWICE DAILY, Disp: 180 Tab, Rfl: 3    pantoprazole (PROTONIX) 40 mg tablet, Take 1 Tab by mouth daily. , Disp: 30 Tab, Rfl: 5    doxycycline (MONODOX) 100 mg capsule, Take 1 Cap by mouth two (2) times a day., Disp: 20 Cap, Rfl: 0    FLUoxetine (PROZAC) 20 mg tablet, TAKE ONE CAPSULE BY MOUTH EVERY DAY, Disp: 30 Tab, Rfl: 5    conjugated estrogens (PREMARIN) 0.625 mg tablet, TAKE 1 TABLET BY MOUTH EVERY DAY, Disp: 30 Tab, Rfl: 5    triamterene-hydroCHLOROthiazide (MAXZIDE) 37.5-25 mg per tablet, TAKE 1 TABLET BY MOUTH TWICE DAILY, Disp: 180 Tab, Rfl: 3    niacinamide 500 mg tablet, TK 1 T PO  BID, Disp: , Rfl: 3    fluticasone (FLONASE) 50 mcg/actuation nasal spray, nightly., Disp: , Rfl:    Diamox     Date Last Reviewed:  5/14/2018   Number of Personal Factors/Comorbidities that affect the Plan of Care: 1-2: MODERATE COMPLEXITY   EXAMINATION:   Observation/Orthostatic Postural Assessment: Forward head, rounded shoulders.   Decreased trunk mobility with gait. Stands with wide WILLY  Mental Status:          Functional but does report some episodes of memory issues  Palpation:          No increased muscle tone noted  Sensation:         intact  Skin Integrity:          intact  Vision:          Functional   Balance:          Good static balance, decreased dynamic balance     Lower Extremity:   Strength PROM   Action R L R  L    Hip Flexion 4 4+     Hip Extension 4 4+     Hip Abduction 4 4+     Hip Adduction       Knee Flexion       Knee Extension 4+ 4+     Dorsi Flexion 4+ 4+     Plantar Flexion 4 4     Inversion       Eversion                 Upper Extremity:         Grossly 4+/5  Functional Mobility:         Gait/Ambulation:  Ambulates with slow gait speed, shuffling gait pattern with decreased heel strike R>L, wide WILLY, flexed knees, some toe out B, improved step length   1050' today in 6 minute walk         Transfers:  Slow, able to stand without UE support, wide WILLY, few attempts to stand. Slow, shuffling turn with still with several steps during turning        Bed Mobility:  Functional         Stairs:  NT        Wheelchair:  NT              Body Structures Involved:  1. Nerves  2. Bones  3. Joints  4. Muscles Body Functions Affected:  1. Mental  2. Genitourinary  3. Neuromusculoskeletal  4. Movement Related Activities and Participation Affected:  1. Mobility  2. Self Care  3. Domestic Life  4. Interpersonal Interactions and Relationships   Number of elements that affect the Plan of Care: 4+: HIGH COMPLEXITY   CLINICAL PRESENTATION:   Presentation: Evolving clinical presentation with changing clinical characteristics: MODERATE COMPLEXITY   CLINICAL DECISION MAKING:   Outcome Measure: Tool Used: Schwab Balance Scale  Score:  Initial: 44/56 Most Recent: 48/56 (Date: 4/20/18 )   Interpretation of Score: Each section is scored on a 0-4 scale, 0 representing the patients inability to perform the task and 4 representing independence.   The scores of each section are added together for a total score of 56. The higher the patients score, the more independent the patient is. Any score below 45 indicates increased risk for falls. Score 56 55-45 44-34 33-23 22-12 11-1 0   Modifier CH CI CJ CK CL CM CN     Tool Used: Timed Up and Go (TUG)  Score:  Initial: 13.5 seconds Most Recent: 13.5 seconds (Date: 4/20/18 )   Interpretation of Score: The test measures, in seconds, the time taken by an individual to stand up from a standard arm chair (seat height 46 cm [18 in], arm height 65 cm [25.6 in]), walk a distance of 3 meters (118 in, approx 10 ft), turn, walk back to the chair and sit down. If the individual takes longer than 14 seconds to complete TUG, this indicates risk for falls. Score 7 7.5-10.5 11-14 14.5-17.5 18-21 21.5-24.5 25+   Modifier CH CI CJ CK CL CM CN     ? Mobility - Walking and Moving Around:     - CURRENT STATUS: CJ - 20%-39% impaired, limited or restricted    - GOAL STATUS: CI - 1%-19% impaired, limited or restricted    - D/C STATUS:  ---------------To be determined---------------      Medical Necessity:   · Patient is expected to demonstrate progress in balance, coordination and functional technique to improve safety during ADLs and IADLs. · Patient demonstrates good rehab potential due to higher previous functional level. Reason for Services/Other Comments:  · Patient continues to require modification of therapeutic interventions to increase complexity of exercises. Use of outcome tool(s) and clinical judgement create a POC that gives a: Questionable prediction of patient's progress: MODERATE COMPLEXITY   TREATMENT:   (In addition to Assessment/Re-Assessment sessions the following treatments were rendered)  Pre Treatment Symptoms: patient reports she is getting better. Therapeutic Exercise: ( 40 minutes ):  Exercises per grid below to improve mobility, balance and coordination.   Required moderate visual, verbal and tactile cues to promote proper body alignment and promote proper body mechanics. Progressed complexity of movement as indicated. Date:  5/3/18 Date:  5/9/18 Date:  5/11/8 Date:  5/14/18   Activity/Exercise Parameters Parameters Parameters Parameters    NuStep X 10 minutes level 2 X 10 minutes level 3  X 10 minutes level 3 X 10 minutes level 3    Calf stretch on incline board  2 X 60 sec hold X 60 sec hold  X 60 sec hold   Heel raises with 3 second hold    Heel/toe rocking x 20 reps   Seated arm with opposite hip movements   X 20 times'  X 20 times with mirror with improved timing     LTR X 10 reps B  X 10 reps B with arms out to the side X 10 reps B with arms out to side  X 10 reps B with arms out to the side   Lunging into bosu working on BIG push off       clams X 15 reps B  X 20 reps B     rebounder   X 20 throws working on timing    Punching bag holding 1 kg balls Good power, cues for cross body movements 4 x 20 seconds   Good power today 3 x 20 reps   PWR marching  On ground, working on timing of movements, very difficult. Braking movement up into static standing, single side and the progressing into the full movement. A lot of cues needed. X 15 minutes total time improved ability to coordinate timing 3 x 50'.   Still not 100%    LSVT seated floor to ceiling  X 5 reps      PWR seated reach 3 x 5 sec hold B      LSVT lateral step outs    X 10 reps with good coordination of timing    LSVT forward step outs   X 10 reps B with good timing    BIG walking X 240' with cues to decrease scuffing  X 240'  X 240'  2 x 200'    PWR sidelying twist  X 10 reps B     PWR supine reaching        Standing PWR reach       HS stretch  2 x 30 sec B      LSVT rock and reach       Hip flexor stretch  Sidelying 2 x 10 reps B Sidelying 3 x 45 sec hold  Sidelying 2 x 45 sec hold Sidelying 2 x 45 sec hold   Prone hip flexor stretch  3 x 30 sec hold      LSVT twist       rockerboard    Standing in stance position, shifting weight from heel to toe, very difficult to maintain balance. X 15 reps B with a lot of tactile cues to maintain balance   Bird dogs    X 10 reps B with good coordination of movement          Treatment/Session Assessment:   Patient improved with her timing today during exercises. Improved coordinating movements. She continues to benefit from skilled PT to improve her functional mobility and balance. · Pain/ Symptoms: Initial:   0/10 Post Session:  0/10 ·   Compliance with Program/Exercises: Will assess as treatment progresses. · Recommendations/Intent for next treatment session: \"Next visit will focus on advancements to more challenging activities and reduction in assistance provided\".   Total Treatment Duration:   PT Patient Time In/Time Out  Time In: 1115  Time Out: [address], DPT

## 2018-05-16 ENCOUNTER — HOSPITAL ENCOUNTER (OUTPATIENT)
Dept: PHYSICAL THERAPY | Age: 78
Discharge: HOME | End: 2018-05-16
Payer: MEDICARE

## 2018-05-16 PROCEDURE — G8978 MOBILITY CURRENT STATUS: HCPCS

## 2018-05-16 PROCEDURE — 97110 THERAPEUTIC EXERCISES: CPT

## 2018-05-16 PROCEDURE — G8979 MOBILITY GOAL STATUS: HCPCS

## 2018-05-16 NOTE — PROGRESS NOTES
Lynda Printers  : 1940  [place]  Secondary: [address]  [facility], [address] [place] [address], [place] [address] [address] [facility]  [phone]   [phone]         OUTPATIENT PHYSICAL THERAPY:Progress Report 2018    ICD-10: Treatment Diagnosis: Unsteadiness on feet (R26.81)  Precautions/Allergies:   Latex; Ceftin [cefuroxime axetil]; and Sulfa (sulfonamide antibiotics)   Fall Risk Score: 5 (? 5 = High Risk)  MD Orders: evaluate and treat MEDICAL/REFERRING DIAGNOSIS:  Presence of cerebrospinal fluid drainage device [Z98.2]  Unspecified abnormalities of gait and mobility [R26.9]  (Idiopathic) normal pressure hydrocephalus [G91.2]   DATE OF ONSET: few months ago  REFERRING PHYSICIAN: Alex Cochran MD  RETURN PHYSICIAN APPOINTMENT: no more follow up scheduled      ASSESSMENT (Date: 18):  Ms. Pancho Calderón has been seen in physical therapy for 16 visits since 3/21/18 s/p shunt placement for NPH. She has met 3/3 of her short term goals and 2/5 of her long term goals. She is demonstrating great progress in balance and ambulation as indicating by improvements on the tinajero balance and the timed up and go test.   She continues to benefit from skilled PT to improve her functional mobility and return to her PLOF. PROBLEM LIST (Impacting functional limitations):  1. Decreased Strength  2. Decreased ADL/Functional Activities  3. Decreased Transfer Abilities  4. Decreased Ambulation Ability/Technique  5. Decreased Balance  6. Decreased Activity Tolerance  7. Decreased [place] with Home Exercise Program INTERVENTIONS PLANNED:  1. Balance Exercise  2. Family Education  3. Gait Training  4. Heat  5. Home Exercise Program (HEP)  6. Manual Therapy  7. Neuromuscular Re-education/Strengthening  8. Therapeutic Activites  9. Therapeutic Exercise/Strengthening  10.  Transfer Training   TREATMENT PLAN:  Effective Dates: 3/21/18 to 6/19/2018. Frequency/Duration: 2 times a week for 12 weeks  GOALS: (Goals have been discussed and agreed upon with patient.)  Short-Term Functional Goals: Time Frame: 6 weeks  1. Patient will be compliant with HEP. MET  2. Patient will have an increase on the tinajero balance to 46/56 in order to improve her functional balance. MET  3. Patient will have a decrease on the TUG to 12 seconds indicating improved gait mechanics. MET  Discharge Goals: Time Frame: 12 weeks  1. Patient will be independent with HEP. 2. Patient will have an increase on the tinajero balance to 48/56 in order to decrease her risk of falls. MET  3. Patient will have a decrease on the TUG to less than 11 seconds in order to improve her functional mobility. Progressing towards  4. Patient will demonstrate turning with 4 steps or less indicating improve movement patterns. NOT MET  5. Patient will have an increase on the 6 minute walk test to 1100' indicating improve community mobility. MET   Rehabilitation Potential For Stated Goals: Good  Regarding Jayla Greenfield's therapy, I certify that the treatment plan above will be carried out by a therapist or under their direction. Thank you for this referral,  Lisette Watson DPT, NCS                 HISTORY:   Patient Stated Goal:        I want to be back to normal  History of Present Injury/Illness (Reason for Referral):  March 2018\" Shunt placement on the R side March 6th. Goes back to Dr. Eliecer Ac on Monday.  is happy with her progress. She says she is now able to back up easier and her stride is getting longer. Sleeps with her head slightly elevated. Has been driving since surgery. Has not been using a walker since she went home. Dec 2017:   Patient reports she went to [facility] and they confirmed Dr. Florin Nelson diagnosis of NPH. They are going to try diamox for 60 days which is a diuretic. At that time, they will send a video to [facility] and they will decide see if they need to do surgery.   Feb 15th is 60 days. Nathan said to do PT in the meantime to help with mobility. She reports no falls but says she is getting more fearful of falling. Patient reports still having some memory issues and some incontinence as well as gait difficulties. Past Medical History/Comorbidities:   Ms. Eloise Johnston  has a past medical history of CAD (coronary artery disease) (4/26/2016); Gait disorder (4/26/2016); Hypertension; Hypertriglyceridemia (4/26/2016); Psychotic disorder; and Small vessel disease, cerebrovascular (4/26/2016). Ms. Eloise Johnston  has no past surgical history on file. Social History/Living Environment:     lives with   Prior Level of Function/Work/Activity:  Retired. Use to exercises 3 days a week but is currently unable to    Current Medications:    Current Outpatient Prescriptions:     atenolol (TENORMIN) 50 mg tablet, TAKE 2 TABLETS BY MOUTH DAILY, Disp: 60 Tab, Rfl: 3    triamterene-hydroCHLOROthiazide (MAXZIDE) 37.5-25 mg per tablet, TAKE 1 TABLET BY MOUTH TWICE DAILY, Disp: 180 Tab, Rfl: 3    pantoprazole (PROTONIX) 40 mg tablet, Take 1 Tab by mouth daily. , Disp: 30 Tab, Rfl: 5    doxycycline (MONODOX) 100 mg capsule, Take 1 Cap by mouth two (2) times a day., Disp: 20 Cap, Rfl: 0    FLUoxetine (PROZAC) 20 mg tablet, TAKE ONE CAPSULE BY MOUTH EVERY DAY, Disp: 30 Tab, Rfl: 5    conjugated estrogens (PREMARIN) 0.625 mg tablet, TAKE 1 TABLET BY MOUTH EVERY DAY, Disp: 30 Tab, Rfl: 5    triamterene-hydroCHLOROthiazide (MAXZIDE) 37.5-25 mg per tablet, TAKE 1 TABLET BY MOUTH TWICE DAILY, Disp: 180 Tab, Rfl: 3    niacinamide 500 mg tablet, TK 1 T PO  BID, Disp: , Rfl: 3    fluticasone (FLONASE) 50 mcg/actuation nasal spray, nightly., Disp: , Rfl:    Diamox     Date Last Reviewed:  5/16/2018   Number of Personal Factors/Comorbidities that affect the Plan of Care: 1-2: MODERATE COMPLEXITY   EXAMINATION:   Observation/Orthostatic Postural Assessment: Forward head, rounded shoulders.   Decreased trunk mobility with gait. Stands with wide WILLY  Mental Status:          Functional but does report some episodes of memory issues  Palpation:          No increased muscle tone noted  Sensation:         intact  Skin Integrity:          intact  Vision:          Functional   Balance:          Good static balance, decreased dynamic balance     Lower Extremity:   Strength PROM   Action R L R  L    Hip Flexion 4 4+     Hip Extension 4 4+     Hip Abduction 4 4+     Hip Adduction       Knee Flexion       Knee Extension 4+ 4+     Dorsi Flexion 4+ 4+     Plantar Flexion 4 4     Inversion       Eversion                 Upper Extremity:         Grossly 4+/5  Functional Mobility:         Gait/Ambulation:  Ambulates with improved but still slower gait speed, shuffling gait pattern at times with decreased heel strike R>L, wide WILLY, flexed knees, some toe out B, improved step length   1200' today in 6 minute walk         Transfers:  Slow, able to stand without UE support, wide WILLY, few attempts to stand. Slow, shuffling turn with still with several steps during turning        Bed Mobility:  Functional         Stairs:  NT        Wheelchair:  NT              Body Structures Involved:  1. Nerves  2. Bones  3. Joints  4. Muscles Body Functions Affected:  1. Mental  2. Genitourinary  3. Neuromusculoskeletal  4. Movement Related Activities and Participation Affected:  1. Mobility  2. Self Care  3. Domestic Life  4. Interpersonal Interactions and Relationships   Number of elements that affect the Plan of Care: 4+: HIGH COMPLEXITY   CLINICAL PRESENTATION:   Presentation: Evolving clinical presentation with changing clinical characteristics: MODERATE COMPLEXITY   CLINICAL DECISION MAKING:   Outcome Measure:    Tool Used: Schwab Balance Scale  Score:  Initial: 44/56 Most Recent: 51/56 (Date: 5/16/18 )   Interpretation of Score: Each section is scored on a 0-4 scale, 0 representing the patients inability to perform the task and 4 representing independence. The scores of each section are added together for a total score of 56. The higher the patients score, the more independent the patient is. Any score below 45 indicates increased risk for falls. Score 56 55-45 44-34 33-23 22-12 11-1 0   Modifier CH CI CJ CK CL CM CN     Tool Used: Timed Up and Go (TUG)  Score:  Initial: 13.5 seconds Most Recent: 11.2 seconds (Date: 5/16/18 )   Interpretation of Score: The test measures, in seconds, the time taken by an individual to stand up from a standard arm chair (seat height 46 cm [18 in], arm height 65 cm [25.6 in]), walk a distance of 3 meters (118 in, approx 10 ft), turn, walk back to the chair and sit down. If the individual takes longer than 14 seconds to complete TUG, this indicates risk for falls. Score 7 7.5-10.5 11-14 14.5-17.5 18-21 21.5-24.5 25+   Modifier CH CI CJ CK CL CM CN     ? Mobility - Walking and Moving Around:     - CURRENT STATUS: CJ - 20%-39% impaired, limited or restricted    - GOAL STATUS: CI - 1%-19% impaired, limited or restricted    - D/C STATUS:  ---------------To be determined---------------      Medical Necessity:   · Patient is expected to demonstrate progress in balance, coordination and functional technique to improve safety during ADLs and IADLs. · Patient demonstrates good rehab potential due to higher previous functional level. Reason for Services/Other Comments:  · Patient continues to require modification of therapeutic interventions to increase complexity of exercises. Use of outcome tool(s) and clinical judgement create a POC that gives a: Questionable prediction of patient's progress: MODERATE COMPLEXITY   TREATMENT:   (In addition to Assessment/Re-Assessment sessions the following treatments were rendered)  Pre Treatment Symptoms: patient reports she feels good today. No pain. Therapeutic Exercise: ( 40 minutes ):  Exercises per grid below to improve mobility, balance and coordination. Required moderate visual, verbal and tactile cues to promote proper body alignment and promote proper body mechanics. Progressed complexity of movement as indicated. Date:  5/9/18 Date:  5/11/8 Date:  5/14/18 Date:  5/16/18   Activity/Exercise Parameters Parameters Parameters  Parameters    NuStep X 10 minutes level 3  X 10 minutes level 3 X 10 minutes level 3  X 10 minutes level 3    Calf stretch on incline board 2 X 60 sec hold X 60 sec hold  X 60 sec hold    Heel raises with 3 second hold   Heel/toe rocking x 20 reps    Seated arm with opposite hip movements  X 20 times'  X 20 times with mirror with improved timing      LTR X 10 reps B with arms out to the side X 10 reps B with arms out to side  X 10 reps B with arms out to the side X 15 reps B with arms out to the side   Lunging into bosu working on BIG push off       clams  X 20 reps B   X 20 reps B    rebounder  X 20 throws working on timing     Punching bag holding 1 kg balls   Good power today 3 x 20 reps    PWR marching On ground, working on timing of movements, very difficult. Braking movement up into static standing, single side and the progressing into the full movement. A lot of cues needed. X 15 minutes total time improved ability to coordinate timing 3 x 50'. Still not 100%     LSVT seated floor to ceiling        PWR seated reach       LSVT lateral step outs   X 10 reps with good coordination of timing     LSVT forward step outs  X 10 reps B with good timing     BIG walking X 240'  X 240'  2 x 200'  6 minutes walk   PWR sidelying twist X 10 reps B      PWR supine reaching        Standing PWR reach       HS stretch 2 x 30 sec B       LSVT rock and reach       Hip flexor stretch  Sidelying 3 x 45 sec hold  Sidelying 2 x 45 sec hold Sidelying 2 x 45 sec hold Sidelying 2 x 45 sec hold   Prone hip flexor stretch        LSVT twist       rockerboard   Standing in stance position, shifting weight from heel to toe, very difficult to maintain balance.  X 15 reps B with a lot of tactile cues to maintain balance    Bird dogs   X 10 reps B with good coordination of movement           Treatment/Session Assessment:   See assessment above. · Pain/ Symptoms: Initial:   0/10 Post Session:  0/10 ·   Compliance with Program/Exercises: Will assess as treatment progresses. · Recommendations/Intent for next treatment session: \"Next visit will focus on advancements to more challenging activities and reduction in assistance provided\".   Total Treatment Duration:   PT Patient Time In/Time Out  Time In: 1035  Time Out: 100 Wilian Ellis DPT

## 2018-05-21 ENCOUNTER — HOSPITAL ENCOUNTER (OUTPATIENT)
Dept: PHYSICAL THERAPY | Age: 78
Discharge: HOME | End: 2018-05-21
Payer: MEDICARE

## 2018-05-21 PROCEDURE — 97110 THERAPEUTIC EXERCISES: CPT

## 2018-05-21 NOTE — PROGRESS NOTES
Michi Estimable  : 1940  [place]  [address], [place] [address] [address] [facility]  [phone]   [phone]         OUTPATIENT PHYSICAL THERAPY:Daily Note 2018    ICD-10: Treatment Diagnosis: Unsteadiness on feet (R26.81)  Precautions/Allergies:   Latex; Ceftin [cefuroxime axetil]; and Sulfa (sulfonamide antibiotics)   Fall Risk Score: 5 (? 5 = High Risk)  MD Orders: evaluate and treat MEDICAL/REFERRING DIAGNOSIS:  Presence of cerebrospinal fluid drainage device [Z98.2]  Unspecified abnormalities of gait and mobility [R26.9]  (Idiopathic) normal pressure hydrocephalus [G91.2]   DATE OF ONSET: few months ago  REFERRING PHYSICIAN: Kathlyn Goltz, MD  RETURN PHYSICIAN APPOINTMENT: no more follow up scheduled      ASSESSMENT (Date: 18):  Ms. Kamaljit Stoner has been seen in physical therapy for 16 visits since 3/21/18 s/p shunt placement for NPH. She has met 3/3 of her short term goals and 2/5 of her long term goals. She is demonstrating great progress in balance and ambulation as indicating by improvements on the tinajero balance and the timed up and go test.   She continues to benefit from skilled PT to improve her functional mobility and return to her PLOF. PROBLEM LIST (Impacting functional limitations):  1. Decreased Strength  2. Decreased ADL/Functional Activities  3. Decreased Transfer Abilities  4. Decreased Ambulation Ability/Technique  5. Decreased Balance  6. Decreased Activity Tolerance  7. Decreased [place] with Home Exercise Program INTERVENTIONS PLANNED:  1. Balance Exercise  2. Family Education  3. Gait Training  4. Heat  5. Home Exercise Program (HEP)  6. Manual Therapy  7. Neuromuscular Re-education/Strengthening  8. Therapeutic Activites  9. Therapeutic Exercise/Strengthening  10.  Transfer Training   TREATMENT PLAN:  Effective Dates: 3/21/18 to 6/19/2018. Frequency/Duration: 2 times a week for 12 weeks  GOALS: (Goals have been discussed and agreed upon with patient.)  Short-Term Functional Goals: Time Frame: 6 weeks  1. Patient will be compliant with HEP. MET  2. Patient will have an increase on the tinajero balance to 46/56 in order to improve her functional balance. MET  3. Patient will have a decrease on the TUG to 12 seconds indicating improved gait mechanics. MET  Discharge Goals: Time Frame: 12 weeks  1. Patient will be independent with HEP. 2. Patient will have an increase on the tinajero balance to 48/56 in order to decrease her risk of falls. MET  3. Patient will have a decrease on the TUG to less than 11 seconds in order to improve her functional mobility. Progressing towards  4. Patient will demonstrate turning with 4 steps or less indicating improve movement patterns. NOT MET  5. Patient will have an increase on the 6 minute walk test to 1100' indicating improve community mobility. MET   Rehabilitation Potential For Stated Goals: Good  Regarding Jayla Greenfield's therapy, I certify that the treatment plan above will be carried out by a therapist or under their direction. Thank you for this referral,  Jannet Nguyễn, RICCIT, NCS                 HISTORY:   Patient Stated Goal:        I want to be back to normal  History of Present Injury/Illness (Reason for Referral):  March 2018\" Shunt placement on the R side March 6th. Goes back to Dr. Lino Finney on Monday.  is happy with her progress. She says she is now able to back up easier and her stride is getting longer. Sleeps with her head slightly elevated. Has been driving since surgery. Has not been using a walker since she went home. Dec 2017:   Patient reports she went to [facility] and they confirmed Dr. Freddy Martinez diagnosis of NPH. They are going to try diamox for 60 days which is a diuretic. At that time, they will send a video to [facility] and they will decide see if they need to do surgery.   Feb 15th is 60 days. Nathan said to do PT in the meantime to help with mobility. She reports no falls but says she is getting more fearful of falling. Patient reports still having some memory issues and some incontinence as well as gait difficulties. Past Medical History/Comorbidities:   Ms. Dino Torres  has a past medical history of CAD (coronary artery disease) (4/26/2016); Gait disorder (4/26/2016); Hypertension; Hypertriglyceridemia (4/26/2016); Psychotic disorder; and Small vessel disease, cerebrovascular (4/26/2016). Ms. Dino Torres  has no past surgical history on file. Social History/Living Environment:     lives with   Prior Level of Function/Work/Activity:  Retired. Use to exercises 3 days a week but is currently unable to    Current Medications:    Current Outpatient Prescriptions:     atenolol (TENORMIN) 50 mg tablet, TAKE 2 TABLETS BY MOUTH DAILY, Disp: 60 Tab, Rfl: 3    triamterene-hydroCHLOROthiazide (MAXZIDE) 37.5-25 mg per tablet, TAKE 1 TABLET BY MOUTH TWICE DAILY, Disp: 180 Tab, Rfl: 3    pantoprazole (PROTONIX) 40 mg tablet, Take 1 Tab by mouth daily. , Disp: 30 Tab, Rfl: 5    doxycycline (MONODOX) 100 mg capsule, Take 1 Cap by mouth two (2) times a day., Disp: 20 Cap, Rfl: 0    FLUoxetine (PROZAC) 20 mg tablet, TAKE ONE CAPSULE BY MOUTH EVERY DAY, Disp: 30 Tab, Rfl: 5    conjugated estrogens (PREMARIN) 0.625 mg tablet, TAKE 1 TABLET BY MOUTH EVERY DAY, Disp: 30 Tab, Rfl: 5    triamterene-hydroCHLOROthiazide (MAXZIDE) 37.5-25 mg per tablet, TAKE 1 TABLET BY MOUTH TWICE DAILY, Disp: 180 Tab, Rfl: 3    niacinamide 500 mg tablet, TK 1 T PO  BID, Disp: , Rfl: 3    fluticasone (FLONASE) 50 mcg/actuation nasal spray, nightly., Disp: , Rfl:    Diamox     Date Last Reviewed:  5/21/2018   Number of Personal Factors/Comorbidities that affect the Plan of Care: 1-2: MODERATE COMPLEXITY   EXAMINATION:   Observation/Orthostatic Postural Assessment: Forward head, rounded shoulders.   Decreased trunk mobility with gait. Stands with wide WILLY  Mental Status:          Functional but does report some episodes of memory issues  Palpation:          No increased muscle tone noted  Sensation:         intact  Skin Integrity:          intact  Vision:          Functional   Balance:          Good static balance, decreased dynamic balance     Lower Extremity:   Strength PROM   Action R L R  L    Hip Flexion 4 4+     Hip Extension 4 4+     Hip Abduction 4 4+     Hip Adduction       Knee Flexion       Knee Extension 4+ 4+     Dorsi Flexion 4+ 4+     Plantar Flexion 4 4     Inversion       Eversion                 Upper Extremity:         Grossly 4+/5  Functional Mobility:         Gait/Ambulation:  Ambulates with improved but still slower gait speed, shuffling gait pattern at times with decreased heel strike R>L, wide WILLY, flexed knees, some toe out B, improved step length   1200' today in 6 minute walk         Transfers:  Slow, able to stand without UE support, wide WILLY, few attempts to stand. Slow, shuffling turn with still with several steps during turning        Bed Mobility:  Functional         Stairs:  NT        Wheelchair:  NT              Body Structures Involved:  1. Nerves  2. Bones  3. Joints  4. Muscles Body Functions Affected:  1. Mental  2. Genitourinary  3. Neuromusculoskeletal  4. Movement Related Activities and Participation Affected:  1. Mobility  2. Self Care  3. Domestic Life  4. Interpersonal Interactions and Relationships   Number of elements that affect the Plan of Care: 4+: HIGH COMPLEXITY   CLINICAL PRESENTATION:   Presentation: Evolving clinical presentation with changing clinical characteristics: MODERATE COMPLEXITY   CLINICAL DECISION MAKING:   Outcome Measure:    Tool Used: Schwab Balance Scale  Score:  Initial: 44/56 Most Recent: 51/56 (Date: 5/16/18 )   Interpretation of Score: Each section is scored on a 0-4 scale, 0 representing the patients inability to perform the task and 4 representing independence. The scores of each section are added together for a total score of 56. The higher the patients score, the more independent the patient is. Any score below 45 indicates increased risk for falls. Score 56 55-45 44-34 33-23 22-12 11-1 0   Modifier CH CI CJ CK CL CM CN     Tool Used: Timed Up and Go (TUG)  Score:  Initial: 13.5 seconds Most Recent: 11.2 seconds (Date: 5/16/18 )   Interpretation of Score: The test measures, in seconds, the time taken by an individual to stand up from a standard arm chair (seat height 46 cm [18 in], arm height 65 cm [25.6 in]), walk a distance of 3 meters (118 in, approx 10 ft), turn, walk back to the chair and sit down. If the individual takes longer than 14 seconds to complete TUG, this indicates risk for falls. Score 7 7.5-10.5 11-14 14.5-17.5 18-21 21.5-24.5 25+   Modifier CH CI CJ CK CL CM CN     ? Mobility - Walking and Moving Around:     - CURRENT STATUS: CJ - 20%-39% impaired, limited or restricted    - GOAL STATUS: CI - 1%-19% impaired, limited or restricted    - D/C STATUS:  ---------------To be determined---------------      Medical Necessity:   · Patient is expected to demonstrate progress in balance, coordination and functional technique to improve safety during ADLs and IADLs. · Patient demonstrates good rehab potential due to higher previous functional level. Reason for Services/Other Comments:  · Patient continues to require modification of therapeutic interventions to increase complexity of exercises. Use of outcome tool(s) and clinical judgement create a POC that gives a: Questionable prediction of patient's progress: MODERATE COMPLEXITY   TREATMENT:   (In addition to Assessment/Re-Assessment sessions the following treatments were rendered)  Pre Treatment Symptoms: patient reports having a good weekend. No pain. Therapeutic Exercise: ( 40 minutes ):  Exercises per grid below to improve mobility, balance and coordination. Required moderate visual, verbal and tactile cues to promote proper body alignment and promote proper body mechanics. Progressed complexity of movement as indicated. Date:  5/11/8 Date:  5/14/18 Date:  5/16/18 Date:  5/21/18   Activity/Exercise Parameters Parameters  Parameters  Parameters    NuStep X 10 minutes level 3 X 10 minutes level 3  X 10 minutes level 3  X 10 minutes level 3   Calf stretch on incline board X 60 sec hold  X 60 sec hold  X 60 sec hold   Heel raises with 3 second hold  Heel/toe rocking x 20 reps  X 20 reps   Seated arm with opposite hip movements X 20 times'  X 20 times with mirror with improved timing       LTR X 10 reps B with arms out to side  X 10 reps B with arms out to the side X 15 reps B with arms out to the side    Lunging into bosu working on BIG push off       clams X 20 reps B   X 20 reps B     rebounder X 20 throws working on timing      Punching bag holding 1 kg balls  Good power today 3 x 20 reps  4 x 20 reps with good trunk rotation    PWR marching Braking movement up into static standing, single side and the progressing into the full movement. A lot of cues needed. X 15 minutes total time improved ability to coordinate timing 3 x 50'. Still not 100%   Improving coordination and timing 2 x 50'   LSVT seated floor to ceiling        PWR seated reach       LSVT lateral step outs  X 10 reps with good coordination of timing      LSVT forward step outs X 10 reps B with good timing      BIG walking X 240'  2 x 200'  6 minutes walk X 240'   PWR sidelying twist       PWR supine reaching        Standing PWR reach       HS stretch       LSVT rock and reach       Hip flexor stretch  Sidelying 2 x 45 sec hold Sidelying 2 x 45 sec hold Sidelying 2 x 45 sec hold Sidelying 2 x 60 sec hold   Prone hip flexor stretch        LSVT twist       rockerboard  Standing in stance position, shifting weight from heel to toe, very difficult to maintain balance.  X 15 reps B with a lot of tactile cues to maintain balance  Stance position for heel/toe weight shifting 2 x 60 sec- improving with reps  Normal stance rocking forward and back x 60 sec- improving with reps   Bird dogs  X 10 reps B with good coordination of movement            Treatment/Session Assessment:   Patient improving her heel/toe and r/l weight shifting today and continues to improve the timing of her movements. She continues to benefit from skilled PT to improve functional mobility and return to her PLOF. · Pain/ Symptoms: Initial:   0/10 Post Session:  0/10 ·   Compliance with Program/Exercises: Will assess as treatment progresses. · Recommendations/Intent for next treatment session: \"Next visit will focus on advancements to more challenging activities and reduction in assistance provided\".   Total Treatment Duration:   PT Patient Time In/Time Out  Time In: 1105  Time Out: 2005 GURJIT Feliciano DPT

## 2018-05-23 ENCOUNTER — HOSPITAL ENCOUNTER (OUTPATIENT)
Dept: PHYSICAL THERAPY | Age: 78
Discharge: HOME | End: 2018-05-23
Payer: MEDICARE

## 2018-05-23 PROCEDURE — 97110 THERAPEUTIC EXERCISES: CPT

## 2018-05-23 NOTE — PROGRESS NOTES
Sol Severs  : 1940  [place]  [place]: [place] [address] [place] [address] at [facility], [address]  [phone]   [phone]         OUTPATIENT PHYSICAL THERAPY:Daily Note 2018    ICD-10: Treatment Diagnosis: Unsteadiness on feet (R26.81)  Precautions/Allergies:   Latex; Ceftin [cefuroxime axetil]; and Sulfa (sulfonamide antibiotics)   Fall Risk Score: 5 (? 5 = High Risk)  MD Orders: evaluate and treat MEDICAL/REFERRING DIAGNOSIS:  Presence of cerebrospinal fluid drainage device [Z98.2]  Unspecified abnormalities of gait and mobility [R26.9]  (Idiopathic) normal pressure hydrocephalus [G91.2]   DATE OF ONSET: few months ago  REFERRING PHYSICIAN: Lucero Kumar MD  RETURN PHYSICIAN APPOINTMENT: no more follow up scheduled      ASSESSMENT (Date: 18):  Ms. Kwame Chahal has been seen in physical therapy for 16 visits since 3/21/18 s/p shunt placement for NPH. She has met 3/3 of her short term goals and 2/5 of her long term goals. She is demonstrating great progress in balance and ambulation as indicating by improvements on the tinajero balance and the timed up and go test.   She continues to benefit from skilled PT to improve her functional mobility and return to her PLOF. PROBLEM LIST (Impacting functional limitations):  1. Decreased Strength  2. Decreased ADL/Functional Activities  3. Decreased Transfer Abilities  4. Decreased Ambulation Ability/Technique  5. Decreased Balance  6. Decreased Activity Tolerance  7. Decreased [place] with Home Exercise Program INTERVENTIONS PLANNED:  1. Balance Exercise  2. Family Education  3. Gait Training  4. Heat  5. Home Exercise Program (HEP)  6. Manual Therapy  7. Neuromuscular Re-education/Strengthening  8. Therapeutic Activites  9. Therapeutic Exercise/Strengthening  10.  Transfer Training   TREATMENT PLAN:  Effective Dates: 3/21/18 to 6/19/2018. Frequency/Duration: 2 times a week for 12 weeks  GOALS: (Goals have been discussed and agreed upon with patient.)  Short-Term Functional Goals: Time Frame: 6 weeks  1. Patient will be compliant with HEP. MET  2. Patient will have an increase on the tinajero balance to 46/56 in order to improve her functional balance. MET  3. Patient will have a decrease on the TUG to 12 seconds indicating improved gait mechanics. MET  Discharge Goals: Time Frame: 12 weeks  1. Patient will be independent with HEP. 2. Patient will have an increase on the tinajero balance to 48/56 in order to decrease her risk of falls. MET  3. Patient will have a decrease on the TUG to less than 11 seconds in order to improve her functional mobility. Progressing towards  4. Patient will demonstrate turning with 4 steps or less indicating improve movement patterns. NOT MET  5. Patient will have an increase on the 6 minute walk test to 1100' indicating improve community mobility. MET   Rehabilitation Potential For Stated Goals: Good  Regarding Jayla Greenfield's therapy, I certify that the treatment plan above will be carried out by a therapist or under their direction. Thank you for this referral,  Emily Ramirez DPT, NCS                 HISTORY:   Patient Stated Goal:        I want to be back to normal  History of Present Injury/Illness (Reason for Referral):  March 2018\" Shunt placement on the R side March 6th. Goes back to Dr. Alvina Farooq on Monday.  is happy with her progress. She says she is now able to back up easier and her stride is getting longer. Sleeps with her head slightly elevated. Has been driving since surgery. Has not been using a walker since she went home. Dec 2017:   Patient reports she went to [facility] and they confirmed Dr. Vadim Donato diagnosis of NPH. They are going to try diamox for 60 days which is a diuretic. At that time, they will send a video to [facility] and they will decide see if they need to do surgery.   Feb 15th is 60 days. Evans said to do PT in the meantime to help with mobility. She reports no falls but says she is getting more fearful of falling. Patient reports still having some memory issues and some incontinence as well as gait difficulties. Past Medical History/Comorbidities:   Ms. Sulma Gutierres  has a past medical history of CAD (coronary artery disease) (4/26/2016); Gait disorder (4/26/2016); Hypertension; Hypertriglyceridemia (4/26/2016); Psychotic disorder; and Small vessel disease, cerebrovascular (4/26/2016). Ms. Sulma Gutierres  has no past surgical history on file. Social History/Living Environment:     lives with   Prior Level of Function/Work/Activity:  Retired. Use to exercises 3 days a week but is currently unable to    Current Medications:    Current Outpatient Prescriptions:     atenolol (TENORMIN) 50 mg tablet, TAKE 2 TABLETS BY MOUTH DAILY, Disp: 60 Tab, Rfl: 3    triamterene-hydroCHLOROthiazide (MAXZIDE) 37.5-25 mg per tablet, TAKE 1 TABLET BY MOUTH TWICE DAILY, Disp: 180 Tab, Rfl: 3    pantoprazole (PROTONIX) 40 mg tablet, Take 1 Tab by mouth daily. , Disp: 30 Tab, Rfl: 5    doxycycline (MONODOX) 100 mg capsule, Take 1 Cap by mouth two (2) times a day., Disp: 20 Cap, Rfl: 0    FLUoxetine (PROZAC) 20 mg tablet, TAKE ONE CAPSULE BY MOUTH EVERY DAY, Disp: 30 Tab, Rfl: 5    conjugated estrogens (PREMARIN) 0.625 mg tablet, TAKE 1 TABLET BY MOUTH EVERY DAY, Disp: 30 Tab, Rfl: 5    triamterene-hydroCHLOROthiazide (MAXZIDE) 37.5-25 mg per tablet, TAKE 1 TABLET BY MOUTH TWICE DAILY, Disp: 180 Tab, Rfl: 3    niacinamide 500 mg tablet, TK 1 T PO  BID, Disp: , Rfl: 3    fluticasone (FLONASE) 50 mcg/actuation nasal spray, nightly., Disp: , Rfl:    Diamox     Date Last Reviewed:  5/23/2018   Number of Personal Factors/Comorbidities that affect the Plan of Care: 1-2: MODERATE COMPLEXITY   EXAMINATION:   Observation/Orthostatic Postural Assessment: Forward head, rounded shoulders.   Decreased trunk mobility with gait. Stands with wide WILLY  Mental Status:          Functional but does report some episodes of memory issues  Palpation:          No increased muscle tone noted  Sensation:         intact  Skin Integrity:          intact  Vision:          Functional   Balance:          Good static balance, decreased dynamic balance     Lower Extremity:   Strength PROM   Action R L R  L    Hip Flexion 4 4+     Hip Extension 4 4+     Hip Abduction 4 4+     Hip Adduction       Knee Flexion       Knee Extension 4+ 4+     Dorsi Flexion 4+ 4+     Plantar Flexion 4 4     Inversion       Eversion                 Upper Extremity:         Grossly 4+/5  Functional Mobility:         Gait/Ambulation:  Ambulates with improved but still slower gait speed, shuffling gait pattern at times with decreased heel strike R>L, wide WILLY, flexed knees, some toe out B, improved step length   1200' today in 6 minute walk         Transfers:  Slow, able to stand without UE support, wide WILLY, few attempts to stand. Slow, shuffling turn with still with several steps during turning        Bed Mobility:  Functional         Stairs:  NT        Wheelchair:  NT              Body Structures Involved:  1. Nerves  2. Bones  3. Joints  4. Muscles Body Functions Affected:  1. Mental  2. Genitourinary  3. Neuromusculoskeletal  4. Movement Related Activities and Participation Affected:  1. Mobility  2. Self Care  3. Domestic Life  4. Interpersonal Interactions and Relationships   Number of elements that affect the Plan of Care: 4+: HIGH COMPLEXITY   CLINICAL PRESENTATION:   Presentation: Evolving clinical presentation with changing clinical characteristics: MODERATE COMPLEXITY   CLINICAL DECISION MAKING:   Outcome Measure:    Tool Used: Schwab Balance Scale  Score:  Initial: 44/56 Most Recent: 51/56 (Date: 5/16/18 )   Interpretation of Score: Each section is scored on a 0-4 scale, 0 representing the patients inability to perform the task and 4 representing independence. The scores of each section are added together for a total score of 56. The higher the patients score, the more independent the patient is. Any score below 45 indicates increased risk for falls. Score 56 55-45 44-34 33-23 22-12 11-1 0   Modifier CH CI CJ CK CL CM CN     Tool Used: Timed Up and Go (TUG)  Score:  Initial: 13.5 seconds Most Recent: 11.2 seconds (Date: 5/16/18 )   Interpretation of Score: The test measures, in seconds, the time taken by an individual to stand up from a standard arm chair (seat height 46 cm [18 in], arm height 65 cm [25.6 in]), walk a distance of 3 meters (118 in, approx 10 ft), turn, walk back to the chair and sit down. If the individual takes longer than 14 seconds to complete TUG, this indicates risk for falls. Score 7 7.5-10.5 11-14 14.5-17.5 18-21 21.5-24.5 25+   Modifier CH CI CJ CK CL CM CN     ? Mobility - Walking and Moving Around:     - CURRENT STATUS: CJ - 20%-39% impaired, limited or restricted    - GOAL STATUS: CI - 1%-19% impaired, limited or restricted    - D/C STATUS:  ---------------To be determined---------------      Medical Necessity:   · Patient is expected to demonstrate progress in balance, coordination and functional technique to improve safety during ADLs and IADLs. · Patient demonstrates good rehab potential due to higher previous functional level. Reason for Services/Other Comments:  · Patient continues to require modification of therapeutic interventions to increase complexity of exercises. Use of outcome tool(s) and clinical judgement create a POC that gives a: Questionable prediction of patient's progress: MODERATE COMPLEXITY   TREATMENT:   (In addition to Assessment/Re-Assessment sessions the following treatments were rendered)  Pre Treatment Symptoms: patient reports feeling well. Therapeutic Exercise: ( 40 minutes ):  Exercises per grid below to improve mobility, balance and coordination.   Required moderate visual, verbal and tactile cues to promote proper body alignment and promote proper body mechanics. Progressed complexity of movement as indicated. Date:  5/14/18 Date:  5/16/18 Date:  5/21/18 Date:  5/23/18   Activity/Exercise Parameters  Parameters  Parameters  Parameters    NuStep X 10 minutes level 3  X 10 minutes level 3  X 10 minutes level 3 X 10 minutes level 3   Calf stretch on incline board X 60 sec hold  X 60 sec hold    Heel raises with 3 second hold Heel/toe rocking x 20 reps  X 20 reps X 20 reps   Seated arm with opposite hip movements       LTR X 10 reps B with arms out to the side X 15 reps B with arms out to the side  X 15 reps B with arms out to the side   Lunging into bosu working on BIG push off    2 x 10 reps B    clams  X 20 reps B      rebounder       Punching bag holding 1 kg balls Good power today 3 x 20 reps  4 x 20 reps with good trunk rotation     PWR marching improved ability to coordinate timing 3 x 50'. Still not 100%   Improving coordination and timing 2 x 50' X 50' with no cuing needed   LSVT seated floor to ceiling        PWR seated reach       LSVT lateral step outs X 10 reps with good coordination of timing       LSVT forward step outs       BIG walking 2 x 200'  6 minutes walk X 240' Forward and backwards x 4 minutes blocked practice   PWR sidelying twist       PWR supine reaching        Standing PWR reach       HS stretch       LSVT rock and reach       Hip flexor stretch  Sidelying 2 x 45 sec hold Sidelying 2 x 45 sec hold Sidelying 2 x 60 sec hold Sidelying 2 x 60 sec hold   grapevine     X 30' each way with min A for balance   [place] rock walking on flat dots to increased step size    Step and hold x 10 dots, 4 reps, min A for balance at times   rockerboard Standing in stance position, shifting weight from heel to toe, very difficult to maintain balance.  X 15 reps B with a lot of tactile cues to maintain balance  Stance position for heel/toe weight shifting 2 x 60 sec- improving with reps  Normal stance rocking forward and back x 60 sec- improving with reps    Bird dogs X 10 reps B with good coordination of movement    X 10 reps B- good ability          Treatment/Session Assessment:   Patient did well with exercises. Treatment focused on improving power and amplitude of movements. Good ability. She continues to benefit from skilled PT to improve functional mobility. · Pain/ Symptoms: Initial:   0/10 Post Session:  0/10 ·   Compliance with Program/Exercises: Will assess as treatment progresses. · Recommendations/Intent for next treatment session: \"Next visit will focus on advancements to more challenging activities and reduction in assistance provided\".   Total Treatment Duration:   PT Patient Time In/Time Out  Time In: 1100  Time Out: [address] Kim, DPT

## 2018-05-29 ENCOUNTER — APPOINTMENT (OUTPATIENT)
Dept: PHYSICAL THERAPY | Age: 78
End: 2018-05-29
Payer: MEDICARE

## 2018-05-30 ENCOUNTER — HOSPITAL ENCOUNTER (OUTPATIENT)
Dept: PHYSICAL THERAPY | Age: 78
Discharge: HOME | End: 2018-05-30
Payer: MEDICARE

## 2018-05-30 PROCEDURE — 97110 THERAPEUTIC EXERCISES: CPT

## 2018-05-30 NOTE — PROGRESS NOTES
Marisol Lind  : 1940  [place]  [place] [address] [place] [address] at [address] [facility] [address], [address] [facility], [place] [address] [address] [facility]  [phone]   [phone]         OUTPATIENT PHYSICAL THERAPY:Daily Note 2018    ICD-10: Treatment Diagnosis: Unsteadiness on feet (R26.81)  Precautions/Allergies:   Latex; Ceftin [cefuroxime axetil]; and Sulfa (sulfonamide antibiotics)   Fall Risk Score: 5 (? 5 = High Risk)  MD Orders: evaluate and treat MEDICAL/REFERRING DIAGNOSIS:  Presence of cerebrospinal fluid drainage device [Z98.2]  Unspecified abnormalities of gait and mobility [R26.9]  (Idiopathic) normal pressure hydrocephalus [G91.2]   DATE OF ONSET: few months ago  REFERRING PHYSICIAN: Alisa Zapata MD  RETURN PHYSICIAN APPOINTMENT: no more follow up scheduled      ASSESSMENT (Date: 18):  Ms. Yoav Bailey has been seen in physical therapy for 16 visits since 3/21/18 s/p shunt placement for NPH. She has met 3/3 of her short term goals and 2/5 of her long term goals. She is demonstrating great progress in balance and ambulation as indicating by improvements on the tinajero balance and the timed up and go test.   She continues to benefit from skilled PT to improve her functional mobility and return to her PLOF. PROBLEM LIST (Impacting functional limitations):  1. Decreased Strength  2. Decreased ADL/Functional Activities  3. Decreased Transfer Abilities  4. Decreased Ambulation Ability/Technique  5. Decreased Balance  6. Decreased Activity Tolerance  7. Decreased [place] with Home Exercise Program INTERVENTIONS PLANNED:  1. Balance Exercise  2. Family Education  3. Gait Training  4. Heat  5. Home Exercise Program (HEP)  6. Manual Therapy  7. Neuromuscular Re-education/Strengthening  8. Therapeutic Activites  9. Therapeutic Exercise/Strengthening  10.  Transfer Training   TREATMENT PLAN:  Effective Dates: 3/21/18 to 6/19/2018. Frequency/Duration: 2 times a week for 12 weeks  GOALS: (Goals have been discussed and agreed upon with patient.)  Short-Term Functional Goals: Time Frame: 6 weeks  1. Patient will be compliant with HEP. MET  2. Patient will have an increase on the tinajero balance to 46/56 in order to improve her functional balance. MET  3. Patient will have a decrease on the TUG to 12 seconds indicating improved gait mechanics. MET  Discharge Goals: Time Frame: 12 weeks  1. Patient will be independent with HEP. 2. Patient will have an increase on the tinajero balance to 48/56 in order to decrease her risk of falls. MET  3. Patient will have a decrease on the TUG to less than 11 seconds in order to improve her functional mobility. Progressing towards  4. Patient will demonstrate turning with 4 steps or less indicating improve movement patterns. NOT MET  5. Patient will have an increase on the 6 minute walk test to 1100' indicating improve community mobility. MET   Rehabilitation Potential For Stated Goals: Good  Regarding Jayla Greenfield's therapy, I certify that the treatment plan above will be carried out by a therapist or under their direction. Thank you for this referral,  Danita Kingston DPT, NCS                 HISTORY:   Patient Stated Goal:        I want to be back to normal  History of Present Injury/Illness (Reason for Referral):  March 2018\" Shunt placement on the R side March 6th. Goes back to Dr. Nelly Landry on Monday.  is happy with her progress. She says she is now able to back up easier and her stride is getting longer. Sleeps with her head slightly elevated. Has been driving since surgery. Has not been using a walker since she went home. Dec 2017:   Patient reports she went to [facility] and they confirmed Dr. Sonja Estrada diagnosis of NPH. They are going to try diamox for 60 days which is a diuretic. At that time, they will send a video to Mak Mcgee and they will decide see if they need to do surgery.   Feb 15th is 60 days. Evans said to do PT in the meantime to help with mobility. She reports no falls but says she is getting more fearful of falling. Patient reports still having some memory issues and some incontinence as well as gait difficulties. Past Medical History/Comorbidities:   Ms. Kalpesh Butler  has a past medical history of CAD (coronary artery disease) (4/26/2016); Gait disorder (4/26/2016); Hypertension; Hypertriglyceridemia (4/26/2016); Psychotic disorder; and Small vessel disease, cerebrovascular (4/26/2016). Ms. Kalpesh Butler  has no past surgical history on file. Social History/Living Environment:     lives with   Prior Level of Function/Work/Activity:  Retired. Use to exercises 3 days a week but is currently unable to    Current Medications:    Current Outpatient Prescriptions:     atenolol (TENORMIN) 50 mg tablet, TAKE 2 TABLETS BY MOUTH DAILY, Disp: 60 Tab, Rfl: 3    triamterene-hydroCHLOROthiazide (MAXZIDE) 37.5-25 mg per tablet, TAKE 1 TABLET BY MOUTH TWICE DAILY, Disp: 180 Tab, Rfl: 3    pantoprazole (PROTONIX) 40 mg tablet, Take 1 Tab by mouth daily. , Disp: 30 Tab, Rfl: 5    doxycycline (MONODOX) 100 mg capsule, Take 1 Cap by mouth two (2) times a day., Disp: 20 Cap, Rfl: 0    FLUoxetine (PROZAC) 20 mg tablet, TAKE ONE CAPSULE BY MOUTH EVERY DAY, Disp: 30 Tab, Rfl: 5    conjugated estrogens (PREMARIN) 0.625 mg tablet, TAKE 1 TABLET BY MOUTH EVERY DAY, Disp: 30 Tab, Rfl: 5    triamterene-hydroCHLOROthiazide (MAXZIDE) 37.5-25 mg per tablet, TAKE 1 TABLET BY MOUTH TWICE DAILY, Disp: 180 Tab, Rfl: 3    niacinamide 500 mg tablet, TK 1 T PO  BID, Disp: , Rfl: 3    fluticasone (FLONASE) 50 mcg/actuation nasal spray, nightly., Disp: , Rfl:    Diamox     Date Last Reviewed:  5/30/2018   Number of Personal Factors/Comorbidities that affect the Plan of Care: 1-2: MODERATE COMPLEXITY   EXAMINATION:   Observation/Orthostatic Postural Assessment: Forward head, rounded shoulders.   Decreased trunk mobility with gait. Stands with wide WILLY  Mental Status:          Functional but does report some episodes of memory issues  Palpation:          No increased muscle tone noted  Sensation:         intact  Skin Integrity:          intact  Vision:          Functional   Balance:          Good static balance, decreased dynamic balance     Lower Extremity:   Strength PROM   Action R L R  L    Hip Flexion 4 4+     Hip Extension 4 4+     Hip Abduction 4 4+     Hip Adduction       Knee Flexion       Knee Extension 4+ 4+     Dorsi Flexion 4+ 4+     Plantar Flexion 4 4     Inversion       Eversion                 Upper Extremity:         Grossly 4+/5  Functional Mobility:         Gait/Ambulation:  Ambulates with improved but still slower gait speed, shuffling gait pattern at times with decreased heel strike R>L, wide WILLY, flexed knees, some toe out B, improved step length   1200' today in 6 minute walk         Transfers:  Slow, able to stand without UE support, wide WILLY, few attempts to stand. Slow, shuffling turn with still with several steps during turning        Bed Mobility:  Functional         Stairs:  NT        Wheelchair:  NT              Body Structures Involved:  1. Nerves  2. Bones  3. Joints  4. Muscles Body Functions Affected:  1. Mental  2. Genitourinary  3. Neuromusculoskeletal  4. Movement Related Activities and Participation Affected:  1. Mobility  2. Self Care  3. Domestic Life  4. Interpersonal Interactions and Relationships   Number of elements that affect the Plan of Care: 4+: HIGH COMPLEXITY   CLINICAL PRESENTATION:   Presentation: Evolving clinical presentation with changing clinical characteristics: MODERATE COMPLEXITY   CLINICAL DECISION MAKING:   Outcome Measure:    Tool Used: Schwab Balance Scale  Score:  Initial: 44/56 Most Recent: 51/56 (Date: 5/16/18 )   Interpretation of Score: Each section is scored on a 0-4 scale, 0 representing the patients inability to perform the task and 4 representing independence. The scores of each section are added together for a total score of 56. The higher the patients score, the more independent the patient is. Any score below 45 indicates increased risk for falls. Score 56 55-45 44-34 33-23 22-12 11-1 0   Modifier CH CI CJ CK CL CM CN     Tool Used: Timed Up and Go (TUG)  Score:  Initial: 13.5 seconds Most Recent: 11.2 seconds (Date: 5/16/18 )   Interpretation of Score: The test measures, in seconds, the time taken by an individual to stand up from a standard arm chair (seat height 46 cm [18 in], arm height 65 cm [25.6 in]), walk a distance of 3 meters (118 in, approx 10 ft), turn, walk back to the chair and sit down. If the individual takes longer than 14 seconds to complete TUG, this indicates risk for falls. Score 7 7.5-10.5 11-14 14.5-17.5 18-21 21.5-24.5 25+   Modifier CH CI CJ CK CL CM CN     ? Mobility - Walking and Moving Around:     - CURRENT STATUS: CJ - 20%-39% impaired, limited or restricted    - GOAL STATUS: CI - 1%-19% impaired, limited or restricted    - D/C STATUS:  ---------------To be determined---------------      Medical Necessity:   · Patient is expected to demonstrate progress in balance, coordination and functional technique to improve safety during ADLs and IADLs. · Patient demonstrates good rehab potential due to higher previous functional level. Reason for Services/Other Comments:  · Patient continues to require modification of therapeutic interventions to increase complexity of exercises. Use of outcome tool(s) and clinical judgement create a POC that gives a: Questionable prediction of patient's progress: MODERATE COMPLEXITY   TREATMENT:   (In addition to Assessment/Re-Assessment sessions the following treatments were rendered)  Pre Treatment Symptoms: patient reports doing well. She had fun at the beach. No pain.       Therapeutic Exercise: ( 55 minutes ):  Exercises per grid below to improve mobility, balance and coordination. Required moderate visual, verbal and tactile cues to promote proper body alignment and promote proper body mechanics. Progressed complexity of movement as indicated.    Date:  5/16/18 Date:  5/21/18 Date:  5/23/18 Date:  5/30/18   Activity/Exercise Parameters  Parameters  Parameters  Parameters   NuStep X 10 minutes level 3  X 10 minutes level 3 X 10 minutes level 3 X 10 minutes level 4    Calf stretch on incline board  X 60 sec hold  X 60 sec hold   Heel raises with 3 second hold  X 20 reps X 20 reps X 30 reps   Seated arm with opposite hip movements       LTR X 15 reps B with arms out to the side  X 15 reps B with arms out to the side X 15 reps B with arms out to the side   Lunging into bosu working on BIG push off   2 x 10 reps B  X 20 reps B with good movement quality   clams X 20 reps B       Side lunging into bosu     2 x 10 reps B   Punching bag holding 1 kg balls  4 x 20 reps with good trunk rotation      PWR marching  Improving coordination and timing 2 x 50' X 48' with no cuing needed X 100' with very minimal cues   LSVT seated floor to ceiling        PWR seated reach       LSVT lateral step outs       LSVT forward step outs       BIG walking 6 minutes walk X 240' Forward and backwards x 4 minutes blocked practice    PWR sidelying twist       PWR supine reaching        4 x 4    X 5 reps with arm swing- cues to decrease # of steps   Shuttle walk for direction changes    3 cones x 3 reps with improving speed of direction change   Prone quad stretch    X 30 sec B   Hip flexor stretch  Sidelying 2 x 45 sec hold Sidelying 2 x 60 sec hold Sidelying 2 x 60 sec hold Sidelying 2 x 60 sec hold   grapevine    X 30' each way with min A for balance    [place] walking on flat dots to increased step size   Step and hold x 10 dots, 4 reps, min A for balance at times    rockerboard  Stance position for heel/toe weight shifting 2 x 60 sec- improving with reps  Normal stance rocking forward and back x 60 sec- improving with reps     Bird dogs   X 10 reps B- good ability     Treadmill    X 5 minutes speed 1.8 mph with B hand hold and cues for gait speed         Treatment/Session Assessment:   Patient doing well with exercises and is improving her functional mobility. She continues to benefit from skilled PT to return to her PLOF. · Pain/ Symptoms: Initial:   0/10 Post Session:  0/10 ·   Compliance with Program/Exercises: Will assess as treatment progresses. · Recommendations/Intent for next treatment session: \"Next visit will focus on advancements to more challenging activities and reduction in assistance provided\".   Total Treatment Duration:   PT Patient Time In/Time Out  Time In: 1100  Time Out: [address], DPT

## 2018-05-31 ENCOUNTER — APPOINTMENT (OUTPATIENT)
Dept: PHYSICAL THERAPY | Age: 78
End: 2018-05-31
Payer: MEDICARE

## 2018-06-01 ENCOUNTER — HOSPITAL ENCOUNTER (OUTPATIENT)
Dept: PHYSICAL THERAPY | Age: 78
Discharge: HOME | End: 2018-06-01
Payer: MEDICARE

## 2018-06-01 PROCEDURE — 97110 THERAPEUTIC EXERCISES: CPT

## 2018-06-01 NOTE — PROGRESS NOTES
Hannah Ny  : 1940  [place]  [place] [address] [place] [address] at [facility], [address] [facility], [address]  [phone]   [phone]         OUTPATIENT PHYSICAL THERAPY:Daily Note 2018    ICD-10: Treatment Diagnosis: Unsteadiness on feet (R26.81)  Precautions/Allergies:   Latex; Ceftin [cefuroxime axetil]; and Sulfa (sulfonamide antibiotics)   Fall Risk Score: 5 (? 5 = High Risk)  MD Orders: evaluate and treat MEDICAL/REFERRING DIAGNOSIS:  Presence of cerebrospinal fluid drainage device [Z98.2]  Unspecified abnormalities of gait and mobility [R26.9]  (Idiopathic) normal pressure hydrocephalus [G91.2]   DATE OF ONSET: few months ago  REFERRING PHYSICIAN: Serjio Flynn MD  RETURN PHYSICIAN APPOINTMENT: no more follow up scheduled      ASSESSMENT (Date: 18):  Ms. Janessa Loyola has been seen in physical therapy for 16 visits since 3/21/18 s/p shunt placement for NPH. She has met 3/3 of her short term goals and 2/5 of her long term goals. She is demonstrating great progress in balance and ambulation as indicating by improvements on the tinajero balance and the timed up and go test.   She continues to benefit from skilled PT to improve her functional mobility and return to her PLOF. PROBLEM LIST (Impacting functional limitations):  1. Decreased Strength  2. Decreased ADL/Functional Activities  3. Decreased Transfer Abilities  4. Decreased Ambulation Ability/Technique  5. Decreased Balance  6. Decreased Activity Tolerance  7. Decreased [place] with Home Exercise Program INTERVENTIONS PLANNED:  1. Balance Exercise  2. Family Education  3. Gait Training  4. Heat  5. Home Exercise Program (HEP)  6. Manual Therapy  7. Neuromuscular Re-education/Strengthening  8. Therapeutic Activites  9. Therapeutic Exercise/Strengthening  10.  Transfer Training   TREATMENT PLAN:  Effective Dates: 3/21/18 to 6/19/2018. Frequency/Duration: 2 times a week for 12 weeks  GOALS: (Goals have been discussed and agreed upon with patient.)  Short-Term Functional Goals: Time Frame: 6 weeks  1. Patient will be compliant with HEP. MET  2. Patient will have an increase on the tinajero balance to 46/56 in order to improve her functional balance. MET  3. Patient will have a decrease on the TUG to 12 seconds indicating improved gait mechanics. MET  Discharge Goals: Time Frame: 12 weeks  1. Patient will be independent with HEP. 2. Patient will have an increase on the tinajero balance to 48/56 in order to decrease her risk of falls. MET  3. Patient will have a decrease on the TUG to less than 11 seconds in order to improve her functional mobility. Progressing towards  4. Patient will demonstrate turning with 4 steps or less indicating improve movement patterns. NOT MET  5. Patient will have an increase on the 6 minute walk test to 1100' indicating improve community mobility. MET   Rehabilitation Potential For Stated Goals: Good  Regarding Jayla Greenfield's therapy, I certify that the treatment plan above will be carried out by a therapist or under their direction. Thank you for this referral,  Clarisse Cano DPT, NCS                 HISTORY:   Patient Stated Goal:        I want to be back to normal  History of Present Injury/Illness (Reason for Referral):  March 2018\" Shunt placement on the R side March 6th. Goes back to Dr. Anel Acosta on Monday.  is happy with her progress. She says she is now able to back up easier and her stride is getting longer. Sleeps with her head slightly elevated. Has been driving since surgery. Has not been using a walker since she went home. Dec 2017:   Patient reports she went to [facility] and they confirmed Dr. Michelle Mondragon diagnosis of NPH. They are going to try diamox for 60 days which is a diuretic. At that time, they will send a [facility] and they will decide see if they need to do surgery.   Feb 15th is 60 days. Nathan said to do PT in the meantime to help with mobility. She reports no falls but says she is getting more fearful of falling. Patient reports still having some memory issues and some incontinence as well as gait difficulties. Past Medical History/Comorbidities:   Ms. Tori Brady  has a past medical history of CAD (coronary artery disease) (4/26/2016); Gait disorder (4/26/2016); Hypertension; Hypertriglyceridemia (4/26/2016); Psychotic disorder; and Small vessel disease, cerebrovascular (4/26/2016). Ms. Tori Brady  has no past surgical history on file. Social History/Living Environment:     lives with   Prior Level of Function/Work/Activity:  Retired. Use to exercises 3 days a week but is currently unable to    Current Medications:    Current Outpatient Prescriptions:     atenolol (TENORMIN) 50 mg tablet, TAKE 2 TABLETS BY MOUTH DAILY, Disp: 60 Tab, Rfl: 3    triamterene-hydroCHLOROthiazide (MAXZIDE) 37.5-25 mg per tablet, TAKE 1 TABLET BY MOUTH TWICE DAILY, Disp: 180 Tab, Rfl: 3    pantoprazole (PROTONIX) 40 mg tablet, Take 1 Tab by mouth daily. , Disp: 30 Tab, Rfl: 5    doxycycline (MONODOX) 100 mg capsule, Take 1 Cap by mouth two (2) times a day., Disp: 20 Cap, Rfl: 0    FLUoxetine (PROZAC) 20 mg tablet, TAKE ONE CAPSULE BY MOUTH EVERY DAY, Disp: 30 Tab, Rfl: 5    conjugated estrogens (PREMARIN) 0.625 mg tablet, TAKE 1 TABLET BY MOUTH EVERY DAY, Disp: 30 Tab, Rfl: 5    triamterene-hydroCHLOROthiazide (MAXZIDE) 37.5-25 mg per tablet, TAKE 1 TABLET BY MOUTH TWICE DAILY, Disp: 180 Tab, Rfl: 3    niacinamide 500 mg tablet, TK 1 T PO  BID, Disp: , Rfl: 3    fluticasone (FLONASE) 50 mcg/actuation nasal spray, nightly., Disp: , Rfl:    Diamox     Date Last Reviewed:  6/1/2018   Number of Personal Factors/Comorbidities that affect the Plan of Care: 1-2: MODERATE COMPLEXITY   EXAMINATION:   Observation/Orthostatic Postural Assessment: Forward head, rounded shoulders.   Decreased trunk mobility with gait. Stands with wide WILLY  Mental Status:          Functional but does report some episodes of memory issues  Palpation:          No increased muscle tone noted  Sensation:         intact  Skin Integrity:          intact  Vision:          Functional   Balance:          Good static balance, decreased dynamic balance     Lower Extremity:   Strength PROM   Action R L R  L    Hip Flexion 4 4+     Hip Extension 4 4+     Hip Abduction 4 4+     Hip Adduction       Knee Flexion       Knee Extension 4+ 4+     Dorsi Flexion 4+ 4+     Plantar Flexion 4 4     Inversion       Eversion                 Upper Extremity:         Grossly 4+/5  Functional Mobility:         Gait/Ambulation:  Ambulates with improved but still slower gait speed, shuffling gait pattern at times with decreased heel strike R>L, wide WILLY, flexed knees, some toe out B, improved step length   1200' today in 6 minute walk         Transfers:  Slow, able to stand without UE support, wide WILLY, few attempts to stand. Slow, shuffling turn with still with several steps during turning        Bed Mobility:  Functional         Stairs:  NT        Wheelchair:  NT              Body Structures Involved:  1. Nerves  2. Bones  3. Joints  4. Muscles Body Functions Affected:  1. Mental  2. Genitourinary  3. Neuromusculoskeletal  4. Movement Related Activities and Participation Affected:  1. Mobility  2. Self Care  3. Domestic Life  4. Interpersonal Interactions and Relationships   Number of elements that affect the Plan of Care: 4+: HIGH COMPLEXITY   CLINICAL PRESENTATION:   Presentation: Evolving clinical presentation with changing clinical characteristics: MODERATE COMPLEXITY   CLINICAL DECISION MAKING:   Outcome Measure:    Tool Used: Schwab Balance Scale  Score:  Initial: 44/56 Most Recent: 51/56 (Date: 5/16/18 )   Interpretation of Score: Each section is scored on a 0-4 scale, 0 representing the patients inability to perform the task and 4 representing independence. The scores of each section are added together for a total score of 56. The higher the patients score, the more independent the patient is. Any score below 45 indicates increased risk for falls. Score 56 55-45 44-34 33-23 22-12 11-1 0   Modifier CH CI CJ CK CL CM CN     Tool Used: Timed Up and Go (TUG)  Score:  Initial: 13.5 seconds Most Recent: 11.2 seconds (Date: 5/16/18 )   Interpretation of Score: The test measures, in seconds, the time taken by an individual to stand up from a standard arm chair (seat height 46 cm [18 in], arm height 65 cm [25.6 in]), walk a distance of 3 meters (118 in, approx 10 ft), turn, walk back to the chair and sit down. If the individual takes longer than 14 seconds to complete TUG, this indicates risk for falls. Score 7 7.5-10.5 11-14 14.5-17.5 18-21 21.5-24.5 25+   Modifier CH CI CJ CK CL CM CN     ? Mobility - Walking and Moving Around:     - CURRENT STATUS: CJ - 20%-39% impaired, limited or restricted    - GOAL STATUS: CI - 1%-19% impaired, limited or restricted    - D/C STATUS:  ---------------To be determined---------------      Medical Necessity:   · Patient is expected to demonstrate progress in balance, coordination and functional technique to improve safety during ADLs and IADLs. · Patient demonstrates good rehab potential due to higher previous functional level. Reason for Services/Other Comments:  · Patient continues to require modification of therapeutic interventions to increase complexity of exercises. Use of outcome tool(s) and clinical judgement create a POC that gives a: Questionable prediction of patient's progress: MODERATE COMPLEXITY   TREATMENT:   (In addition to Assessment/Re-Assessment sessions the following treatments were rendered)  Pre Treatment Symptoms: patient reports doing well today. No pain. Feels like she is moving well.       Therapeutic Exercise: (  55 minutes ):  Exercises per grid below to improve mobility, balance and coordination. Required moderate visual, verbal and tactile cues to promote proper body alignment and promote proper body mechanics. Progressed complexity of movement as indicated.    Date:  5/21/18 Date:  5/23/18 Date:  5/30/18 Date:  6/1/18   Activity/Exercise Parameters  Parameters  Parameters Parameters   NuStep X 10 minutes level 3 X 10 minutes level 3 X 10 minutes level 4  X 10 minutes level 4   Calf stretch on incline board X 60 sec hold  X 60 sec hold    Heel raises with 3 second hold X 20 reps X 20 reps X 30 reps    HS stretch    X 30 sec B   LTR  X 15 reps B with arms out to the side X 15 reps B with arms out to the side X 15 reps B with arms out to the side   Lunging into bosu working on BIG push off  2 x 10 reps B  X 20 reps B with good movement quality X 20 reps B good ability   clams       Side lunging into bosu    2 x 10 reps B X 20 reps lateral   Punching bag holding 1 kg balls 4 x 20 reps with good trunk rotation       PWR marching Improving coordination and timing 2 x 50' X 48' with no cuing needed X 100' with very minimal cues X 76' with good ability   LSVT seated floor to ceiling        PWR seated reach       LSVT lateral step outs       LSVT forward step outs       BIG walking X 240' Forward and backwards x 4 minutes blocked practice  X 200' working on turns   LSVT twist    X 10 reps B with min A at times   PWR supine reaching        4 x 4   X 5 reps with arm swing- cues to decrease # of steps    Shuttle walk for direction changes   3 cones x 3 reps with improving speed of direction change 3 cones x 3 reps with cues for step clearance   Prone quad stretch   X 30 sec B    Hip flexor stretch  Sidelying 2 x 60 sec hold Sidelying 2 x 60 sec hold Sidelying 2 x 60 sec hold Sidelying 2 x 60 sec hold    grapevine   X 30' each way with min A for balance     [place] walking on flat dots to increased step size  Step and hold x 10 dots, 4 reps, min A for balance at times rockerboard Stance position for heel/toe weight shifting 2 x 60 sec- improving with reps  Normal stance rocking forward and back x 60 sec- improving with reps      Bird dogs  X 10 reps B- good ability      Treadmill   X 5 minutes speed 1.8 mph with B hand hold and cues for gait speed X 5 minutes speed 1.8 mph with cues for step length         Treatment/Session Assessment:   Patient had a few LOB with turning today and continues to need increased # of steps for turns. She was able to ambulate on the treadmill at 1.8 mph with good ability and hand hold but without hands, she was unable to keep up with speed 1.2 mph and had very poor gait pattern. She continues to benefit from skilled PT to improve her functional mobility and decrease her risk of falls. · Pain/ Symptoms: Initial:   0/10 Post Session:  0/10 ·   Compliance with Program/Exercises: Will assess as treatment progresses. · Recommendations/Intent for next treatment session: \"Next visit will focus on advancements to more challenging activities and reduction in assistance provided\".   Total Treatment Duration:   PT Patient Time In/Time Out  Time In: 1300  Time Out: MIRTA Saeed 44, DPT

## 2018-06-06 ENCOUNTER — HOSPITAL ENCOUNTER (OUTPATIENT)
Dept: PHYSICAL THERAPY | Age: 78
Discharge: HOME | End: 2018-06-06
Payer: MEDICARE

## 2018-06-06 NOTE — PROGRESS NOTES
Tejinder Alvarez  : 1940 [facility] at [facility], [facility], [address]  [phone]   [phone]          OUTPATIENT DAILY NOTE    NAME/AGE/GENDER: Tejinder Alvarez is a 76 y.o. female. DATE: 2018    Patient cancelled for appointment today due to hurting her knee. She cancelled all appointments for this week. Will plan to follow up on next scheduled visit.     Michelle Cartwright DPT

## 2018-06-08 ENCOUNTER — APPOINTMENT (OUTPATIENT)
Dept: PHYSICAL THERAPY | Age: 78
End: 2018-06-08
Payer: MEDICARE

## 2018-06-11 ENCOUNTER — HOSPITAL ENCOUNTER (OUTPATIENT)
Dept: PHYSICAL THERAPY | Age: 78
End: 2018-06-11
Payer: MEDICARE

## 2018-06-13 ENCOUNTER — APPOINTMENT (OUTPATIENT)
Dept: PHYSICAL THERAPY | Age: 78
End: 2018-06-13
Payer: MEDICARE

## 2018-06-18 ENCOUNTER — HOSPITAL ENCOUNTER (OUTPATIENT)
Dept: PHYSICAL THERAPY | Age: 78
Discharge: HOME | End: 2018-06-18
Payer: MEDICARE

## 2018-06-18 PROCEDURE — G8979 MOBILITY GOAL STATUS: HCPCS

## 2018-06-18 PROCEDURE — G8978 MOBILITY CURRENT STATUS: HCPCS

## 2018-06-18 PROCEDURE — 97110 THERAPEUTIC EXERCISES: CPT

## 2018-06-18 NOTE — PROGRESS NOTES
Dinesh Lopez  : 1940  [place]  Secondary: [address], [place] [address] [facility]  [phone]   [phone]         OUTPATIENT PHYSICAL THERAPY:Recertification     ICD-10: Treatment Diagnosis: Unsteadiness on feet (R26.81)  Precautions/Allergies:   Latex; Ceftin [cefuroxime axetil]; and Sulfa (sulfonamide antibiotics)   Fall Risk Score: 5 (? 5 = High Risk)  MD Orders: evaluate and treat MEDICAL/REFERRING DIAGNOSIS:  Presence of cerebrospinal fluid drainage device [Z98.2]  Unspecified abnormalities of gait and mobility [R26.9]  (Idiopathic) normal pressure hydrocephalus [G91.2]   DATE OF ONSET: few months ago  REFERRING PHYSICIAN: Geoff Mondragon MD  RETURN PHYSICIAN APPOINTMENT: no more follow up scheduled      ASSESSMENT (Date: 18):  Ms. Ortencia Meigs has been seen in physical therapy for 21 visits since 3/21/18 s/p shunt placement for NPH. She has met 3/3 of her short term goals and 5/6 of her long term goals. She has demonstrated great improvements in balance and functional mobility. Her gait has improved with improved step length, arm swing, and gait speed. She still ambulates with flexed knees and widen WILLY. She has difficulty maintaining her gait pattern with dual task. She would benefit from 2 more weeks of skilled PT to improve her functional mobility in order to return to her PLOF. PROBLEM LIST (Impacting functional limitations):  1. Decreased Strength  2. Decreased ADL/Functional Activities  3. Decreased Transfer Abilities  4. Decreased Ambulation Ability/Technique  5. Decreased Balance  6. Decreased Activity Tolerance  7. Decreased [place] with Home Exercise Program INTERVENTIONS PLANNED:  1. Balance Exercise  2. Family Education  3. Gait Training  4. Heat  5. Home Exercise Program (HEP)  6. Manual Therapy  7.  Neuromuscular Re-education/Strengthening  8. Therapeutic Activites  9. Therapeutic Exercise/Strengthening  10. Transfer Training   TREATMENT PLAN:  Effective Dates: 6/18/18 to 7/4/18. Frequency/Duration: 2 times a week for 2 weeks  GOALS: (Goals have been discussed and agreed upon with patient.)  Short-Term Functional Goals: Time Frame: 6 weeks  1. Patient will be compliant with HEP. MET  2. Patient will have an increase on the tinajero balance to 46/56 in order to improve her functional balance. MET  3. Patient will have a decrease on the TUG to 12 seconds indicating improved gait mechanics. MET  Discharge Goals: Time Frame: 12 weeks  1. Patient will be independent with HEP. NOT MET  2. Patient will have an increase on the tinajero balance to 48/56 in order to decrease her risk of falls. MET  3. Patient will have a decrease on the TUG to less than 11 seconds in order to improve her functional mobility. MET  4. Patient will demonstrate turning with 4 steps or less indicating improve movement patterns. MET  5. Patient will have an increase on the 6 minute walk test to 1100' indicating improve community mobility. MET   6. NEW:  Patient will complete the dual task TUG in less than 11 seconds indicating improved ability to multitask. Rehabilitation Potential For Stated Goals: Good  Regarding Jayla Greenfield's therapy, I certify that the treatment plan above will be carried out by a therapist or under their direction. Thank you for this referral,  Emily Ramirez DPT, NCS                 HISTORY:   Patient Stated Goal:        I want to be back to normal  History of Present Injury/Illness (Reason for Referral):  March 2018\" Shunt placement on the R side March 6th. Goes back to Dr. Alvina Farooq on Monday.  is happy with her progress. She says she is now able to back up easier and her stride is getting longer. Sleeps with her head slightly elevated. Has been driving since surgery. Has not been using a walker since she went home.    Dec 2017:   Patient reports she went to [facility] and they confirmed Dr. Eliecer Mendoza diagnosis of NPH. They are going to try diamox for 60 days which is a diuretic. At that time, they will send a video to [facility] and they will decide see if they need to do surgery. Feb 15th is 60 days. Nathan said to do PT in the meantime to help with mobility. She reports no falls but says she is getting more fearful of falling. Patient reports still having some memory issues and some incontinence as well as gait difficulties. Past Medical History/Comorbidities:   Ms. Lori Mondragon  has a past medical history of CAD (coronary artery disease) (4/26/2016); Gait disorder (4/26/2016); Hypertension; Hypertriglyceridemia (4/26/2016); Psychotic disorder; and Small vessel disease, cerebrovascular (4/26/2016). Ms. Lori Mondragon  has no past surgical history on file. Social History/Living Environment:     lives with   Prior Level of Function/Work/Activity:  Retired. Use to exercises 3 days a week but is currently unable to    Current Medications:    Current Outpatient Prescriptions:     atenolol (TENORMIN) 50 mg tablet, TAKE 2 TABLETS BY MOUTH DAILY, Disp: 60 Tab, Rfl: 3    triamterene-hydroCHLOROthiazide (MAXZIDE) 37.5-25 mg per tablet, TAKE 1 TABLET BY MOUTH TWICE DAILY, Disp: 180 Tab, Rfl: 3    pantoprazole (PROTONIX) 40 mg tablet, Take 1 Tab by mouth daily. , Disp: 30 Tab, Rfl: 5    doxycycline (MONODOX) 100 mg capsule, Take 1 Cap by mouth two (2) times a day., Disp: 20 Cap, Rfl: 0    FLUoxetine (PROZAC) 20 mg tablet, TAKE ONE CAPSULE BY MOUTH EVERY DAY, Disp: 30 Tab, Rfl: 5    conjugated estrogens (PREMARIN) 0.625 mg tablet, TAKE 1 TABLET BY MOUTH EVERY DAY, Disp: 30 Tab, Rfl: 5    triamterene-hydroCHLOROthiazide (MAXZIDE) 37.5-25 mg per tablet, TAKE 1 TABLET BY MOUTH TWICE DAILY, Disp: 180 Tab, Rfl: 3    niacinamide 500 mg tablet, TK 1 T PO  BID, Disp: , Rfl: 3    fluticasone (FLONASE) 50 mcg/actuation nasal spray, nightly., Disp: , Rfl:    Diamox     Date Last Reviewed:  6/18/2018   Number of Personal Factors/Comorbidities that affect the Plan of Care: 1-2: MODERATE COMPLEXITY   EXAMINATION:   Observation/Orthostatic Postural Assessment: Forward head, rounded shoulders. Decreased trunk mobility with gait. Stands with wide WILLY  Mental Status:          Functional but does report some episodes of memory issues  Palpation:          No increased muscle tone noted  Sensation:         intact  Skin Integrity:          intact  Vision:          Functional   Balance:          Good static balance, decreased dynamic balance     Lower Extremity:   Strength PROM   Action R L R  L    Hip Flexion 4 4+     Hip Extension 4 4+     Hip Abduction 4 4+     Hip Adduction       Knee Flexion       Knee Extension 4+ 4+     Dorsi Flexion 4+ 4+     Plantar Flexion 4 4     Inversion       Eversion                 Upper Extremity:         Grossly 4+/5  Functional Mobility:         Gait/Ambulation:  Ambulates with improved but still slower gait speed, less shuffling gait pattern and better heel strike, wide WILLY, flexed knees, some toe out B, improved step length   1200' today in 6 minute walk         Transfers:  Slow, able to stand without UE support, wide WILLY. Turning in 3 -4 steps        Bed Mobility:  Functional         Stairs:  NT        Wheelchair:  NT              Body Structures Involved:  1. Nerves  2. Bones  3. Joints  4. Muscles Body Functions Affected:  1. Mental  2. Genitourinary  3. Neuromusculoskeletal  4. Movement Related Activities and Participation Affected:  1. Mobility  2. Self Care  3. Domestic Life  4. Interpersonal Interactions and Relationships   Number of elements that affect the Plan of Care: 4+: HIGH COMPLEXITY   CLINICAL PRESENTATION:   Presentation: Evolving clinical presentation with changing clinical characteristics: MODERATE COMPLEXITY   CLINICAL DECISION MAKING:   Outcome Measure:    Tool Used: Tong Balance Scale  Score:  Initial: 44/56 Most Recent: 51/56 (Date: 5/16/18 )   Interpretation of Score: Each section is scored on a 0-4 scale, 0 representing the patients inability to perform the task and 4 representing independence. The scores of each section are added together for a total score of 56. The higher the patients score, the more independent the patient is. Any score below 45 indicates increased risk for falls. Score 56 55-45 44-34 33-23 22-12 11-1 0   Modifier CH CI CJ CK CL CM CN     Tool Used: Timed Up and Go (TUG)  Score:  Initial: 13.5 seconds Most Recent: 10 seconds (Date: 6/18/18 )   Interpretation of Score: The test measures, in seconds, the time taken by an individual to stand up from a standard arm chair (seat height 46 cm [18 in], arm height 65 cm [25.6 in]), walk a distance of 3 meters (118 in, approx 10 ft), turn, walk back to the chair and sit down. If the individual takes longer than 14 seconds to complete TUG, this indicates risk for falls. Score 7 7.5-10.5 11-14 14.5-17.5 18-21 21.5-24.5 25+   Modifier CH CI CJ CK CL CM CN     ? Mobility - Walking and Moving Around:     - CURRENT STATUS: CI - 1%-19% impaired, limited or restricted    - GOAL STATUS: CI - 1%-19% impaired, limited or restricted    - D/C STATUS:  ---------------To be determined---------------      Medical Necessity:   · Patient is expected to demonstrate progress in balance, coordination and functional technique to improve safety during ADLs and IADLs. · Patient demonstrates good rehab potential due to higher previous functional level. Reason for Services/Other Comments:  · Patient continues to require modification of therapeutic interventions to increase complexity of exercises.    Use of outcome tool(s) and clinical judgement create a POC that gives a: Questionable prediction of patient's progress: MODERATE COMPLEXITY   TREATMENT:   (In addition to Assessment/Re-Assessment sessions the following treatments were rendered)  Pre Treatment Symptoms: patient report she fell on her knee 2 weeks ago. It is feeling much better now. No current pain. Therapeutic Exercise: (  55 minutes ):  Exercises per grid below to improve mobility, balance and coordination. Required moderate visual, verbal and tactile cues to promote proper body alignment and promote proper body mechanics. Progressed complexity of movement as indicated. Date:  5/23/18 Date:  5/30/18 Date:  6/1/18 Date:  6/18/18   Activity/Exercise Parameters  Parameters Parameters Parameters   NuStep X 10 minutes level 3 X 10 minutes level 4  X 10 minutes level 4 X 10 minutes level 4    Calf stretch on incline board  X 60 sec hold     Heel raises with 3 second hold X 20 reps X 30 reps     HS stretch   X 30 sec B X 30 sec B    LTR X 15 reps B with arms out to the side X 15 reps B with arms out to the side X 15 reps B with arms out to the side X 15 reps B with arms out to the side    Lunging into bosu working on BIG push off 2 x 10 reps B  X 20 reps B with good movement quality X 20 reps B good ability X 20 reps B   clams    X 15 reps B    Side lunging into bosu   2 x 10 reps B X 20 reps lateral X 15 reps B lateral    Punching bag holding 1 kg balls       PWR marching X 48' with no cuing needed X 100' with very minimal cues X 76' with good ability Breaking up movement into stand and then walking working on time. More cues needed today.     LSVT seated floor to ceiling        PWR seated reach       LSVT lateral step outs       LSVT forward step outs       BIG walking Forward and backwards x 4 minutes blocked practice  X 200' working on turns 2 x 200' with better turns- some difficulty with quick stops- difficulty maintaining gait with dual task   LSVT twist   X 10 reps B with min A at times    PWR supine reaching        4 x 4  X 5 reps with arm swing- cues to decrease # of steps     Shuttle walk for direction changes  3 cones x 3 reps with improving speed of direction change 3 cones x 3 reps with cues for step clearance    Prone quad stretch  X 30 sec B     Hip flexor stretch  Sidelying 2 x 60 sec hold Sidelying 2 x 60 sec hold Sidelying 2 x 60 sec hold  Sidelying 2 x 60 sec hold B   grapevine  X 30' each way with min A for balance      [place] walking on flat dots to increased step size Step and hold x 10 dots, 4 reps, min A for balance at times      rockerboard       Bird dogs X 10 reps B- good ability       Treadmill  X 5 minutes speed 1.8 mph with B hand hold and cues for gait speed X 5 minutes speed 1.8 mph with cues for step length X 2.5 minutes speed 1.8 with hand hold  X 2.5 minutes speed 1.4 with no hand hold- cues needed to maintain step length         Treatment/Session Assessment:   See assessment above. · Pain/ Symptoms: Initial:   0/10 Post Session:  0/10 ·   Compliance with Program/Exercises: Will assess as treatment progresses. · Recommendations/Intent for next treatment session: \"Next visit will focus on advancements to more challenging activities and reduction in assistance provided\".   Total Treatment Duration:   PT Patient Time In/Time Out  Time In: 1100  Time Out: [address], DPT

## 2018-06-20 ENCOUNTER — HOSPITAL ENCOUNTER (OUTPATIENT)
Dept: PHYSICAL THERAPY | Age: 78
Discharge: HOME | End: 2018-06-20
Payer: MEDICARE

## 2018-06-20 PROCEDURE — 97110 THERAPEUTIC EXERCISES: CPT

## 2018-06-20 NOTE — PROGRESS NOTES
Yan Flores  : 1940  [place]  [place] [facility], [address]  [phone]   [phone]         OUTPATIENT PHYSICAL THERAPY:Daily Note 2018    ICD-10: Treatment Diagnosis: Unsteadiness on feet (R26.81)  Precautions/Allergies:   Latex; Ceftin [cefuroxime axetil]; and Sulfa (sulfonamide antibiotics)   Fall Risk Score: 5 (? 5 = High Risk)  MD Orders: evaluate and treat MEDICAL/REFERRING DIAGNOSIS:  Presence of cerebrospinal fluid drainage device [Z98.2]  Unspecified abnormalities of gait and mobility [R26.9]  (Idiopathic) normal pressure hydrocephalus [G91.2]   DATE OF ONSET: few months ago  REFERRING PHYSICIAN: Anthony Jimenez MD  RETURN PHYSICIAN APPOINTMENT: no more follow up scheduled      ASSESSMENT (Date: 18):  Ms. Tori Brady has been seen in physical therapy for 21 visits since 3/21/18 s/p shunt placement for NPH. She has met 3/3 of her short term goals and 5/6 of her long term goals. She has demonstrated great improvements in balance and functional mobility. Her gait has improved with improved step length, arm swing, and gait speed. She still ambulates with flexed knees and widen WILLY. She has difficulty maintaining her gait pattern with dual task. She would benefit from 2 more weeks of skilled PT to improve her functional mobility in order to return to her PLOF. PROBLEM LIST (Impacting functional limitations):  1. Decreased Strength  2. Decreased ADL/Functional Activities  3. Decreased Transfer Abilities  4. Decreased Ambulation Ability/Technique  5. Decreased Balance  6. Decreased Activity Tolerance  7. Decreased [place] with Home Exercise Program INTERVENTIONS PLANNED:  1. Balance Exercise  2. Family Education  3. Gait Training  4. Heat  5. Home Exercise Program (HEP)  6. Manual Therapy  7.  Neuromuscular Re-education/Strengthening  8. Therapeutic Activites  9. Therapeutic Exercise/Strengthening  10. Transfer Training   TREATMENT PLAN:  Effective Dates: 6/18/18 to 7/4/18. Frequency/Duration: 2 times a week for 2 weeks  GOALS: (Goals have been discussed and agreed upon with patient.)  Short-Term Functional Goals: Time Frame: 6 weeks  1. Patient will be compliant with HEP. MET  2. Patient will have an increase on the tinajero balance to 46/56 in order to improve her functional balance. MET  3. Patient will have a decrease on the TUG to 12 seconds indicating improved gait mechanics. MET  Discharge Goals: Time Frame: 12 weeks  1. Patient will be independent with HEP. NOT MET  2. Patient will have an increase on the tinajero balance to 48/56 in order to decrease her risk of falls. MET  3. Patient will have a decrease on the TUG to less than 11 seconds in order to improve her functional mobility. MET  4. Patient will demonstrate turning with 4 steps or less indicating improve movement patterns. MET  5. Patient will have an increase on the 6 minute walk test to 1100' indicating improve community mobility. MET   6. NEW:  Patient will complete the dual task TUG in less than 11 seconds indicating improved ability to multitask. Rehabilitation Potential For Stated Goals: Good  Regarding Jayla Greenfield's therapy, I certify that the treatment plan above will be carried out by a therapist or under their direction. Thank you for this referral,  Joel Prieto, DPT, NCS                 HISTORY:   Patient Stated Goal:        I want to be back to normal  History of Present Injury/Illness (Reason for Referral):  March 2018\" Shunt placement on the R side March 6th. Goes back to Dr. Bertha Ceballos on Monday.  is happy with her progress. She says she is now able to back up easier and her stride is getting longer. Sleeps with her head slightly elevated. Has been driving since surgery. Has not been using a walker since she went home.    Dec 2017:   Patient reports she went to [facility] and they confirmed Dr. Savi Recio diagnosis of NPH. They are going to try diamox for 60 days which is a diuretic. At that time, they will send a video to  [email or web address] [facility] and they will decide see if they need to do surgery. Feb 15th is 60 days. Nathan said to do PT in the meantime to help with mobility. She reports no falls but says she is getting more fearful of falling. Patient reports still having some memory issues and some incontinence as well as gait difficulties. Past Medical History/Comorbidities:   Ms. Andrey Ang  has a past medical history of CAD (coronary artery disease) (4/26/2016); Gait disorder (4/26/2016); Hypertension; Hypertriglyceridemia (4/26/2016); Psychotic disorder; and Small vessel disease, cerebrovascular (4/26/2016). Ms. Andrey Ang  has no past surgical history on file. Social History/Living Environment:     lives with   Prior Level of Function/Work/Activity:  Retired. Use to exercises 3 days a week but is currently unable to    Current Medications:    Current Outpatient Prescriptions:     atenolol (TENORMIN) 50 mg tablet, TAKE 2 TABLETS BY MOUTH DAILY, Disp: 60 Tab, Rfl: 3    triamterene-hydroCHLOROthiazide (MAXZIDE) 37.5-25 mg per tablet, TAKE 1 TABLET BY MOUTH TWICE DAILY, Disp: 180 Tab, Rfl: 3    pantoprazole (PROTONIX) 40 mg tablet, Take 1 Tab by mouth daily. , Disp: 30 Tab, Rfl: 5    doxycycline (MONODOX) 100 mg capsule, Take 1 Cap by mouth two (2) times a day., Disp: 20 Cap, Rfl: 0    FLUoxetine (PROZAC) 20 mg tablet, TAKE ONE CAPSULE BY MOUTH EVERY DAY, Disp: 30 Tab, Rfl: 5    conjugated estrogens (PREMARIN) 0.625 mg tablet, TAKE 1 TABLET BY MOUTH EVERY DAY, Disp: 30 Tab, Rfl: 5    triamterene-hydroCHLOROthiazide (MAXZIDE) 37.5-25 mg per tablet, TAKE 1 TABLET BY MOUTH TWICE DAILY, Disp: 180 Tab, Rfl: 3    niacinamide 500 mg tablet, TK 1 T PO  BID, Disp: , Rfl: 3    fluticasone (FLONASE) 50 mcg/actuation nasal spray, nightly., Disp: , Rfl:    Diamox     Date Last Reviewed:  6/20/2018   Number of Personal Factors/Comorbidities that affect the Plan of Care: 1-2: MODERATE COMPLEXITY   EXAMINATION:   Observation/Orthostatic Postural Assessment: Forward head, rounded shoulders. Decreased trunk mobility with gait. Stands with wide WILLY  Mental Status:          Functional but does report some episodes of memory issues  Palpation:          No increased muscle tone noted  Sensation:         intact  Skin Integrity:          intact  Vision:          Functional   Balance:          Good static balance, decreased dynamic balance     Lower Extremity:   Strength PROM   Action R L R  L    Hip Flexion 4 4+     Hip Extension 4 4+     Hip Abduction 4 4+     Hip Adduction       Knee Flexion       Knee Extension 4+ 4+     Dorsi Flexion 4+ 4+     Plantar Flexion 4 4     Inversion       Eversion                 Upper Extremity:         Grossly 4+/5  Functional Mobility:         Gait/Ambulation:  Ambulates with improved but still slower gait speed, less shuffling gait pattern and better heel strike, wide WILLY, flexed knees, some toe out B, improved step length   1200' today in 6 minute walk         Transfers:  Slow, able to stand without UE support, wide WILLY. Turning in 3 -4 steps        Bed Mobility:  Functional         Stairs:  NT        Wheelchair:  NT              Body Structures Involved:  1. Nerves  2. Bones  3. Joints  4. Muscles Body Functions Affected:  1. Mental  2. Genitourinary  3. Neuromusculoskeletal  4. Movement Related Activities and Participation Affected:  1. Mobility  2. Self Care  3. Domestic Life  4. Interpersonal Interactions and Relationships   Number of elements that affect the Plan of Care: 4+: HIGH COMPLEXITY   CLINICAL PRESENTATION:   Presentation: Evolving clinical presentation with changing clinical characteristics: MODERATE COMPLEXITY   CLINICAL DECISION MAKING:   Outcome Measure:    Tool Used: Akhil Home Balance Scale  Score:  Initial: 44/56 Most Recent: 51/56 (Date: 5/16/18 )   Interpretation of Score: Each section is scored on a 0-4 scale, 0 representing the patients inability to perform the task and 4 representing independence. The scores of each section are added together for a total score of 56. The higher the patients score, the more independent the patient is. Any score below 45 indicates increased risk for falls. Score 56 55-45 44-34 33-23 22-12 11-1 0   Modifier CH CI CJ CK CL CM CN     Tool Used: Timed Up and Go (TUG)  Score:  Initial: 13.5 seconds Most Recent: 10 seconds (Date: 6/18/18 )   Interpretation of Score: The test measures, in seconds, the time taken by an individual to stand up from a standard arm chair (seat height 46 cm [18 in], arm height 65 cm [25.6 in]), walk a distance of 3 meters (118 in, approx 10 ft), turn, walk back to the chair and sit down. If the individual takes longer than 14 seconds to complete TUG, this indicates risk for falls. Score 7 7.5-10.5 11-14 14.5-17.5 18-21 21.5-24.5 25+   Modifier CH CI CJ CK CL CM CN     ? Mobility - Walking and Moving Around:     - CURRENT STATUS: CI - 1%-19% impaired, limited or restricted    - GOAL STATUS: CI - 1%-19% impaired, limited or restricted    - D/C STATUS:  ---------------To be determined---------------      Medical Necessity:   · Patient is expected to demonstrate progress in balance, coordination and functional technique to improve safety during ADLs and IADLs. · Patient demonstrates good rehab potential due to higher previous functional level. Reason for Services/Other Comments:  · Patient continues to require modification of therapeutic interventions to increase complexity of exercises.    Use of outcome tool(s) and clinical judgement create a POC that gives a: Questionable prediction of patient's progress: MODERATE COMPLEXITY   TREATMENT:   (In addition to Assessment/Re-Assessment sessions the following treatments were rendered)  Pre Treatment Symptoms: patient report she fell on her knee 2 weeks ago. It is feeling much better now. No current pain. Therapeutic Exercise: (  40 minutes ):  Exercises per grid below to improve mobility, balance and coordination. Required moderate visual, verbal and tactile cues to promote proper body alignment and promote proper body mechanics. Progressed complexity of movement as indicated. Date:  5/30/18 Date:  6/1/18 Date:  6/18/18 Date:  6/20/18   Activity/Exercise Parameters Parameters Parameters Parameters   NuStep X 10 minutes level 4  X 10 minutes level 4 X 10 minutes level 4  X 10 minutes level 3   Calf stretch on incline board X 60 sec hold      Heel raises with 3 second hold X 30 reps      HS stretch  X 30 sec B X 30 sec B     LTR X 15 reps B with arms out to the side X 15 reps B with arms out to the side X 15 reps B with arms out to the side  X 15 reps B with arms out to the side    Lunging into bosu working on BIG push off X 20 reps B with good movement quality X 20 reps B good ability X 20 reps B    clams   X 15 reps B  X 20 reps B   Side lunging into bosu  2 x 10 reps B X 20 reps lateral X 15 reps B lateral     Punching bag holding 1 kg balls       PWR marching X 100' with very minimal cues X 76' with good ability Breaking up movement into stand and then walking working on time. More cues needed today. With less cuing today.   Dual tasking with coversation- more cues needed   LSVT seated floor to ceiling        PWR seated reach       LSVT lateral step outs    With gait 3 x 50' with cues for timing    LSVT forward step outs       BIG walking  X 200' working on turns 2 x 200' with better turns- some difficulty with quick stops- difficulty maintaining gait with dual task 2 x 200' with cues to maintain arm swing with dual task    LSVT twist  X 10 reps B with min A at times     PWR supine reaching        4 x 4 X 5 reps with arm swing- cues to decrease # of steps      Shuttle walk for direction changes 3 cones x 3 reps with improving speed of direction change 3 cones x 3 reps with cues for step clearance     Prone quad stretch X 30 sec B      Hip flexor stretch  Sidelying 2 x 60 sec hold Sidelying 2 x 60 sec hold  Sidelying 2 x 60 sec hold B Sidelying 2 x 60 sec hold B   g[place]vine        [place] walking on flat dots to increased step size       rockerboard       Bird dogs       Treadmill X 5 minutes speed 1.8 mph with B hand hold and cues for gait speed X 5 minutes speed 1.8 mph with cues for step length X 2.5 minutes speed 1.8 with hand hold  X 2.5 minutes speed 1.4 with no hand hold- cues needed to maintain step length X 5 minutes speed 1.6 progressing to no hand hold. Dual task with conversation          Treatment/Session Assessment:    Patient did well maintaining gait speed with gait but had difficulty maintaining arm swing with dual task. She continues to benefit from skilled PT to improve mobility. · Pain/ Symptoms: Initial:   0/10 Post Session:  0/10 ·   Compliance with Program/Exercises: Will assess as treatment progresses. · Recommendations/Intent for next treatment session: \"Next visit will focus on advancements to more challenging activities and reduction in assistance provided\".   Total Treatment Duration:   PT Patient Time In/Time Out  Time In: 1100  Time Out: 2005 GURJIT Feliciano DPT

## 2018-06-25 ENCOUNTER — HOSPITAL ENCOUNTER (OUTPATIENT)
Dept: PHYSICAL THERAPY | Age: 78
Discharge: HOME | End: 2018-06-25
Payer: MEDICARE

## 2018-06-25 PROCEDURE — 97110 THERAPEUTIC EXERCISES: CPT

## 2018-06-25 NOTE — PROGRESS NOTES
Marisol Lind  : 1940  [place]  [place] [facility] [address], [address] [facility], [address]  [phone]   [phone]         OUTPATIENT PHYSICAL THERAPY:Daily Note 2018    ICD-10: Treatment Diagnosis: Unsteadiness on feet (R26.81)  Precautions/Allergies:   Latex; Ceftin [cefuroxime axetil]; and Sulfa (sulfonamide antibiotics)   Fall Risk Score: 5 (? 5 = High Risk)  MD Orders: evaluate and treat MEDICAL/REFERRING DIAGNOSIS:  Presence of cerebrospinal fluid drainage device [Z98.2]  Unspecified abnormalities of gait and mobility [R26.9]  (Idiopathic) normal pressure hydrocephalus [G91.2]   DATE OF ONSET: few months ago  REFERRING PHYSICIAN: Alisa Zapata MD  RETURN PHYSICIAN APPOINTMENT: no more follow up scheduled      ASSESSMENT (Date: 18):  Ms. Yoav Bailey has been seen in physical therapy for 21 visits since 3/21/18 s/p shunt placement for NPH. She has met 3/3 of her short term goals and 5/6 of her long term goals. She has demonstrated great improvements in balance and functional mobility. Her gait has improved with improved step length, arm swing, and gait speed. She still ambulates with flexed knees and widen WILLY. She has difficulty maintaining her gait pattern with dual task. She would benefit from 2 more weeks of skilled PT to improve her functional mobility in order to return to her PLOF. PROBLEM LIST (Impacting functional limitations):  1. Decreased Strength  2. Decreased ADL/Functional Activities  3. Decreased Transfer Abilities  4. Decreased Ambulation Ability/Technique  5. Decreased Balance  6. Decreased Activity Tolerance  7. Decreased [place] with Home Exercise Program INTERVENTIONS PLANNED:  1. Balance Exercise  2. Family Education  3. Gait Training  4. Heat  5. Home Exercise Program (HEP)  6. Manual Therapy  7.  Neuromuscular Re-education/Strengthening  8. Therapeutic Activites  9. Therapeutic Exercise/Strengthening  10. Transfer Training   TREATMENT PLAN:  Effective Dates: 6/18/18 to 7/4/18. Frequency/Duration: 2 times a week for 2 weeks  GOALS: (Goals have been discussed and agreed upon with patient.)  Short-Term Functional Goals: Time Frame: 6 weeks  1. Patient will be compliant with HEP. MET  2. Patient will have an increase on the tinajero balance to 46/56 in order to improve her functional balance. MET  3. Patient will have a decrease on the TUG to 12 seconds indicating improved gait mechanics. MET  Discharge Goals: Time Frame: 12 weeks  1. Patient will be independent with HEP. NOT MET  2. Patient will have an increase on the tinajero balance to 48/56 in order to decrease her risk of falls. MET  3. Patient will have a decrease on the TUG to less than 11 seconds in order to improve her functional mobility. MET  4. Patient will demonstrate turning with 4 steps or less indicating improve movement patterns. MET  5. Patient will have an increase on the 6 minute walk test to 1100' indicating improve community mobility. MET   6. NEW:  Patient will complete the dual task TUG in less than 11 seconds indicating improved ability to multitask. Rehabilitation Potential For Stated Goals: Good  Regarding Jayla Greenfield's therapy, I certify that the treatment plan above will be carried out by a therapist or under their direction. Thank you for this referral,  Baldwin Closs, DPT, NCS                 HISTORY:   Patient Stated Goal:        I want to be back to normal  History of Present Injury/Illness (Reason for Referral):  March 2018\" Shunt placement on the R side March 6th. Goes back to Dr. Christi Garcia on Monday.  is happy with her progress. She says she is now able to back up easier and her stride is getting longer. Sleeps with her head slightly elevated. Has been driving since surgery. Has not been using a walker since she went home.    Dec 2017:   Patient reports she went to [facility] and they confirmed Dr. Dottie Levin diagnosis of NPH. They are going to try diamox for 60 days which is a diuretic. At that time, they will send a video to [facility] and they will decide see if they need to do surgery. Feb 15th is 60 days. Nathan said to do PT in the meantime to help with mobility. She reports no falls but says she is getting more fearful of falling. Patient reports still having some memory issues and some incontinence as well as gait difficulties. Past Medical History/Comorbidities:   Ms. Prema Alvarez  has a past medical history of CAD (coronary artery disease) (4/26/2016); Gait disorder (4/26/2016); Hypertension; Hypertriglyceridemia (4/26/2016); Psychotic disorder; and Small vessel disease, cerebrovascular (4/26/2016). Ms. Prema Alvarez  has no past surgical history on file. Social History/Living Environment:     lives with   Prior Level of Function/Work/Activity:  Retired. Use to exercises 3 days a week but is currently unable to    Current Medications:    Current Outpatient Prescriptions:     atenolol (TENORMIN) 50 mg tablet, TAKE 2 TABLETS BY MOUTH DAILY, Disp: 60 Tab, Rfl: 3    triamterene-hydroCHLOROthiazide (MAXZIDE) 37.5-25 mg per tablet, TAKE 1 TABLET BY MOUTH TWICE DAILY, Disp: 180 Tab, Rfl: 3    pantoprazole (PROTONIX) 40 mg tablet, Take 1 Tab by mouth daily. , Disp: 30 Tab, Rfl: 5    doxycycline (MONODOX) 100 mg capsule, Take 1 Cap by mouth two (2) times a day., Disp: 20 Cap, Rfl: 0    FLUoxetine (PROZAC) 20 mg tablet, TAKE ONE CAPSULE BY MOUTH EVERY DAY, Disp: 30 Tab, Rfl: 5    conjugated estrogens (PREMARIN) 0.625 mg tablet, TAKE 1 TABLET BY MOUTH EVERY DAY, Disp: 30 Tab, Rfl: 5    triamterene-hydroCHLOROthiazide (MAXZIDE) 37.5-25 mg per tablet, TAKE 1 TABLET BY MOUTH TWICE DAILY, Disp: 180 Tab, Rfl: 3    niacinamide 500 mg tablet, TK 1 T PO  BID, Disp: , Rfl: 3    fluticasone (FLONASE) 50 mcg/actuation nasal spray, nightly., Disp: , Rfl:    Diamox     Date Last Reviewed:  6/25/2018   Number of Personal Factors/Comorbidities that affect the Plan of Care: 1-2: MODERATE COMPLEXITY   EXAMINATION:   Observation/Orthostatic Postural Assessment: Forward head, rounded shoulders. Decreased trunk mobility with gait. Stands with wide WILLY  Mental Status:          Functional but does report some episodes of memory issues  Palpation:          No increased muscle tone noted  Sensation:         intact  Skin Integrity:          intact  Vision:          Functional   Balance:          Good static balance, decreased dynamic balance     Lower Extremity:   Strength PROM   Action R L R  L    Hip Flexion 4 4+     Hip Extension 4 4+     Hip Abduction 4 4+     Hip Adduction       Knee Flexion       Knee Extension 4+ 4+     Dorsi Flexion 4+ 4+     Plantar Flexion 4 4     Inversion       Eversion                 Upper Extremity:         Grossly 4+/5  Functional Mobility:         Gait/Ambulation:  Ambulates with improved but still slower gait speed, less shuffling gait pattern and better heel strike, wide WILLY, flexed knees, some toe out B, improved step length   1200' today in 6 minute walk         Transfers:  Slow, able to stand without UE support, wide WILLY. Turning in 3 -4 steps        Bed Mobility:  Functional         Stairs:  NT        Wheelchair:  NT              Body Structures Involved:  1. Nerves  2. Bones  3. Joints  4. Muscles Body Functions Affected:  1. Mental  2. Genitourinary  3. Neuromusculoskeletal  4. Movement Related Activities and Participation Affected:  1. Mobility  2. Self Care  3. Domestic Life  4. Interpersonal Interactions and Relationships   Number of elements that affect the Plan of Care: 4+: HIGH COMPLEXITY   CLINICAL PRESENTATION:   Presentation: Evolving clinical presentation with changing clinical characteristics: MODERATE COMPLEXITY   CLINICAL DECISION MAKING:   Outcome Measure:    Tool Used: Iván Forward Balance Scale  Score:  Initial: 44/56 Most Recent: 51/56 (Date: 5/16/18 )   Interpretation of Score: Each section is scored on a 0-4 scale, 0 representing the patients inability to perform the task and 4 representing independence. The scores of each section are added together for a total score of 56. The higher the patients score, the more independent the patient is. Any score below 45 indicates increased risk for falls. Score 56 55-45 44-34 33-23 22-12 11-1 0   Modifier CH CI CJ CK CL CM CN     Tool Used: Timed Up and Go (TUG)  Score:  Initial: 13.5 seconds Most Recent: 10 seconds (Date: 6/18/18 )   Interpretation of Score: The test measures, in seconds, the time taken by an individual to stand up from a standard arm chair (seat height 46 cm [18 in], arm height 65 cm [25.6 in]), walk a distance of 3 meters (118 in, approx 10 ft), turn, walk back to the chair and sit down. If the individual takes longer than 14 seconds to complete TUG, this indicates risk for falls. Score 7 7.5-10.5 11-14 14.5-17.5 18-21 21.5-24.5 25+   Modifier CH CI CJ CK CL CM CN     ? Mobility - Walking and Moving Around:     - CURRENT STATUS: CI - 1%-19% impaired, limited or restricted    - GOAL STATUS: CI - 1%-19% impaired, limited or restricted    - D/C STATUS:  ---------------To be determined---------------      Medical Necessity:   · Patient is expected to demonstrate progress in balance, coordination and functional technique to improve safety during ADLs and IADLs. · Patient demonstrates good rehab potential due to higher previous functional level. Reason for Services/Other Comments:  · Patient continues to require modification of therapeutic interventions to increase complexity of exercises.    Use of outcome tool(s) and clinical judgement create a POC that gives a: Questionable prediction of patient's progress: MODERATE COMPLEXITY   TREATMENT:   (In addition to Assessment/Re-Assessment sessions the following treatments were rendered)  Pre Treatment Symptoms: patient reports doing well today. Says she walked about 3 miles this weekend so she is stiff from that. Therapeutic Exercise: (  40 minutes ):  Exercises per grid below to improve mobility, balance and coordination. Required moderate visual, verbal and tactile cues to promote proper body alignment and promote proper body mechanics. Progressed complexity of movement as indicated. Date:  6/1/18 Date:  6/18/18 Date:  6/20/18 Date:  6/25/18   Activity/Exercise Parameters Parameters Parameters Parameters    NuStep X 10 minutes level 4 X 10 minutes level 4  X 10 minutes level 3 X 10 minute level 3   Runners stretch on wall    X 30 sec hold B for calf stretch  X 30 sec hold B for hip flexor stretch    Heel raises with 3 second hold       HS stretch X 30 sec B X 30 sec B      LTR X 15 reps B with arms out to the side X 15 reps B with arms out to the side  X 15 reps B with arms out to the side  X 15 reps B with arms out to the side   Lunging into bosu working on BIG push off X 20 reps B good ability X 20 reps B     clams  X 15 reps B  X 20 reps B    Side lunging into bosu  X 20 reps lateral X 15 reps B lateral      Punching bag holding 1 kg balls       PWR marching X 76' with good ability Breaking up movement into stand and then walking working on time. More cues needed today. With less cuing today.   Dual tasking with coversation- more cues needed 2 x 50', able to self correct    LSVT seated floor to ceiling        PWR seated reach       LSVT lateral step outs   With gait 3 x 50' with cues for timing  X 30' with gait, improved timing with cues   LSVT forward step outs       BIG walking X 200' working on turns 2 x 200' with better turns- some difficulty with quick stops- difficulty maintaining gait with dual task 2 x 200' with cues to maintain arm swing with dual task  2 x 200' with conversation for dual task   LSVT twist X 10 reps B with min A at times   X 10 reps B   PWR supine reaching 4 x 4       Shuttle walk for direction changes 3 cones x 3 reps with cues for step clearance      Prone quad stretch       Hip flexor stretch  Sidelying 2 x 60 sec hold  Sidelying 2 x 60 sec hold B Sidelying 2 x 60 sec hold B Sidelying 2 x 60 sec hold B   grapevine        [place] walking on flat dots to increased step size       rockerboard       Bird dogs       Treadmill X 5 minutes speed 1.8 mph with cues for step length X 2.5 minutes speed 1.8 with hand hold  X 2.5 minutes speed 1.4 with no hand hold- cues needed to maintain step length X 5 minutes speed 1.6 progressing to no hand hold. Dual task with conversation           Treatment/Session Assessment:    Patient was educated on home exercises to continue working on at discharge from PT. Will likely DC at next visit. · Pain/ Symptoms: Initial:   0/10 Post Session:  0/10 ·   Compliance with Program/Exercises: Will assess as treatment progresses. · Recommendations/Intent for next treatment session: \"Next visit will focus on advancements to more challenging activities and reduction in assistance provided\".   Total Treatment Duration:   PT Patient Time In/Time Out  Time In: 1030  Time Out: Jules 40, DPT

## 2018-06-27 ENCOUNTER — HOSPITAL ENCOUNTER (OUTPATIENT)
Dept: PHYSICAL THERAPY | Age: 78
Discharge: HOME | End: 2018-06-27
Payer: MEDICARE

## 2018-06-27 PROCEDURE — G8979 MOBILITY GOAL STATUS: HCPCS

## 2018-06-27 PROCEDURE — 97110 THERAPEUTIC EXERCISES: CPT

## 2018-06-27 PROCEDURE — G8978 MOBILITY CURRENT STATUS: HCPCS

## 2018-06-27 NOTE — PROGRESS NOTES
Ezella Mortimer  : 1940  [place]  Secondary: [address], [place] [address] [facility]  [phone]   [phone]         OUTPATIENT PHYSICAL THERAPY:Discharge 2018    ICD-10: Treatment Diagnosis: Unsteadiness on feet (R26.81)  Precautions/Allergies:   Latex; Ceftin [cefuroxime axetil]; and Sulfa (sulfonamide antibiotics)   Fall Risk Score: 5 (? 5 = High Risk)  MD Orders: evaluate and treat MEDICAL/REFERRING DIAGNOSIS:  Presence of cerebrospinal fluid drainage device [Z98.2]  Unspecified abnormalities of gait and mobility [R26.9]  (Idiopathic) normal pressure hydrocephalus [G91.2]   DATE OF ONSET: few months ago  REFERRING PHYSICIAN: Cristofer Hardy MD  RETURN PHYSICIAN APPOINTMENT: no more follow up scheduled      ASSESSMENT (Date: 2718):  Ms. Jenny Bryson has been seen in physical therapy for 24 visits since 3/21/18 s/p shunt placement for NPH. She has met 3/3 of her short term goals and 6/6 of her long term goals. She has demonstrated great improvements in balance and functional mobility. She has significantly improved gait speed, gait mechanics, and normalized her movement patterns. She continues to ambulate with slightly flexed knees, wide WILLY, and decreased trunk mobility but she reports this as a chronic problem. She will be discharged at this time 2° meeting her goals and being independent with her HEP. PROBLEM LIST (Impacting functional limitations):  1. Decreased Strength  2. Decreased ADL/Functional Activities  3. Decreased Transfer Abilities  4. Decreased Ambulation Ability/Technique  5. Decreased Balance  6. Decreased Activity Tolerance  7. Decreased J[place] with Home Exercise Program INTERVENTIONS PLANNED:  1. Balance Exercise  2. Family Education  3. Gait Training  4. Heat  5. Home Exercise Program (HEP)  6. Manual Therapy  7.  Neuromuscular Re-education/Strengthening  8. Therapeutic Activites  9. Therapeutic Exercise/Strengthening  10. Transfer Training   TREATMENT PLAN:  Effective Dates: 6/18/18 to 7/4/18. Frequency/Duration: 2 times a week for 2 weeks  GOALS: (Goals have been discussed and agreed upon with patient.)  Short-Term Functional Goals: Time Frame: 6 weeks  1. Patient will be compliant with HEP. MET  2. Patient will have an increase on the tinajero balance to 46/56 in order to improve her functional balance. MET  3. Patient will have a decrease on the TUG to 12 seconds indicating improved gait mechanics. MET  Discharge Goals: Time Frame: 12 weeks  1. Patient will be independent with HEP. MET  2. Patient will have an increase on the tinajero balance to 48/56 in order to decrease her risk of falls. MET  3. Patient will have a decrease on the TUG to less than 11 seconds in order to improve her functional mobility. MET  4. Patient will demonstrate turning with 4 steps or less indicating improve movement patterns. MET  5. Patient will have an increase on the 6 minute walk test to 1100' indicating improve community mobility. MET   6. NEW:  Patient will complete the dual task TUG in less than 11 seconds indicating improved ability to multitask. MET  Rehabilitation Potential For Stated Goals: Good  Regarding Jayla Abrahamrick's therapy, I certify that the treatment plan above will be carried out by a therapist or under their direction. Thank you for this referral,  Tash Williamson DPT, NCS                 HISTORY:   Patient Stated Goal:        I want to be back to normal  History of Present Injury/Illness (Reason for Referral):  March 2018\" Shunt placement on the R side March 6th. Goes back to Dr. Stefani Quintero on Monday.  is happy with her progress. She says she is now able to back up easier and her stride is getting longer. Sleeps with her head slightly elevated. Has been driving since surgery. Has not been using a walker since she went home.    Dec 2017:   Patient reports she went to [facility] and they confirmed Dr. Rocky Payne diagnosis of NPH. They are going to try diamox for 60 days which is a diuretic. At that time, they will send a video to [facility] and they will decide see if they need to do surgery. Feb 15th is 60 days. Nathan said to do PT in the meantime to help with mobility. She reports no falls but says she is getting more fearful of falling. Patient reports still having some memory issues and some incontinence as well as gait difficulties. Past Medical History/Comorbidities:   Ms. Theresa Greenberg  has a past medical history of CAD (coronary artery disease) (4/26/2016); Gait disorder (4/26/2016); Hypertension; Hypertriglyceridemia (4/26/2016); Psychotic disorder; and Small vessel disease, cerebrovascular (4/26/2016). Ms. Theresa Greenberg  has no past surgical history on file. Social History/Living Environment:     lives with   Prior Level of Function/Work/Activity:  Retired. Use to exercises 3 days a week but is currently unable to    Current Medications:    Current Outpatient Prescriptions:     atenolol (TENORMIN) 50 mg tablet, TAKE 2 TABLETS BY MOUTH DAILY, Disp: 60 Tab, Rfl: 3    triamterene-hydroCHLOROthiazide (MAXZIDE) 37.5-25 mg per tablet, TAKE 1 TABLET BY MOUTH TWICE DAILY, Disp: 180 Tab, Rfl: 3    pantoprazole (PROTONIX) 40 mg tablet, Take 1 Tab by mouth daily. , Disp: 30 Tab, Rfl: 5    doxycycline (MONODOX) 100 mg capsule, Take 1 Cap by mouth two (2) times a day., Disp: 20 Cap, Rfl: 0    FLUoxetine (PROZAC) 20 mg tablet, TAKE ONE CAPSULE BY MOUTH EVERY DAY, Disp: 30 Tab, Rfl: 5    conjugated estrogens (PREMARIN) 0.625 mg tablet, TAKE 1 TABLET BY MOUTH EVERY DAY, Disp: 30 Tab, Rfl: 5    triamterene-hydroCHLOROthiazide (MAXZIDE) 37.5-25 mg per tablet, TAKE 1 TABLET BY MOUTH TWICE DAILY, Disp: 180 Tab, Rfl: 3    niacinamide 500 mg tablet, TK 1 T PO  BID, Disp: , Rfl: 3    fluticasone (FLONASE) 50 mcg/actuation nasal spray, nightly., Disp: , Rfl:    Diamox     Date Last Reviewed:  6/27/2018   Number of Personal Factors/Comorbidities that affect the Plan of Care: 1-2: MODERATE COMPLEXITY   EXAMINATION:   Observation/Orthostatic Postural Assessment: Forward head, rounded shoulders. Some decreased trunk mobility with gait. Stands with wide WILLY  Mental Status:          Functional but does report some episodes of memory issues  Palpation:          No increased muscle tone noted  Sensation:         intact  Skin Integrity:          intact  Vision:          Functional   Balance:          Good static balance, decreased dynamic balance     Lower Extremity:   Strength PROM   Action R L R  L    Hip Flexion 4 4+     Hip Extension 4 4+     Hip Abduction 4 4+     Hip Adduction       Knee Flexion       Knee Extension 4+ 4+     Dorsi Flexion 4+ 4+     Plantar Flexion 4 4     Inversion       Eversion                 Upper Extremity:         Grossly 4+/5  Functional Mobility:         Gait/Ambulation:  Ambulates with mildly decreased gait speed, less shuffling gait pattern and better heel strike, wide WILLY, flexed knees, some toe out B, improved step length   1200' today in 6 minute walk         Transfers:  Slow, able to stand without UE support, wide WILLY. Turning in 3 -4 steps        Bed Mobility:  Functional         Stairs:  NT        Wheelchair:  NT              Body Structures Involved:  1. Nerves  2. Bones  3. Joints  4. Muscles Body Functions Affected:  1. Mental  2. Genitourinary  3. Neuromusculoskeletal  4. Movement Related Activities and Participation Affected:  1. Mobility  2. Self Care  3. Domestic Life  4. Interpersonal Interactions and Relationships   Number of elements that affect the Plan of Care: 4+: HIGH COMPLEXITY   CLINICAL PRESENTATION:   Presentation: Evolving clinical presentation with changing clinical characteristics: MODERATE COMPLEXITY   CLINICAL DECISION MAKING:   Outcome Measure:    Tool Used: Schwab Balance Scale  Score:  Initial: 44/56 Most Recent: 52/56 (Date: 6/27/18 )   Interpretation of Score: Each section is scored on a 0-4 scale, 0 representing the patients inability to perform the task and 4 representing independence. The scores of each section are added together for a total score of 56. The higher the patients score, the more independent the patient is. Any score below 45 indicates increased risk for falls. Score 56 55-45 44-34 33-23 22-12 11-1 0   Modifier CH CI CJ CK CL CM CN     Tool Used: Timed Up and Go (TUG)  Score:  Initial: 13.5 seconds Most Recent: 9.7 seconds (Date: 6/27/18 )   Interpretation of Score: The test measures, in seconds, the time taken by an individual to stand up from a standard arm chair (seat height 46 cm [18 in], arm height 65 cm [25.6 in]), walk a distance of 3 meters (118 in, approx 10 ft), turn, walk back to the chair and sit down. If the individual takes longer than 14 seconds to complete TUG, this indicates risk for falls. Score 7 7.5-10.5 11-14 14.5-17.5 18-21 21.5-24.5 25+   Modifier CH CI CJ CK CL CM CN     ? Mobility - Walking and Moving Around:     - CURRENT STATUS: CI - 1%-19% impaired, limited or restricted    - GOAL STATUS: CI - 1%-19% impaired, limited or restricted    - D/C STATUS:  CI - 1%-19% impaired, limited or restricted     Use of outcome tool(s) and clinical judgement create a POC that gives a: Questionable prediction of patient's progress: MODERATE COMPLEXITY   TREATMENT:   (In addition to Assessment/Re-Assessment sessions the following treatments were rendered)  Pre Treatment Symptoms: patient reports doing well today. Says she walked about 3 miles this weekend so she is stiff from that. Therapeutic Exercise: (  40 minutes ):  Exercises per grid below to improve mobility, balance and coordination. Required moderate visual, verbal and tactile cues to promote proper body alignment and promote proper body mechanics.   Progressed complexity of movement as indicated. Date:  6/18/18 Date:  6/20/18 Date:  6/25/18 Date:  6/27/18   Activity/Exercise Parameters Parameters Parameters  Parameters    NuStep X 10 minutes level 4  X 10 minutes level 3 X 10 minute level 3 X 10 minutes level 4   Runners stretch on wall   X 30 sec hold B for calf stretch  X 30 sec hold B for hip flexor stretch  X 30 sec hold B calf stretch  X 30 sec hold B for hip flexor stretch    Heel raises with 3 second hold       HS stretch X 30 sec B       LTR X 15 reps B with arms out to the side  X 15 reps B with arms out to the side  X 15 reps B with arms out to the side    Lunging into bosu working on BIG push off X 20 reps B      clams X 15 reps B  X 20 reps B     Side lunging into bosu  X 15 reps B lateral       Punching bag holding 1 kg balls       PWR marching Breaking up movement into stand and then walking working on time. More cues needed today. With less cuing today.   Dual tasking with coversation- more cues needed 2 x 50', able to self correct     LSVT seated floor to ceiling        PWR seated reach       LSVT lateral step outs  With gait 3 x 50' with cues for timing  X 30' with gait, improved timing with cues    LSVT forward step outs       BIG walking 2 x 200' with better turns- some difficulty with quick stops- difficulty maintaining gait with dual task 2 x 200' with cues to maintain arm swing with dual task  2 x 200' with conversation for dual task 2 x 200' with good ability   LSVT twist   X 10 reps B    PWR supine reaching        4 x 4       Shuttle walk for direction changes       Prone quad stretch       Hip flexor stretch  Sidelying 2 x 60 sec hold B Sidelying 2 x 60 sec hold B Sidelying 2 x 60 sec hold B Sidelying 3 x 60 sec hold B   grapevine        [place] walking on flat dots to increased step size       rockerboard       Bird dogs       Treadmill X 2.5 minutes speed 1.8 with hand hold  X 2.5 minutes speed 1.4 with no hand hold- cues needed to maintain step length X 5 minutes speed 1.6 progressing to no hand hold. Dual task with conversation      Schwab balance and TUG activities to improve static and dynamic balance x 10 minutes       Treatment/Session Assessment:    See assessment above.     · Pain/ Symptoms: Initial:   0/10 Post Session:  0/10     Total Treatment Duration:   PT Patient Time In/Time Out  Time In: 1030  Time Out: [address], DPT

## 2018-07-25 ENCOUNTER — APPOINTMENT (RX ONLY)
Dept: URBAN - METROPOLITAN AREA CLINIC 23 | Facility: CLINIC | Age: 78
Setting detail: DERMATOLOGY
End: 2018-07-25

## 2018-07-25 DIAGNOSIS — L81.4 OTHER MELANIN HYPERPIGMENTATION: ICD-10-CM

## 2018-07-25 DIAGNOSIS — D485 NEOPLASM OF UNCERTAIN BEHAVIOR OF SKIN: ICD-10-CM

## 2018-07-25 DIAGNOSIS — L82.1 OTHER SEBORRHEIC KERATOSIS: ICD-10-CM

## 2018-07-25 DIAGNOSIS — Z85.828 PERSONAL HISTORY OF OTHER MALIGNANT NEOPLASM OF SKIN: ICD-10-CM

## 2018-07-25 DIAGNOSIS — L82.0 INFLAMED SEBORRHEIC KERATOSIS: ICD-10-CM

## 2018-07-25 DIAGNOSIS — H01.13 ECZEMATOUS DERMATITIS OF EYELID: ICD-10-CM

## 2018-07-25 DIAGNOSIS — B07.8 OTHER VIRAL WARTS: ICD-10-CM

## 2018-07-25 PROBLEM — I10 ESSENTIAL (PRIMARY) HYPERTENSION: Status: ACTIVE | Noted: 2018-07-25

## 2018-07-25 PROBLEM — H01.134 ECZEMATOUS DERMATITIS OF LEFT UPPER EYELID: Status: ACTIVE | Noted: 2018-07-25

## 2018-07-25 PROBLEM — D48.5 NEOPLASM OF UNCERTAIN BEHAVIOR OF SKIN: Status: ACTIVE | Noted: 2018-07-25

## 2018-07-25 PROCEDURE — ? OTHER

## 2018-07-25 PROCEDURE — ? BIOPSY BY SHAVE METHOD

## 2018-07-25 PROCEDURE — 99214 OFFICE O/P EST MOD 30 MIN: CPT | Mod: 25

## 2018-07-25 PROCEDURE — 17110 DESTRUCTION B9 LES UP TO 14: CPT

## 2018-07-25 PROCEDURE — ? COUNSELING

## 2018-07-25 PROCEDURE — ? TREATMENT REGIMEN

## 2018-07-25 PROCEDURE — ? LIQUID NITROGEN

## 2018-07-25 PROCEDURE — 11100: CPT | Mod: 59

## 2018-07-25 ASSESSMENT — LOCATION DETAILED DESCRIPTION DERM
LOCATION DETAILED: RIGHT SUPERIOR PARIETAL SCALP
LOCATION DETAILED: LEFT ANTERIOR DISTAL THIGH
LOCATION DETAILED: RIGHT INFERIOR LATERAL UPPER BACK
LOCATION DETAILED: UPPER STERNUM
LOCATION DETAILED: RIGHT PROXIMAL PRETIBIAL REGION
LOCATION DETAILED: LEFT LATERAL MALAR CHEEK
LOCATION DETAILED: MID-OCCIPITAL SCALP
LOCATION DETAILED: LEFT PROXIMAL POSTERIOR UPPER ARM
LOCATION DETAILED: RIGHT ANTERIOR SHOULDER
LOCATION DETAILED: LEFT SUPERIOR CENTRAL MALAR CHEEK
LOCATION DETAILED: LEFT SUPERIOR FLANK
LOCATION DETAILED: LEFT ANTERIOR PROXIMAL THIGH
LOCATION DETAILED: LEFT DISTAL PRETIBIAL REGION
LOCATION DETAILED: RIGHT MEDIAL TRAPEZIAL NECK
LOCATION DETAILED: LEFT PROXIMAL LATERAL CALF
LOCATION DETAILED: RIGHT LATERAL SUPERIOR CHEST
LOCATION DETAILED: EPIGASTRIC SKIN
LOCATION DETAILED: LEFT CENTRAL ZYGOMA
LOCATION DETAILED: RIGHT LATERAL PROXIMAL PRETIBIAL REGION
LOCATION DETAILED: LEFT DISTAL LATERAL CALF
LOCATION DETAILED: LEFT LATERAL ABDOMEN
LOCATION DETAILED: LOWER STERNUM
LOCATION DETAILED: LEFT SUPERIOR POSTERIOR PARIETAL SCALP
LOCATION DETAILED: RIGHT POSTERIOR ANKLE
LOCATION DETAILED: LEFT DISTAL CALF
LOCATION DETAILED: RIGHT DORSAL 2ND TOE
LOCATION DETAILED: LEFT LATERAL DISTAL PRETIBIAL REGION
LOCATION DETAILED: LEFT LATERAL SUPERIOR EYELID
LOCATION DETAILED: RIGHT SUPERIOR POSTERIOR PARIETAL SCALP
LOCATION DETAILED: RIGHT MEDIAL SUPERIOR CHEST
LOCATION DETAILED: RIGHT MEDIAL MALAR CHEEK
LOCATION DETAILED: LEFT INFERIOR LATERAL NECK
LOCATION DETAILED: MIDDLE STERNUM
LOCATION DETAILED: RIGHT DISTAL PRETIBIAL REGION
LOCATION DETAILED: LEFT CENTRAL LATERAL NECK
LOCATION DETAILED: RIGHT MEDIAL DISTAL PRETIBIAL REGION
LOCATION DETAILED: POSTERIOR MID-PARIETAL SCALP
LOCATION DETAILED: LEFT INFERIOR CENTRAL MALAR CHEEK
LOCATION DETAILED: LEFT POSTERIOR ANKLE
LOCATION DETAILED: LEFT CLAVICULAR NECK
LOCATION DETAILED: LEFT PROXIMAL DORSAL FOREARM
LOCATION DETAILED: LEFT DORSAL INDEX METACARPOPHALANGEAL JOINT
LOCATION DETAILED: RIGHT SUPERIOR UPPER BACK
LOCATION DETAILED: RIGHT ANTERIOR DISTAL THIGH
LOCATION DETAILED: LEFT CENTRAL EYEBROW
LOCATION DETAILED: RIGHT MEDIAL BREAST 2-3:00 REGION

## 2018-07-25 ASSESSMENT — LOCATION SIMPLE DESCRIPTION DERM
LOCATION SIMPLE: LEFT ANTERIOR NECK
LOCATION SIMPLE: RIGHT 2ND TOE
LOCATION SIMPLE: LEFT CHEEK
LOCATION SIMPLE: LEFT SUPERIOR EYELID
LOCATION SIMPLE: SCALP
LOCATION SIMPLE: RIGHT THIGH
LOCATION SIMPLE: POSTERIOR SCALP
LOCATION SIMPLE: LEFT ZYGOMA
LOCATION SIMPLE: ABDOMEN
LOCATION SIMPLE: RIGHT SHOULDER
LOCATION SIMPLE: RIGHT UPPER BACK
LOCATION SIMPLE: POSTERIOR NECK
LOCATION SIMPLE: NECK
LOCATION SIMPLE: LEFT CALF
LOCATION SIMPLE: RIGHT BREAST
LOCATION SIMPLE: LEFT FOREARM
LOCATION SIMPLE: LEFT HAND
LOCATION SIMPLE: RIGHT PRETIBIAL REGION
LOCATION SIMPLE: RIGHT CHEEK
LOCATION SIMPLE: LEFT POSTERIOR UPPER ARM
LOCATION SIMPLE: LEFT EYEBROW
LOCATION SIMPLE: LEFT THIGH
LOCATION SIMPLE: LEFT ANKLE
LOCATION SIMPLE: RIGHT ANKLE
LOCATION SIMPLE: LEFT PRETIBIAL REGION
LOCATION SIMPLE: CHEST

## 2018-07-25 ASSESSMENT — LOCATION ZONE DERM
LOCATION ZONE: HAND
LOCATION ZONE: ARM
LOCATION ZONE: FACE
LOCATION ZONE: LEG
LOCATION ZONE: TOE
LOCATION ZONE: EYELID
LOCATION ZONE: TRUNK
LOCATION ZONE: NECK
LOCATION ZONE: SCALP

## 2018-07-25 NOTE — PROCEDURE: OTHER
Detail Level: Zone
Other (Free Text): Ln2
Note Text (......Xxx Chief Complaint.): This diagnosis correlates with the
Other (Free Text): Clearing up per patient.

## 2018-07-25 NOTE — PROCEDURE: BIOPSY BY SHAVE METHOD
Hemostasis: Electrocautery
Post-Care Instructions: I reviewed with the patient in detail post-care instructions. Patient is to keep the biopsy site dry overnight, and then apply bacitracin twice daily until healed. Patient may apply hydrogen peroxide soaks to remove any crusting.
Additional Anesthesia Volume In Cc (Will Not Render If 0): 0
Bill [id number] For Specimen Handling/Conveyance To Laboratory?: no
Electrodesiccation Text: The wound bed was treated with electrodesiccation after the biopsy was performed.
Was A Bandage Applied: Yes
Anesthesia Volume In Cc: 0.5
Consent: Written consent was obtained and risks were reviewed including but not limited to scarring, infection, bleeding, scabbing, incomplete removal, nerve damage and allergy to anesthesia.
Wound Care: Vaseline
Detail Level: Detailed
Type Of Destruction Used: Curettage
Notification Instructions: Patient will be notified of biopsy results. However, patient instructed to call the office if not contacted within 2 weeks.
Electrodesiccation And Curettage Text: The wound bed was treated with electrodesiccation and curettage after the biopsy was performed.
Silver Nitrate Text: The wound bed was treated with silver nitrate after the biopsy was performed.
Biopsy Type: H and E
Anesthesia Type: 1% lidocaine with 1:200,000 epinephrine and a 1:10 solution of 8.4% sodium bicarbonate
Biopsy Method: Dermablade
Depth Of Biopsy: dermis
Outside Pathology Billing: outside pathology read only
Dressing: pressure dressing with telfa
Cryotherapy Text: The wound bed was treated with cryotherapy after the biopsy was performed.
Accession #: pc
Billing Type: Third-Party Bill

## 2018-07-25 NOTE — PROCEDURE: LIQUID NITROGEN
Post-Care Instructions: I reviewed with the patient in detail post-care instructions. Patient is to wear sunprotection, and avoid picking at any of the treated lesions. Pt may apply Vaseline to crusted or scabbing areas.
Number Of Freeze-Thaw Cycles: 1 freeze-thaw cycle
Medical Necessity Clause: This procedure was medically necessary because the lesions that were treated were irritated and enlarging
Add 52 Modifier (Optional): no
Medical Necessity Information: It is in your best interest to select a reason for this procedure from the list below. All of these items fulfill various CMS LCD requirements except the new and changing color options.
Consent: The patient's verbal consent was obtained including but not limited to risks of crusting, scabbing, blistering, scarring, darker or lighter pigmentary change, recurrence, incomplete removal and infection.
Detail Level: Detailed

## 2018-08-20 ENCOUNTER — APPOINTMENT (RX ONLY)
Dept: URBAN - METROPOLITAN AREA CLINIC 23 | Facility: CLINIC | Age: 78
Setting detail: DERMATOLOGY
End: 2018-08-20

## 2018-08-20 PROBLEM — C44.41 BASAL CELL CARCINOMA OF SKIN OF SCALP AND NECK: Status: ACTIVE | Noted: 2018-08-20

## 2018-08-20 PROCEDURE — 11622 EXC S/N/H/F/G MAL+MRG 1.1-2: CPT

## 2018-08-20 PROCEDURE — 12042 INTMD RPR N-HF/GENIT2.6-7.5: CPT

## 2018-08-20 PROCEDURE — ? EXCISION

## 2018-08-20 NOTE — PROCEDURE: EXCISION
Complex Repair And Dorsal Nasal Flap Text: The defect edges were debeveled with a #15 scalpel blade.  The primary defect was closed partially with a complex linear closure.  Given the location of the remaining defect, shape of the defect and the proximity to free margins a dorsal nasal flap was deemed most appropriate for complete closure of the defect.  Using a sterile surgical marker, an appropriate flap was drawn incorporating the defect and placing the expected incisions within the relaxed skin tension lines where possible.    The area thus outlined was incised deep to adipose tissue with a #15 scalpel blade.  The skin margins were undermined to an appropriate distance in all directions utilizing iris scissors.
Complex Repair And Melolabial Flap Text: The defect edges were debeveled with a #15 scalpel blade.  The primary defect was closed partially with a complex linear closure.  Given the location of the remaining defect, shape of the defect and the proximity to free margins a melolabial flap was deemed most appropriate for complete closure of the defect.  Using a sterile surgical marker, an appropriate advancement flap was drawn incorporating the defect and placing the expected incisions within the relaxed skin tension lines where possible.    The area thus outlined was incised deep to adipose tissue with a #15 scalpel blade.  The skin margins were undermined to an appropriate distance in all directions utilizing iris scissors.
Keystone Flap Text: The defect edges were debeveled with a #15 scalpel blade.  Given the location of the defect, shape of the defect a keystone flap was deemed most appropriate.  Using a sterile surgical marker, an appropriate keystone flap was drawn incorporating the defect, outlining the appropriate donor tissue and placing the expected incisions within the relaxed skin tension lines where possible. The area thus outlined was incised deep to adipose tissue with a #15 scalpel blade.  The skin margins were undermined to an appropriate distance in all directions around the primary defect and laterally outward around the flap utilizing iris scissors.
Dressing: steri-strips and pressure dressing
Excisional Biopsy Additional Text (Leave Blank If You Do Not Want): The margin was drawn around the clinically apparent lesion.  An elliptical shape was then drawn on the skin incorporating the lesion and margins.  Incisions were then made along these lines to the appropriate tissue plane and the lesion was extirpated.
Z Plasty Text: The lesion was extirpated to the level of the fat with a #15 scalpel blade.  Given the location of the defect, shape of the defect and the proximity to free margins a Z-plasty was deemed most appropriate for repair.  Using a sterile surgical marker, the appropriate transposition arms of the Z-plasty were drawn incorporating the defect and placing the expected incisions within the relaxed skin tension lines where possible.    The area thus outlined was incised deep to adipose tissue with a #15 scalpel blade.  The skin margins were undermined to an appropriate distance in all directions utilizing iris scissors.  The opposing transposition arms were then transposed into place in opposite direction and anchored with interrupted buried subcutaneous sutures.
Cheek-To-Nose Interpolation Flap Text: A decision was made to reconstruct the defect utilizing an interpolation axial flap and a staged reconstruction.  A telfa template was made of the defect.  This telfa template was then used to outline the Cheek-To-Nose Interpolation flap.  The donor area for the pedicle flap was then injected with anesthesia.  The flap was excised through the skin and subcutaneous tissue down to the layer of the underlying musculature.  The interpolation flap was carefully excised within this deep plane to maintain its blood supply.  The edges of the donor site were undermined.   The donor site was closed in a primary fashion.  The pedicle was then rotated into position and sutured.  Once the tube was sutured into place, adequate blood supply was confirmed with blanching and refill.  The pedicle was then wrapped with xeroform gauze and dressed appropriately with a telfa and gauze bandage to ensure continued blood supply and protect the attached pedicle.
Complex Repair And Z Plasty Text: The defect edges were debeveled with a #15 scalpel blade.  The primary defect was closed partially with a complex linear closure.  Given the location of the remaining defect, shape of the defect and the proximity to free margins a Z plasty was deemed most appropriate for complete closure of the defect.  Using a sterile surgical marker, an appropriate advancement flap was drawn incorporating the defect and placing the expected incisions within the relaxed skin tension lines where possible.    The area thus outlined was incised deep to adipose tissue with a #15 scalpel blade.  The skin margins were undermined to an appropriate distance in all directions utilizing iris scissors.
Complex Repair And Ftsg Text: The defect edges were debeveled with a #15 scalpel blade.  The primary defect was closed partially with a complex linear closure.  Given the location of the defect, shape of the defect and the proximity to free margins a full thickness skin graft was deemed most appropriate to repair the remaining defect.  The graft was trimmed to fit the size of the remaining defect.  The graft was then placed in the primary defect, oriented appropriately, and sutured into place.
Curvilinear Excision Additional Text (Leave Blank If You Do Not Want): The margin was drawn around the clinically apparent lesion.  A curvilinear shape was then drawn on the skin incorporating the lesion and margins.  Incisions were then made along these lines to the appropriate tissue plane and the lesion was extirpated.
Mercedes Flap Text: The defect edges were debeveled with a #15 scalpel blade.  Given the location of the defect, shape of the defect and the proximity to free margins a Mercedes flap was deemed most appropriate.  Using a sterile surgical marker, an appropriate advancement flap was drawn incorporating the defect and placing the expected incisions within the relaxed skin tension lines where possible. The area thus outlined was incised deep to adipose tissue with a #15 scalpel blade.  The skin margins were undermined to an appropriate distance in all directions utilizing iris scissors.
Complex Repair And Transposition Flap Text: The defect edges were debeveled with a #15 scalpel blade.  The primary defect was closed partially with a complex linear closure.  Given the location of the remaining defect, shape of the defect and the proximity to free margins a transposition flap was deemed most appropriate for complete closure of the defect.  Using a sterile surgical marker, an appropriate advancement flap was drawn incorporating the defect and placing the expected incisions within the relaxed skin tension lines where possible.    The area thus outlined was incised deep to adipose tissue with a #15 scalpel blade.  The skin margins were undermined to an appropriate distance in all directions utilizing iris scissors.
Patient Will Remove Sutures At Home?: No
Saucerization Excision Additional Text (Leave Blank If You Do Not Want): The margin was drawn around the clinically apparent lesion.  Incisions were then made along these lines, in a tangential fashion, to the appropriate tissue plane and the lesion was extirpated.
Intermediate Repair Preamble Text (Leave Blank If You Do Not Want): Undermining was performed with blunt dissection.
Path Notes (To The Dermatopathologist): Please check margins.
Primary Defect Width (In Cm): 0
Island Pedicle Flap-Requiring Vessel Identification Text: The defect edges were debeveled with a #15 scalpel blade.  Given the location of the defect, shape of the defect and the proximity to free margins an island pedicle advancement flap was deemed most appropriate.  Using a sterile surgical marker, an appropriate advancement flap was drawn, based on the axial vessel mentioned above, incorporating the defect, outlining the appropriate donor tissue and placing the expected incisions within the relaxed skin tension lines where possible.    The area thus outlined was incised deep to adipose tissue with a #15 scalpel blade.  The skin margins were undermined to an appropriate distance in all directions around the primary defect and laterally outward around the island pedicle utilizing iris scissors.  There was minimal undermining beneath the pedicle flap.
Tissue Cultured Epidermal Autograft Text: The defect edges were debeveled with a #15 scalpel blade.  Given the location of the defect, shape of the defect and the proximity to free margins a tissue cultured epidermal autograft was deemed most appropriate.  The graft was then trimmed to fit the size of the defect.  The graft was then placed in the primary defect and oriented appropriately.
Split-Thickness Skin Graft Text: The defect edges were debeveled with a #15 scalpel blade.  Given the location of the defect, shape of the defect and the proximity to free margins a split thickness skin graft was deemed most appropriate.  Using a sterile surgical marker, the primary defect shape was transferred to the donor site. The split thickness graft was then harvested.  The skin graft was then placed in the primary defect and oriented appropriately.
Trilobed Flap Text: The defect edges were debeveled with a #15 scalpel blade.  Given the location of the defect and the proximity to free margins a trilobed flap was deemed most appropriate.  Using a sterile surgical marker, an appropriate trilobed flap drawn around the defect.    The area thus outlined was incised deep to adipose tissue with a #15 scalpel blade.  The skin margins were undermined to an appropriate distance in all directions utilizing iris scissors.
H Plasty Text: Given the location of the defect, shape of the defect and the proximity to free margins a H-plasty was deemed most appropriate for repair.  Using a sterile surgical marker, the appropriate advancement arms of the H-plasty were drawn incorporating the defect and placing the expected incisions within the relaxed skin tension lines where possible. The area thus outlined was incised deep to adipose tissue with a #15 scalpel blade. The skin margins were undermined to an appropriate distance in all directions utilizing iris scissors.  The opposing advancement arms were then advanced into place in opposite direction and anchored with interrupted buried subcutaneous sutures.
Xenograft Text: The defect edges were debeveled with a #15 scalpel blade.  Given the location of the defect, shape of the defect and the proximity to free margins a xenograft was deemed most appropriate.  The graft was then trimmed to fit the size of the defect.  The graft was then placed in the primary defect and oriented appropriately.
Cartilage Graft Text: The defect edges were debeveled with a #15 scalpel blade.  Given the location of the defect, shape of the defect, the fact the defect involved a full thickness cartilage defect a cartilage graft was deemed most appropriate.  An appropriate donor site was identified, cleansed, and anesthetized. The cartilage graft was then harvested and transferred to the recipient site, oriented appropriately and then sutured into place.  The secondary defect was then repaired using a primary closure.
Estimated Blood Loss (Cc): minimal
O-Z Plasty Text: The defect edges were debeveled with a #15 scalpel blade.  Given the location of the defect, shape of the defect and the proximity to free margins an O-Z plasty (double transposition flap) was deemed most appropriate.  Using a sterile surgical marker, the appropriate transposition flaps were drawn incorporating the defect and placing the expected incisions within the relaxed skin tension lines where possible.    The area thus outlined was incised deep to adipose tissue with a #15 scalpel blade.  The skin margins were undermined to an appropriate distance in all directions utilizing iris scissors.  Hemostasis was achieved with electrocautery.  The flaps were then transposed into place, one clockwise and the other counterclockwise, and anchored with interrupted buried subcutaneous sutures.
Show Referring Physician Variable: Yes
No Repair - Repaired With Adjacent Surgical Defect Text (Leave Blank If You Do Not Want): After the excision the defect was repaired concurrently with another surgical defect which was in close approximation.
Purse String (Simple) Text: Given the location of the defect and the characteristics of the surrounding skin a purse string simple closure was deemed most appropriate.  Undermining was performed circumferentially around the surgical defect.  A purse string suture was then placed and tightened.
Intermediate / Complex Repair - Final Wound Length In Cm: 4.2
Billing Type: Third-Party Bill
Advancement Flap (Single) Text: The defect edges were debeveled with a #15 scalpel blade.  Given the location of the defect and the proximity to free margins a single advancement flap was deemed most appropriate.  Using a sterile surgical marker, an appropriate advancement flap was drawn incorporating the defect and placing the expected incisions within the relaxed skin tension lines where possible.    The area thus outlined was incised deep to adipose tissue with a #15 scalpel blade.  The skin margins were undermined to an appropriate distance in all directions utilizing iris scissors.
Paramedian Forehead Flap Text: A decision was made to reconstruct the defect utilizing an interpolation axial flap and a staged reconstruction.  A telfa template was made of the defect.  This telfa template was then used to outline the paramedian forehead pedicle flap.  The donor area for the pedicle flap was then injected with anesthesia.  The flap was excised through the skin and subcutaneous tissue down to the layer of the underlying musculature.  The pedicle flap was carefully excised within this deep plane to maintain its blood supply.  The edges of the donor site were undermined.   The donor site was closed in a primary fashion.  The pedicle was then rotated into position and sutured.  Once the tube was sutured into place, adequate blood supply was confirmed with blanching and refill.  The pedicle was then wrapped with xeroform gauze and dressed appropriately with a telfa and gauze bandage to ensure continued blood supply and protect the attached pedicle.
Repair Performed By Another Provider Text (Leave Blank If You Do Not Want): After the tissue was excised the defect was repaired by another provider.
Complex Repair And Epidermal Autograft Text: The defect edges were debeveled with a #15 scalpel blade.  The primary defect was closed partially with a complex linear closure.  Given the location of the defect, shape of the defect and the proximity to free margins an epidermal autograft was deemed most appropriate to repair the remaining defect.  The graft was trimmed to fit the size of the remaining defect.  The graft was then placed in the primary defect, oriented appropriately, and sutured into place.
Island Pedicle Flap With Canthal Suspension Text: The defect edges were debeveled with a #15 scalpel blade.  Given the location of the defect, shape of the defect and the proximity to free margins an island pedicle advancement flap was deemed most appropriate.  Using a sterile surgical marker, an appropriate advancement flap was drawn incorporating the defect, outlining the appropriate donor tissue and placing the expected incisions within the relaxed skin tension lines where possible. The area thus outlined was incised deep to adipose tissue with a #15 scalpel blade.  The skin margins were undermined to an appropriate distance in all directions around the primary defect and laterally outward around the island pedicle utilizing iris scissors.  There was minimal undermining beneath the pedicle flap. A suspension suture was placed in the canthal tendon to prevent tension and prevent ectropion.
Positioning (Leave Blank If You Do Not Want): The patient was placed in a comfortable position exposing the surgical site.
Hemostasis: Electrocautery
Excision Depth: adipose tissue
Consent was obtained from the patient. The risks and benefits to therapy were discussed in detail. Specifically, the risks of infection, scarring, bleeding, prolonged wound healing, incomplete removal, allergy to anesthesia, nerve injury and recurrence were addressed. Prior to the procedure, the treatment site was clearly identified and confirmed by the patient. All components of Universal Protocol/PAUSE Rule completed.
Complex Repair And Single Advancement Flap Text: The defect edges were debeveled with a #15 scalpel blade.  The primary defect was closed partially with a complex linear closure.  Given the location of the remaining defect, shape of the defect and the proximity to free margins a single advancement flap was deemed most appropriate for complete closure of the defect.  Using a sterile surgical marker, an appropriate advancement flap was drawn incorporating the defect and placing the expected incisions within the relaxed skin tension lines where possible.    The area thus outlined was incised deep to adipose tissue with a #15 scalpel blade.  The skin margins were undermined to an appropriate distance in all directions utilizing iris scissors.
Complex Repair And Rotation Flap Text: The defect edges were debeveled with a #15 scalpel blade.  The primary defect was closed partially with a complex linear closure.  Given the location of the remaining defect, shape of the defect and the proximity to free margins a rotation flap was deemed most appropriate for complete closure of the defect.  Using a sterile surgical marker, an appropriate advancement flap was drawn incorporating the defect and placing the expected incisions within the relaxed skin tension lines where possible.    The area thus outlined was incised deep to adipose tissue with a #15 scalpel blade.  The skin margins were undermined to an appropriate distance in all directions utilizing iris scissors.
Complex Repair And Skin Substitute Graft Text: The defect edges were debeveled with a #15 scalpel blade.  The primary defect was closed partially with a complex linear closure.  Given the location of the remaining defect, shape of the defect and the proximity to free margins a skin substitute graft was deemed most appropriate to repair the remaining defect.  The graft was trimmed to fit the size of the remaining defect.  The graft was then placed in the primary defect, oriented appropriately, and sutured into place.
Mucosal Advancement Flap Text: Given the location of the defect, shape of the defect and the proximity to free margins a mucosal advancement flap was deemed most appropriate. Incisions were made with a 15 blade scalpel in the appropriate fashion along the cutaneous vermillion border and the mucosal lip. The remaining actinically damaged mucosal tissue was excised.  The mucosal advancement flap was then elevated to the gingival sulcus with care taken to preserve the neurovascular structures and advanced into the primary defect. Care was taken to ensure that precise realignment of the vermillion border was achieved.
Island Pedicle Flap Text: The defect edges were debeveled with a #15 scalpel blade.  Given the location of the defect, shape of the defect and the proximity to free margins an island pedicle advancement flap was deemed most appropriate.  Using a sterile surgical marker, an appropriate advancement flap was drawn incorporating the defect, outlining the appropriate donor tissue and placing the expected incisions within the relaxed skin tension lines where possible.    The area thus outlined was incised deep to adipose tissue with a #15 scalpel blade.  The skin margins were undermined to an appropriate distance in all directions around the primary defect and laterally outward around the island pedicle utilizing iris scissors.  There was minimal undermining beneath the pedicle flap.
Epidermal Closure: running subcuticular
Complex Repair And W Plasty Text: The defect edges were debeveled with a #15 scalpel blade.  The primary defect was closed partially with a complex linear closure.  Given the location of the remaining defect, shape of the defect and the proximity to free margins a W plasty was deemed most appropriate for complete closure of the defect.  Using a sterile surgical marker, an appropriate advancement flap was drawn incorporating the defect and placing the expected incisions within the relaxed skin tension lines where possible.    The area thus outlined was incised deep to adipose tissue with a #15 scalpel blade.  The skin margins were undermined to an appropriate distance in all directions utilizing iris scissors.
Anesthesia Type: 1% lidocaine with epinephrine and a 1:10 solution of 8.4% sodium bicarbonate
Melolabial Transposition Flap Text: The defect edges were debeveled with a #15 scalpel blade.  Given the location of the defect and the proximity to free margins a melolabial flap was deemed most appropriate.  Using a sterile surgical marker, an appropriate melolabial transposition flap was drawn incorporating the defect.    The area thus outlined was incised deep to adipose tissue with a #15 scalpel blade.  The skin margins were undermined to an appropriate distance in all directions utilizing iris scissors.
Crescentic Advancement Flap Text: The defect edges were debeveled with a #15 scalpel blade.  Given the location of the defect and the proximity to free margins a crescentic advancement flap was deemed most appropriate.  Using a sterile surgical marker, the appropriate advancement flap was drawn incorporating the defect and placing the expected incisions within the relaxed skin tension lines where possible.    The area thus outlined was incised deep to adipose tissue with a #15 scalpel blade.  The skin margins were undermined to an appropriate distance in all directions utilizing iris scissors.
Deep Sutures: 4-0 PDO
O-T Plasty Text: The defect edges were debeveled with a #15 scalpel blade.  Given the location of the defect, shape of the defect and the proximity to free margins an O-T plasty was deemed most appropriate.  Using a sterile surgical marker, an appropriate O-T plasty was drawn incorporating the defect and placing the expected incisions within the relaxed skin tension lines where possible.    The area thus outlined was incised deep to adipose tissue with a #15 scalpel blade.  The skin margins were undermined to an appropriate distance in all directions utilizing iris scissors.
Ftsg Text: The defect edges were debeveled with a #15 scalpel blade.  Given the location of the defect, shape of the defect and the proximity to free margins a full thickness skin graft was deemed most appropriate.  Using a sterile surgical marker, the primary defect shape was transferred to the donor site. The area thus outlined was incised deep to adipose tissue with a #15 scalpel blade.  The harvested graft was then trimmed of adipose tissue until only dermis and epidermis was left.  The skin margins of the secondary defect were undermined to an appropriate distance in all directions utilizing iris scissors.  The secondary defect was closed with interrupted buried subcutaneous sutures.  The skin edges were then re-apposed with running  sutures.  The skin graft was then placed in the primary defect and oriented appropriately.
Accession #: [id number]
Cheek Interpolation Flap Text: A decision was made to reconstruct the defect utilizing an interpolation axial flap and a staged reconstruction.  A telfa template was made of the defect.  This telfa template was then used to outline the Cheek Interpolation flap.  The donor area for the pedicle flap was then injected with anesthesia.  The flap was excised through the skin and subcutaneous tissue down to the layer of the underlying musculature.  The interpolation flap was carefully excised within this deep plane to maintain its blood supply.  The edges of the donor site were undermined.   The donor site was closed in a primary fashion.  The pedicle was then rotated into position and sutured.  Once the tube was sutured into place, adequate blood supply was confirmed with blanching and refill.  The pedicle was then wrapped with xeroform gauze and dressed appropriately with a telfa and gauze bandage to ensure continued blood supply and protect the attached pedicle.
Melolabial Interpolation Flap Text: A decision was made to reconstruct the defect utilizing an interpolation axial flap and a staged reconstruction.  A telfa template was made of the defect.  This telfa template was then used to outline the melolabial interpolation flap.  The donor area for the pedicle flap was then injected with anesthesia.  The flap was excised through the skin and subcutaneous tissue down to the layer of the underlying musculature.  The pedicle flap was carefully excised within this deep plane to maintain its blood supply.  The edges of the donor site were undermined.   The donor site was closed in a primary fashion.  The pedicle was then rotated into position and sutured.  Once the tube was sutured into place, adequate blood supply was confirmed with blanching and refill.  The pedicle was then wrapped with xeroform gauze and dressed appropriately with a telfa and gauze bandage to ensure continued blood supply and protect the attached pedicle.
V-Y Plasty Text: The defect edges were debeveled with a #15 scalpel blade.  Given the location of the defect, shape of the defect and the proximity to free margins an V-Y advancement flap was deemed most appropriate.  Using a sterile surgical marker, an appropriate advancement flap was drawn incorporating the defect and placing the expected incisions within the relaxed skin tension lines where possible.    The area thus outlined was incised deep to adipose tissue with a #15 scalpel blade.  The skin margins were undermined to an appropriate distance in all directions utilizing iris scissors.
Burow's Advancement Flap Text: The defect edges were debeveled with a #15 scalpel blade.  Given the location of the defect and the proximity to free margins a Burow's advancement flap was deemed most appropriate.  Using a sterile surgical marker, the appropriate advancement flap was drawn incorporating the defect and placing the expected incisions within the relaxed skin tension lines where possible.    The area thus outlined was incised deep to adipose tissue with a #15 scalpel blade.  The skin margins were undermined to an appropriate distance in all directions utilizing iris scissors.
Scalpel Size: 15 blade
Complex Repair And Double M Plasty Text: The defect edges were debeveled with a #15 scalpel blade.  The primary defect was closed partially with a complex linear closure.  Given the location of the remaining defect, shape of the defect and the proximity to free margins a double M plasty was deemed most appropriate for complete closure of the defect.  Using a sterile surgical marker, an appropriate advancement flap was drawn incorporating the defect and placing the expected incisions within the relaxed skin tension lines where possible.    The area thus outlined was incised deep to adipose tissue with a #15 scalpel blade.  The skin margins were undermined to an appropriate distance in all directions utilizing iris scissors.
Lip Wedge Excision Repair Text: Given the location of the defect and the proximity to free margins a full thickness wedge repair was deemed most appropriate.  Using a sterile surgical marker, the appropriate repair was drawn incorporating the defect and placing the expected incisions perpendicular to the vermillion border.  The vermillion border was also meticulously outlined to ensure appropriate reapproximation during the repair.  The area thus outlined was incised through and through with a #15 scalpel blade.  The muscularis and dermis were reaproximated with deep sutures following hemostasis. Care was taken to realign the vermillion border before proceeding with the superficial closure.  Once the vermillion was realigned the superfical and mucosal closure was finished.
Graft Donor Site Bandage (Optional-Leave Blank If You Don't Want In Note): Steri-strips and a pressure bandage were applied to the donor site.
Size Of Lesion In Cm: 0.5
Rhombic Flap Text: The defect edges were debeveled with a #15 scalpel blade.  Given the location of the defect and the proximity to free margins a rhombic flap was deemed most appropriate.  Using a sterile surgical marker, an appropriate rhombic flap was drawn incorporating the defect.    The area thus outlined was incised deep to adipose tissue with a #15 scalpel blade.  The skin margins were undermined to an appropriate distance in all directions utilizing iris scissors.
Epidermal Sutures: 4-0 Prolene
Wound Care: Mastisol
Mastoid Interpolation Flap Text: A decision was made to reconstruct the defect utilizing an interpolation axial flap and a staged reconstruction.  A telfa template was made of the defect.  This telfa template was then used to outline the mastoid interpolation flap.  The donor area for the pedicle flap was then injected with anesthesia.  The flap was excised through the skin and subcutaneous tissue down to the layer of the underlying musculature.  The pedicle flap was carefully excised within this deep plane to maintain its blood supply.  The edges of the donor site were undermined.   The donor site was closed in a primary fashion.  The pedicle was then rotated into position and sutured.  Once the tube was sutured into place, adequate blood supply was confirmed with blanching and refill.  The pedicle was then wrapped with xeroform gauze and dressed appropriately with a telfa and gauze bandage to ensure continued blood supply and protect the attached pedicle.
Pre-Excision Curettage Text (Leave Blank If You Do Not Want): Prior to drawing the surgical margin the visible lesion was removed with electrodesiccation and curettage to clearly define the lesion size.
Complex Repair And Dermal Autograft Text: The defect edges were debeveled with a #15 scalpel blade.  The primary defect was closed partially with a complex linear closure.  Given the location of the defect, shape of the defect and the proximity to free margins an dermal autograft was deemed most appropriate to repair the remaining defect.  The graft was trimmed to fit the size of the remaining defect.  The graft was then placed in the primary defect, oriented appropriately, and sutured into place.
Purse String (Intermediate) Text: Given the location of the defect and the characteristics of the surrounding skin a pursestring intermediate closure was deemed most appropriate.  Undermining was performed circumfirentially around the surgical defect.  A purstring suture was then placed and tightened.
Modified Advancement Flap Text: The defect edges were debeveled with a #15 scalpel blade.  Given the location of the defect, shape of the defect and the proximity to free margins a modified advancement flap was deemed most appropriate.  Using a sterile surgical marker, an appropriate advancement flap was drawn incorporating the defect and placing the expected incisions within the relaxed skin tension lines where possible.    The area thus outlined was incised deep to adipose tissue with a #15 scalpel blade.  The skin margins were undermined to an appropriate distance in all directions utilizing iris scissors.
Ear Star Wedge Flap Text: The defect edges were debeveled with a #15 blade scalpel.  Given the location of the defect and the proximity to free margins (helical rim) an ear star wedge flap was deemed most appropriate.  Using a sterile surgical marker, the appropriate flap was drawn incorporating the defect and placing the expected incisions between the helical rim and antihelix where possible.  The area thus outlined was incised through and through with a #15 scalpel blade.
Epidermal Closure Graft Donor Site (Optional): simple interrupted
W Plasty Text: The lesion was extirpated to the level of the fat with a #15 scalpel blade.  Given the location of the defect, shape of the defect and the proximity to free margins a W-plasty was deemed most appropriate for repair.  Using a sterile surgical marker, the appropriate transposition arms of the W-plasty were drawn incorporating the defect and placing the expected incisions within the relaxed skin tension lines where possible.    The area thus outlined was incised deep to adipose tissue with a #15 scalpel blade.  The skin margins were undermined to an appropriate distance in all directions utilizing iris scissors.  The opposing transposition arms were then transposed into place in opposite direction and anchored with interrupted buried subcutaneous sutures.
Advancement-Rotation Flap Text: The defect edges were debeveled with a #15 scalpel blade.  Given the location of the defect, shape of the defect and the proximity to free margins an advancement-rotation flap was deemed most appropriate.  Using a sterile surgical marker, an appropriate flap was drawn incorporating the defect and placing the expected incisions within the relaxed skin tension lines where possible. The area thus outlined was incised deep to adipose tissue with a #15 scalpel blade.  The skin margins were undermined to an appropriate distance in all directions utilizing iris scissors.
Complex Repair Preamble Text (Leave Blank If You Do Not Want): Extensive wide undermining was performed.
Fusiform Excision Additional Text (Leave Blank If You Do Not Want): The margin was drawn around the clinically apparent lesion.  A fusiform shape was then drawn on the skin incorporating the lesion and margins.  Incisions were then made along these lines to the appropriate tissue plane and the lesion was extirpated.
Complex Repair And V-Y Plasty Text: The defect edges were debeveled with a #15 scalpel blade.  The primary defect was closed partially with a complex linear closure.  Given the location of the remaining defect, shape of the defect and the proximity to free margins a V-Y plasty was deemed most appropriate for complete closure of the defect.  Using a sterile surgical marker, an appropriate advancement flap was drawn incorporating the defect and placing the expected incisions within the relaxed skin tension lines where possible.    The area thus outlined was incised deep to adipose tissue with a #15 scalpel blade.  The skin margins were undermined to an appropriate distance in all directions utilizing iris scissors.
O-L Flap Text: The defect edges were debeveled with a #15 scalpel blade.  Given the location of the defect, shape of the defect and the proximity to free margins an O-L flap was deemed most appropriate.  Using a sterile surgical marker, an appropriate advancement flap was drawn incorporating the defect and placing the expected incisions within the relaxed skin tension lines where possible.    The area thus outlined was incised deep to adipose tissue with a #15 scalpel blade.  The skin margins were undermined to an appropriate distance in all directions utilizing iris scissors.
Complex Repair And Modified Advancement Flap Text: The defect edges were debeveled with a #15 scalpel blade.  The primary defect was closed partially with a complex linear closure.  Given the location of the remaining defect, shape of the defect and the proximity to free margins a modified advancement flap was deemed most appropriate for complete closure of the defect.  Using a sterile surgical marker, an appropriate advancement flap was drawn incorporating the defect and placing the expected incisions within the relaxed skin tension lines where possible.    The area thus outlined was incised deep to adipose tissue with a #15 scalpel blade.  The skin margins were undermined to an appropriate distance in all directions utilizing iris scissors.
Helical Rim Advancement Flap Text: The defect edges were debeveled with a #15 blade scalpel.  Given the location of the defect and the proximity to free margins (helical rim) a double helical rim advancement flap was deemed most appropriate.  Using a sterile surgical marker, the appropriate advancement flaps were drawn incorporating the defect and placing the expected incisions between the helical rim and antihelix where possible.  The area thus outlined was incised through and through with a #15 scalpel blade.  With a skin hook and iris scissors, the flaps were gently and sharply undermined and freed up.
Detail Level: Detailed
Alar Island Pedicle Flap Text: The defect edges were debeveled with a #15 scalpel blade.  Given the location of the defect, shape of the defect and the proximity to the alar rim an island pedicle advancement flap was deemed most appropriate.  Using a sterile surgical marker, an appropriate advancement flap was drawn incorporating the defect, outlining the appropriate donor tissue and placing the expected incisions within the nasal ala running parallel to the alar rim. The area thus outlined was incised with a #15 scalpel blade.  The skin margins were undermined minimally to an appropriate distance in all directions around the primary defect and laterally outward around the island pedicle utilizing iris scissors.  There was minimal undermining beneath the pedicle flap.
Complex Repair And M Plasty Text: The defect edges were debeveled with a #15 scalpel blade.  The primary defect was closed partially with a complex linear closure.  Given the location of the remaining defect, shape of the defect and the proximity to free margins an M plasty was deemed most appropriate for complete closure of the defect.  Using a sterile surgical marker, an appropriate advancement flap was drawn incorporating the defect and placing the expected incisions within the relaxed skin tension lines where possible.    The area thus outlined was incised deep to adipose tissue with a #15 scalpel blade.  The skin margins were undermined to an appropriate distance in all directions utilizing iris scissors.
Complex Repair And Rhombic Flap Text: The defect edges were debeveled with a #15 scalpel blade.  The primary defect was closed partially with a complex linear closure.  Given the location of the remaining defect, shape of the defect and the proximity to free margins a rhombic flap was deemed most appropriate for complete closure of the defect.  Using a sterile surgical marker, an appropriate advancement flap was drawn incorporating the defect and placing the expected incisions within the relaxed skin tension lines where possible.    The area thus outlined was incised deep to adipose tissue with a #15 scalpel blade.  The skin margins were undermined to an appropriate distance in all directions utilizing iris scissors.
Home Suture Removal Text: Patient was provided a home suture removal kit and will remove their sutures at home.  If they have any questions or difficulties they will call the office.
Composite Graft Text: The defect edges were debeveled with a #15 scalpel blade.  Given the location of the defect, shape of the defect, the proximity to free margins and the fact the defect was full thickness a composite graft was deemed most appropriate.  The defect was outline and then transferred to the donor site.  A full thickness graft was then excised from the donor site. The graft was then placed in the primary defect, oriented appropriately and then sutured into place.  The secondary defect was then repaired using a primary closure.
Partial Purse String (Intermediate) Text: Given the location of the defect and the characteristics of the surrounding skin an intermediate purse string closure was deemed most appropriate.  Undermining was performed circumferentially around the surgical defect.  A purse string suture was then placed and tightened. Wound tension of the circular defect prevented complete closure of the wound.
Slit Excision Additional Text (Leave Blank If You Do Not Want): A linear line was drawn on the skin overlying the lesion. An incision was made slowly until the lesion was visualized.  Once visualized, the lesion was removed with blunt dissection.
Dorsal Nasal Flap Text: The defect edges were debeveled with a #15 scalpel blade.  Given the location of the defect and the proximity to free margins a dorsal nasal flap was deemed most appropriate.  Using a sterile surgical marker, an appropriate dorsal nasal flap was drawn around the defect.    The area thus outlined was incised deep to adipose tissue with a #15 scalpel blade.  The skin margins were undermined to an appropriate distance in all directions utilizing iris scissors.
Hatchet Flap Text: The defect edges were debeveled with a #15 scalpel blade.  Given the location of the defect, shape of the defect and the proximity to free margins a hatchet flap was deemed most appropriate.  Using a sterile surgical marker, an appropriate hatchet flap was drawn incorporating the defect and placing the expected incisions within the relaxed skin tension lines where possible.    The area thus outlined was incised deep to adipose tissue with a #15 scalpel blade.  The skin margins were undermined to an appropriate distance in all directions utilizing iris scissors.
A-T Advancement Flap Text: The defect edges were debeveled with a #15 scalpel blade.  Given the location of the defect, shape of the defect and the proximity to free margins an A-T advancement flap was deemed most appropriate.  Using a sterile surgical marker, an appropriate advancement flap was drawn incorporating the defect and placing the expected incisions within the relaxed skin tension lines where possible.    The area thus outlined was incised deep to adipose tissue with a #15 scalpel blade.  The skin margins were undermined to an appropriate distance in all directions utilizing iris scissors.
Star Wedge Flap Text: The defect edges were debeveled with a #15 scalpel blade.  Given the location of the defect, shape of the defect and the proximity to free margins a star wedge flap was deemed most appropriate.  Using a sterile surgical marker, an appropriate rotation flap was drawn incorporating the defect and placing the expected incisions within the relaxed skin tension lines where possible. The area thus outlined was incised deep to adipose tissue with a #15 scalpel blade.  The skin margins were undermined to an appropriate distance in all directions utilizing iris scissors.
Advancement Flap (Double) Text: The defect edges were debeveled with a #15 scalpel blade.  Given the location of the defect and the proximity to free margins a double advancement flap was deemed most appropriate.  Using a sterile surgical marker, the appropriate advancement flaps were drawn incorporating the defect and placing the expected incisions within the relaxed skin tension lines where possible.    The area thus outlined was incised deep to adipose tissue with a #15 scalpel blade.  The skin margins were undermined to an appropriate distance in all directions utilizing iris scissors.
Bilobed Flap Text: The defect edges were debeveled with a #15 scalpel blade.  Given the location of the defect and the proximity to free margins a bilobe flap was deemed most appropriate.  Using a sterile surgical marker, an appropriate bilobe flap drawn around the defect.    The area thus outlined was incised deep to adipose tissue with a #15 scalpel blade.  The skin margins were undermined to an appropriate distance in all directions utilizing iris scissors.
Bi-Rhombic Flap Text: The defect edges were debeveled with a #15 scalpel blade.  Given the location of the defect and the proximity to free margins a bi-rhombic flap was deemed most appropriate.  Using a sterile surgical marker, an appropriate rhombic flap was drawn incorporating the defect. The area thus outlined was incised deep to adipose tissue with a #15 scalpel blade.  The skin margins were undermined to an appropriate distance in all directions utilizing iris scissors.
Transposition Flap Text: The defect edges were debeveled with a #15 scalpel blade.  Given the location of the defect and the proximity to free margins a transposition flap was deemed most appropriate.  Using a sterile surgical marker, an appropriate transposition flap was drawn incorporating the defect.    The area thus outlined was incised deep to adipose tissue with a #15 scalpel blade.  The skin margins were undermined to an appropriate distance in all directions utilizing iris scissors.
Interpolation Flap Text: A decision was made to reconstruct the defect utilizing an interpolation axial flap and a staged reconstruction.  A telfa template was made of the defect.  This telfa template was then used to outline the interpolation flap.  The donor area for the pedicle flap was then injected with anesthesia.  The flap was excised through the skin and subcutaneous tissue down to the layer of the underlying musculature.  The interpolation flap was carefully excised within this deep plane to maintain its blood supply.  The edges of the donor site were undermined.   The donor site was closed in a primary fashion.  The pedicle was then rotated into position and sutured.  Once the tube was sutured into place, adequate blood supply was confirmed with blanching and refill.  The pedicle was then wrapped with xeroform gauze and dressed appropriately with a telfa and gauze bandage to ensure continued blood supply and protect the attached pedicle.
Anesthesia Volume In Cc: 6
Repair Type: Intermediate
Suture Removal: 14 days
Skin Substitute Text: The defect edges were debeveled with a #15 scalpel blade.  Given the location of the defect, shape of the defect and the proximity to free margins a skin substitute graft was deemed most appropriate.  The graft material was trimmed to fit the size of the defect. The graft was then placed in the primary defect and oriented appropriately.
Banner Transposition Flap Text: The defect edges were debeveled with a #15 scalpel blade.  Given the location of the defect and the proximity to free margins a Banner transposition flap was deemed most appropriate.  Using a sterile surgical marker, an appropriate flap drawn around the defect. The area thus outlined was incised deep to adipose tissue with a #15 scalpel blade.  The skin margins were undermined to an appropriate distance in all directions utilizing iris scissors.
Complex Repair And Xenograft Text: The defect edges were debeveled with a #15 scalpel blade.  The primary defect was closed partially with a complex linear closure.  Given the location of the defect, shape of the defect and the proximity to free margins an tissue cultured epidermal autograft was deemed most appropriate to repair the remaining defect.  The graft was trimmed to fit the size of the remaining defect.  The graft was then placed in the primary defect, oriented appropriately, and sutured into place.
Complex Repair And Split-Thickness Skin Graft Text: The defect edges were debeveled with a #15 scalpel blade.  The primary defect was closed partially with a complex linear closure.  Given the location of the defect, shape of the defect and the proximity to free margins a split thickness skin graft was deemed most appropriate to repair the remaining defect.  The graft was trimmed to fit the size of the remaining defect.  The graft was then placed in the primary defect, oriented appropriately, and sutured into place.
S Plasty Text: Given the location and shape of the defect, and the orientation of relaxed skin tension lines, an S-plasty was deemed most appropriate for repair.  Using a sterile surgical marker, the appropriate outline of the S-plasty was drawn, incorporating the defect and placing the expected incisions within the relaxed skin tension lines where possible.  The area thus outlined was incised deep to adipose tissue with a #15 scalpel blade.  The skin margins were undermined to an appropriate distance in all directions utilizing iris scissors. The skin flaps were advanced over the defect.  The opposing margins were then approximated with interrupted buried subcutaneous sutures.
Complex Repair And Double Advancement Flap Text: The defect edges were debeveled with a #15 scalpel blade.  The primary defect was closed partially with a complex linear closure.  Given the location of the remaining defect, shape of the defect and the proximity to free margins a double advancement flap was deemed most appropriate for complete closure of the defect.  Using a sterile surgical marker, an appropriate advancement flap was drawn incorporating the defect and placing the expected incisions within the relaxed skin tension lines where possible.    The area thus outlined was incised deep to adipose tissue with a #15 scalpel blade.  The skin margins were undermined to an appropriate distance in all directions utilizing iris scissors.
Epidermal Autograft Text: The defect edges were debeveled with a #15 scalpel blade.  Given the location of the defect, shape of the defect and the proximity to free margins an epidermal autograft was deemed most appropriate.  Using a sterile surgical marker, the primary defect shape was transferred to the donor site. The epidermal graft was then harvested.  The skin graft was then placed in the primary defect and oriented appropriately.
V-Y Flap Text: The defect edges were debeveled with a #15 scalpel blade.  Given the location of the defect, shape of the defect and the proximity to free margins a V-Y flap was deemed most appropriate.  Using a sterile surgical marker, an appropriate advancement flap was drawn incorporating the defect and placing the expected incisions within the relaxed skin tension lines where possible.    The area thus outlined was incised deep to adipose tissue with a #15 scalpel blade.  The skin margins were undermined to an appropriate distance in all directions utilizing iris scissors.
Perilesional Excision Additional Text (Leave Blank If You Do Not Want): The margin was drawn around the clinically apparent lesion. Incisions were then made along these lines to the appropriate tissue plane and the lesion was extirpated.
Post-Care Instructions: I reviewed with the patient in detail post-care instructions. Patient is not to engage in any heavy lifting, exercise, or swimming for the next 14 days. Should the patient develop any fevers, chills, bleeding, severe pain patient will contact the office immediately.
Rotation Flap Text: The defect edges were debeveled with a #15 scalpel blade.  Given the location of the defect, shape of the defect and the proximity to free margins a rotation flap was deemed most appropriate.  Using a sterile surgical marker, an appropriate rotation flap was drawn incorporating the defect and placing the expected incisions within the relaxed skin tension lines where possible.    The area thus outlined was incised deep to adipose tissue with a #15 scalpel blade.  The skin margins were undermined to an appropriate distance in all directions utilizing iris scissors.
Excision Method: Elliptical
Bilobed Transposition Flap Text: The defect edges were debeveled with a #15 scalpel blade.  Given the location of the defect and the proximity to free margins a bilobed transposition flap was deemed most appropriate.  Using a sterile surgical marker, an appropriate bilobe flap drawn around the defect.    The area thus outlined was incised deep to adipose tissue with a #15 scalpel blade.  The skin margins were undermined to an appropriate distance in all directions utilizing iris scissors.
Bilateral Helical Rim Advancement Flap Text: The defect edges were debeveled with a #15 blade scalpel.  Given the location of the defect and the proximity to free margins (helical rim) a bilateral helical rim advancement flap was deemed most appropriate.  Using a sterile surgical marker, the appropriate advancement flaps were drawn incorporating the defect and placing the expected incisions between the helical rim and antihelix where possible.  The area thus outlined was incised through and through with a #15 scalpel blade.  With a skin hook and iris scissors, the flaps were gently and sharply undermined and freed up.
Posterior Auricular Interpolation Flap Text: A decision was made to reconstruct the defect utilizing an interpolation axial flap and a staged reconstruction.  A telfa template was made of the defect.  This telfa template was then used to outline the posterior auricular interpolation flap.  The donor area for the pedicle flap was then injected with anesthesia.  The flap was excised through the skin and subcutaneous tissue down to the layer of the underlying musculature.  The pedicle flap was carefully excised within this deep plane to maintain its blood supply.  The edges of the donor site were undermined.   The donor site was closed in a primary fashion.  The pedicle was then rotated into position and sutured.  Once the tube was sutured into place, adequate blood supply was confirmed with blanching and refill.  The pedicle was then wrapped with xeroform gauze and dressed appropriately with a telfa and gauze bandage to ensure continued blood supply and protect the attached pedicle.
O-T Advancement Flap Text: The defect edges were debeveled with a #15 scalpel blade.  Given the location of the defect, shape of the defect and the proximity to free margins an O-T advancement flap was deemed most appropriate.  Using a sterile surgical marker, an appropriate advancement flap was drawn incorporating the defect and placing the expected incisions within the relaxed skin tension lines where possible.    The area thus outlined was incised deep to adipose tissue with a #15 scalpel blade.  The skin margins were undermined to an appropriate distance in all directions utilizing iris scissors.
Complex Repair And O-L Flap Text: The defect edges were debeveled with a #15 scalpel blade.  The primary defect was closed partially with a complex linear closure.  Given the location of the remaining defect, shape of the defect and the proximity to free margins an O-L flap was deemed most appropriate for complete closure of the defect.  Using a sterile surgical marker, an appropriate flap was drawn incorporating the defect and placing the expected incisions within the relaxed skin tension lines where possible.    The area thus outlined was incised deep to adipose tissue with a #15 scalpel blade.  The skin margins were undermined to an appropriate distance in all directions utilizing iris scissors.
Spiral Flap Text: The defect edges were debeveled with a #15 scalpel blade.  Given the location of the defect, shape of the defect and the proximity to free margins a spiral flap was deemed most appropriate.  Using a sterile surgical marker, an appropriate rotation flap was drawn incorporating the defect and placing the expected incisions within the relaxed skin tension lines where possible. The area thus outlined was incised deep to adipose tissue with a #15 scalpel blade.  The skin margins were undermined to an appropriate distance in all directions utilizing iris scissors.
Double Island Pedicle Flap Text: The defect edges were debeveled with a #15 scalpel blade.  Given the location of the defect, shape of the defect and the proximity to free margins a double island pedicle advancement flap was deemed most appropriate.  Using a sterile surgical marker, an appropriate advancement flap was drawn incorporating the defect, outlining the appropriate donor tissue and placing the expected incisions within the relaxed skin tension lines where possible.    The area thus outlined was incised deep to adipose tissue with a #15 scalpel blade.  The skin margins were undermined to an appropriate distance in all directions around the primary defect and laterally outward around the island pedicle utilizing iris scissors.  There was minimal undermining beneath the pedicle flap.
Size Of Margin In Cm: 0.3
Anesthesia Type: 1% lidocaine with 1:200,000 epinephrine
Dermal Autograft Text: The defect edges were debeveled with a #15 scalpel blade.  Given the location of the defect, shape of the defect and the proximity to free margins a dermal autograft was deemed most appropriate.  Using a sterile surgical marker, the primary defect shape was transferred to the donor site. The area thus outlined was incised deep to adipose tissue with a #15 scalpel blade.  The harvested graft was then trimmed of adipose and epidermal tissue until only dermis was left.  The skin graft was then placed in the primary defect and oriented appropriately.
Muscle Hinge Flap Text: The defect edges were debeveled with a #15 scalpel blade.  Given the size, depth and location of the defect and the proximity to free margins a muscle hinge flap was deemed most appropriate.  Using a sterile surgical marker, an appropriate hinge flap was drawn incorporating the defect. The area thus outlined was incised with a #15 scalpel blade.  The skin margins were undermined to an appropriate distance in all directions utilizing iris scissors.
Complex Repair And A-T Advancement Flap Text: The defect edges were debeveled with a #15 scalpel blade.  The primary defect was closed partially with a complex linear closure.  Given the location of the remaining defect, shape of the defect and the proximity to free margins an A-T advancement flap was deemed most appropriate for complete closure of the defect.  Using a sterile surgical marker, an appropriate advancement flap was drawn incorporating the defect and placing the expected incisions within the relaxed skin tension lines where possible.    The area thus outlined was incised deep to adipose tissue with a #15 scalpel blade.  The skin margins were undermined to an appropriate distance in all directions utilizing iris scissors.
Partial Purse String (Simple) Text: Given the location of the defect and the characteristics of the surrounding skin a simple purse string closure was deemed most appropriate.  Undermining was performed circumferentially around the surgical defect.  A purse string suture was then placed and tightened. Wound tension of the circular defect prevented complete closure of the wound.
Complex Repair And Bilobe Flap Text: The defect edges were debeveled with a #15 scalpel blade.  The primary defect was closed partially with a complex linear closure.  Given the location of the remaining defect, shape of the defect and the proximity to free margins a bilobe flap was deemed most appropriate for complete closure of the defect.  Using a sterile surgical marker, an appropriate advancement flap was drawn incorporating the defect and placing the expected incisions within the relaxed skin tension lines where possible.    The area thus outlined was incised deep to adipose tissue with a #15 scalpel blade.  The skin margins were undermined to an appropriate distance in all directions utilizing iris scissors.
Complex Repair And O-T Advancement Flap Text: The defect edges were debeveled with a #15 scalpel blade.  The primary defect was closed partially with a complex linear closure.  Given the location of the remaining defect, shape of the defect and the proximity to free margins an O-T advancement flap was deemed most appropriate for complete closure of the defect.  Using a sterile surgical marker, an appropriate advancement flap was drawn incorporating the defect and placing the expected incisions within the relaxed skin tension lines where possible.    The area thus outlined was incised deep to adipose tissue with a #15 scalpel blade.  The skin margins were undermined to an appropriate distance in all directions utilizing iris scissors.
Outside Pathology Billing: outside pathology read only

## 2018-09-04 ENCOUNTER — APPOINTMENT (RX ONLY)
Dept: URBAN - METROPOLITAN AREA CLINIC 23 | Facility: CLINIC | Age: 78
Setting detail: DERMATOLOGY
End: 2018-09-04

## 2018-09-04 DIAGNOSIS — Z48.01 ENCOUNTER FOR CHANGE OR REMOVAL OF SURGICAL WOUND DRESSING: ICD-10-CM

## 2018-09-04 PROCEDURE — ? SUTURE REMOVAL (GLOBAL PERIOD)

## 2018-09-04 PROCEDURE — 99024 POSTOP FOLLOW-UP VISIT: CPT

## 2018-09-04 ASSESSMENT — LOCATION DETAILED DESCRIPTION DERM: LOCATION DETAILED: LEFT CLAVICULAR NECK

## 2018-09-04 ASSESSMENT — LOCATION SIMPLE DESCRIPTION DERM: LOCATION SIMPLE: LEFT ANTERIOR NECK

## 2018-09-04 ASSESSMENT — LOCATION ZONE DERM: LOCATION ZONE: NECK

## 2018-09-04 NOTE — PROCEDURE: SUTURE REMOVAL (GLOBAL PERIOD)
Add 30032 Cpt? (Important Note: In 2017 The Use Of 89878 Is Being Tracked By Cms To Determine Future Global Period Reimbursement For Global Periods): yes
Detail Level: Detailed

## 2018-10-08 ENCOUNTER — APPOINTMENT (RX ONLY)
Dept: URBAN - METROPOLITAN AREA CLINIC 23 | Facility: CLINIC | Age: 78
Setting detail: DERMATOLOGY
End: 2018-10-08

## 2018-10-08 DIAGNOSIS — D485 NEOPLASM OF UNCERTAIN BEHAVIOR OF SKIN: ICD-10-CM

## 2018-10-08 DIAGNOSIS — L82.1 OTHER SEBORRHEIC KERATOSIS: ICD-10-CM

## 2018-10-08 DIAGNOSIS — L82.0 INFLAMED SEBORRHEIC KERATOSIS: ICD-10-CM

## 2018-10-08 DIAGNOSIS — Z85.828 PERSONAL HISTORY OF OTHER MALIGNANT NEOPLASM OF SKIN: ICD-10-CM

## 2018-10-08 DIAGNOSIS — L57.0 ACTINIC KERATOSIS: ICD-10-CM

## 2018-10-08 PROBLEM — J30.1 ALLERGIC RHINITIS DUE TO POLLEN: Status: ACTIVE | Noted: 2018-10-08

## 2018-10-08 PROBLEM — D48.5 NEOPLASM OF UNCERTAIN BEHAVIOR OF SKIN: Status: ACTIVE | Noted: 2018-10-08

## 2018-10-08 PROBLEM — I10 ESSENTIAL (PRIMARY) HYPERTENSION: Status: ACTIVE | Noted: 2018-10-08

## 2018-10-08 PROCEDURE — 17000 DESTRUCT PREMALG LESION: CPT | Mod: 59

## 2018-10-08 PROCEDURE — 99213 OFFICE O/P EST LOW 20 MIN: CPT | Mod: 25

## 2018-10-08 PROCEDURE — 17003 DESTRUCT PREMALG LES 2-14: CPT

## 2018-10-08 PROCEDURE — 11100: CPT | Mod: 59

## 2018-10-08 PROCEDURE — 17110 DESTRUCTION B9 LES UP TO 14: CPT

## 2018-10-08 PROCEDURE — ? OTHER

## 2018-10-08 PROCEDURE — ? BIOPSY BY SHAVE METHOD

## 2018-10-08 PROCEDURE — ? LIQUID NITROGEN

## 2018-10-08 ASSESSMENT — LOCATION SIMPLE DESCRIPTION DERM
LOCATION SIMPLE: SCALP
LOCATION SIMPLE: LEFT ANTERIOR NECK
LOCATION SIMPLE: LEFT FOREARM
LOCATION SIMPLE: LEFT PRETIBIAL REGION
LOCATION SIMPLE: RIGHT KNEE
LOCATION SIMPLE: LEFT UPPER BACK
LOCATION SIMPLE: RIGHT UPPER BACK
LOCATION SIMPLE: RIGHT ELBOW
LOCATION SIMPLE: RIGHT CALF
LOCATION SIMPLE: RIGHT THUMB
LOCATION SIMPLE: RIGHT ANTERIOR NECK
LOCATION SIMPLE: TRAPEZIAL NECK
LOCATION SIMPLE: RIGHT 2ND TOE
LOCATION SIMPLE: RIGHT FOREARM
LOCATION SIMPLE: LEFT CALF
LOCATION SIMPLE: LEFT CHEEK
LOCATION SIMPLE: RIGHT PRETIBIAL REGION
LOCATION SIMPLE: LEFT FOREHEAD
LOCATION SIMPLE: LEFT LOWER LEG
LOCATION SIMPLE: RIGHT HAND
LOCATION SIMPLE: RIGHT ARM

## 2018-10-08 ASSESSMENT — LOCATION DETAILED DESCRIPTION DERM
LOCATION DETAILED: RIGHT DISTAL PRETIBIAL REGION
LOCATION DETAILED: LEFT INFERIOR LATERAL NECK
LOCATION DETAILED: RIGHT RADIAL DORSAL HAND
LOCATION DETAILED: LEFT PROXIMAL LATERAL PRETIBIAL REGION
LOCATION DETAILED: LEFT DISTAL CALF
LOCATION DETAILED: LEFT VENTRAL PROXIMAL FOREARM
LOCATION DETAILED: RIGHT PROXIMAL CALF
LOCATION DETAILED: LEFT SUPERIOR UPPER BACK
LOCATION DETAILED: RIGHT SUPERIOR PARIETAL SCALP
LOCATION DETAILED: LEFT LATERAL ANKLE
LOCATION DETAILED: LEFT SUPERIOR NASAL CHEEK
LOCATION DETAILED: LEFT DISTAL LATERAL PRETIBIAL REGION
LOCATION DETAILED: RIGHT VENTRAL PROXIMAL FOREARM
LOCATION DETAILED: LEFT CLAVICULAR NECK
LOCATION DETAILED: RIGHT LATERAL PROXIMAL PRETIBIAL REGION
LOCATION DETAILED: LEFT VENTRAL DISTAL FOREARM
LOCATION DETAILED: RIGHT DORSAL 2ND TOE
LOCATION DETAILED: LEFT DISTAL PRETIBIAL REGION
LOCATION DETAILED: RIGHT PROXIMAL RADIAL DORSAL FOREARM
LOCATION DETAILED: RIGHT LATERAL ELBOW
LOCATION DETAILED: MID TRAPEZIAL NECK
LOCATION DETAILED: RIGHT INFERIOR LATERAL NECK
LOCATION DETAILED: LEFT PROXIMAL LATERAL CALF
LOCATION DETAILED: RIGHT MEDIAL ANTECUBITAL SKIN
LOCATION DETAILED: RIGHT MEDIAL DISTAL PRETIBIAL REGION
LOCATION DETAILED: RIGHT PROXIMAL DORSAL THUMB
LOCATION DETAILED: RIGHT VENTRAL LATERAL DISTAL FOREARM
LOCATION DETAILED: RIGHT MEDIAL KNEE
LOCATION DETAILED: 2ND WEB SPACE RIGHT HAND
LOCATION DETAILED: RIGHT LATERAL UPPER BACK
LOCATION DETAILED: LEFT SUPERIOR FOREHEAD

## 2018-10-08 ASSESSMENT — LOCATION ZONE DERM
LOCATION ZONE: ARM
LOCATION ZONE: FINGER
LOCATION ZONE: LEG
LOCATION ZONE: FACE
LOCATION ZONE: TOE
LOCATION ZONE: NECK
LOCATION ZONE: HAND
LOCATION ZONE: SCALP
LOCATION ZONE: TRUNK

## 2018-10-08 NOTE — PROCEDURE: LIQUID NITROGEN
Detail Level: Detailed
Consent: The patient's verbal consent was obtained including but not limited to risks of crusting, scabbing, blistering, scarring, darker or lighter pigmentary change, recurrence, incomplete removal and infection.
Number Of Freeze-Thaw Cycles: 1 freeze-thaw cycle
Render Post-Care Instructions In Note?: no
Medical Necessity Information: It is in your best interest to select a reason for this procedure from the list below. All of these items fulfill various CMS LCD requirements except the new and changing color options.
Post-Care Instructions: I reviewed with the patient in detail post-care instructions. Patient is to wear sunprotection, and avoid picking at any of the treated lesions. Pt may apply Vaseline to crusted or scabbing areas.
Medical Necessity Clause: This procedure was medically necessary because the lesions that were treated were irritated and enlarging
Post-Care Instructions: I reviewed with the patient in detail post-care instructions. Patient is to wear sunprotection, and avoid picking at any of the treated lesions. Pt may apply Vaseline to crusted or scabbing areas. Will re-check at follow up
Duration Of Freeze Thaw-Cycle (Seconds): 5

## 2018-10-08 NOTE — PROCEDURE: BIOPSY BY SHAVE METHOD
Was A Bandage Applied: Yes
Additional Anesthesia Volume In Cc (Will Not Render If 0): 0
Anesthesia Type: 1% lidocaine with 1:200,000 epinephrine and a 1:10 solution of 8.4% sodium bicarbonate
Bill For Surgical Tray: no
Notification Instructions: Patient will be notified of biopsy results. However, patient instructed to call the office if not contacted within 2 weeks.
Anesthesia Volume In Cc: 0.5
Billing Type: Third-Party Bill
Silver Nitrate Text: The wound bed was treated with silver nitrate after the biopsy was performed.
Electrodesiccation And Curettage Text: The wound bed was treated with electrodesiccation and curettage after the biopsy was performed.
Post-Care Instructions: I reviewed with the patient in detail post-care instructions. Patient is to keep the biopsy site dry overnight, and then apply bacitracin twice daily until healed. Patient may apply hydrogen peroxide soaks to remove any crusting.
Type Of Destruction Used: Curettage
Detail Level: Detailed
Wound Care: Vaseline
Consent: Written consent was obtained and risks were reviewed including but not limited to scarring, infection, bleeding, scabbing, incomplete removal, nerve damage and allergy to anesthesia.
Dressing: pressure dressing with telfa
Outside Pathology Billing: outside pathology read only
Hemostasis: Electrocautery
Electrodesiccation Text: The wound bed was treated with electrodesiccation after the biopsy was performed.
Biopsy Type: H and E
Cryotherapy Text: The wound bed was treated with cryotherapy after the biopsy was performed.
Biopsy Method: Dermablade
Accession #: [id number]
Depth Of Biopsy: dermis

## 2018-11-14 ENCOUNTER — APPOINTMENT (RX ONLY)
Dept: URBAN - METROPOLITAN AREA CLINIC 23 | Facility: CLINIC | Age: 78
Setting detail: DERMATOLOGY
End: 2018-11-14

## 2018-11-14 DIAGNOSIS — L57.0 ACTINIC KERATOSIS: ICD-10-CM

## 2018-11-14 PROBLEM — D04.72 CARCINOMA IN SITU OF SKIN OF LEFT LOWER LIMB, INCLUDING HIP: Status: ACTIVE | Noted: 2018-11-14

## 2018-11-14 PROCEDURE — 12032 INTMD RPR S/A/T/EXT 2.6-7.5: CPT

## 2018-11-14 PROCEDURE — 11602 EXC TR-EXT MAL+MARG 1.1-2 CM: CPT

## 2018-11-14 PROCEDURE — ? EXCISION

## 2018-11-14 PROCEDURE — ? PRESCRIPTION

## 2018-11-14 RX ORDER — IMIQUIMOD 50 MG/G
CREAM TOPICAL BIW
Qty: 1 | Refills: 3 | Status: ERX

## 2018-11-14 ASSESSMENT — LOCATION DETAILED DESCRIPTION DERM
LOCATION DETAILED: RIGHT INFERIOR VERMILION LIP
LOCATION DETAILED: LEFT INFERIOR VERMILION LIP

## 2018-11-14 ASSESSMENT — LOCATION ZONE DERM: LOCATION ZONE: LIP

## 2018-11-14 ASSESSMENT — LOCATION SIMPLE DESCRIPTION DERM
LOCATION SIMPLE: RIGHT LIP
LOCATION SIMPLE: LEFT LIP

## 2018-11-14 NOTE — PROCEDURE: EXCISION
Lazy S Intermediate Repair Preamble Text (Leave Blank If You Do Not Want): Undermining was performed with blunt dissection.
Double M-Plasty Complex Repair Preamble Text (Leave Blank If You Do Not Want): Extensive wide undermining was performed.
Split-Thickness Skin Graft Text: The defect edges were debeveled with a #15 scalpel blade.  Given the location of the defect, shape of the defect and the proximity to free margins a split thickness skin graft was deemed most appropriate.  Using a sterile surgical marker, the primary defect shape was transferred to the donor site. The split thickness graft was then harvested.  The skin graft was then placed in the primary defect and oriented appropriately.
Suture Removal: 14 days
Consent was obtained from the patient. The risks and benefits to therapy were discussed in detail. Specifically, the risks of infection, scarring, bleeding, prolonged wound healing, incomplete removal, allergy to anesthesia, nerve injury and recurrence were addressed. Prior to the procedure, the treatment site was clearly identified and confirmed by the patient. All components of Universal Protocol/PAUSE Rule completed.
Show Repair Surgeon Variable: Yes
Bilobed Transposition Flap Text: The defect edges were debeveled with a #15 scalpel blade.  Given the location of the defect and the proximity to free margins a bilobed transposition flap was deemed most appropriate.  Using a sterile surgical marker, an appropriate bilobe flap drawn around the defect.    The area thus outlined was incised deep to adipose tissue with a #15 scalpel blade.  The skin margins were undermined to an appropriate distance in all directions utilizing iris scissors.
Bi-Rhombic Flap Text: The defect edges were debeveled with a #15 scalpel blade.  Given the location of the defect and the proximity to free margins a bi-rhombic flap was deemed most appropriate.  Using a sterile surgical marker, an appropriate rhombic flap was drawn incorporating the defect. The area thus outlined was incised deep to adipose tissue with a #15 scalpel blade.  The skin margins were undermined to an appropriate distance in all directions utilizing iris scissors.
Post-Care Instructions: I reviewed with the patient in detail post-care instructions. Patient is not to engage in any heavy lifting, exercise, or swimming for the next 14 days. Should the patient develop any fevers, chills, bleeding, severe pain patient will contact the office immediately.
Complex Repair And Rhombic Flap Text: The defect edges were debeveled with a #15 scalpel blade.  The primary defect was closed partially with a complex linear closure.  Given the location of the remaining defect, shape of the defect and the proximity to free margins a rhombic flap was deemed most appropriate for complete closure of the defect.  Using a sterile surgical marker, an appropriate advancement flap was drawn incorporating the defect and placing the expected incisions within the relaxed skin tension lines where possible.    The area thus outlined was incised deep to adipose tissue with a #15 scalpel blade.  The skin margins were undermined to an appropriate distance in all directions utilizing iris scissors.
Information: Selecting Yes will display possible errors in your note based on the variables you have selected. This validation is only offered as a suggestion for you. PLEASE NOTE THAT THE VALIDATION TEXT WILL BE REMOVED WHEN YOU FINALIZE YOUR NOTE. IF YOU WANT TO FAX A PRELIMINARY NOTE YOU WILL NEED TO TOGGLE THIS TO 'NO' IF YOU DO NOT WANT IT IN YOUR FAXED NOTE.
Island Pedicle Flap Text: The defect edges were debeveled with a #15 scalpel blade.  Given the location of the defect, shape of the defect and the proximity to free margins an island pedicle advancement flap was deemed most appropriate.  Using a sterile surgical marker, an appropriate advancement flap was drawn incorporating the defect, outlining the appropriate donor tissue and placing the expected incisions within the relaxed skin tension lines where possible.    The area thus outlined was incised deep to adipose tissue with a #15 scalpel blade.  The skin margins were undermined to an appropriate distance in all directions around the primary defect and laterally outward around the island pedicle utilizing iris scissors.  There was minimal undermining beneath the pedicle flap.
Complex Repair And Modified Advancement Flap Text: The defect edges were debeveled with a #15 scalpel blade.  The primary defect was closed partially with a complex linear closure.  Given the location of the remaining defect, shape of the defect and the proximity to free margins a modified advancement flap was deemed most appropriate for complete closure of the defect.  Using a sterile surgical marker, an appropriate advancement flap was drawn incorporating the defect and placing the expected incisions within the relaxed skin tension lines where possible.    The area thus outlined was incised deep to adipose tissue with a #15 scalpel blade.  The skin margins were undermined to an appropriate distance in all directions utilizing iris scissors.
Anesthesia Type: 1% lidocaine with 1:200,000 epinephrine
Positioning (Leave Blank If You Do Not Want): The patient was placed in a comfortable position exposing the surgical site.
Advancement Flap (Single) Text: The defect edges were debeveled with a #15 scalpel blade.  Given the location of the defect and the proximity to free margins a single advancement flap was deemed most appropriate.  Using a sterile surgical marker, an appropriate advancement flap was drawn incorporating the defect and placing the expected incisions within the relaxed skin tension lines where possible.    The area thus outlined was incised deep to adipose tissue with a #15 scalpel blade.  The skin margins were undermined to an appropriate distance in all directions utilizing iris scissors.
Melolabial Transposition Flap Text: The defect edges were debeveled with a #15 scalpel blade.  Given the location of the defect and the proximity to free margins a melolabial flap was deemed most appropriate.  Using a sterile surgical marker, an appropriate melolabial transposition flap was drawn incorporating the defect.    The area thus outlined was incised deep to adipose tissue with a #15 scalpel blade.  The skin margins were undermined to an appropriate distance in all directions utilizing iris scissors.
Crescentic Advancement Flap Text: The defect edges were debeveled with a #15 scalpel blade.  Given the location of the defect and the proximity to free margins a crescentic advancement flap was deemed most appropriate.  Using a sterile surgical marker, the appropriate advancement flap was drawn incorporating the defect and placing the expected incisions within the relaxed skin tension lines where possible.    The area thus outlined was incised deep to adipose tissue with a #15 scalpel blade.  The skin margins were undermined to an appropriate distance in all directions utilizing iris scissors.
Complex Repair And V-Y Plasty Text: The defect edges were debeveled with a #15 scalpel blade.  The primary defect was closed partially with a complex linear closure.  Given the location of the remaining defect, shape of the defect and the proximity to free margins a V-Y plasty was deemed most appropriate for complete closure of the defect.  Using a sterile surgical marker, an appropriate advancement flap was drawn incorporating the defect and placing the expected incisions within the relaxed skin tension lines where possible.    The area thus outlined was incised deep to adipose tissue with a #15 scalpel blade.  The skin margins were undermined to an appropriate distance in all directions utilizing iris scissors.
O-Z Plasty Text: The defect edges were debeveled with a #15 scalpel blade.  Given the location of the defect, shape of the defect and the proximity to free margins an O-Z plasty (double transposition flap) was deemed most appropriate.  Using a sterile surgical marker, the appropriate transposition flaps were drawn incorporating the defect and placing the expected incisions within the relaxed skin tension lines where possible.    The area thus outlined was incised deep to adipose tissue with a #15 scalpel blade.  The skin margins were undermined to an appropriate distance in all directions utilizing iris scissors.  Hemostasis was achieved with electrocautery.  The flaps were then transposed into place, one clockwise and the other counterclockwise, and anchored with interrupted buried subcutaneous sutures.
Intermediate / Complex Repair - Final Wound Length In Cm: 4.2
Complex Repair And Double M Plasty Text: The defect edges were debeveled with a #15 scalpel blade.  The primary defect was closed partially with a complex linear closure.  Given the location of the remaining defect, shape of the defect and the proximity to free margins a double M plasty was deemed most appropriate for complete closure of the defect.  Using a sterile surgical marker, an appropriate advancement flap was drawn incorporating the defect and placing the expected incisions within the relaxed skin tension lines where possible.    The area thus outlined was incised deep to adipose tissue with a #15 scalpel blade.  The skin margins were undermined to an appropriate distance in all directions utilizing iris scissors.
Partial Purse String (Simple) Text: Given the location of the defect and the characteristics of the surrounding skin a simple purse string closure was deemed most appropriate.  Undermining was performed circumferentially around the surgical defect.  A purse string suture was then placed and tightened. Wound tension of the circular defect prevented complete closure of the wound.
Validate Previous Accession (Can Hide Previous Accession In Settings Tab): No
Path Notes (To The Dermatopathologist): Please check margins. Please change previous pathology report # [id number] from right proximal calf to the correct location which should be left distal  calf .
Perilesional Excision Additional Text (Leave Blank If You Do Not Want): The margin was drawn around the clinically apparent lesion. Incisions were then made along these lines to the appropriate tissue plane and the lesion was extirpated.
Cartilage Graft Text: The defect edges were debeveled with a #15 scalpel blade.  Given the location of the defect, shape of the defect, the fact the defect involved a full thickness cartilage defect a cartilage graft was deemed most appropriate.  An appropriate donor site was identified, cleansed, and anesthetized. The cartilage graft was then harvested and transferred to the recipient site, oriented appropriately and then sutured into place.  The secondary defect was then repaired using a primary closure.
Mucosal Advancement Flap Text: Given the location of the defect, shape of the defect and the proximity to free margins a mucosal advancement flap was deemed most appropriate. Incisions were made with a 15 blade scalpel in the appropriate fashion along the cutaneous vermillion border and the mucosal lip. The remaining actinically damaged mucosal tissue was excised.  The mucosal advancement flap was then elevated to the gingival sulcus with care taken to preserve the neurovascular structures and advanced into the primary defect. Care was taken to ensure that precise realignment of the vermillion border was achieved.
Muscle Hinge Flap Text: The defect edges were debeveled with a #15 scalpel blade.  Given the size, depth and location of the defect and the proximity to free margins a muscle hinge flap was deemed most appropriate.  Using a sterile surgical marker, an appropriate hinge flap was drawn incorporating the defect. The area thus outlined was incised with a #15 scalpel blade.  The skin margins were undermined to an appropriate distance in all directions utilizing iris scissors.
Bilobed Flap Text: The defect edges were debeveled with a #15 scalpel blade.  Given the location of the defect and the proximity to free margins a bilobe flap was deemed most appropriate.  Using a sterile surgical marker, an appropriate bilobe flap drawn around the defect.    The area thus outlined was incised deep to adipose tissue with a #15 scalpel blade.  The skin margins were undermined to an appropriate distance in all directions utilizing iris scissors.
Trilobed Flap Text: The defect edges were debeveled with a #15 scalpel blade.  Given the location of the defect and the proximity to free margins a trilobed flap was deemed most appropriate.  Using a sterile surgical marker, an appropriate trilobed flap drawn around the defect.    The area thus outlined was incised deep to adipose tissue with a #15 scalpel blade.  The skin margins were undermined to an appropriate distance in all directions utilizing iris scissors.
Estimated Blood Loss (Cc): minimal
Epidermal Closure Graft Donor Site (Optional): simple interrupted
Additional Anesthesia Volume In Cc: 0
Modified Advancement Flap Text: The defect edges were debeveled with a #15 scalpel blade.  Given the location of the defect, shape of the defect and the proximity to free margins a modified advancement flap was deemed most appropriate.  Using a sterile surgical marker, an appropriate advancement flap was drawn incorporating the defect and placing the expected incisions within the relaxed skin tension lines where possible.    The area thus outlined was incised deep to adipose tissue with a #15 scalpel blade.  The skin margins were undermined to an appropriate distance in all directions utilizing iris scissors.
A-T Advancement Flap Text: The defect edges were debeveled with a #15 scalpel blade.  Given the location of the defect, shape of the defect and the proximity to free margins an A-T advancement flap was deemed most appropriate.  Using a sterile surgical marker, an appropriate advancement flap was drawn incorporating the defect and placing the expected incisions within the relaxed skin tension lines where possible.    The area thus outlined was incised deep to adipose tissue with a #15 scalpel blade.  The skin margins were undermined to an appropriate distance in all directions utilizing iris scissors.
Island Pedicle Flap-Requiring Vessel Identification Text: The defect edges were debeveled with a #15 scalpel blade.  Given the location of the defect, shape of the defect and the proximity to free margins an island pedicle advancement flap was deemed most appropriate.  Using a sterile surgical marker, an appropriate advancement flap was drawn, based on the axial vessel mentioned above, incorporating the defect, outlining the appropriate donor tissue and placing the expected incisions within the relaxed skin tension lines where possible.    The area thus outlined was incised deep to adipose tissue with a #15 scalpel blade.  The skin margins were undermined to an appropriate distance in all directions around the primary defect and laterally outward around the island pedicle utilizing iris scissors.  There was minimal undermining beneath the pedicle flap.
Complex Repair And W Plasty Text: The defect edges were debeveled with a #15 scalpel blade.  The primary defect was closed partially with a complex linear closure.  Given the location of the remaining defect, shape of the defect and the proximity to free margins a W plasty was deemed most appropriate for complete closure of the defect.  Using a sterile surgical marker, an appropriate advancement flap was drawn incorporating the defect and placing the expected incisions within the relaxed skin tension lines where possible.    The area thus outlined was incised deep to adipose tissue with a #15 scalpel blade.  The skin margins were undermined to an appropriate distance in all directions utilizing iris scissors.
Fusiform Excision Additional Text (Leave Blank If You Do Not Want): The margin was drawn around the clinically apparent lesion.  A fusiform shape was then drawn on the skin incorporating the lesion and margins.  Incisions were then made along these lines to the appropriate tissue plane and the lesion was extirpated.
Mercedes Flap Text: The defect edges were debeveled with a #15 scalpel blade.  Given the location of the defect, shape of the defect and the proximity to free margins a Mercedes flap was deemed most appropriate.  Using a sterile surgical marker, an appropriate advancement flap was drawn incorporating the defect and placing the expected incisions within the relaxed skin tension lines where possible. The area thus outlined was incised deep to adipose tissue with a #15 scalpel blade.  The skin margins were undermined to an appropriate distance in all directions utilizing iris scissors.
Complex Repair And Single Advancement Flap Text: The defect edges were debeveled with a #15 scalpel blade.  The primary defect was closed partially with a complex linear closure.  Given the location of the remaining defect, shape of the defect and the proximity to free margins a single advancement flap was deemed most appropriate for complete closure of the defect.  Using a sterile surgical marker, an appropriate advancement flap was drawn incorporating the defect and placing the expected incisions within the relaxed skin tension lines where possible.    The area thus outlined was incised deep to adipose tissue with a #15 scalpel blade.  The skin margins were undermined to an appropriate distance in all directions utilizing iris scissors.
X Size Of Lesion In Cm (Optional): 0.5
Excision Method: Elliptical
Double Island Pedicle Flap Text: The defect edges were debeveled with a #15 scalpel blade.  Given the location of the defect, shape of the defect and the proximity to free margins a double island pedicle advancement flap was deemed most appropriate.  Using a sterile surgical marker, an appropriate advancement flap was drawn incorporating the defect, outlining the appropriate donor tissue and placing the expected incisions within the relaxed skin tension lines where possible.    The area thus outlined was incised deep to adipose tissue with a #15 scalpel blade.  The skin margins were undermined to an appropriate distance in all directions around the primary defect and laterally outward around the island pedicle utilizing iris scissors.  There was minimal undermining beneath the pedicle flap.
Repair Type: Intermediate
Complex Repair And Dermal Autograft Text: The defect edges were debeveled with a #15 scalpel blade.  The primary defect was closed partially with a complex linear closure.  Given the location of the defect, shape of the defect and the proximity to free margins an dermal autograft was deemed most appropriate to repair the remaining defect.  The graft was trimmed to fit the size of the remaining defect.  The graft was then placed in the primary defect, oriented appropriately, and sutured into place.
Mastoid Interpolation Flap Text: A decision was made to reconstruct the defect utilizing an interpolation axial flap and a staged reconstruction.  A telfa template was made of the defect.  This telfa template was then used to outline the mastoid interpolation flap.  The donor area for the pedicle flap was then injected with anesthesia.  The flap was excised through the skin and subcutaneous tissue down to the layer of the underlying musculature.  The pedicle flap was carefully excised within this deep plane to maintain its blood supply.  The edges of the donor site were undermined.   The donor site was closed in a primary fashion.  The pedicle was then rotated into position and sutured.  Once the tube was sutured into place, adequate blood supply was confirmed with blanching and refill.  The pedicle was then wrapped with xeroform gauze and dressed appropriately with a telfa and gauze bandage to ensure continued blood supply and protect the attached pedicle.
W Plasty Text: The lesion was extirpated to the level of the fat with a #15 scalpel blade.  Given the location of the defect, shape of the defect and the proximity to free margins a W-plasty was deemed most appropriate for repair.  Using a sterile surgical marker, the appropriate transposition arms of the W-plasty were drawn incorporating the defect and placing the expected incisions within the relaxed skin tension lines where possible.    The area thus outlined was incised deep to adipose tissue with a #15 scalpel blade.  The skin margins were undermined to an appropriate distance in all directions utilizing iris scissors.  The opposing transposition arms were then transposed into place in opposite direction and anchored with interrupted buried subcutaneous sutures.
Epidermal Sutures: 4-0 Prolene
Saucerization Excision Additional Text (Leave Blank If You Do Not Want): The margin was drawn around the clinically apparent lesion.  Incisions were then made along these lines, in a tangential fashion, to the appropriate tissue plane and the lesion was extirpated.
Paramedian Forehead Flap Text: A decision was made to reconstruct the defect utilizing an interpolation axial flap and a staged reconstruction.  A telfa template was made of the defect.  This telfa template was then used to outline the paramedian forehead pedicle flap.  The donor area for the pedicle flap was then injected with anesthesia.  The flap was excised through the skin and subcutaneous tissue down to the layer of the underlying musculature.  The pedicle flap was carefully excised within this deep plane to maintain its blood supply.  The edges of the donor site were undermined.   The donor site was closed in a primary fashion.  The pedicle was then rotated into position and sutured.  Once the tube was sutured into place, adequate blood supply was confirmed with blanching and refill.  The pedicle was then wrapped with xeroform gauze and dressed appropriately with a telfa and gauze bandage to ensure continued blood supply and protect the attached pedicle.
O-L Flap Text: The defect edges were debeveled with a #15 scalpel blade.  Given the location of the defect, shape of the defect and the proximity to free margins an O-L flap was deemed most appropriate.  Using a sterile surgical marker, an appropriate advancement flap was drawn incorporating the defect and placing the expected incisions within the relaxed skin tension lines where possible.    The area thus outlined was incised deep to adipose tissue with a #15 scalpel blade.  The skin margins were undermined to an appropriate distance in all directions utilizing iris scissors.
Anesthesia Volume In Cc: 9
Curvilinear Excision Additional Text (Leave Blank If You Do Not Want): The margin was drawn around the clinically apparent lesion.  A curvilinear shape was then drawn on the skin incorporating the lesion and margins.  Incisions were then made along these lines to the appropriate tissue plane and the lesion was extirpated.
Hatchet Flap Text: The defect edges were debeveled with a #15 scalpel blade.  Given the location of the defect, shape of the defect and the proximity to free margins a hatchet flap was deemed most appropriate.  Using a sterile surgical marker, an appropriate hatchet flap was drawn incorporating the defect and placing the expected incisions within the relaxed skin tension lines where possible.    The area thus outlined was incised deep to adipose tissue with a #15 scalpel blade.  The skin margins were undermined to an appropriate distance in all directions utilizing iris scissors.
Epidermal Closure: running
Advancement-Rotation Flap Text: The defect edges were debeveled with a #15 scalpel blade.  Given the location of the defect, shape of the defect and the proximity to free margins an advancement-rotation flap was deemed most appropriate.  Using a sterile surgical marker, an appropriate flap was drawn incorporating the defect and placing the expected incisions within the relaxed skin tension lines where possible. The area thus outlined was incised deep to adipose tissue with a #15 scalpel blade.  The skin margins were undermined to an appropriate distance in all directions utilizing iris scissors.
Rotation Flap Text: The defect edges were debeveled with a #15 scalpel blade.  Given the location of the defect, shape of the defect and the proximity to free margins a rotation flap was deemed most appropriate.  Using a sterile surgical marker, an appropriate rotation flap was drawn incorporating the defect and placing the expected incisions within the relaxed skin tension lines where possible.    The area thus outlined was incised deep to adipose tissue with a #15 scalpel blade.  The skin margins were undermined to an appropriate distance in all directions utilizing iris scissors.
Dressing: pressure dressing with telfa
Helical Rim Advancement Flap Text: The defect edges were debeveled with a #15 blade scalpel.  Given the location of the defect and the proximity to free margins (helical rim) a double helical rim advancement flap was deemed most appropriate.  Using a sterile surgical marker, the appropriate advancement flaps were drawn incorporating the defect and placing the expected incisions between the helical rim and antihelix where possible.  The area thus outlined was incised through and through with a #15 scalpel blade.  With a skin hook and iris scissors, the flaps were gently and sharply undermined and freed up.
Complex Repair And Tissue Cultured Epidermal Autograft Text: The defect edges were debeveled with a #15 scalpel blade.  The primary defect was closed partially with a complex linear closure.  Given the location of the defect, shape of the defect and the proximity to free margins an tissue cultured epidermal autograft was deemed most appropriate to repair the remaining defect.  The graft was trimmed to fit the size of the remaining defect.  The graft was then placed in the primary defect, oriented appropriately, and sutured into place.
Complex Repair And Split-Thickness Skin Graft Text: The defect edges were debeveled with a #15 scalpel blade.  The primary defect was closed partially with a complex linear closure.  Given the location of the defect, shape of the defect and the proximity to free margins a split thickness skin graft was deemed most appropriate to repair the remaining defect.  The graft was trimmed to fit the size of the remaining defect.  The graft was then placed in the primary defect, oriented appropriately, and sutured into place.
Complex Repair And Dorsal Nasal Flap Text: The defect edges were debeveled with a #15 scalpel blade.  The primary defect was closed partially with a complex linear closure.  Given the location of the remaining defect, shape of the defect and the proximity to free margins a dorsal nasal flap was deemed most appropriate for complete closure of the defect.  Using a sterile surgical marker, an appropriate flap was drawn incorporating the defect and placing the expected incisions within the relaxed skin tension lines where possible.    The area thus outlined was incised deep to adipose tissue with a #15 scalpel blade.  The skin margins were undermined to an appropriate distance in all directions utilizing iris scissors.
Size Of Margin In Cm: 0.3
Composite Graft Text: The defect edges were debeveled with a #15 scalpel blade.  Given the location of the defect, shape of the defect, the proximity to free margins and the fact the defect was full thickness a composite graft was deemed most appropriate.  The defect was outline and then transferred to the donor site.  A full thickness graft was then excised from the donor site. The graft was then placed in the primary defect, oriented appropriately and then sutured into place.  The secondary defect was then repaired using a primary closure.
Wound Care: Petrolatum
Excision Depth: adipose tissue
Interpolation Flap Text: A decision was made to reconstruct the defect utilizing an interpolation axial flap and a staged reconstruction.  A telfa template was made of the defect.  This telfa template was then used to outline the interpolation flap.  The donor area for the pedicle flap was then injected with anesthesia.  The flap was excised through the skin and subcutaneous tissue down to the layer of the underlying musculature.  The interpolation flap was carefully excised within this deep plane to maintain its blood supply.  The edges of the donor site were undermined.   The donor site was closed in a primary fashion.  The pedicle was then rotated into position and sutured.  Once the tube was sutured into place, adequate blood supply was confirmed with blanching and refill.  The pedicle was then wrapped with xeroform gauze and dressed appropriately with a telfa and gauze bandage to ensure continued blood supply and protect the attached pedicle.
Ear Star Wedge Flap Text: The defect edges were debeveled with a #15 blade scalpel.  Given the location of the defect and the proximity to free margins (helical rim) an ear star wedge flap was deemed most appropriate.  Using a sterile surgical marker, the appropriate flap was drawn incorporating the defect and placing the expected incisions between the helical rim and antihelix where possible.  The area thus outlined was incised through and through with a #15 scalpel blade.
Billing Type: Third-Party Bill
Epidermal Autograft Text: The defect edges were debeveled with a #15 scalpel blade.  Given the location of the defect, shape of the defect and the proximity to free margins an epidermal autograft was deemed most appropriate.  Using a sterile surgical marker, the primary defect shape was transferred to the donor site. The epidermal graft was then harvested.  The skin graft was then placed in the primary defect and oriented appropriately.
O-T Plasty Text: The defect edges were debeveled with a #15 scalpel blade.  Given the location of the defect, shape of the defect and the proximity to free margins an O-T plasty was deemed most appropriate.  Using a sterile surgical marker, an appropriate O-T plasty was drawn incorporating the defect and placing the expected incisions within the relaxed skin tension lines where possible.    The area thus outlined was incised deep to adipose tissue with a #15 scalpel blade.  The skin margins were undermined to an appropriate distance in all directions utilizing iris scissors.
Advancement Flap (Double) Text: The defect edges were debeveled with a #15 scalpel blade.  Given the location of the defect and the proximity to free margins a double advancement flap was deemed most appropriate.  Using a sterile surgical marker, the appropriate advancement flaps were drawn incorporating the defect and placing the expected incisions within the relaxed skin tension lines where possible.    The area thus outlined was incised deep to adipose tissue with a #15 scalpel blade.  The skin margins were undermined to an appropriate distance in all directions utilizing iris scissors.
Complex Repair And Skin Substitute Graft Text: The defect edges were debeveled with a #15 scalpel blade.  The primary defect was closed partially with a complex linear closure.  Given the location of the remaining defect, shape of the defect and the proximity to free margins a skin substitute graft was deemed most appropriate to repair the remaining defect.  The graft was trimmed to fit the size of the remaining defect.  The graft was then placed in the primary defect, oriented appropriately, and sutured into place.
Accession #: [id number]
Excisional Biopsy Additional Text (Leave Blank If You Do Not Want): The margin was drawn around the clinically apparent lesion.  An elliptical shape was then drawn on the skin incorporating the lesion and margins.  Incisions were then made along these lines to the appropriate tissue plane and the lesion was extirpated.
Home Suture Removal Text: Patient was provided a home suture removal kit and will remove their sutures at home.  If they have any questions or difficulties they will call the office.
Posterior Auricular Interpolation Flap Text: A decision was made to reconstruct the defect utilizing an interpolation axial flap and a staged reconstruction.  A telfa template was made of the defect.  This telfa template was then used to outline the posterior auricular interpolation flap.  The donor area for the pedicle flap was then injected with anesthesia.  The flap was excised through the skin and subcutaneous tissue down to the layer of the underlying musculature.  The pedicle flap was carefully excised within this deep plane to maintain its blood supply.  The edges of the donor site were undermined.   The donor site was closed in a primary fashion.  The pedicle was then rotated into position and sutured.  Once the tube was sutured into place, adequate blood supply was confirmed with blanching and refill.  The pedicle was then wrapped with xeroform gauze and dressed appropriately with a telfa and gauze bandage to ensure continued blood supply and protect the attached pedicle.
Bilateral Helical Rim Advancement Flap Text: The defect edges were debeveled with a #15 blade scalpel.  Given the location of the defect and the proximity to free margins (helical rim) a bilateral helical rim advancement flap was deemed most appropriate.  Using a sterile surgical marker, the appropriate advancement flaps were drawn incorporating the defect and placing the expected incisions between the helical rim and antihelix where possible.  The area thus outlined was incised through and through with a #15 scalpel blade.  With a skin hook and iris scissors, the flaps were gently and sharply undermined and freed up.
Cheek-To-Nose Interpolation Flap Text: A decision was made to reconstruct the defect utilizing an interpolation axial flap and a staged reconstruction.  A telfa template was made of the defect.  This telfa template was then used to outline the Cheek-To-Nose Interpolation flap.  The donor area for the pedicle flap was then injected with anesthesia.  The flap was excised through the skin and subcutaneous tissue down to the layer of the underlying musculature.  The interpolation flap was carefully excised within this deep plane to maintain its blood supply.  The edges of the donor site were undermined.   The donor site was closed in a primary fashion.  The pedicle was then rotated into position and sutured.  Once the tube was sutured into place, adequate blood supply was confirmed with blanching and refill.  The pedicle was then wrapped with xeroform gauze and dressed appropriately with a telfa and gauze bandage to ensure continued blood supply and protect the attached pedicle.
Partial Purse String (Intermediate) Text: Given the location of the defect and the characteristics of the surrounding skin an intermediate purse string closure was deemed most appropriate.  Undermining was performed circumferentially around the surgical defect.  A purse string suture was then placed and tightened. Wound tension of the circular defect prevented complete closure of the wound.
Spiral Flap Text: The defect edges were debeveled with a #15 scalpel blade.  Given the location of the defect, shape of the defect and the proximity to free margins a spiral flap was deemed most appropriate.  Using a sterile surgical marker, an appropriate rotation flap was drawn incorporating the defect and placing the expected incisions within the relaxed skin tension lines where possible. The area thus outlined was incised deep to adipose tissue with a #15 scalpel blade.  The skin margins were undermined to an appropriate distance in all directions utilizing iris scissors.
Complex Repair And Epidermal Autograft Text: The defect edges were debeveled with a #15 scalpel blade.  The primary defect was closed partially with a complex linear closure.  Given the location of the defect, shape of the defect and the proximity to free margins an epidermal autograft was deemed most appropriate to repair the remaining defect.  The graft was trimmed to fit the size of the remaining defect.  The graft was then placed in the primary defect, oriented appropriately, and sutured into place.
Tissue Cultured Epidermal Autograft Text: The defect edges were debeveled with a #15 scalpel blade.  Given the location of the defect, shape of the defect and the proximity to free margins a tissue cultured epidermal autograft was deemed most appropriate.  The graft was then trimmed to fit the size of the defect.  The graft was then placed in the primary defect and oriented appropriately.
Hemostasis: Electrocautery
Graft Donor Site Bandage (Optional-Leave Blank If You Don't Want In Note): Steri-strips and a pressure bandage were applied to the donor site.
O-T Advancement Flap Text: The defect edges were debeveled with a #15 scalpel blade.  Given the location of the defect, shape of the defect and the proximity to free margins an O-T advancement flap was deemed most appropriate.  Using a sterile surgical marker, an appropriate advancement flap was drawn incorporating the defect and placing the expected incisions within the relaxed skin tension lines where possible.    The area thus outlined was incised deep to adipose tissue with a #15 scalpel blade.  The skin margins were undermined to an appropriate distance in all directions utilizing iris scissors.
No Repair - Repaired With Adjacent Surgical Defect Text (Leave Blank If You Do Not Want): After the excision the defect was repaired concurrently with another surgical defect which was in close approximation.
Cheek Interpolation Flap Text: A decision was made to reconstruct the defect utilizing an interpolation axial flap and a staged reconstruction.  A telfa template was made of the defect.  This telfa template was then used to outline the Cheek Interpolation flap.  The donor area for the pedicle flap was then injected with anesthesia.  The flap was excised through the skin and subcutaneous tissue down to the layer of the underlying musculature.  The interpolation flap was carefully excised within this deep plane to maintain its blood supply.  The edges of the donor site were undermined.   The donor site was closed in a primary fashion.  The pedicle was then rotated into position and sutured.  Once the tube was sutured into place, adequate blood supply was confirmed with blanching and refill.  The pedicle was then wrapped with xeroform gauze and dressed appropriately with a telfa and gauze bandage to ensure continued blood supply and protect the attached pedicle.
Complex Repair And Rotation Flap Text: The defect edges were debeveled with a #15 scalpel blade.  The primary defect was closed partially with a complex linear closure.  Given the location of the remaining defect, shape of the defect and the proximity to free margins a rotation flap was deemed most appropriate for complete closure of the defect.  Using a sterile surgical marker, an appropriate advancement flap was drawn incorporating the defect and placing the expected incisions within the relaxed skin tension lines where possible.    The area thus outlined was incised deep to adipose tissue with a #15 scalpel blade.  The skin margins were undermined to an appropriate distance in all directions utilizing iris scissors.
Anesthesia Type: 1% lidocaine with epinephrine and a 1:10 solution of 8.4% sodium bicarbonate
Complex Repair And M Plasty Text: The defect edges were debeveled with a #15 scalpel blade.  The primary defect was closed partially with a complex linear closure.  Given the location of the remaining defect, shape of the defect and the proximity to free margins an M plasty was deemed most appropriate for complete closure of the defect.  Using a sterile surgical marker, an appropriate advancement flap was drawn incorporating the defect and placing the expected incisions within the relaxed skin tension lines where possible.    The area thus outlined was incised deep to adipose tissue with a #15 scalpel blade.  The skin margins were undermined to an appropriate distance in all directions utilizing iris scissors.
Z Plasty Text: The lesion was extirpated to the level of the fat with a #15 scalpel blade.  Given the location of the defect, shape of the defect and the proximity to free margins a Z-plasty was deemed most appropriate for repair.  Using a sterile surgical marker, the appropriate transposition arms of the Z-plasty were drawn incorporating the defect and placing the expected incisions within the relaxed skin tension lines where possible.    The area thus outlined was incised deep to adipose tissue with a #15 scalpel blade.  The skin margins were undermined to an appropriate distance in all directions utilizing iris scissors.  The opposing transposition arms were then transposed into place in opposite direction and anchored with interrupted buried subcutaneous sutures.
Complex Repair And Ftsg Text: The defect edges were debeveled with a #15 scalpel blade.  The primary defect was closed partially with a complex linear closure.  Given the location of the defect, shape of the defect and the proximity to free margins a full thickness skin graft was deemed most appropriate to repair the remaining defect.  The graft was trimmed to fit the size of the remaining defect.  The graft was then placed in the primary defect, oriented appropriately, and sutured into place.
Dorsal Nasal Flap Text: The defect edges were debeveled with a #15 scalpel blade.  Given the location of the defect and the proximity to free margins a dorsal nasal flap was deemed most appropriate.  Using a sterile surgical marker, an appropriate dorsal nasal flap was drawn around the defect.    The area thus outlined was incised deep to adipose tissue with a #15 scalpel blade.  The skin margins were undermined to an appropriate distance in all directions utilizing iris scissors.
Complex Repair And O-T Advancement Flap Text: The defect edges were debeveled with a #15 scalpel blade.  The primary defect was closed partially with a complex linear closure.  Given the location of the remaining defect, shape of the defect and the proximity to free margins an O-T advancement flap was deemed most appropriate for complete closure of the defect.  Using a sterile surgical marker, an appropriate advancement flap was drawn incorporating the defect and placing the expected incisions within the relaxed skin tension lines where possible.    The area thus outlined was incised deep to adipose tissue with a #15 scalpel blade.  The skin margins were undermined to an appropriate distance in all directions utilizing iris scissors.
Purse String (Simple) Text: Given the location of the defect and the characteristics of the surrounding skin a purse string simple closure was deemed most appropriate.  Undermining was performed circumferentially around the surgical defect.  A purse string suture was then placed and tightened.
Purse String (Intermediate) Text: Given the location of the defect and the characteristics of the surrounding skin a pursestring intermediate closure was deemed most appropriate.  Undermining was performed circumfirentially around the surgical defect.  A purstring suture was then placed and tightened.
Outside Pathology Billing: outside pathology read only
V-Y Flap Text: The defect edges were debeveled with a #15 scalpel blade.  Given the location of the defect, shape of the defect and the proximity to free margins a V-Y flap was deemed most appropriate.  Using a sterile surgical marker, an appropriate advancement flap was drawn incorporating the defect and placing the expected incisions within the relaxed skin tension lines where possible.    The area thus outlined was incised deep to adipose tissue with a #15 scalpel blade.  The skin margins were undermined to an appropriate distance in all directions utilizing iris scissors.
Rhombic Flap Text: The defect edges were debeveled with a #15 scalpel blade.  Given the location of the defect and the proximity to free margins a rhombic flap was deemed most appropriate.  Using a sterile surgical marker, an appropriate rhombic flap was drawn incorporating the defect.    The area thus outlined was incised deep to adipose tissue with a #15 scalpel blade.  The skin margins were undermined to an appropriate distance in all directions utilizing iris scissors.
Skin Substitute Text: The defect edges were debeveled with a #15 scalpel blade.  Given the location of the defect, shape of the defect and the proximity to free margins a skin substitute graft was deemed most appropriate.  The graft material was trimmed to fit the size of the defect. The graft was then placed in the primary defect and oriented appropriately.
Melolabial Interpolation Flap Text: A decision was made to reconstruct the defect utilizing an interpolation axial flap and a staged reconstruction.  A telfa template was made of the defect.  This telfa template was then used to outline the melolabial interpolation flap.  The donor area for the pedicle flap was then injected with anesthesia.  The flap was excised through the skin and subcutaneous tissue down to the layer of the underlying musculature.  The pedicle flap was carefully excised within this deep plane to maintain its blood supply.  The edges of the donor site were undermined.   The donor site was closed in a primary fashion.  The pedicle was then rotated into position and sutured.  Once the tube was sutured into place, adequate blood supply was confirmed with blanching and refill.  The pedicle was then wrapped with xeroform gauze and dressed appropriately with a telfa and gauze bandage to ensure continued blood supply and protect the attached pedicle.
Xenograft Text: The defect edges were debeveled with a #15 scalpel blade.  Given the location of the defect, shape of the defect and the proximity to free margins a xenograft was deemed most appropriate.  The graft was then trimmed to fit the size of the defect.  The graft was then placed in the primary defect and oriented appropriately.
Banner Transposition Flap Text: The defect edges were debeveled with a #15 scalpel blade.  Given the location of the defect and the proximity to free margins a Banner transposition flap was deemed most appropriate.  Using a sterile surgical marker, an appropriate flap drawn around the defect. The area thus outlined was incised deep to adipose tissue with a #15 scalpel blade.  The skin margins were undermined to an appropriate distance in all directions utilizing iris scissors.
Alar Island Pedicle Flap Text: The defect edges were debeveled with a #15 scalpel blade.  Given the location of the defect, shape of the defect and the proximity to the alar rim an island pedicle advancement flap was deemed most appropriate.  Using a sterile surgical marker, an appropriate advancement flap was drawn incorporating the defect, outlining the appropriate donor tissue and placing the expected incisions within the nasal ala running parallel to the alar rim. The area thus outlined was incised with a #15 scalpel blade.  The skin margins were undermined minimally to an appropriate distance in all directions around the primary defect and laterally outward around the island pedicle utilizing iris scissors.  There was minimal undermining beneath the pedicle flap.
S Plasty Text: Given the location and shape of the defect, and the orientation of relaxed skin tension lines, an S-plasty was deemed most appropriate for repair.  Using a sterile surgical marker, the appropriate outline of the S-plasty was drawn, incorporating the defect and placing the expected incisions within the relaxed skin tension lines where possible.  The area thus outlined was incised deep to adipose tissue with a #15 scalpel blade.  The skin margins were undermined to an appropriate distance in all directions utilizing iris scissors. The skin flaps were advanced over the defect.  The opposing margins were then approximated with interrupted buried subcutaneous sutures.
Pre-Excision Curettage Text (Leave Blank If You Do Not Want): Prior to drawing the surgical margin the visible lesion was removed with electrodesiccation and curettage to clearly define the lesion size.
Complex Repair And O-L Flap Text: The defect edges were debeveled with a #15 scalpel blade.  The primary defect was closed partially with a complex linear closure.  Given the location of the remaining defect, shape of the defect and the proximity to free margins an O-L flap was deemed most appropriate for complete closure of the defect.  Using a sterile surgical marker, an appropriate flap was drawn incorporating the defect and placing the expected incisions within the relaxed skin tension lines where possible.    The area thus outlined was incised deep to adipose tissue with a #15 scalpel blade.  The skin margins were undermined to an appropriate distance in all directions utilizing iris scissors.
Complex Repair And Z Plasty Text: The defect edges were debeveled with a #15 scalpel blade.  The primary defect was closed partially with a complex linear closure.  Given the location of the remaining defect, shape of the defect and the proximity to free margins a Z plasty was deemed most appropriate for complete closure of the defect.  Using a sterile surgical marker, an appropriate advancement flap was drawn incorporating the defect and placing the expected incisions within the relaxed skin tension lines where possible.    The area thus outlined was incised deep to adipose tissue with a #15 scalpel blade.  The skin margins were undermined to an appropriate distance in all directions utilizing iris scissors.
Detail Level: Detailed
Transposition Flap Text: The defect edges were debeveled with a #15 scalpel blade.  Given the location of the defect and the proximity to free margins a transposition flap was deemed most appropriate.  Using a sterile surgical marker, an appropriate transposition flap was drawn incorporating the defect.    The area thus outlined was incised deep to adipose tissue with a #15 scalpel blade.  The skin margins were undermined to an appropriate distance in all directions utilizing iris scissors.
Slit Excision Additional Text (Leave Blank If You Do Not Want): A linear line was drawn on the skin overlying the lesion. An incision was made slowly until the lesion was visualized.  Once visualized, the lesion was removed with blunt dissection.
Burow's Advancement Flap Text: The defect edges were debeveled with a #15 scalpel blade.  Given the location of the defect and the proximity to free margins a Burow's advancement flap was deemed most appropriate.  Using a sterile surgical marker, the appropriate advancement flap was drawn incorporating the defect and placing the expected incisions within the relaxed skin tension lines where possible.    The area thus outlined was incised deep to adipose tissue with a #15 scalpel blade.  The skin margins were undermined to an appropriate distance in all directions utilizing iris scissors.
Complex Repair And Melolabial Flap Text: The defect edges were debeveled with a #15 scalpel blade.  The primary defect was closed partially with a complex linear closure.  Given the location of the remaining defect, shape of the defect and the proximity to free margins a melolabial flap was deemed most appropriate for complete closure of the defect.  Using a sterile surgical marker, an appropriate advancement flap was drawn incorporating the defect and placing the expected incisions within the relaxed skin tension lines where possible.    The area thus outlined was incised deep to adipose tissue with a #15 scalpel blade.  The skin margins were undermined to an appropriate distance in all directions utilizing iris scissors.
Dermal Autograft Text: The defect edges were debeveled with a #15 scalpel blade.  Given the location of the defect, shape of the defect and the proximity to free margins a dermal autograft was deemed most appropriate.  Using a sterile surgical marker, the primary defect shape was transferred to the donor site. The area thus outlined was incised deep to adipose tissue with a #15 scalpel blade.  The harvested graft was then trimmed of adipose and epidermal tissue until only dermis was left.  The skin graft was then placed in the primary defect and oriented appropriately.
Complex Repair And Double Advancement Flap Text: The defect edges were debeveled with a #15 scalpel blade.  The primary defect was closed partially with a complex linear closure.  Given the location of the remaining defect, shape of the defect and the proximity to free margins a double advancement flap was deemed most appropriate for complete closure of the defect.  Using a sterile surgical marker, an appropriate advancement flap was drawn incorporating the defect and placing the expected incisions within the relaxed skin tension lines where possible.    The area thus outlined was incised deep to adipose tissue with a #15 scalpel blade.  The skin margins were undermined to an appropriate distance in all directions utilizing iris scissors.
H Plasty Text: Given the location of the defect, shape of the defect and the proximity to free margins a H-plasty was deemed most appropriate for repair.  Using a sterile surgical marker, the appropriate advancement arms of the H-plasty were drawn incorporating the defect and placing the expected incisions within the relaxed skin tension lines where possible. The area thus outlined was incised deep to adipose tissue with a #15 scalpel blade. The skin margins were undermined to an appropriate distance in all directions utilizing iris scissors.  The opposing advancement arms were then advanced into place in opposite direction and anchored with interrupted buried subcutaneous sutures.
V-Y Plasty Text: The defect edges were debeveled with a #15 scalpel blade.  Given the location of the defect, shape of the defect and the proximity to free margins an V-Y advancement flap was deemed most appropriate.  Using a sterile surgical marker, an appropriate advancement flap was drawn incorporating the defect and placing the expected incisions within the relaxed skin tension lines where possible.    The area thus outlined was incised deep to adipose tissue with a #15 scalpel blade.  The skin margins were undermined to an appropriate distance in all directions utilizing iris scissors.
Scalpel Size: 15 blade
Star Wedge Flap Text: The defect edges were debeveled with a #15 scalpel blade.  Given the location of the defect, shape of the defect and the proximity to free margins a star wedge flap was deemed most appropriate.  Using a sterile surgical marker, an appropriate rotation flap was drawn incorporating the defect and placing the expected incisions within the relaxed skin tension lines where possible. The area thus outlined was incised deep to adipose tissue with a #15 scalpel blade.  The skin margins were undermined to an appropriate distance in all directions utilizing iris scissors.
Complex Repair And Transposition Flap Text: The defect edges were debeveled with a #15 scalpel blade.  The primary defect was closed partially with a complex linear closure.  Given the location of the remaining defect, shape of the defect and the proximity to free margins a transposition flap was deemed most appropriate for complete closure of the defect.  Using a sterile surgical marker, an appropriate advancement flap was drawn incorporating the defect and placing the expected incisions within the relaxed skin tension lines where possible.    The area thus outlined was incised deep to adipose tissue with a #15 scalpel blade.  The skin margins were undermined to an appropriate distance in all directions utilizing iris scissors.
Deep Sutures: 4-0 PDO
Complex Repair And A-T Advancement Flap Text: The defect edges were debeveled with a #15 scalpel blade.  The primary defect was closed partially with a complex linear closure.  Given the location of the remaining defect, shape of the defect and the proximity to free margins an A-T advancement flap was deemed most appropriate for complete closure of the defect.  Using a sterile surgical marker, an appropriate advancement flap was drawn incorporating the defect and placing the expected incisions within the relaxed skin tension lines where possible.    The area thus outlined was incised deep to adipose tissue with a #15 scalpel blade.  The skin margins were undermined to an appropriate distance in all directions utilizing iris scissors.
Island Pedicle Flap With Canthal Suspension Text: The defect edges were debeveled with a #15 scalpel blade.  Given the location of the defect, shape of the defect and the proximity to free margins an island pedicle advancement flap was deemed most appropriate.  Using a sterile surgical marker, an appropriate advancement flap was drawn incorporating the defect, outlining the appropriate donor tissue and placing the expected incisions within the relaxed skin tension lines where possible. The area thus outlined was incised deep to adipose tissue with a #15 scalpel blade.  The skin margins were undermined to an appropriate distance in all directions around the primary defect and laterally outward around the island pedicle utilizing iris scissors.  There was minimal undermining beneath the pedicle flap. A suspension suture was placed in the canthal tendon to prevent tension and prevent ectropion.
Complex Repair And Bilobe Flap Text: The defect edges were debeveled with a #15 scalpel blade.  The primary defect was closed partially with a complex linear closure.  Given the location of the remaining defect, shape of the defect and the proximity to free margins a bilobe flap was deemed most appropriate for complete closure of the defect.  Using a sterile surgical marker, an appropriate advancement flap was drawn incorporating the defect and placing the expected incisions within the relaxed skin tension lines where possible.    The area thus outlined was incised deep to adipose tissue with a #15 scalpel blade.  The skin margins were undermined to an appropriate distance in all directions utilizing iris scissors.
Keystone Flap Text: The defect edges were debeveled with a #15 scalpel blade.  Given the location of the defect, shape of the defect a keystone flap was deemed most appropriate.  Using a sterile surgical marker, an appropriate keystone flap was drawn incorporating the defect, outlining the appropriate donor tissue and placing the expected incisions within the relaxed skin tension lines where possible. The area thus outlined was incised deep to adipose tissue with a #15 scalpel blade.  The skin margins were undermined to an appropriate distance in all directions around the primary defect and laterally outward around the flap utilizing iris scissors.
Ftsg Text: The defect edges were debeveled with a #15 scalpel blade.  Given the location of the defect, shape of the defect and the proximity to free margins a full thickness skin graft was deemed most appropriate.  Using a sterile surgical marker, the primary defect shape was transferred to the donor site. The area thus outlined was incised deep to adipose tissue with a #15 scalpel blade.  The harvested graft was then trimmed of adipose tissue until only dermis and epidermis was left.  The skin margins of the secondary defect were undermined to an appropriate distance in all directions utilizing iris scissors.  The secondary defect was closed with interrupted buried subcutaneous sutures.  The skin edges were then re-apposed with running  sutures.  The skin graft was then placed in the primary defect and oriented appropriately.
Repair Performed By Another Provider Text (Leave Blank If You Do Not Want): After the tissue was excised the defect was repaired by another provider.
Lip Wedge Excision Repair Text: Given the location of the defect and the proximity to free margins a full thickness wedge repair was deemed most appropriate.  Using a sterile surgical marker, the appropriate repair was drawn incorporating the defect and placing the expected incisions perpendicular to the vermillion border.  The vermillion border was also meticulously outlined to ensure appropriate reapproximation during the repair.  The area thus outlined was incised through and through with a #15 scalpel blade.  The muscularis and dermis were reaproximated with deep sutures following hemostasis. Care was taken to realign the vermillion border before proceeding with the superficial closure.  Once the vermillion was realigned the superfical and mucosal closure was finished.

## 2018-12-03 ENCOUNTER — APPOINTMENT (RX ONLY)
Dept: URBAN - METROPOLITAN AREA CLINIC 23 | Facility: CLINIC | Age: 78
Setting detail: DERMATOLOGY
End: 2018-12-03

## 2018-12-03 DIAGNOSIS — Z85.828 PERSONAL HISTORY OF OTHER MALIGNANT NEOPLASM OF SKIN: ICD-10-CM

## 2018-12-03 DIAGNOSIS — L82.1 OTHER SEBORRHEIC KERATOSIS: ICD-10-CM

## 2018-12-03 DIAGNOSIS — L57.0 ACTINIC KERATOSIS: ICD-10-CM

## 2018-12-03 DIAGNOSIS — Z48.01 ENCOUNTER FOR CHANGE OR REMOVAL OF SURGICAL WOUND DRESSING: ICD-10-CM

## 2018-12-03 PROCEDURE — ? OTHER

## 2018-12-03 PROCEDURE — ? SUTURE REMOVAL (GLOBAL PERIOD)

## 2018-12-03 PROCEDURE — ? TREATMENT REGIMEN

## 2018-12-03 PROCEDURE — 99213 OFFICE O/P EST LOW 20 MIN: CPT

## 2018-12-03 ASSESSMENT — LOCATION SIMPLE DESCRIPTION DERM
LOCATION SIMPLE: RIGHT CHEEK
LOCATION SIMPLE: RIGHT FOREARM
LOCATION SIMPLE: RIGHT 2ND TOE
LOCATION SIMPLE: LEFT FOREHEAD
LOCATION SIMPLE: LEFT FOREARM
LOCATION SIMPLE: LEFT PRETIBIAL REGION
LOCATION SIMPLE: LEFT CALF
LOCATION SIMPLE: SCALP
LOCATION SIMPLE: LEFT ANTERIOR NECK
LOCATION SIMPLE: RIGHT PRETIBIAL REGION
LOCATION SIMPLE: LEFT CHEEK
LOCATION SIMPLE: RIGHT FOREHEAD
LOCATION SIMPLE: RIGHT ANTERIOR NECK

## 2018-12-03 ASSESSMENT — LOCATION DETAILED DESCRIPTION DERM
LOCATION DETAILED: LEFT DISTAL PRETIBIAL REGION
LOCATION DETAILED: LEFT INFERIOR PREAURICULAR CHEEK
LOCATION DETAILED: RIGHT DISTAL PRETIBIAL REGION
LOCATION DETAILED: RIGHT SUPERIOR FOREHEAD
LOCATION DETAILED: LEFT SUPERIOR FOREHEAD
LOCATION DETAILED: LEFT PROXIMAL LATERAL CALF
LOCATION DETAILED: RIGHT PROXIMAL DORSAL FOREARM
LOCATION DETAILED: LEFT SUPERIOR PARIETAL SCALP
LOCATION DETAILED: RIGHT DORSAL 2ND TOE
LOCATION DETAILED: RIGHT SUPERIOR PARIETAL SCALP
LOCATION DETAILED: LEFT CLAVICULAR NECK
LOCATION DETAILED: RIGHT LATERAL PROXIMAL PRETIBIAL REGION
LOCATION DETAILED: RIGHT INFERIOR PREAURICULAR CHEEK
LOCATION DETAILED: LEFT DISTAL CALF
LOCATION DETAILED: RIGHT SUPERIOR ANTERIOR NECK
LOCATION DETAILED: LEFT PROXIMAL CALF
LOCATION DETAILED: LEFT DISTAL DORSAL FOREARM

## 2018-12-03 ASSESSMENT — LOCATION ZONE DERM
LOCATION ZONE: LEG
LOCATION ZONE: TOE
LOCATION ZONE: FACE
LOCATION ZONE: ARM
LOCATION ZONE: NECK
LOCATION ZONE: SCALP

## 2018-12-03 NOTE — PROCEDURE: TREATMENT REGIMEN
Detail Level: Zone
Continue Regimen: Aldara  treatment to the neck ,arms , and legs . She states that the cream is reacting well.

## 2018-12-03 NOTE — PROCEDURE: SUTURE REMOVAL (GLOBAL PERIOD)
Add 33856 Cpt? (Important Note: In 2017 The Use Of 49554 Is Being Tracked By Cms To Determine Future Global Period Reimbursement For Global Periods): no
Detail Level: Detailed

## 2019-02-04 ENCOUNTER — APPOINTMENT (RX ONLY)
Dept: URBAN - METROPOLITAN AREA CLINIC 23 | Facility: CLINIC | Age: 79
Setting detail: DERMATOLOGY
End: 2019-02-04

## 2019-02-04 DIAGNOSIS — L82.0 INFLAMED SEBORRHEIC KERATOSIS: ICD-10-CM

## 2019-02-04 DIAGNOSIS — L82.1 OTHER SEBORRHEIC KERATOSIS: ICD-10-CM

## 2019-02-04 DIAGNOSIS — L57.0 ACTINIC KERATOSIS: ICD-10-CM

## 2019-02-04 DIAGNOSIS — Z85.828 PERSONAL HISTORY OF OTHER MALIGNANT NEOPLASM OF SKIN: ICD-10-CM

## 2019-02-04 DIAGNOSIS — D485 NEOPLASM OF UNCERTAIN BEHAVIOR OF SKIN: ICD-10-CM

## 2019-02-04 PROBLEM — I10 ESSENTIAL (PRIMARY) HYPERTENSION: Status: ACTIVE | Noted: 2019-02-04

## 2019-02-04 PROBLEM — L55.1 SUNBURN OF SECOND DEGREE: Status: ACTIVE | Noted: 2019-02-04

## 2019-02-04 PROBLEM — D48.5 NEOPLASM OF UNCERTAIN BEHAVIOR OF SKIN: Status: ACTIVE | Noted: 2019-02-04

## 2019-02-04 PROBLEM — L85.3 XEROSIS CUTIS: Status: ACTIVE | Noted: 2019-02-04

## 2019-02-04 PROBLEM — J30.1 ALLERGIC RHINITIS DUE TO POLLEN: Status: ACTIVE | Noted: 2019-02-04

## 2019-02-04 PROCEDURE — 99214 OFFICE O/P EST MOD 30 MIN: CPT | Mod: 25

## 2019-02-04 PROCEDURE — 11102 TANGNTL BX SKIN SINGLE LES: CPT | Mod: 59

## 2019-02-04 PROCEDURE — 17111 DESTRUCTION B9 LESIONS 15/>: CPT

## 2019-02-04 PROCEDURE — ? LIQUID NITROGEN

## 2019-02-04 PROCEDURE — 17000 DESTRUCT PREMALG LESION: CPT | Mod: 59

## 2019-02-04 PROCEDURE — ? TREATMENT REGIMEN

## 2019-02-04 PROCEDURE — ? OTHER

## 2019-02-04 PROCEDURE — 17003 DESTRUCT PREMALG LES 2-14: CPT

## 2019-02-04 PROCEDURE — ? BIOPSY BY SHAVE METHOD

## 2019-02-04 PROCEDURE — ? COUNSELING

## 2019-02-04 ASSESSMENT — LOCATION DETAILED DESCRIPTION DERM
LOCATION DETAILED: INFERIOR THORACIC SPINE
LOCATION DETAILED: RIGHT SUPERIOR PARIETAL SCALP
LOCATION DETAILED: LEFT PROXIMAL CALF
LOCATION DETAILED: LEFT LATERAL MALLEOLUS
LOCATION DETAILED: RIGHT MID-UPPER BACK
LOCATION DETAILED: RIGHT LATERAL UPPER BACK
LOCATION DETAILED: RIGHT LATERAL DISTAL PRETIBIAL REGION
LOCATION DETAILED: UPPER STERNUM
LOCATION DETAILED: RIGHT ANTERIOR PROXIMAL UPPER ARM
LOCATION DETAILED: LEFT INFERIOR LATERAL NECK
LOCATION DETAILED: LEFT DORSAL WRIST
LOCATION DETAILED: LEFT DISTAL DORSAL FOREARM
LOCATION DETAILED: LEFT POSTERIOR ANKLE
LOCATION DETAILED: RIGHT SUPERIOR MEDIAL UPPER BACK
LOCATION DETAILED: LEFT ANTERIOR PROXIMAL UPPER ARM
LOCATION DETAILED: RIGHT SUPERIOR UPPER BACK
LOCATION DETAILED: RIGHT SUBMANDIBULAR AREA
LOCATION DETAILED: LEFT LATERAL POSTERIOR ANKLE
LOCATION DETAILED: RIGHT SUPERIOR LATERAL NECK
LOCATION DETAILED: LEFT MEDIAL BREAST 10-11:00 REGION
LOCATION DETAILED: LEFT SUPERIOR LATERAL NECK
LOCATION DETAILED: LEFT CLAVICULAR NECK
LOCATION DETAILED: LEFT SUPERIOR UPPER BACK
LOCATION DETAILED: RIGHT DISTAL PRETIBIAL REGION
LOCATION DETAILED: RIGHT INFERIOR VERMILION LIP
LOCATION DETAILED: LEFT DISTAL CALF
LOCATION DETAILED: RIGHT LATERAL PROXIMAL PRETIBIAL REGION
LOCATION DETAILED: LEFT PROXIMAL LATERAL CALF
LOCATION DETAILED: RIGHT DORSAL 2ND TOE
LOCATION DETAILED: LEFT DISTAL PRETIBIAL REGION
LOCATION DETAILED: RIGHT CENTRAL EYEBROW

## 2019-02-04 ASSESSMENT — LOCATION SIMPLE DESCRIPTION DERM
LOCATION SIMPLE: LEFT ANTERIOR NECK
LOCATION SIMPLE: RIGHT ANTERIOR NECK
LOCATION SIMPLE: RIGHT UPPER ARM
LOCATION SIMPLE: LEFT ANKLE
LOCATION SIMPLE: RIGHT UPPER BACK
LOCATION SIMPLE: LEFT PRETIBIAL REGION
LOCATION SIMPLE: RIGHT SUBMANDIBULAR AREA
LOCATION SIMPLE: UPPER BACK
LOCATION SIMPLE: LEFT FOOT
LOCATION SIMPLE: CHEST
LOCATION SIMPLE: RIGHT LIP
LOCATION SIMPLE: LEFT CALF
LOCATION SIMPLE: LEFT UPPER BACK
LOCATION SIMPLE: LEFT BREAST
LOCATION SIMPLE: LEFT UPPER ARM
LOCATION SIMPLE: LEFT WRIST
LOCATION SIMPLE: RIGHT EYEBROW
LOCATION SIMPLE: NECK
LOCATION SIMPLE: RIGHT PRETIBIAL REGION
LOCATION SIMPLE: SCALP
LOCATION SIMPLE: LEFT FOREARM
LOCATION SIMPLE: RIGHT 2ND TOE

## 2019-02-04 ASSESSMENT — LOCATION ZONE DERM
LOCATION ZONE: LEG
LOCATION ZONE: NECK
LOCATION ZONE: TRUNK
LOCATION ZONE: FEET
LOCATION ZONE: LIP
LOCATION ZONE: FACE
LOCATION ZONE: SCALP
LOCATION ZONE: TOE
LOCATION ZONE: ARM

## 2019-02-04 NOTE — PROCEDURE: LIQUID NITROGEN
Post-Care Instructions: I reviewed with the patient in detail post-care instructions. Patient is to wear sunprotection, and avoid picking at any of the treated lesions. Pt may apply Vaseline to crusted or scabbing areas.
Number Of Freeze-Thaw Cycles: 1 freeze-thaw cycle
Medical Necessity Information: It is in your best interest to select a reason for this procedure from the list below. All of these items fulfill various CMS LCD requirements except the new and changing color options.
Consent: The patient's verbal consent was obtained including but not limited to risks of crusting, scabbing, blistering, scarring, darker or lighter pigmentary change, recurrence, incomplete removal and infection.
Include Z78.9 (Other Specified Conditions Influencing Health Status) As An Associated Diagnosis?: No
Detail Level: Simple
Medical Necessity Clause: This procedure was medically necessary because the lesions that were treated were irritated and enlarging
Duration Of Freeze Thaw-Cycle (Seconds): 5
Detail Level: Detailed
Post-Care Instructions: I reviewed with the patient in detail post-care instructions. Patient is to wear sunprotection, and avoid picking at any of the treated lesions. Pt may apply Vaseline to crusted or scabbing areas. Will re-check at follow up

## 2019-02-04 NOTE — HPI: EVALUATION OF SKIN LESION(S)
What Type Of Note Output Would You Prefer (Optional)?: Standard Output
Hpi Title: Evaluation of Skin Lesions
How Severe Are Your Spot(S)?: moderate

## 2019-02-04 NOTE — PROCEDURE: BIOPSY BY SHAVE METHOD
Type Of Destruction Used: Curettage
Electrodesiccation And Curettage Text: The wound bed was treated with electrodesiccation and curettage after the biopsy was performed.
Anesthesia Volume In Cc: 0.5
Additional Anesthesia Volume In Cc (Will Not Render If 0): 0
Wound Care: Vaseline
Biopsy Type: H and E
Silver Nitrate Text: The wound bed was treated with silver nitrate after the biopsy was performed.
Billing Type: Third-Party Bill
Render Post-Care Instructions In Note?: no
Anesthesia Type: 1% lidocaine with 1:200,000 epinephrine and a 1:10 solution of 8.4% sodium bicarbonate
Accession #: [id number]
Notification Instructions: Patient will be notified of biopsy results. However, patient instructed to call the office if not contacted within 2 weeks.
Detail Level: Detailed
Consent: Written consent was obtained and risks were reviewed including but not limited to scarring, infection, bleeding, scabbing, incomplete removal, nerve damage and allergy to anesthesia.
Was A Bandage Applied: Yes
Electrodesiccation Text: The wound bed was treated with electrodesiccation after the biopsy was performed.
Outside Pathology Billing: outside pathology read only
Cryotherapy Text: The wound bed was treated with cryotherapy after the biopsy was performed.
Post-Care Instructions: I reviewed with the patient in detail post-care instructions. Patient is to keep the biopsy site dry overnight, and then apply bacitracin twice daily until healed. Patient may apply hydrogen peroxide soaks to remove any crusting.
Dressing: pressure dressing with telfa
Hemostasis: Electrocautery
Depth Of Biopsy: dermis
Biopsy Method: Dermablade

## 2019-07-10 ENCOUNTER — APPOINTMENT (RX ONLY)
Dept: URBAN - METROPOLITAN AREA CLINIC 23 | Facility: CLINIC | Age: 79
Setting detail: DERMATOLOGY
End: 2019-07-10

## 2019-07-10 DIAGNOSIS — B07.8 OTHER VIRAL WARTS: ICD-10-CM

## 2019-07-10 DIAGNOSIS — Z85.828 PERSONAL HISTORY OF OTHER MALIGNANT NEOPLASM OF SKIN: ICD-10-CM

## 2019-07-10 DIAGNOSIS — L81.4 OTHER MELANIN HYPERPIGMENTATION: ICD-10-CM

## 2019-07-10 DIAGNOSIS — L82.1 OTHER SEBORRHEIC KERATOSIS: ICD-10-CM

## 2019-07-10 DIAGNOSIS — L57.0 ACTINIC KERATOSIS: ICD-10-CM

## 2019-07-10 DIAGNOSIS — L82.0 INFLAMED SEBORRHEIC KERATOSIS: ICD-10-CM

## 2019-07-10 DIAGNOSIS — L72.8 OTHER FOLLICULAR CYSTS OF THE SKIN AND SUBCUTANEOUS TISSUE: ICD-10-CM

## 2019-07-10 DIAGNOSIS — L21.8 OTHER SEBORRHEIC DERMATITIS: ICD-10-CM

## 2019-07-10 PROBLEM — L55.1 SUNBURN OF SECOND DEGREE: Status: ACTIVE | Noted: 2019-07-10

## 2019-07-10 PROBLEM — D48.5 NEOPLASM OF UNCERTAIN BEHAVIOR OF SKIN: Status: ACTIVE | Noted: 2019-07-10

## 2019-07-10 PROBLEM — J30.1 ALLERGIC RHINITIS DUE TO POLLEN: Status: ACTIVE | Noted: 2019-07-10

## 2019-07-10 PROBLEM — L85.3 XEROSIS CUTIS: Status: ACTIVE | Noted: 2019-07-10

## 2019-07-10 PROCEDURE — ? OTHER

## 2019-07-10 PROCEDURE — ? LIQUID NITROGEN

## 2019-07-10 PROCEDURE — ? COUNSELING

## 2019-07-10 PROCEDURE — 17110 DESTRUCTION B9 LES UP TO 14: CPT

## 2019-07-10 PROCEDURE — ? PRESCRIPTION

## 2019-07-10 PROCEDURE — ? TREATMENT REGIMEN

## 2019-07-10 PROCEDURE — 17000 DESTRUCT PREMALG LESION: CPT | Mod: 59

## 2019-07-10 PROCEDURE — 99214 OFFICE O/P EST MOD 30 MIN: CPT | Mod: 25

## 2019-07-10 RX ORDER — HYDROQUINONE 4 %
CREAM (GRAM) TOPICAL
Qty: 1 | Refills: 3 | Status: ERX | COMMUNITY
Start: 2019-07-10

## 2019-07-10 RX ADMIN — Medication: at 19:53

## 2019-07-10 ASSESSMENT — LOCATION DETAILED DESCRIPTION DERM
LOCATION DETAILED: RIGHT DISTAL PRETIBIAL REGION
LOCATION DETAILED: RIGHT DORSAL 2ND TOE
LOCATION DETAILED: LEFT SUPERIOR LATERAL FOREHEAD
LOCATION DETAILED: LEFT ANTERIOR PROXIMAL THIGH
LOCATION DETAILED: LEFT LATERAL PROXIMAL PRETIBIAL REGION
LOCATION DETAILED: RIGHT SUPERIOR UPPER BACK
LOCATION DETAILED: LEFT VENTRAL PROXIMAL FOREARM
LOCATION DETAILED: RIGHT PROXIMAL PRETIBIAL REGION
LOCATION DETAILED: RIGHT MEDIAL PROXIMAL PRETIBIAL REGION
LOCATION DETAILED: LEFT POSTERIOR SHOULDER
LOCATION DETAILED: LEFT PROXIMAL RADIAL DORSAL FOREARM
LOCATION DETAILED: LEFT ANTERIOR DISTAL THIGH
LOCATION DETAILED: RIGHT LATERAL MALAR CHEEK
LOCATION DETAILED: LEFT DISTAL CALF
LOCATION DETAILED: RIGHT LATERAL PROXIMAL PRETIBIAL REGION
LOCATION DETAILED: LEFT ANTERIOR DISTAL UPPER ARM
LOCATION DETAILED: LEFT PROXIMAL PRETIBIAL REGION
LOCATION DETAILED: RIGHT MEDIAL DORSAL FOOT
LOCATION DETAILED: RIGHT PROXIMAL CALF
LOCATION DETAILED: RIGHT SUPERIOR PARIETAL SCALP
LOCATION DETAILED: RIGHT ANTERIOR PROXIMAL THIGH
LOCATION DETAILED: LEFT CLAVICULAR NECK
LOCATION DETAILED: RIGHT ANTERIOR DISTAL THIGH
LOCATION DETAILED: LEFT DORSAL 4TH TOE
LOCATION DETAILED: RIGHT CENTRAL EYEBROW
LOCATION DETAILED: RIGHT DISTAL RADIAL DORSAL FOREARM
LOCATION DETAILED: RIGHT DORSAL INDEX FINGER METACARPOPHALANGEAL JOINT
LOCATION DETAILED: RIGHT ELBOW
LOCATION DETAILED: LEFT PROXIMAL DORSAL FOREARM
LOCATION DETAILED: RIGHT CENTRAL FRONTAL SCALP
LOCATION DETAILED: RIGHT PROXIMAL DORSAL FOREARM
LOCATION DETAILED: RIGHT PROXIMAL RADIAL DORSAL FOREARM
LOCATION DETAILED: UPPER STERNUM
LOCATION DETAILED: LEFT SUPERIOR UPPER BACK
LOCATION DETAILED: LEFT ANTERIOR LATERAL DISTAL THIGH
LOCATION DETAILED: RIGHT SUPERIOR LATERAL FOREHEAD
LOCATION DETAILED: LEFT PROXIMAL LATERAL CALF
LOCATION DETAILED: LEFT DISTAL PRETIBIAL REGION

## 2019-07-10 ASSESSMENT — LOCATION SIMPLE DESCRIPTION DERM
LOCATION SIMPLE: SCALP
LOCATION SIMPLE: RIGHT FOREHEAD
LOCATION SIMPLE: LEFT CALF
LOCATION SIMPLE: LEFT PRETIBIAL REGION
LOCATION SIMPLE: RIGHT FOOT
LOCATION SIMPLE: RIGHT CHEEK
LOCATION SIMPLE: RIGHT CALF
LOCATION SIMPLE: LEFT SHOULDER
LOCATION SIMPLE: LEFT 4TH TOE
LOCATION SIMPLE: LEFT UPPER ARM
LOCATION SIMPLE: LEFT ANTERIOR NECK
LOCATION SIMPLE: RIGHT SCALP
LOCATION SIMPLE: RIGHT 2ND TOE
LOCATION SIMPLE: RIGHT FOREARM
LOCATION SIMPLE: LEFT FOREARM
LOCATION SIMPLE: RIGHT HAND
LOCATION SIMPLE: RIGHT UPPER BACK
LOCATION SIMPLE: RIGHT EYEBROW
LOCATION SIMPLE: RIGHT THIGH
LOCATION SIMPLE: LEFT THIGH
LOCATION SIMPLE: LEFT UPPER BACK
LOCATION SIMPLE: CHEST
LOCATION SIMPLE: LEFT FOREHEAD
LOCATION SIMPLE: RIGHT PRETIBIAL REGION
LOCATION SIMPLE: RIGHT ELBOW

## 2019-07-10 ASSESSMENT — LOCATION ZONE DERM
LOCATION ZONE: SCALP
LOCATION ZONE: TOE
LOCATION ZONE: FEET
LOCATION ZONE: ARM
LOCATION ZONE: TRUNK
LOCATION ZONE: NECK
LOCATION ZONE: LEG
LOCATION ZONE: HAND
LOCATION ZONE: FACE

## 2019-07-10 NOTE — PROCEDURE: LIQUID NITROGEN
Render Post-Care Instructions In Note?: no
Post-Care Instructions: I reviewed with the patient in detail post-care instructions. Patient is to wear sunprotection, and avoid picking at any of the treated lesions. Pt may apply Vaseline to crusted or scabbing areas. Will re-check at follow up
Number Of Freeze-Thaw Cycles: 1 freeze-thaw cycle
Detail Level: Detailed
Medical Necessity Clause: This procedure was medically necessary because the lesions that were treated were irritated and enlarging
Post-Care Instructions: I reviewed with the patient in detail post-care instructions. Patient is to wear sunprotection, and avoid picking at any of the treated lesions. Pt may apply Vaseline to crusted or scabbing areas.
Consent: The patient's verbal consent was obtained including but not limited to risks of crusting, scabbing, blistering, scarring, darker or lighter pigmentary change, recurrence, incomplete removal and infection.
Medical Necessity Information: It is in your best interest to select a reason for this procedure from the list below. All of these items fulfill various CMS LCD requirements except the new and changing color options.
Duration Of Freeze Thaw-Cycle (Seconds): 5

## 2019-08-12 ENCOUNTER — APPOINTMENT (RX ONLY)
Dept: URBAN - METROPOLITAN AREA CLINIC 23 | Facility: CLINIC | Age: 79
Setting detail: DERMATOLOGY
End: 2019-08-12

## 2019-08-12 DIAGNOSIS — L82.0 INFLAMED SEBORRHEIC KERATOSIS: ICD-10-CM

## 2019-08-12 DIAGNOSIS — Z85.828 PERSONAL HISTORY OF OTHER MALIGNANT NEOPLASM OF SKIN: ICD-10-CM

## 2019-08-12 DIAGNOSIS — M10.0 IDIOPATHIC GOUT: ICD-10-CM

## 2019-08-12 DIAGNOSIS — L82.1 OTHER SEBORRHEIC KERATOSIS: ICD-10-CM

## 2019-08-12 DIAGNOSIS — L57.0 ACTINIC KERATOSIS: ICD-10-CM

## 2019-08-12 PROBLEM — J30.1 ALLERGIC RHINITIS DUE TO POLLEN: Status: ACTIVE | Noted: 2019-08-12

## 2019-08-12 PROBLEM — M10.072 IDIOPATHIC GOUT, LEFT ANKLE AND FOOT: Status: ACTIVE | Noted: 2019-08-12

## 2019-08-12 PROCEDURE — ? OTHER

## 2019-08-12 PROCEDURE — 17003 DESTRUCT PREMALG LES 2-14: CPT | Mod: 59

## 2019-08-12 PROCEDURE — 17110 DESTRUCTION B9 LES UP TO 14: CPT

## 2019-08-12 PROCEDURE — ? LIQUID NITROGEN

## 2019-08-12 PROCEDURE — 99213 OFFICE O/P EST LOW 20 MIN: CPT | Mod: 25

## 2019-08-12 PROCEDURE — ? COUNSELING

## 2019-08-12 PROCEDURE — ? REFERRAL

## 2019-08-12 PROCEDURE — 17000 DESTRUCT PREMALG LESION: CPT | Mod: 59

## 2019-08-12 ASSESSMENT — LOCATION ZONE DERM
LOCATION ZONE: TOE
LOCATION ZONE: TRUNK
LOCATION ZONE: FACE
LOCATION ZONE: LEG
LOCATION ZONE: HAND
LOCATION ZONE: NECK
LOCATION ZONE: SCALP
LOCATION ZONE: ARM

## 2019-08-12 ASSESSMENT — LOCATION DETAILED DESCRIPTION DERM
LOCATION DETAILED: LEFT DISTAL PRETIBIAL REGION
LOCATION DETAILED: RIGHT SUPERIOR MEDIAL UPPER BACK
LOCATION DETAILED: RIGHT SUPERIOR PARIETAL SCALP
LOCATION DETAILED: RIGHT LATERAL TEMPLE
LOCATION DETAILED: RIGHT LATERAL FOREHEAD
LOCATION DETAILED: RIGHT PROXIMAL DORSAL FOREARM
LOCATION DETAILED: RIGHT LATERAL PROXIMAL PRETIBIAL REGION
LOCATION DETAILED: LEFT DISTAL CALF
LOCATION DETAILED: RIGHT PROXIMAL CALF
LOCATION DETAILED: UPPER STERNUM
LOCATION DETAILED: RIGHT SUPERIOR OCCIPITAL SCALP
LOCATION DETAILED: LEFT SUPERIOR PARIETAL SCALP
LOCATION DETAILED: RIGHT DORSAL 2ND TOE
LOCATION DETAILED: LEFT RADIAL DORSAL HAND
LOCATION DETAILED: LEFT LATERAL TRAPEZIAL NECK
LOCATION DETAILED: LEFT CLAVICULAR NECK
LOCATION DETAILED: LEFT DORSAL 4TH TOE
LOCATION DETAILED: LEFT PROXIMAL LATERAL CALF
LOCATION DETAILED: RIGHT SUPERIOR UPPER BACK
LOCATION DETAILED: RIGHT ELBOW
LOCATION DETAILED: LEFT PROXIMAL PRETIBIAL REGION
LOCATION DETAILED: RIGHT DISTAL PRETIBIAL REGION
LOCATION DETAILED: LEFT POSTERIOR SHOULDER

## 2019-08-12 ASSESSMENT — LOCATION SIMPLE DESCRIPTION DERM
LOCATION SIMPLE: CHEST
LOCATION SIMPLE: RIGHT CALF
LOCATION SIMPLE: RIGHT OCCIPITAL SCALP
LOCATION SIMPLE: RIGHT FOREARM
LOCATION SIMPLE: LEFT ANTERIOR NECK
LOCATION SIMPLE: POSTERIOR NECK
LOCATION SIMPLE: LEFT CALF
LOCATION SIMPLE: LEFT SHOULDER
LOCATION SIMPLE: SCALP
LOCATION SIMPLE: LEFT HAND
LOCATION SIMPLE: LEFT PRETIBIAL REGION
LOCATION SIMPLE: LEFT 4TH TOE
LOCATION SIMPLE: RIGHT ELBOW
LOCATION SIMPLE: RIGHT PRETIBIAL REGION
LOCATION SIMPLE: RIGHT 2ND TOE
LOCATION SIMPLE: RIGHT UPPER BACK
LOCATION SIMPLE: RIGHT FOREHEAD
LOCATION SIMPLE: RIGHT TEMPLE

## 2019-08-12 NOTE — PROCEDURE: OTHER
Other (Free Text): Pt currently being treated by Dr. Alfaro.
Detail Level: Simple
Other (Free Text): Ln2
Note Text (......Xxx Chief Complaint.): This diagnosis correlates with the

## 2019-08-12 NOTE — PROCEDURE: LIQUID NITROGEN
Medical Necessity Clause: This procedure was medically necessary because the lesions that were treated were irritated and enlarging
Post-Care Instructions: I reviewed with the patient in detail post-care instructions. Patient is to wear sunprotection, and avoid picking at any of the treated lesions. Pt may apply Vaseline to crusted or scabbing areas. Will re-check at follow up
Number Of Freeze-Thaw Cycles: 1 freeze-thaw cycle
Detail Level: Simple
Consent: The patient's verbal consent was obtained including but not limited to risks of crusting, scabbing, blistering, scarring, darker or lighter pigmentary change, recurrence, incomplete removal and infection.
Add 52 Modifier (Optional): no
Medical Necessity Information: It is in your best interest to select a reason for this procedure from the list below. All of these items fulfill various CMS LCD requirements except the new and changing color options.
Duration Of Freeze Thaw-Cycle (Seconds): 5
Detail Level: Detailed
Post-Care Instructions: I reviewed with the patient in detail post-care instructions. Patient is to wear sunprotection, and avoid picking at any of the treated lesions. Pt may apply Vaseline to crusted or scabbing areas.

## 2019-09-09 ENCOUNTER — RX ONLY (OUTPATIENT)
Age: 79
Setting detail: RX ONLY
End: 2019-09-09

## 2019-09-09 RX ORDER — HYDROQUINONE 4 %
CREAM (GRAM) TOPICAL
Qty: 1 | Refills: 3 | Status: ERX

## 2019-11-26 ENCOUNTER — APPOINTMENT (RX ONLY)
Dept: URBAN - METROPOLITAN AREA CLINIC 23 | Facility: CLINIC | Age: 79
Setting detail: DERMATOLOGY
End: 2019-11-26

## 2019-11-26 DIAGNOSIS — Z85.828 PERSONAL HISTORY OF OTHER MALIGNANT NEOPLASM OF SKIN: ICD-10-CM

## 2019-11-26 DIAGNOSIS — D485 NEOPLASM OF UNCERTAIN BEHAVIOR OF SKIN: ICD-10-CM

## 2019-11-26 PROBLEM — D48.5 NEOPLASM OF UNCERTAIN BEHAVIOR OF SKIN: Status: ACTIVE | Noted: 2019-11-26

## 2019-11-26 PROCEDURE — ? BIOPSY BY SHAVE METHOD

## 2019-11-26 PROCEDURE — 11102 TANGNTL BX SKIN SINGLE LES: CPT

## 2019-11-26 PROCEDURE — 99213 OFFICE O/P EST LOW 20 MIN: CPT | Mod: 25

## 2019-11-26 PROCEDURE — 11103 TANGNTL BX SKIN EA SEP/ADDL: CPT

## 2019-11-26 ASSESSMENT — LOCATION DETAILED DESCRIPTION DERM
LOCATION DETAILED: RIGHT LOWER CUTANEOUS LIP
LOCATION DETAILED: UPPER STERNUM
LOCATION DETAILED: LEFT PROXIMAL PRETIBIAL REGION
LOCATION DETAILED: RIGHT DORSAL 2ND TOE
LOCATION DETAILED: RIGHT PROXIMAL CALF
LOCATION DETAILED: LEFT DISTAL PRETIBIAL REGION
LOCATION DETAILED: RIGHT SUPERIOR PARIETAL SCALP
LOCATION DETAILED: RIGHT DISTAL PRETIBIAL REGION
LOCATION DETAILED: LEFT ANTERIOR PROXIMAL THIGH
LOCATION DETAILED: LEFT DISTAL CALF
LOCATION DETAILED: LEFT CLAVICULAR NECK
LOCATION DETAILED: RIGHT LATERAL PROXIMAL PRETIBIAL REGION
LOCATION DETAILED: LEFT ANTERIOR PROXIMAL UPPER ARM
LOCATION DETAILED: LEFT PROXIMAL LATERAL CALF

## 2019-11-26 ASSESSMENT — LOCATION ZONE DERM
LOCATION ZONE: LIP
LOCATION ZONE: LEG
LOCATION ZONE: NECK
LOCATION ZONE: ARM
LOCATION ZONE: TRUNK
LOCATION ZONE: SCALP
LOCATION ZONE: TOE

## 2019-11-26 ASSESSMENT — LOCATION SIMPLE DESCRIPTION DERM
LOCATION SIMPLE: RIGHT LIP
LOCATION SIMPLE: SCALP
LOCATION SIMPLE: RIGHT PRETIBIAL REGION
LOCATION SIMPLE: LEFT UPPER ARM
LOCATION SIMPLE: LEFT CALF
LOCATION SIMPLE: CHEST
LOCATION SIMPLE: RIGHT 2ND TOE
LOCATION SIMPLE: LEFT THIGH
LOCATION SIMPLE: RIGHT CALF
LOCATION SIMPLE: LEFT ANTERIOR NECK
LOCATION SIMPLE: LEFT PRETIBIAL REGION

## 2019-11-26 NOTE — PROCEDURE: BIOPSY BY SHAVE METHOD
Anesthesia Type: 1% lidocaine with 1:200,000 epinephrine and a 1:10 solution of 8.4% sodium bicarbonate
Electrodesiccation Text: The wound bed was treated with electrodesiccation after the biopsy was performed.
Render Path Notes In Note?: No
Biopsy Type: H and E
Depth Of Biopsy: dermis
Was A Bandage Applied: Yes
Anesthesia Volume In Cc: 0.5
Type Of Destruction Used: Curettage
Size Of Lesion In Cm: 0
Silver Nitrate Text: The wound bed was treated with silver nitrate after the biopsy was performed.
Hemostasis: Electrocautery
Consent: Written consent was obtained and risks were reviewed including but not limited to scarring, infection, bleeding, scabbing, incomplete removal, nerve damage and allergy to anesthesia.
Wound Care: Vaseline
Dressing: pressure dressing with telfa
Outside Pathology Billing: outside pathology read only
Post-Care Instructions: I reviewed with the patient in detail post-care instructions. Patient is to keep the biopsy site dry overnight, and then apply bacitracin twice daily until healed. Patient may apply hydrogen peroxide soaks to remove any crusting.
Detail Level: Detailed
Notification Instructions: Patient will be notified of biopsy results. However, patient instructed to call the office if not contacted within 2 weeks.
Electrodesiccation And Curettage Text: The wound bed was treated with electrodesiccation and curettage after the biopsy was performed.
Biopsy Method: Dermablade
Path Notes (To The Dermatopathologist): .
Billing Type: Third-Party Bill
Cryotherapy Text: The wound bed was treated with cryotherapy after the biopsy was performed.

## 2019-12-04 ENCOUNTER — APPOINTMENT (RX ONLY)
Dept: URBAN - METROPOLITAN AREA CLINIC 23 | Facility: CLINIC | Age: 79
Setting detail: DERMATOLOGY
End: 2019-12-04

## 2019-12-04 PROBLEM — C44.629 SQUAMOUS CELL CARCINOMA OF SKIN OF LEFT UPPER LIMB, INCLUDING SHOULDER: Status: ACTIVE | Noted: 2019-12-04

## 2019-12-04 PROBLEM — J30.1 ALLERGIC RHINITIS DUE TO POLLEN: Status: ACTIVE | Noted: 2019-12-04

## 2019-12-04 PROCEDURE — ? EXCISION

## 2019-12-04 PROCEDURE — 11602 EXC TR-EXT MAL+MARG 1.1-2 CM: CPT

## 2019-12-04 PROCEDURE — 12032 INTMD RPR S/A/T/EXT 2.6-7.5: CPT

## 2019-12-04 NOTE — PROCEDURE: EXCISION
Dressing: pressure dressing with telfa
Skin Substitute Units (Will Override Primary Defect Units If Greater Than 0): 0
Rhomboid Transposition Flap Text: The defect edges were debeveled with a #15 scalpel blade.  Given the location of the defect and the proximity to free margins a rhomboid transposition flap was deemed most appropriate.  Using a sterile surgical marker, an appropriate rhomboid flap was drawn incorporating the defect.    The area thus outlined was incised deep to adipose tissue with a #15 scalpel blade.  The skin margins were undermined to an appropriate distance in all directions utilizing iris scissors.
Skin Substitute Text: The defect edges were debeveled with a #15 scalpel blade.  Given the location of the defect, shape of the defect and the proximity to free margins a skin substitute graft was deemed most appropriate.  The graft material was trimmed to fit the size of the defect. The graft was then placed in the primary defect and oriented appropriately.
Excision Depth: adipose tissue
Complex Repair And M Plasty Text: The defect edges were debeveled with a #15 scalpel blade.  The primary defect was closed partially with a complex linear closure.  Given the location of the remaining defect, shape of the defect and the proximity to free margins an M plasty was deemed most appropriate for complete closure of the defect.  Using a sterile surgical marker, an appropriate advancement flap was drawn incorporating the defect and placing the expected incisions within the relaxed skin tension lines where possible.    The area thus outlined was incised deep to adipose tissue with a #15 scalpel blade.  The skin margins were undermined to an appropriate distance in all directions utilizing iris scissors.
Helical Rim Advancement Flap Text: The defect edges were debeveled with a #15 blade scalpel.  Given the location of the defect and the proximity to free margins (helical rim) a double helical rim advancement flap was deemed most appropriate.  Using a sterile surgical marker, the appropriate advancement flaps were drawn incorporating the defect and placing the expected incisions between the helical rim and antihelix where possible.  The area thus outlined was incised through and through with a #15 scalpel blade.  With a skin hook and iris scissors, the flaps were gently and sharply undermined and freed up.
Curvilinear Excision Additional Text (Leave Blank If You Do Not Want): The margin was drawn around the clinically apparent lesion.  A curvilinear shape was then drawn on the skin incorporating the lesion and margins.  Incisions were then made along these lines to the appropriate tissue plane and the lesion was extirpated.
Post-Care Instructions: I reviewed with the patient in detail post-care instructions. Patient is not to engage in any heavy lifting, exercise, or swimming for the next 14 days. Should the patient develop any fevers, chills, bleeding, severe pain patient will contact the office immediately.
Patient Will Remove Sutures At Home?: Yes
X Size Of Lesion In Cm (Optional): 1.1
Double M-Plasty Complex Repair Preamble Text (Leave Blank If You Do Not Want): Extensive wide undermining was performed.
Island Pedicle Flap Text: The defect edges were debeveled with a #15 scalpel blade.  Given the location of the defect, shape of the defect and the proximity to free margins an island pedicle advancement flap was deemed most appropriate.  Using a sterile surgical marker, an appropriate advancement flap was drawn incorporating the defect, outlining the appropriate donor tissue and placing the expected incisions within the relaxed skin tension lines where possible.    The area thus outlined was incised deep to adipose tissue with a #15 scalpel blade.  The skin margins were undermined to an appropriate distance in all directions around the primary defect and laterally outward around the island pedicle utilizing iris scissors.  There was minimal undermining beneath the pedicle flap.
Interpolation Flap Text: A decision was made to reconstruct the defect utilizing an interpolation axial flap and a staged reconstruction.  A telfa template was made of the defect.  This telfa template was then used to outline the interpolation flap.  The donor area for the pedicle flap was then injected with anesthesia.  The flap was excised through the skin and subcutaneous tissue down to the layer of the underlying musculature.  The interpolation flap was carefully excised within this deep plane to maintain its blood supply.  The edges of the donor site were undermined.   The donor site was closed in a primary fashion.  The pedicle was then rotated into position and sutured.  Once the tube was sutured into place, adequate blood supply was confirmed with blanching and refill.  The pedicle was then wrapped with xeroform gauze and dressed appropriately with a telfa and gauze bandage to ensure continued blood supply and protect the attached pedicle.
Epidermal Sutures: 4-0 Prolene
Render Post-Care Instructions In Note?: no
Complex Repair And Dorsal Nasal Flap Text: The defect edges were debeveled with a #15 scalpel blade.  The primary defect was closed partially with a complex linear closure.  Given the location of the remaining defect, shape of the defect and the proximity to free margins a dorsal nasal flap was deemed most appropriate for complete closure of the defect.  Using a sterile surgical marker, an appropriate flap was drawn incorporating the defect and placing the expected incisions within the relaxed skin tension lines where possible.    The area thus outlined was incised deep to adipose tissue with a #15 scalpel blade.  The skin margins were undermined to an appropriate distance in all directions utilizing iris scissors.
Complex Repair And Rotation Flap Text: The defect edges were debeveled with a #15 scalpel blade.  The primary defect was closed partially with a complex linear closure.  Given the location of the remaining defect, shape of the defect and the proximity to free margins a rotation flap was deemed most appropriate for complete closure of the defect.  Using a sterile surgical marker, an appropriate advancement flap was drawn incorporating the defect and placing the expected incisions within the relaxed skin tension lines where possible.    The area thus outlined was incised deep to adipose tissue with a #15 scalpel blade.  The skin margins were undermined to an appropriate distance in all directions utilizing iris scissors.
Saucerization Excision Additional Text (Leave Blank If You Do Not Want): The margin was drawn around the clinically apparent lesion.  Incisions were then made along these lines, in a tangential fashion, to the appropriate tissue plane and the lesion was extirpated.
Excision Method: Elliptical
Complex Repair And Double Advancement Flap Text: The defect edges were debeveled with a #15 scalpel blade.  The primary defect was closed partially with a complex linear closure.  Given the location of the remaining defect, shape of the defect and the proximity to free margins a double advancement flap was deemed most appropriate for complete closure of the defect.  Using a sterile surgical marker, an appropriate advancement flap was drawn incorporating the defect and placing the expected incisions within the relaxed skin tension lines where possible.    The area thus outlined was incised deep to adipose tissue with a #15 scalpel blade.  The skin margins were undermined to an appropriate distance in all directions utilizing iris scissors.
Mercedes Flap Text: The defect edges were debeveled with a #15 scalpel blade.  Given the location of the defect, shape of the defect and the proximity to free margins a Mercedes flap was deemed most appropriate.  Using a sterile surgical marker, an appropriate advancement flap was drawn incorporating the defect and placing the expected incisions within the relaxed skin tension lines where possible. The area thus outlined was incised deep to adipose tissue with a #15 scalpel blade.  The skin margins were undermined to an appropriate distance in all directions utilizing iris scissors.
Alar Island Pedicle Flap Text: The defect edges were debeveled with a #15 scalpel blade.  Given the location of the defect, shape of the defect and the proximity to the alar rim an island pedicle advancement flap was deemed most appropriate.  Using a sterile surgical marker, an appropriate advancement flap was drawn incorporating the defect, outlining the appropriate donor tissue and placing the expected incisions within the nasal ala running parallel to the alar rim. The area thus outlined was incised with a #15 scalpel blade.  The skin margins were undermined minimally to an appropriate distance in all directions around the primary defect and laterally outward around the island pedicle utilizing iris scissors.  There was minimal undermining beneath the pedicle flap.
Lip Wedge Excision Repair Text: Given the location of the defect and the proximity to free margins a full thickness wedge repair was deemed most appropriate.  Using a sterile surgical marker, the appropriate repair was drawn incorporating the defect and placing the expected incisions perpendicular to the vermillion border.  The vermillion border was also meticulously outlined to ensure appropriate reapproximation during the repair.  The area thus outlined was incised through and through with a #15 scalpel blade.  The muscularis and dermis were reaproximated with deep sutures following hemostasis. Care was taken to realign the vermillion border before proceeding with the superficial closure.  Once the vermillion was realigned the superfical and mucosal closure was finished.
Keystone Flap Text: The defect edges were debeveled with a #15 scalpel blade.  Given the location of the defect, shape of the defect a keystone flap was deemed most appropriate.  Using a sterile surgical marker, an appropriate keystone flap was drawn incorporating the defect, outlining the appropriate donor tissue and placing the expected incisions within the relaxed skin tension lines where possible. The area thus outlined was incised deep to adipose tissue with a #15 scalpel blade.  The skin margins were undermined to an appropriate distance in all directions around the primary defect and laterally outward around the flap utilizing iris scissors.
O-L Flap Text: The defect edges were debeveled with a #15 scalpel blade.  Given the location of the defect, shape of the defect and the proximity to free margins an O-L flap was deemed most appropriate.  Using a sterile surgical marker, an appropriate advancement flap was drawn incorporating the defect and placing the expected incisions within the relaxed skin tension lines where possible.    The area thus outlined was incised deep to adipose tissue with a #15 scalpel blade.  The skin margins were undermined to an appropriate distance in all directions utilizing iris scissors.
Path Notes (To The Dermatopathologist): Please check margins.
Complex Repair And Dermal Autograft Text: The defect edges were debeveled with a #15 scalpel blade.  The primary defect was closed partially with a complex linear closure.  Given the location of the defect, shape of the defect and the proximity to free margins an dermal autograft was deemed most appropriate to repair the remaining defect.  The graft was trimmed to fit the size of the remaining defect.  The graft was then placed in the primary defect, oriented appropriately, and sutured into place.
Fusiform Excision Additional Text (Leave Blank If You Do Not Want): The margin was drawn around the clinically apparent lesion.  A fusiform shape was then drawn on the skin incorporating the lesion and margins.  Incisions were then made along these lines to the appropriate tissue plane and the lesion was extirpated.
Repair Type: Intermediate
Size Of Margin In Cm: 0.3
Partial Purse String (Intermediate) Text: Given the location of the defect and the characteristics of the surrounding skin an intermediate purse string closure was deemed most appropriate.  Undermining was performed circumferentially around the surgical defect.  A purse string suture was then placed and tightened. Wound tension of the circular defect prevented complete closure of the wound.
Advancement-Rotation Flap Text: The defect edges were debeveled with a #15 scalpel blade.  Given the location of the defect, shape of the defect and the proximity to free margins an advancement-rotation flap was deemed most appropriate.  Using a sterile surgical marker, an appropriate flap was drawn incorporating the defect and placing the expected incisions within the relaxed skin tension lines where possible. The area thus outlined was incised deep to adipose tissue with a #15 scalpel blade.  The skin margins were undermined to an appropriate distance in all directions utilizing iris scissors.
Complex Repair And Xenograft Text: The defect edges were debeveled with a #15 scalpel blade.  The primary defect was closed partially with a complex linear closure.  Given the location of the defect, shape of the defect and the proximity to free margins an tissue cultured epidermal autograft was deemed most appropriate to repair the remaining defect.  The graft was trimmed to fit the size of the remaining defect.  The graft was then placed in the primary defect, oriented appropriately, and sutured into place.
Epidermal Closure Graft Donor Site (Optional): simple interrupted
Complex Repair And O-L Flap Text: The defect edges were debeveled with a #15 scalpel blade.  The primary defect was closed partially with a complex linear closure.  Given the location of the remaining defect, shape of the defect and the proximity to free margins an O-L flap was deemed most appropriate for complete closure of the defect.  Using a sterile surgical marker, an appropriate flap was drawn incorporating the defect and placing the expected incisions within the relaxed skin tension lines where possible.    The area thus outlined was incised deep to adipose tissue with a #15 scalpel blade.  The skin margins were undermined to an appropriate distance in all directions utilizing iris scissors.
Complex Repair And Modified Advancement Flap Text: The defect edges were debeveled with a #15 scalpel blade.  The primary defect was closed partially with a complex linear closure.  Given the location of the remaining defect, shape of the defect and the proximity to free margins a modified advancement flap was deemed most appropriate for complete closure of the defect.  Using a sterile surgical marker, an appropriate advancement flap was drawn incorporating the defect and placing the expected incisions within the relaxed skin tension lines where possible.    The area thus outlined was incised deep to adipose tissue with a #15 scalpel blade.  The skin margins were undermined to an appropriate distance in all directions utilizing iris scissors.
Elliptical Excision Additional Text (Leave Blank If You Do Not Want): The margin was drawn around the clinically apparent lesion.  An elliptical shape was then drawn on the skin incorporating the lesion and margins.  Incisions were then made along these lines to the appropriate tissue plane and the lesion was extirpated.
Trilobed Flap Text: The defect edges were debeveled with a #15 scalpel blade.  Given the location of the defect and the proximity to free margins a trilobed flap was deemed most appropriate.  Using a sterile surgical marker, an appropriate trilobed flap drawn around the defect.    The area thus outlined was incised deep to adipose tissue with a #15 scalpel blade.  The skin margins were undermined to an appropriate distance in all directions utilizing iris scissors.
Deep Sutures: 4-0 PDO
Slit Excision Additional Text (Leave Blank If You Do Not Want): A linear line was drawn on the skin overlying the lesion. An incision was made slowly until the lesion was visualized.  Once visualized, the lesion was removed with blunt dissection.
Crescentic Advancement Flap Text: The defect edges were debeveled with a #15 scalpel blade.  Given the location of the defect and the proximity to free margins a crescentic advancement flap was deemed most appropriate.  Using a sterile surgical marker, the appropriate advancement flap was drawn incorporating the defect and placing the expected incisions within the relaxed skin tension lines where possible.    The area thus outlined was incised deep to adipose tissue with a #15 scalpel blade.  The skin margins were undermined to an appropriate distance in all directions utilizing iris scissors.
Information: Selecting Yes will display possible errors in your note based on the variables you have selected. This validation is only offered as a suggestion for you. PLEASE NOTE THAT THE VALIDATION TEXT WILL BE REMOVED WHEN YOU FINALIZE YOUR NOTE. IF YOU WANT TO FAX A PRELIMINARY NOTE YOU WILL NEED TO TOGGLE THIS TO 'NO' IF YOU DO NOT WANT IT IN YOUR FAXED NOTE.
Intermediate Repair Preamble Text (Leave Blank If You Do Not Want): Undermining was performed with blunt dissection.
Melolabial Transposition Flap Text: The defect edges were debeveled with a #15 scalpel blade.  Given the location of the defect and the proximity to free margins a melolabial flap was deemed most appropriate.  Using a sterile surgical marker, an appropriate melolabial transposition flap was drawn incorporating the defect.    The area thus outlined was incised deep to adipose tissue with a #15 scalpel blade.  The skin margins were undermined to an appropriate distance in all directions utilizing iris scissors.
Double O-Z Plasty Text: The defect edges were debeveled with a #15 scalpel blade.  Given the location of the defect, shape of the defect and the proximity to free margins a Double O-Z plasty (double transposition flap) was deemed most appropriate.  Using a sterile surgical marker, the appropriate transposition flaps were drawn incorporating the defect and placing the expected incisions within the relaxed skin tension lines where possible. The area thus outlined was incised deep to adipose tissue with a #15 scalpel blade.  The skin margins were undermined to an appropriate distance in all directions utilizing iris scissors.  Hemostasis was achieved with electrocautery.  The flaps were then transposed into place, one clockwise and the other counterclockwise, and anchored with interrupted buried subcutaneous sutures.
Bilobed Flap Text: The defect edges were debeveled with a #15 scalpel blade.  Given the location of the defect and the proximity to free margins a bilobe flap was deemed most appropriate.  Using a sterile surgical marker, an appropriate bilobe flap drawn around the defect.    The area thus outlined was incised deep to adipose tissue with a #15 scalpel blade.  The skin margins were undermined to an appropriate distance in all directions utilizing iris scissors.
Intermediate / Complex Repair - Final Wound Length In Cm: 6
O-Z Flap Text: The defect edges were debeveled with a #15 scalpel blade.  Given the location of the defect, shape of the defect and the proximity to free margins an O-Z flap was deemed most appropriate.  Using a sterile surgical marker, an appropriate transposition flap was drawn incorporating the defect and placing the expected incisions within the relaxed skin tension lines where possible. The area thus outlined was incised deep to adipose tissue with a #15 scalpel blade.  The skin margins were undermined to an appropriate distance in all directions utilizing iris scissors.
Muscle Hinge Flap Text: The defect edges were debeveled with a #15 scalpel blade.  Given the size, depth and location of the defect and the proximity to free margins a muscle hinge flap was deemed most appropriate.  Using a sterile surgical marker, an appropriate hinge flap was drawn incorporating the defect. The area thus outlined was incised with a #15 scalpel blade.  The skin margins were undermined to an appropriate distance in all directions utilizing iris scissors.
Complex Repair And Rhombic Flap Text: The defect edges were debeveled with a #15 scalpel blade.  The primary defect was closed partially with a complex linear closure.  Given the location of the remaining defect, shape of the defect and the proximity to free margins a rhombic flap was deemed most appropriate for complete closure of the defect.  Using a sterile surgical marker, an appropriate advancement flap was drawn incorporating the defect and placing the expected incisions within the relaxed skin tension lines where possible.    The area thus outlined was incised deep to adipose tissue with a #15 scalpel blade.  The skin margins were undermined to an appropriate distance in all directions utilizing iris scissors.
Anesthesia Volume In Cc: 12
Composite Graft Text: The defect edges were debeveled with a #15 scalpel blade.  Given the location of the defect, shape of the defect, the proximity to free margins and the fact the defect was full thickness a composite graft was deemed most appropriate.  The defect was outline and then transferred to the donor site.  A full thickness graft was then excised from the donor site. The graft was then placed in the primary defect, oriented appropriately and then sutured into place.  The secondary defect was then repaired using a primary closure.
Mastoid Interpolation Flap Text: A decision was made to reconstruct the defect utilizing an interpolation axial flap and a staged reconstruction.  A telfa template was made of the defect.  This telfa template was then used to outline the mastoid interpolation flap.  The donor area for the pedicle flap was then injected with anesthesia.  The flap was excised through the skin and subcutaneous tissue down to the layer of the underlying musculature.  The pedicle flap was carefully excised within this deep plane to maintain its blood supply.  The edges of the donor site were undermined.   The donor site was closed in a primary fashion.  The pedicle was then rotated into position and sutured.  Once the tube was sutured into place, adequate blood supply was confirmed with blanching and refill.  The pedicle was then wrapped with xeroform gauze and dressed appropriately with a telfa and gauze bandage to ensure continued blood supply and protect the attached pedicle.
Graft Basting Suture (Optional): 5-0 Prolene
Hemostasis: Electrocautery
Banner Transposition Flap Text: The defect edges were debeveled with a #15 scalpel blade.  Given the location of the defect and the proximity to free margins a Banner transposition flap was deemed most appropriate.  Using a sterile surgical marker, an appropriate flap drawn around the defect. The area thus outlined was incised deep to adipose tissue with a #15 scalpel blade.  The skin margins were undermined to an appropriate distance in all directions utilizing iris scissors.
Complex Repair And Epidermal Autograft Text: The defect edges were debeveled with a #15 scalpel blade.  The primary defect was closed partially with a complex linear closure.  Given the location of the defect, shape of the defect and the proximity to free margins an epidermal autograft was deemed most appropriate to repair the remaining defect.  The graft was trimmed to fit the size of the remaining defect.  The graft was then placed in the primary defect, oriented appropriately, and sutured into place.
Billing Type: Third-Party Bill
Transposition Flap Text: The defect edges were debeveled with a #15 scalpel blade.  Given the location of the defect and the proximity to free margins a transposition flap was deemed most appropriate.  Using a sterile surgical marker, an appropriate transposition flap was drawn incorporating the defect.    The area thus outlined was incised deep to adipose tissue with a #15 scalpel blade.  The skin margins were undermined to an appropriate distance in all directions utilizing iris scissors.
Partial Purse String (Simple) Text: Given the location of the defect and the characteristics of the surrounding skin a simple purse string closure was deemed most appropriate.  Undermining was performed circumferentially around the surgical defect.  A purse string suture was then placed and tightened. Wound tension of the circular defect prevented complete closure of the wound.
Double O-Z Flap Text: The defect edges were debeveled with a #15 scalpel blade.  Given the location of the defect, shape of the defect and the proximity to free margins a Double O-Z flap was deemed most appropriate.  Using a sterile surgical marker, an appropriate transposition flap was drawn incorporating the defect and placing the expected incisions within the relaxed skin tension lines where possible. The area thus outlined was incised deep to adipose tissue with a #15 scalpel blade.  The skin margins were undermined to an appropriate distance in all directions utilizing iris scissors.
Purse String (Intermediate) Text: Given the location of the defect and the characteristics of the surrounding skin a pursestring intermediate closure was deemed most appropriate.  Undermining was performed circumfirentially around the surgical defect.  A purstring suture was then placed and tightened.
Dermal Autograft Text: The defect edges were debeveled with a #15 scalpel blade.  Given the location of the defect, shape of the defect and the proximity to free margins a dermal autograft was deemed most appropriate.  Using a sterile surgical marker, the primary defect shape was transferred to the donor site. The area thus outlined was incised deep to adipose tissue with a #15 scalpel blade.  The harvested graft was then trimmed of adipose and epidermal tissue until only dermis was left.  The skin graft was then placed in the primary defect and oriented appropriately.
Rhombic Flap Text: The defect edges were debeveled with a #15 scalpel blade.  Given the location of the defect and the proximity to free margins a rhombic flap was deemed most appropriate.  Using a sterile surgical marker, an appropriate rhombic flap was drawn incorporating the defect.    The area thus outlined was incised deep to adipose tissue with a #15 scalpel blade.  The skin margins were undermined to an appropriate distance in all directions utilizing iris scissors.
Chonodrocutaneous Helical Advancement Flap Text: The defect edges were debeveled with a #15 scalpel blade.  Given the location of the defect and the proximity to free margins a chondrocutaneous helical advancement flap was deemed most appropriate.  Using a sterile surgical marker, the appropriate advancement flap was drawn incorporating the defect and placing the expected incisions within the relaxed skin tension lines where possible.    The area thus outlined was incised deep to adipose tissue with a #15 scalpel blade.  The skin margins were undermined to an appropriate distance in all directions utilizing iris scissors.
Repair Performed By Another Provider Text (Leave Blank If You Do Not Want): After the tissue was excised the defect was repaired by another provider.
Anesthesia Type: 1% lidocaine with epinephrine and a 1:10 solution of 8.4% sodium bicarbonate
S Plasty Text: Given the location and shape of the defect, and the orientation of relaxed skin tension lines, an S-plasty was deemed most appropriate for repair.  Using a sterile surgical marker, the appropriate outline of the S-plasty was drawn, incorporating the defect and placing the expected incisions within the relaxed skin tension lines where possible.  The area thus outlined was incised deep to adipose tissue with a #15 scalpel blade.  The skin margins were undermined to an appropriate distance in all directions utilizing iris scissors. The skin flaps were advanced over the defect.  The opposing margins were then approximated with interrupted buried subcutaneous sutures.
Accession #: [id number]
Epidermal Autograft Text: The defect edges were debeveled with a #15 scalpel blade.  Given the location of the defect, shape of the defect and the proximity to free margins an epidermal autograft was deemed most appropriate.  Using a sterile surgical marker, the primary defect shape was transferred to the donor site. The epidermal graft was then harvested.  The skin graft was then placed in the primary defect and oriented appropriately.
Home Suture Removal Text: Patient was provided a home suture removal kit and will remove their sutures at home.  If they have any questions or difficulties they will call the office.
W Plasty Text: The lesion was extirpated to the level of the fat with a #15 scalpel blade.  Given the location of the defect, shape of the defect and the proximity to free margins a W-plasty was deemed most appropriate for repair.  Using a sterile surgical marker, the appropriate transposition arms of the W-plasty were drawn incorporating the defect and placing the expected incisions within the relaxed skin tension lines where possible.    The area thus outlined was incised deep to adipose tissue with a #15 scalpel blade.  The skin margins were undermined to an appropriate distance in all directions utilizing iris scissors.  The opposing transposition arms were then transposed into place in opposite direction and anchored with interrupted buried subcutaneous sutures.
Bi-Rhombic Flap Text: The defect edges were debeveled with a #15 scalpel blade.  Given the location of the defect and the proximity to free margins a bi-rhombic flap was deemed most appropriate.  Using a sterile surgical marker, an appropriate rhombic flap was drawn incorporating the defect. The area thus outlined was incised deep to adipose tissue with a #15 scalpel blade.  The skin margins were undermined to an appropriate distance in all directions utilizing iris scissors.
Double Island Pedicle Flap Text: The defect edges were debeveled with a #15 scalpel blade.  Given the location of the defect, shape of the defect and the proximity to free margins a double island pedicle advancement flap was deemed most appropriate.  Using a sterile surgical marker, an appropriate advancement flap was drawn incorporating the defect, outlining the appropriate donor tissue and placing the expected incisions within the relaxed skin tension lines where possible.    The area thus outlined was incised deep to adipose tissue with a #15 scalpel blade.  The skin margins were undermined to an appropriate distance in all directions around the primary defect and laterally outward around the island pedicle utilizing iris scissors.  There was minimal undermining beneath the pedicle flap.
Consent was obtained from the patient. The risks and benefits to therapy were discussed in detail. Specifically, the risks of infection, scarring, bleeding, prolonged wound healing, incomplete removal, allergy to anesthesia, nerve injury and recurrence were addressed. Prior to the procedure, the treatment site was clearly identified and confirmed by the patient. All components of Universal Protocol/PAUSE Rule completed.
Bilobed Transposition Flap Text: The defect edges were debeveled with a #15 scalpel blade.  Given the location of the defect and the proximity to free margins a bilobed transposition flap was deemed most appropriate.  Using a sterile surgical marker, an appropriate bilobe flap drawn around the defect.    The area thus outlined was incised deep to adipose tissue with a #15 scalpel blade.  The skin margins were undermined to an appropriate distance in all directions utilizing iris scissors.
Complex Repair And Transposition Flap Text: The defect edges were debeveled with a #15 scalpel blade.  The primary defect was closed partially with a complex linear closure.  Given the location of the remaining defect, shape of the defect and the proximity to free margins a transposition flap was deemed most appropriate for complete closure of the defect.  Using a sterile surgical marker, an appropriate advancement flap was drawn incorporating the defect and placing the expected incisions within the relaxed skin tension lines where possible.    The area thus outlined was incised deep to adipose tissue with a #15 scalpel blade.  The skin margins were undermined to an appropriate distance in all directions utilizing iris scissors.
Outside Pathology Billing: outside pathology read only
Island Pedicle Flap-Requiring Vessel Identification Text: The defect edges were debeveled with a #15 scalpel blade.  Given the location of the defect, shape of the defect and the proximity to free margins an island pedicle advancement flap was deemed most appropriate.  Using a sterile surgical marker, an appropriate advancement flap was drawn, based on the axial vessel mentioned above, incorporating the defect, outlining the appropriate donor tissue and placing the expected incisions within the relaxed skin tension lines where possible.    The area thus outlined was incised deep to adipose tissue with a #15 scalpel blade.  The skin margins were undermined to an appropriate distance in all directions around the primary defect and laterally outward around the island pedicle utilizing iris scissors.  There was minimal undermining beneath the pedicle flap.
Perilesional Excision Additional Text (Leave Blank If You Do Not Want): The margin was drawn around the clinically apparent lesion. Incisions were then made along these lines to the appropriate tissue plane and the lesion was extirpated.
Posterior Auricular Interpolation Flap Text: A decision was made to reconstruct the defect utilizing an interpolation axial flap and a staged reconstruction.  A telfa template was made of the defect.  This telfa template was then used to outline the posterior auricular interpolation flap.  The donor area for the pedicle flap was then injected with anesthesia.  The flap was excised through the skin and subcutaneous tissue down to the layer of the underlying musculature.  The pedicle flap was carefully excised within this deep plane to maintain its blood supply.  The edges of the donor site were undermined.   The donor site was closed in a primary fashion.  The pedicle was then rotated into position and sutured.  Once the tube was sutured into place, adequate blood supply was confirmed with blanching and refill.  The pedicle was then wrapped with xeroform gauze and dressed appropriately with a telfa and gauze bandage to ensure continued blood supply and protect the attached pedicle.
Mucosal Advancement Flap Text: Given the location of the defect, shape of the defect and the proximity to free margins a mucosal advancement flap was deemed most appropriate. Incisions were made with a 15 blade scalpel in the appropriate fashion along the cutaneous vermillion border and the mucosal lip. The remaining actinically damaged mucosal tissue was excised.  The mucosal advancement flap was then elevated to the gingival sulcus with care taken to preserve the neurovascular structures and advanced into the primary defect. Care was taken to ensure that precise realignment of the vermillion border was achieved.
Complex Repair And Skin Substitute Graft Text: The defect edges were debeveled with a #15 scalpel blade.  The primary defect was closed partially with a complex linear closure.  Given the location of the remaining defect, shape of the defect and the proximity to free margins a skin substitute graft was deemed most appropriate to repair the remaining defect.  The graft was trimmed to fit the size of the remaining defect.  The graft was then placed in the primary defect, oriented appropriately, and sutured into place.
O-T Advancement Flap Text: The defect edges were debeveled with a #15 scalpel blade.  Given the location of the defect, shape of the defect and the proximity to free margins an O-T advancement flap was deemed most appropriate.  Using a sterile surgical marker, an appropriate advancement flap was drawn incorporating the defect and placing the expected incisions within the relaxed skin tension lines where possible.    The area thus outlined was incised deep to adipose tissue with a #15 scalpel blade.  The skin margins were undermined to an appropriate distance in all directions utilizing iris scissors.
Advancement Flap (Single) Text: The defect edges were debeveled with a #15 scalpel blade.  Given the location of the defect and the proximity to free margins a single advancement flap was deemed most appropriate.  Using a sterile surgical marker, an appropriate advancement flap was drawn incorporating the defect and placing the expected incisions within the relaxed skin tension lines where possible.    The area thus outlined was incised deep to adipose tissue with a #15 scalpel blade.  The skin margins were undermined to an appropriate distance in all directions utilizing iris scissors.
O-Z Plasty Text: The defect edges were debeveled with a #15 scalpel blade.  Given the location of the defect, shape of the defect and the proximity to free margins an O-Z plasty (double transposition flap) was deemed most appropriate.  Using a sterile surgical marker, the appropriate transposition flaps were drawn incorporating the defect and placing the expected incisions within the relaxed skin tension lines where possible.    The area thus outlined was incised deep to adipose tissue with a #15 scalpel blade.  The skin margins were undermined to an appropriate distance in all directions utilizing iris scissors.  Hemostasis was achieved with electrocautery.  The flaps were then transposed into place, one clockwise and the other counterclockwise, and anchored with interrupted buried subcutaneous sutures.
Ftsg Text: The defect edges were debeveled with a #15 scalpel blade.  Given the location of the defect, shape of the defect and the proximity to free margins a full thickness skin graft was deemed most appropriate.  Using a sterile surgical marker, the primary defect shape was transferred to the donor site. The area thus outlined was incised deep to adipose tissue with a #15 scalpel blade.  The harvested graft was then trimmed of adipose tissue until only dermis and epidermis was left.  The skin margins of the secondary defect were undermined to an appropriate distance in all directions utilizing iris scissors.  The secondary defect was closed with interrupted buried subcutaneous sutures.  The skin edges were then re-apposed with running  sutures.  The skin graft was then placed in the primary defect and oriented appropriately.
Where Do You Want The Question To Include Opioid Counseling Located?: Case Summary Tab
Burow's Advancement Flap Text: The defect edges were debeveled with a #15 scalpel blade.  Given the location of the defect and the proximity to free margins a Burow's advancement flap was deemed most appropriate.  Using a sterile surgical marker, the appropriate advancement flap was drawn incorporating the defect and placing the expected incisions within the relaxed skin tension lines where possible.    The area thus outlined was incised deep to adipose tissue with a #15 scalpel blade.  The skin margins were undermined to an appropriate distance in all directions utilizing iris scissors.
Tissue Cultured Epidermal Autograft Text: The defect edges were debeveled with a #15 scalpel blade.  Given the location of the defect, shape of the defect and the proximity to free margins a tissue cultured epidermal autograft was deemed most appropriate.  The graft was then trimmed to fit the size of the defect.  The graft was then placed in the primary defect and oriented appropriately.
Advancement Flap (Double) Text: The defect edges were debeveled with a #15 scalpel blade.  Given the location of the defect and the proximity to free margins a double advancement flap was deemed most appropriate.  Using a sterile surgical marker, the appropriate advancement flaps were drawn incorporating the defect and placing the expected incisions within the relaxed skin tension lines where possible.    The area thus outlined was incised deep to adipose tissue with a #15 scalpel blade.  The skin margins were undermined to an appropriate distance in all directions utilizing iris scissors.
Pre-Excision Curettage Text (Leave Blank If You Do Not Want): Prior to drawing the surgical margin the visible lesion was removed with electrodesiccation and curettage to clearly define the lesion size.
Positioning (Leave Blank If You Do Not Want): The patient was placed in a comfortable position exposing the surgical site.
Complex Repair And Melolabial Flap Text: The defect edges were debeveled with a #15 scalpel blade.  The primary defect was closed partially with a complex linear closure.  Given the location of the remaining defect, shape of the defect and the proximity to free margins a melolabial flap was deemed most appropriate for complete closure of the defect.  Using a sterile surgical marker, an appropriate advancement flap was drawn incorporating the defect and placing the expected incisions within the relaxed skin tension lines where possible.    The area thus outlined was incised deep to adipose tissue with a #15 scalpel blade.  The skin margins were undermined to an appropriate distance in all directions utilizing iris scissors.
Purse String (Simple) Text: Given the location of the defect and the characteristics of the surrounding skin a purse string simple closure was deemed most appropriate.  Undermining was performed circumferentially around the surgical defect.  A purse string suture was then placed and tightened.
A-T Advancement Flap Text: The defect edges were debeveled with a #15 scalpel blade.  Given the location of the defect, shape of the defect and the proximity to free margins an A-T advancement flap was deemed most appropriate.  Using a sterile surgical marker, an appropriate advancement flap was drawn incorporating the defect and placing the expected incisions within the relaxed skin tension lines where possible.    The area thus outlined was incised deep to adipose tissue with a #15 scalpel blade.  The skin margins were undermined to an appropriate distance in all directions utilizing iris scissors.
Complex Repair And A-T Advancement Flap Text: The defect edges were debeveled with a #15 scalpel blade.  The primary defect was closed partially with a complex linear closure.  Given the location of the remaining defect, shape of the defect and the proximity to free margins an A-T advancement flap was deemed most appropriate for complete closure of the defect.  Using a sterile surgical marker, an appropriate advancement flap was drawn incorporating the defect and placing the expected incisions within the relaxed skin tension lines where possible.    The area thus outlined was incised deep to adipose tissue with a #15 scalpel blade.  The skin margins were undermined to an appropriate distance in all directions utilizing iris scissors.
Anesthesia Type: 1% lidocaine with 1:200,000 epinephrine
No Repair - Repaired With Adjacent Surgical Defect Text (Leave Blank If You Do Not Want): After the excision the defect was repaired concurrently with another surgical defect which was in close approximation.
Cartilage Graft Text: The defect edges were debeveled with a #15 scalpel blade.  Given the location of the defect, shape of the defect, the fact the defect involved a full thickness cartilage defect a cartilage graft was deemed most appropriate.  An appropriate donor site was identified, cleansed, and anesthetized. The cartilage graft was then harvested and transferred to the recipient site, oriented appropriately and then sutured into place.  The secondary defect was then repaired using a primary closure.
Scalpel Size: 15 blade
Cheek-To-Nose Interpolation Flap Text: A decision was made to reconstruct the defect utilizing an interpolation axial flap and a staged reconstruction.  A telfa template was made of the defect.  This telfa template was then used to outline the Cheek-To-Nose Interpolation flap.  The donor area for the pedicle flap was then injected with anesthesia.  The flap was excised through the skin and subcutaneous tissue down to the layer of the underlying musculature.  The interpolation flap was carefully excised within this deep plane to maintain its blood supply.  The edges of the donor site were undermined.   The donor site was closed in a primary fashion.  The pedicle was then rotated into position and sutured.  Once the tube was sutured into place, adequate blood supply was confirmed with blanching and refill.  The pedicle was then wrapped with xeroform gauze and dressed appropriately with a telfa and gauze bandage to ensure continued blood supply and protect the attached pedicle.
Complex Repair And Bilobe Flap Text: The defect edges were debeveled with a #15 scalpel blade.  The primary defect was closed partially with a complex linear closure.  Given the location of the remaining defect, shape of the defect and the proximity to free margins a bilobe flap was deemed most appropriate for complete closure of the defect.  Using a sterile surgical marker, an appropriate advancement flap was drawn incorporating the defect and placing the expected incisions within the relaxed skin tension lines where possible.    The area thus outlined was incised deep to adipose tissue with a #15 scalpel blade.  The skin margins were undermined to an appropriate distance in all directions utilizing iris scissors.
V-Y Flap Text: The defect edges were debeveled with a #15 scalpel blade.  Given the location of the defect, shape of the defect and the proximity to free margins a V-Y flap was deemed most appropriate.  Using a sterile surgical marker, an appropriate advancement flap was drawn incorporating the defect and placing the expected incisions within the relaxed skin tension lines where possible.    The area thus outlined was incised deep to adipose tissue with a #15 scalpel blade.  The skin margins were undermined to an appropriate distance in all directions utilizing iris scissors.
Split-Thickness Skin Graft Text: The defect edges were debeveled with a #15 scalpel blade.  Given the location of the defect, shape of the defect and the proximity to free margins a split thickness skin graft was deemed most appropriate.  Using a sterile surgical marker, the primary defect shape was transferred to the donor site. The split thickness graft was then harvested.  The skin graft was then placed in the primary defect and oriented appropriately.
Graft Donor Site Bandage (Optional-Leave Blank If You Don't Want In Note): Steri-strips and a pressure bandage were applied to the donor site.
Rotation Flap Text: The defect edges were debeveled with a #15 scalpel blade.  Given the location of the defect, shape of the defect and the proximity to free margins a rotation flap was deemed most appropriate.  Using a sterile surgical marker, an appropriate rotation flap was drawn incorporating the defect and placing the expected incisions within the relaxed skin tension lines where possible.    The area thus outlined was incised deep to adipose tissue with a #15 scalpel blade.  The skin margins were undermined to an appropriate distance in all directions utilizing iris scissors.
Paramedian Forehead Flap Text: A decision was made to reconstruct the defect utilizing an interpolation axial flap and a staged reconstruction.  A telfa template was made of the defect.  This telfa template was then used to outline the paramedian forehead pedicle flap.  The donor area for the pedicle flap was then injected with anesthesia.  The flap was excised through the skin and subcutaneous tissue down to the layer of the underlying musculature.  The pedicle flap was carefully excised within this deep plane to maintain its blood supply.  The edges of the donor site were undermined.   The donor site was closed in a primary fashion.  The pedicle was then rotated into position and sutured.  Once the tube was sutured into place, adequate blood supply was confirmed with blanching and refill.  The pedicle was then wrapped with xeroform gauze and dressed appropriately with a telfa and gauze bandage to ensure continued blood supply and protect the attached pedicle.
Modified Advancement Flap Text: The defect edges were debeveled with a #15 scalpel blade.  Given the location of the defect, shape of the defect and the proximity to free margins a modified advancement flap was deemed most appropriate.  Using a sterile surgical marker, an appropriate advancement flap was drawn incorporating the defect and placing the expected incisions within the relaxed skin tension lines where possible.    The area thus outlined was incised deep to adipose tissue with a #15 scalpel blade.  The skin margins were undermined to an appropriate distance in all directions utilizing iris scissors.
Bilateral Helical Rim Advancement Flap Text: The defect edges were debeveled with a #15 blade scalpel.  Given the location of the defect and the proximity to free margins (helical rim) a bilateral helical rim advancement flap was deemed most appropriate.  Using a sterile surgical marker, the appropriate advancement flaps were drawn incorporating the defect and placing the expected incisions between the helical rim and antihelix where possible.  The area thus outlined was incised through and through with a #15 scalpel blade.  With a skin hook and iris scissors, the flaps were gently and sharply undermined and freed up.
V-Y Plasty Text: The defect edges were debeveled with a #15 scalpel blade.  Given the location of the defect, shape of the defect and the proximity to free margins an V-Y advancement flap was deemed most appropriate.  Using a sterile surgical marker, an appropriate advancement flap was drawn incorporating the defect and placing the expected incisions within the relaxed skin tension lines where possible.    The area thus outlined was incised deep to adipose tissue with a #15 scalpel blade.  The skin margins were undermined to an appropriate distance in all directions utilizing iris scissors.
Complex Repair And V-Y Plasty Text: The defect edges were debeveled with a #15 scalpel blade.  The primary defect was closed partially with a complex linear closure.  Given the location of the remaining defect, shape of the defect and the proximity to free margins a V-Y plasty was deemed most appropriate for complete closure of the defect.  Using a sterile surgical marker, an appropriate advancement flap was drawn incorporating the defect and placing the expected incisions within the relaxed skin tension lines where possible.    The area thus outlined was incised deep to adipose tissue with a #15 scalpel blade.  The skin margins were undermined to an appropriate distance in all directions utilizing iris scissors.
Ear Star Wedge Flap Text: The defect edges were debeveled with a #15 blade scalpel.  Given the location of the defect and the proximity to free margins (helical rim) an ear star wedge flap was deemed most appropriate.  Using a sterile surgical marker, the appropriate flap was drawn incorporating the defect and placing the expected incisions between the helical rim and antihelix where possible.  The area thus outlined was incised through and through with a #15 scalpel blade.
Wound Care: Petrolatum
Star Wedge Flap Text: The defect edges were debeveled with a #15 scalpel blade.  Given the location of the defect, shape of the defect and the proximity to free margins a star wedge flap was deemed most appropriate.  Using a sterile surgical marker, an appropriate rotation flap was drawn incorporating the defect and placing the expected incisions within the relaxed skin tension lines where possible. The area thus outlined was incised deep to adipose tissue with a #15 scalpel blade.  The skin margins were undermined to an appropriate distance in all directions utilizing iris scissors.
Complex Repair And Single Advancement Flap Text: The defect edges were debeveled with a #15 scalpel blade.  The primary defect was closed partially with a complex linear closure.  Given the location of the remaining defect, shape of the defect and the proximity to free margins a single advancement flap was deemed most appropriate for complete closure of the defect.  Using a sterile surgical marker, an appropriate advancement flap was drawn incorporating the defect and placing the expected incisions within the relaxed skin tension lines where possible.    The area thus outlined was incised deep to adipose tissue with a #15 scalpel blade.  The skin margins were undermined to an appropriate distance in all directions utilizing iris scissors.
Z Plasty Text: The lesion was extirpated to the level of the fat with a #15 scalpel blade.  Given the location of the defect, shape of the defect and the proximity to free margins a Z-plasty was deemed most appropriate for repair.  Using a sterile surgical marker, the appropriate transposition arms of the Z-plasty were drawn incorporating the defect and placing the expected incisions within the relaxed skin tension lines where possible.    The area thus outlined was incised deep to adipose tissue with a #15 scalpel blade.  The skin margins were undermined to an appropriate distance in all directions utilizing iris scissors.  The opposing transposition arms were then transposed into place in opposite direction and anchored with interrupted buried subcutaneous sutures.
Epidermal Closure: running
Dorsal Nasal Flap Text: The defect edges were debeveled with a #15 scalpel blade.  Given the location of the defect and the proximity to free margins a dorsal nasal flap was deemed most appropriate.  Using a sterile surgical marker, an appropriate dorsal nasal flap was drawn around the defect.    The area thus outlined was incised deep to adipose tissue with a #15 scalpel blade.  The skin margins were undermined to an appropriate distance in all directions utilizing iris scissors.
O-T Plasty Text: The defect edges were debeveled with a #15 scalpel blade.  Given the location of the defect, shape of the defect and the proximity to free margins an O-T plasty was deemed most appropriate.  Using a sterile surgical marker, an appropriate O-T plasty was drawn incorporating the defect and placing the expected incisions within the relaxed skin tension lines where possible.    The area thus outlined was incised deep to adipose tissue with a #15 scalpel blade.  The skin margins were undermined to an appropriate distance in all directions utilizing iris scissors.
Complex Repair And O-T Advancement Flap Text: The defect edges were debeveled with a #15 scalpel blade.  The primary defect was closed partially with a complex linear closure.  Given the location of the remaining defect, shape of the defect and the proximity to free margins an O-T advancement flap was deemed most appropriate for complete closure of the defect.  Using a sterile surgical marker, an appropriate advancement flap was drawn incorporating the defect and placing the expected incisions within the relaxed skin tension lines where possible.    The area thus outlined was incised deep to adipose tissue with a #15 scalpel blade.  The skin margins were undermined to an appropriate distance in all directions utilizing iris scissors.
Island Pedicle Flap With Canthal Suspension Text: The defect edges were debeveled with a #15 scalpel blade.  Given the location of the defect, shape of the defect and the proximity to free margins an island pedicle advancement flap was deemed most appropriate.  Using a sterile surgical marker, an appropriate advancement flap was drawn incorporating the defect, outlining the appropriate donor tissue and placing the expected incisions within the relaxed skin tension lines where possible. The area thus outlined was incised deep to adipose tissue with a #15 scalpel blade.  The skin margins were undermined to an appropriate distance in all directions around the primary defect and laterally outward around the island pedicle utilizing iris scissors.  There was minimal undermining beneath the pedicle flap. A suspension suture was placed in the canthal tendon to prevent tension and prevent ectropion.
Complex Repair And Ftsg Text: The defect edges were debeveled with a #15 scalpel blade.  The primary defect was closed partially with a complex linear closure.  Given the location of the defect, shape of the defect and the proximity to free margins a full thickness skin graft was deemed most appropriate to repair the remaining defect.  The graft was trimmed to fit the size of the remaining defect.  The graft was then placed in the primary defect, oriented appropriately, and sutured into place.
H Plasty Text: Given the location of the defect, shape of the defect and the proximity to free margins a H-plasty was deemed most appropriate for repair.  Using a sterile surgical marker, the appropriate advancement arms of the H-plasty were drawn incorporating the defect and placing the expected incisions within the relaxed skin tension lines where possible. The area thus outlined was incised deep to adipose tissue with a #15 scalpel blade. The skin margins were undermined to an appropriate distance in all directions utilizing iris scissors.  The opposing advancement arms were then advanced into place in opposite direction and anchored with interrupted buried subcutaneous sutures.
Complex Repair And W Plasty Text: The defect edges were debeveled with a #15 scalpel blade.  The primary defect was closed partially with a complex linear closure.  Given the location of the remaining defect, shape of the defect and the proximity to free margins a W plasty was deemed most appropriate for complete closure of the defect.  Using a sterile surgical marker, an appropriate advancement flap was drawn incorporating the defect and placing the expected incisions within the relaxed skin tension lines where possible.    The area thus outlined was incised deep to adipose tissue with a #15 scalpel blade.  The skin margins were undermined to an appropriate distance in all directions utilizing iris scissors.
Cheek Interpolation Flap Text: A decision was made to reconstruct the defect utilizing an interpolation axial flap and a staged reconstruction.  A telfa template was made of the defect.  This telfa template was then used to outline the Cheek Interpolation flap.  The donor area for the pedicle flap was then injected with anesthesia.  The flap was excised through the skin and subcutaneous tissue down to the layer of the underlying musculature.  The interpolation flap was carefully excised within this deep plane to maintain its blood supply.  The edges of the donor site were undermined.   The donor site was closed in a primary fashion.  The pedicle was then rotated into position and sutured.  Once the tube was sutured into place, adequate blood supply was confirmed with blanching and refill.  The pedicle was then wrapped with xeroform gauze and dressed appropriately with a telfa and gauze bandage to ensure continued blood supply and protect the attached pedicle.
Melolabial Interpolation Flap Text: A decision was made to reconstruct the defect utilizing an interpolation axial flap and a staged reconstruction.  A telfa template was made of the defect.  This telfa template was then used to outline the melolabial interpolation flap.  The donor area for the pedicle flap was then injected with anesthesia.  The flap was excised through the skin and subcutaneous tissue down to the layer of the underlying musculature.  The pedicle flap was carefully excised within this deep plane to maintain its blood supply.  The edges of the donor site were undermined.   The donor site was closed in a primary fashion.  The pedicle was then rotated into position and sutured.  Once the tube was sutured into place, adequate blood supply was confirmed with blanching and refill.  The pedicle was then wrapped with xeroform gauze and dressed appropriately with a telfa and gauze bandage to ensure continued blood supply and protect the attached pedicle.
Complex Repair And Double M Plasty Text: The defect edges were debeveled with a #15 scalpel blade.  The primary defect was closed partially with a complex linear closure.  Given the location of the remaining defect, shape of the defect and the proximity to free margins a double M plasty was deemed most appropriate for complete closure of the defect.  Using a sterile surgical marker, an appropriate advancement flap was drawn incorporating the defect and placing the expected incisions within the relaxed skin tension lines where possible.    The area thus outlined was incised deep to adipose tissue with a #15 scalpel blade.  The skin margins were undermined to an appropriate distance in all directions utilizing iris scissors.
Xenograft Text: The defect edges were debeveled with a #15 scalpel blade.  Given the location of the defect, shape of the defect and the proximity to free margins a xenograft was deemed most appropriate.  The graft was then trimmed to fit the size of the defect.  The graft was then placed in the primary defect and oriented appropriately.
Detail Level: Detailed
Suture Removal: 14 days
Hatchet Flap Text: The defect edges were debeveled with a #15 scalpel blade.  Given the location of the defect, shape of the defect and the proximity to free margins a hatchet flap was deemed most appropriate.  Using a sterile surgical marker, an appropriate hatchet flap was drawn incorporating the defect and placing the expected incisions within the relaxed skin tension lines where possible.    The area thus outlined was incised deep to adipose tissue with a #15 scalpel blade.  The skin margins were undermined to an appropriate distance in all directions utilizing iris scissors.
Spiral Flap Text: The defect edges were debeveled with a #15 scalpel blade.  Given the location of the defect, shape of the defect and the proximity to free margins a spiral flap was deemed most appropriate.  Using a sterile surgical marker, an appropriate rotation flap was drawn incorporating the defect and placing the expected incisions within the relaxed skin tension lines where possible. The area thus outlined was incised deep to adipose tissue with a #15 scalpel blade.  The skin margins were undermined to an appropriate distance in all directions utilizing iris scissors.
Complex Repair And Split-Thickness Skin Graft Text: The defect edges were debeveled with a #15 scalpel blade.  The primary defect was closed partially with a complex linear closure.  Given the location of the defect, shape of the defect and the proximity to free margins a split thickness skin graft was deemed most appropriate to repair the remaining defect.  The graft was trimmed to fit the size of the remaining defect.  The graft was then placed in the primary defect, oriented appropriately, and sutured into place.
Complex Repair And Z Plasty Text: The defect edges were debeveled with a #15 scalpel blade.  The primary defect was closed partially with a complex linear closure.  Given the location of the remaining defect, shape of the defect and the proximity to free margins a Z plasty was deemed most appropriate for complete closure of the defect.  Using a sterile surgical marker, an appropriate advancement flap was drawn incorporating the defect and placing the expected incisions within the relaxed skin tension lines where possible.    The area thus outlined was incised deep to adipose tissue with a #15 scalpel blade.  The skin margins were undermined to an appropriate distance in all directions utilizing iris scissors.
Estimated Blood Loss (Cc): minimal

## 2019-12-31 ENCOUNTER — RX ONLY (OUTPATIENT)
Age: 79
Setting detail: RX ONLY
End: 2019-12-31

## 2019-12-31 ENCOUNTER — APPOINTMENT (RX ONLY)
Dept: URBAN - METROPOLITAN AREA CLINIC 24 | Facility: CLINIC | Age: 79
Setting detail: DERMATOLOGY
End: 2019-12-31

## 2019-12-31 PROBLEM — C44.729 SQUAMOUS CELL CARCINOMA OF SKIN OF LEFT LOWER LIMB, INCLUDING HIP: Status: ACTIVE | Noted: 2019-12-31

## 2019-12-31 PROCEDURE — 17314 MOHS ADDL STAGE T/A/L: CPT

## 2019-12-31 PROCEDURE — 17313 MOHS 1 STAGE T/A/L: CPT

## 2019-12-31 PROCEDURE — ? MOHS SURGERY

## 2019-12-31 PROCEDURE — 17313 MOHS 1 STAGE T/A/L: CPT | Mod: 76

## 2019-12-31 PROCEDURE — 12032 INTMD RPR S/A/T/EXT 2.6-7.5: CPT

## 2019-12-31 NOTE — PROCEDURE: MOHS SURGERY
Consent (Scalp)/Introductory Paragraph: The rationale for Mohs was explained to the patient and consent was obtained. The risks, benefits and alternatives to therapy were discussed in detail. Specifically, the risks of changes in hair growth pattern secondary to repair, infection, scarring, bleeding, prolonged wound healing, incomplete removal, allergy to anesthesia, nerve injury and recurrence were addressed. Prior to the procedure, the treatment site was clearly identified and confirmed by the patient. All components of Universal Protocol/PAUSE Rule completed.
Stage 11: Additional Anesthesia Volume In Cc: 0
Stage 6: Additional Anesthesia Type: 1% lidocaine with epinephrine
Area M Indication Text: Tumors in this location are included in Area M (cheek, forehead, scalp, neck, jawline and pretibial skin).  Mohs surgery is indicated for tumors 1 cm or larger in these anatomic locations.
Tumor Debulked?: curette
Show Assistants Variable: Yes
Consent (Near Eyelid Margin)/Introductory Paragraph: The rationale for Mohs was explained to the patient and consent was obtained. The risks, benefits and alternatives to therapy were discussed in detail. Specifically, the risks of ectropion or eyelid deformity, infection, scarring, bleeding, prolonged wound healing, incomplete removal, allergy to anesthesia, nerve injury and recurrence were addressed. Prior to the procedure, the treatment site was clearly identified and confirmed by the patient. All components of Universal Protocol/PAUSE Rule completed.
Asc Procedure Text (E): After obtaining clear surgical margins the patient was sent to an ASC for surgical repair.  The patient understands they will receive post-surgical care and follow-up from the ASC physician.
Closure 2 Information: This tab is for additional flaps and grafts, including complex repair and grafts and complex repair and flaps. You can also specify a different location for the additional defect, if the location is the same you do not need to select a new one. We will insert the automated text for the repair you select below just as we do for solitary flaps and grafts. Please note that at this time if you select a location with a different insurance zone you will need to override the ICD10 and CPT if appropriate.
Referred To Plastics For Closure Text (Leave Blank If You Do Not Want): After obtaining clear surgical margins the patient was sent to plastics for surgical repair.  The patient understands they will receive post-surgical care and follow-up from the referring physician's office.
Dorsal Nasal Flap Text: The defect edges were debeveled with a #15 scalpel blade.  Given the location of the defect and the proximity to free margins a dorsal nasal flap was deemed most appropriate.  Using a sterile surgical marker, an appropriate dorsal nasal flap was drawn around the defect.    The area thus outlined was incised deep to adipose tissue with a #15 scalpel blade.  The skin margins were undermined to an appropriate distance in all directions utilizing iris scissors.
Mohs Histo Method Verbiage: The section(s) were then chromacoded and processed in the Mohs lab using the Mohs protocol and submitted for frozen section.
Graft Donor Site Will Heal By Secondary Intention: No
A-T Advancement Flap Text: The defect edges were debeveled with a #15 scalpel blade.  Given the location of the defect, shape of the defect and the proximity to free margins an A-T advancement flap was deemed most appropriate.  Using a sterile surgical marker, an appropriate advancement flap was drawn incorporating the defect and placing the expected incisions within the relaxed skin tension lines where possible.    The area thus outlined was incised deep to adipose tissue with a #15 scalpel blade.  The skin margins were undermined to an appropriate distance in all directions utilizing iris scissors.
Subsequent Stages Histo Method Verbiage: Using a similar technique to that described above, a thin layer of tissue was removed from all areas where tumor was visible on the previous stage.  The tissue was again oriented, mapped, dyed, and processed as above.
Wound Care (No Sutures): Petrolatum
Otolaryngologist Procedure Text (F): After obtaining clear surgical margins the patient was sent to otolaryngology for surgical repair.  The patient understands they will receive post-surgical care and follow-up from the referring physician's office.
Complex Repair And Graft Additional Text (Will Appearing After The Standard Complex Repair Text): The complex repair was not sufficient to completely close the primary defect. The remaining additional defect was repaired with the graft mentioned below.
Oculoplastic Surgeon Procedure Text (C): After obtaining clear surgical margins the patient was sent to oculoplastics for surgical repair.  The patient understands they will receive post-surgical care and follow-up from the referring physician's office.
Mid-Level Procedure Text (A): After obtaining clear surgical margins the patient was sent to a mid-level provider for surgical repair.  The patient understands they will receive post-surgical care and follow-up from the mid-level provider.
Number Of Stages: 3
M-Plasty Intermediate Repair Preamble Text (Leave Blank If You Do Not Want): Undermining was performed with blunt dissection.
Bi-Rhombic Flap Text: The defect edges were debeveled with a #15 scalpel blade.  Given the location of the defect and the proximity to free margins a bi-rhombic flap was deemed most appropriate.  Using a sterile surgical marker, an appropriate rhombic flap was drawn incorporating the defect. The area thus outlined was incised deep to adipose tissue with a #15 scalpel blade.  The skin margins were undermined to an appropriate distance in all directions utilizing iris scissors.
Mucosal Advancement Flap Text: Given the location of the defect, shape of the defect and the proximity to free margins a mucosal advancement flap was deemed most appropriate. Incisions were made with a 15 blade scalpel in the appropriate fashion along the cutaneous vermillion border and the mucosal lip. The remaining actinically damaged mucosal tissue was excised.  The mucosal advancement flap was then elevated to the gingival sulcus with care taken to preserve the neurovascular structures and advanced into the primary defect. Care was taken to ensure that precise realignment of the vermillion border was achieved.
Lazy S Complex Repair Preamble Text (Leave Blank If You Do Not Want): Extensive wide undermining was performed.
Mohs Method Verbiage: An incision at a 45 degree angle following the standard Mohs approach was done and the specimen was harvested as a microscopic controlled layer.
Epidermal Sutures: 4-0 Prolene
Consent (Nose)/Introductory Paragraph: The rationale for Mohs was explained to the patient and consent was obtained. The risks, benefits and alternatives to therapy were discussed in detail. Specifically, the risks of nasal deformity, changes in the flow of air through the nose, infection, scarring, bleeding, prolonged wound healing, incomplete removal, allergy to anesthesia, nerve injury and recurrence were addressed. Prior to the procedure, the treatment site was clearly identified and confirmed by the patient. All components of Universal Protocol/PAUSE Rule completed.
Chonodrocutaneous Helical Advancement Flap Text: The defect edges were debeveled with a #15 scalpel blade.  Given the location of the defect and the proximity to free margins a chondrocutaneous helical advancement flap was deemed most appropriate.  Using a sterile surgical marker, the appropriate advancement flap was drawn incorporating the defect and placing the expected incisions within the relaxed skin tension lines where possible.    The area thus outlined was incised deep to adipose tissue with a #15 scalpel blade.  The skin margins were undermined to an appropriate distance in all directions utilizing iris scissors.
Keystone Flap Text: The defect edges were debeveled with a #15 scalpel blade.  Given the location of the defect, shape of the defect a keystone flap was deemed most appropriate.  Using a sterile surgical marker, an appropriate keystone flap was drawn incorporating the defect, outlining the appropriate donor tissue and placing the expected incisions within the relaxed skin tension lines where possible. The area thus outlined was incised deep to adipose tissue with a #15 scalpel blade.  The skin margins were undermined to an appropriate distance in all directions around the primary defect and laterally outward around the flap utilizing iris scissors.
Closure 3 Information: This tab is for additional flaps and grafts above and beyond our usual structured repairs.  Please note if you enter information here it will not currently bill and you will need to add the billing information manually.
Pain Refusal Text: I offered to prescribe pain medication but the patient refused to take this medication.
No Repair - Repaired With Adjacent Surgical Defect Text (Leave Blank If You Do Not Want): After obtaining clear surgical margins the defect was repaired concurrently with another surgical defect which was in close approximation.
O-L Flap Text: The defect edges were debeveled with a #15 scalpel blade.  Given the location of the defect, shape of the defect and the proximity to free margins an O-L flap was deemed most appropriate.  Using a sterile surgical marker, an appropriate advancement flap was drawn incorporating the defect and placing the expected incisions within the relaxed skin tension lines where possible.    The area thus outlined was incised deep to adipose tissue with a #15 scalpel blade.  The skin margins were undermined to an appropriate distance in all directions utilizing iris scissors.
Wound Care: Bacitracin
Island Pedicle Flap Text: The defect edges were debeveled with a #15 scalpel blade.  Given the location of the defect, shape of the defect and the proximity to free margins an island pedicle advancement flap was deemed most appropriate.  Using a sterile surgical marker, an appropriate advancement flap was drawn incorporating the defect, outlining the appropriate donor tissue and placing the expected incisions within the relaxed skin tension lines where possible.    The area thus outlined was incised deep to adipose tissue with a #15 scalpel blade.  The skin margins were undermined to an appropriate distance in all directions around the primary defect and laterally outward around the island pedicle utilizing iris scissors.  There was minimal undermining beneath the pedicle flap.
Vermilion Border Text: The closure involved the vermilion border.
Double O-Z Plasty Text: The defect edges were debeveled with a #15 scalpel blade.  Given the location of the defect, shape of the defect and the proximity to free margins a Double O-Z plasty (double transposition flap) was deemed most appropriate.  Using a sterile surgical marker, the appropriate transposition flaps were drawn incorporating the defect and placing the expected incisions within the relaxed skin tension lines where possible. The area thus outlined was incised deep to adipose tissue with a #15 scalpel blade.  The skin margins were undermined to an appropriate distance in all directions utilizing iris scissors.  Hemostasis was achieved with electrocautery.  The flaps were then transposed into place, one clockwise and the other counterclockwise, and anchored with interrupted buried subcutaneous sutures.
Epidermal Autograft Text: The defect edges were debeveled with a #15 scalpel blade.  Given the location of the defect, shape of the defect and the proximity to free margins an epidermal autograft was deemed most appropriate.  Using a sterile surgical marker, the primary defect shape was transferred to the donor site. The epidermal graft was then harvested.  The skin graft was then placed in the primary defect and oriented appropriately.
Suturegard Intro: Intraoperative tissue expansion was performed, utilizing the SUTUREGARD device, in order to reduce wound tension.
O-T Advancement Flap Text: The defect edges were debeveled with a #15 scalpel blade.  Given the location of the defect, shape of the defect and the proximity to free margins an O-T advancement flap was deemed most appropriate.  Using a sterile surgical marker, an appropriate advancement flap was drawn incorporating the defect and placing the expected incisions within the relaxed skin tension lines where possible.    The area thus outlined was incised deep to adipose tissue with a #15 scalpel blade.  The skin margins were undermined to an appropriate distance in all directions utilizing iris scissors.
Consent 2/Introductory Paragraph: Mohs surgery was explained to the patient and consent was obtained. The risks, benefits and alternatives to therapy were discussed in detail. Specifically, the risks of infection, scarring, bleeding, prolonged wound healing, incomplete removal, allergy to anesthesia, nerve injury and recurrence were addressed. Prior to the procedure, the treatment site was clearly identified and confirmed by the patient. All components of Universal Protocol/PAUSE Rule completed.
Hemostasis: Electrocautery
Area H Indication Text: Tumors in this location are included in Area H (eyelids, eyebrows, nose, lips, chin, ear, pre-auricular, post-auricular, temple, genitalia, hands, feet, ankles and areola).  Tissue conservation is critical in these anatomic locations.
Double O-Z Flap Text: The defect edges were debeveled with a #15 scalpel blade.  Given the location of the defect, shape of the defect and the proximity to free margins a Double O-Z flap was deemed most appropriate.  Using a sterile surgical marker, an appropriate transposition flap was drawn incorporating the defect and placing the expected incisions within the relaxed skin tension lines where possible. The area thus outlined was incised deep to adipose tissue with a #15 scalpel blade.  The skin margins were undermined to an appropriate distance in all directions utilizing iris scissors.
Localized Dermabrasion With Wire Brush Text: The patient was draped in routine manner.  Localized dermabrasion using 3 x 17 mm wire brush was performed in routine manner to papillary dermis. This spot dermabrasion is being performed to complete skin cancer reconstruction. It also will eliminate the other sun damaged precancerous cells that are known to be part of the regional effect of a lifetime's worth of sun exposure. This localized dermabrasion is therapeutic and should not be considered cosmetic in any regard.
Graft Cartilage Fenestration Text: The cartilage was fenestrated with a 2mm punch biopsy to help facilitate graft survival and healing.
Repair Performed By Another Provider Text (Leave Blank If You Do Not Want): After obtaining clear surgical margins the defect was repaired by another provider.
Star Wedge Flap Text: The defect edges were debeveled with a #15 scalpel blade.  Given the location of the defect, shape of the defect and the proximity to free margins a star wedge flap was deemed most appropriate.  Using a sterile surgical marker, an appropriate rotation flap was drawn incorporating the defect and placing the expected incisions within the relaxed skin tension lines where possible. The area thus outlined was incised deep to adipose tissue with a #15 scalpel blade.  The skin margins were undermined to an appropriate distance in all directions utilizing iris scissors.
Dressing: dry sterile dressing
Hatchet Flap Text: The defect edges were debeveled with a #15 scalpel blade.  Given the location of the defect, shape of the defect and the proximity to free margins a hatchet flap was deemed most appropriate.  Using a sterile surgical marker, an appropriate hatchet flap was drawn incorporating the defect and placing the expected incisions within the relaxed skin tension lines where possible.    The area thus outlined was incised deep to adipose tissue with a #15 scalpel blade.  The skin margins were undermined to an appropriate distance in all directions utilizing iris scissors.
Spiral Flap Text: The defect edges were debeveled with a #15 scalpel blade.  Given the location of the defect, shape of the defect and the proximity to free margins a spiral flap was deemed most appropriate.  Using a sterile surgical marker, an appropriate rotation flap was drawn incorporating the defect and placing the expected incisions within the relaxed skin tension lines where possible. The area thus outlined was incised deep to adipose tissue with a #15 scalpel blade.  The skin margins were undermined to an appropriate distance in all directions utilizing iris scissors.
Epidermal Closure: running and interrupted
V-Y Plasty Text: The defect edges were debeveled with a #15 scalpel blade.  Given the location of the defect, shape of the defect and the proximity to free margins an V-Y advancement flap was deemed most appropriate.  Using a sterile surgical marker, an appropriate advancement flap was drawn incorporating the defect and placing the expected incisions within the relaxed skin tension lines where possible.    The area thus outlined was incised deep to adipose tissue with a #15 scalpel blade.  The skin margins were undermined to an appropriate distance in all directions utilizing iris scissors.
Advancement Flap (Double) Text: The defect edges were debeveled with a #15 scalpel blade.  Given the location of the defect and the proximity to free margins a double advancement flap was deemed most appropriate.  Using a sterile surgical marker, the appropriate advancement flaps were drawn incorporating the defect and placing the expected incisions within the relaxed skin tension lines where possible.    The area thus outlined was incised deep to adipose tissue with a #15 scalpel blade.  The skin margins were undermined to an appropriate distance in all directions utilizing iris scissors.
Paramedian Forehead Flap Text: A decision was made to reconstruct the defect utilizing an interpolation axial flap and a staged reconstruction.  A telfa template was made of the defect.  This telfa template was then used to outline the paramedian forehead pedicle flap.  The donor area for the pedicle flap was then injected with anesthesia.  The flap was excised through the skin and subcutaneous tissue down to the layer of the underlying musculature.  The pedicle flap was carefully excised within this deep plane to maintain its blood supply.  The edges of the donor site were undermined.   The donor site was closed in a primary fashion.  The pedicle was then rotated into position and sutured.  Once the tube was sutured into place, adequate blood supply was confirmed with blanching and refill.  The pedicle was then wrapped with xeroform gauze and dressed appropriately with a telfa and gauze bandage to ensure continued blood supply and protect the attached pedicle.
Unna Boot Text: An Unna boot was placed to help immobilize the limb and facilitate more rapid healing.
Melolabial Interpolation Flap Text: A decision was made to reconstruct the defect utilizing an interpolation axial flap and a staged reconstruction.  A telfa template was made of the defect.  This telfa template was then used to outline the melolabial interpolation flap.  The donor area for the pedicle flap was then injected with anesthesia.  The flap was excised through the skin and subcutaneous tissue down to the layer of the underlying musculature.  The pedicle flap was carefully excised within this deep plane to maintain its blood supply.  The edges of the donor site were undermined.   The donor site was closed in a primary fashion.  The pedicle was then rotated into position and sutured.  Once the tube was sutured into place, adequate blood supply was confirmed with blanching and refill.  The pedicle was then wrapped with xeroform gauze and dressed appropriately with a telfa and gauze bandage to ensure continued blood supply and protect the attached pedicle.
Melolabial Transposition Flap Text: The defect edges were debeveled with a #15 scalpel blade.  Given the location of the defect and the proximity to free margins a melolabial flap was deemed most appropriate.  Using a sterile surgical marker, an appropriate melolabial transposition flap was drawn incorporating the defect.    The area thus outlined was incised deep to adipose tissue with a #15 scalpel blade.  The skin margins were undermined to an appropriate distance in all directions utilizing iris scissors.
Stage 3: Additional Anesthesia Type: 0.5% lidocaine with 1:200,000 epinephrine and a 1:10 solution of 8.4% sodium bicarbonate
Where Do You Want The Question To Include Opioid Counseling Located?: Case Summary Tab
Interpolation Flap Text: A decision was made to reconstruct the defect utilizing an interpolation axial flap and a staged reconstruction.  A telfa template was made of the defect.  This telfa template was then used to outline the interpolation flap.  The donor area for the pedicle flap was then injected with anesthesia.  The flap was excised through the skin and subcutaneous tissue down to the layer of the underlying musculature.  The interpolation flap was carefully excised within this deep plane to maintain its blood supply.  The edges of the donor site were undermined.   The donor site was closed in a primary fashion.  The pedicle was then rotated into position and sutured.  Once the tube was sutured into place, adequate blood supply was confirmed with blanching and refill.  The pedicle was then wrapped with xeroform gauze and dressed appropriately with a telfa and gauze bandage to ensure continued blood supply and protect the attached pedicle.
Double Island Pedicle Flap Text: The defect edges were debeveled with a #15 scalpel blade.  Given the location of the defect, shape of the defect and the proximity to free margins a double island pedicle advancement flap was deemed most appropriate.  Using a sterile surgical marker, an appropriate advancement flap was drawn incorporating the defect, outlining the appropriate donor tissue and placing the expected incisions within the relaxed skin tension lines where possible.    The area thus outlined was incised deep to adipose tissue with a #15 scalpel blade.  The skin margins were undermined to an appropriate distance in all directions around the primary defect and laterally outward around the island pedicle utilizing iris scissors.  There was minimal undermining beneath the pedicle flap.
Z Plasty Text: The lesion was extirpated to the level of the fat with a #15 scalpel blade.  Given the location of the defect, shape of the defect and the proximity to free margins a Z-plasty was deemed most appropriate for repair.  Using a sterile surgical marker, the appropriate transposition arms of the Z-plasty were drawn incorporating the defect and placing the expected incisions within the relaxed skin tension lines where possible.    The area thus outlined was incised deep to adipose tissue with a #15 scalpel blade.  The skin margins were undermined to an appropriate distance in all directions utilizing iris scissors.  The opposing transposition arms were then transposed into place in opposite direction and anchored with interrupted buried subcutaneous sutures.
Graft Donor Site Dermal Sutures (Optional): 5-0 PDS
Anesthesia Type: 1% lidocaine with 1:200,000 epinephrine and a 1:10 solution of 8.4% sodium bicarbonate
Skin Substitute Text: The defect edges were debeveled with a #15 scalpel blade.  Given the location of the defect, shape of the defect and the proximity to free margins a skin substitute graft was deemed most appropriate.  The graft material was trimmed to fit the size of the defect. The graft was then placed in the primary defect and oriented appropriately.
Post-Care Instructions: I reviewed with the patient in detail post-care instructions. Patient is not to engage in any heavy lifting, exercise, or swimming for the next 14 days. Should the patient develop any fevers, chills, bleeding, severe pain patient will contact the office immediately..\\n.
Stage 2: Number Of Blocks?: 1
Rhombic Flap Text: The defect edges were debeveled with a #15 scalpel blade.  Given the location of the defect and the proximity to free margins a rhombic flap was deemed most appropriate.  Using a sterile surgical marker, an appropriate rhombic flap was drawn incorporating the defect.    The area thus outlined was incised deep to adipose tissue with a #15 scalpel blade.  The skin margins were undermined to an appropriate distance in all directions utilizing iris scissors.
Postop Diagnosis: same
Estimated Blood Loss (Cc): minimal
Partial Purse String (Intermediate) Text: Given the location of the defect and the characteristics of the surrounding skin an intermediate purse string closure was deemed most appropriate.  Undermining was performed circumfirentially around the surgical defect.  A purse string suture was then placed and tightened. Wound tension only allowed a partial closure of the circular defect.
Inflammation Suggestive Of Cancer Camouflage Histology Text: There was a dense lymphocytic infiltrate which prevented adequate histologic evaluation of adjacent structures.
Modified Advancement Flap Text: The defect edges were debeveled with a #15 scalpel blade.  Given the location of the defect, shape of the defect and the proximity to free margins a modified advancement flap was deemed most appropriate.  Using a sterile surgical marker, an appropriate advancement flap was drawn incorporating the defect and placing the expected incisions within the relaxed skin tension lines where possible.    The area thus outlined was incised deep to adipose tissue with a #15 scalpel blade.  The skin margins were undermined to an appropriate distance in all directions utilizing iris scissors.
Surgeon: Hitesh gonzales
Surgeon Performing Repair (Optional): Dr Seymour
No Residual Tumor Seen Histology Text: There were no malignant cells seen in the sections examined.
W Plasty Text: The lesion was extirpated to the level of the fat with a #15 scalpel blade.  Given the location of the defect, shape of the defect and the proximity to free margins a W-plasty was deemed most appropriate for repair.  Using a sterile surgical marker, the appropriate transposition arms of the W-plasty were drawn incorporating the defect and placing the expected incisions within the relaxed skin tension lines where possible.    The area thus outlined was incised deep to adipose tissue with a #15 scalpel blade.  The skin margins were undermined to an appropriate distance in all directions utilizing iris scissors.  The opposing transposition arms were then transposed into place in opposite direction and anchored with interrupted buried subcutaneous sutures.
Debridement Text: The wound edges were debrided prior to proceeding with the closure to facilitate wound healing.
Banner Transposition Flap Text: The defect edges were debeveled with a #15 scalpel blade.  Given the location of the defect and the proximity to free margins a Banner transposition flap was deemed most appropriate.  Using a sterile surgical marker, an appropriate flap drawn around the defect. The area thus outlined was incised deep to adipose tissue with a #15 scalpel blade.  The skin margins were undermined to an appropriate distance in all directions utilizing iris scissors.
Stage 2: Additional Anesthesia Type: 1% lidocaine with 1:100,000 epinephrine
Consent 3/Introductory Paragraph: I gave the patient a chance to ask questions they had about the procedure.  Following this I explained the Mohs procedure and consent was obtained. The risks, benefits and alternatives to therapy were discussed in detail. Specifically, the risks of infection, scarring, bleeding, prolonged wound healing, incomplete removal, allergy to anesthesia, nerve injury and recurrence were addressed. Prior to the procedure, the treatment site was clearly identified and confirmed by the patient. All components of Universal Protocol/PAUSE Rule completed.
Alar Island Pedicle Flap Text: The defect edges were debeveled with a #15 scalpel blade.  Given the location of the defect, shape of the defect and the proximity to the alar rim an island pedicle advancement flap was deemed most appropriate.  Using a sterile surgical marker, an appropriate advancement flap was drawn incorporating the defect, outlining the appropriate donor tissue and placing the expected incisions within the nasal ala running parallel to the alar rim. The area thus outlined was incised with a #15 scalpel blade.  The skin margins were undermined minimally to an appropriate distance in all directions around the primary defect and laterally outward around the island pedicle utilizing iris scissors.  There was minimal undermining beneath the pedicle flap.
Graft Donor Site Bandage (Optional-Leave Blank If You Don't Want In Note): Steri-strips and a pressure bandage were applied to the donor site.
Anesthesia Type: 0.25% lidocaine with 1:400,000 epinephrine and a 1:10 solution of 8.4% sodium bicarbonate
Anesthesia Volume In Cc: 2
Tarsorrhaphy Text: A tarsorrhaphy was performed using Frost sutures.
Ftsg Text: The defect edges were debeveled with a #15 scalpel blade.  Given the location of the defect, shape of the defect and the proximity to free margins a full thickness skin graft was deemed most appropriate.  Using a sterile surgical marker, the primary defect shape was transferred to the donor site. The area thus outlined was incised deep to adipose tissue with a #15 scalpel blade.  The harvested graft was then trimmed of adipose tissue until only dermis and epidermis was left.  The skin margins of the secondary defect were undermined to an appropriate distance in all directions utilizing iris scissors.  The secondary defect was closed with interrupted buried subcutaneous sutures.  The skin edges were then re-apposed with running  sutures.  The skin graft was then placed in the primary defect and oriented appropriately.
Deep Sutures: 4-0 Vicryl
Donor Site Anesthesia Type: same as repair anesthesia
Consent (Spinal Accessory)/Introductory Paragraph: The rationale for Mohs was explained to the patient and consent was obtained. The risks, benefits and alternatives to therapy were discussed in detail. Specifically, the risks of damage to the spinal accessory nerve, infection, scarring, bleeding, prolonged wound healing, incomplete removal, allergy to anesthesia, and recurrence were addressed. Prior to the procedure, the treatment site was clearly identified and confirmed by the patient. All components of Universal Protocol/PAUSE Rule completed.
Cheek-To-Nose Interpolation Flap Text: A decision was made to reconstruct the defect utilizing an interpolation axial flap and a staged reconstruction.  A telfa template was made of the defect.  This telfa template was then used to outline the Cheek-To-Nose Interpolation flap.  The donor area for the pedicle flap was then injected with anesthesia.  The flap was excised through the skin and subcutaneous tissue down to the layer of the underlying musculature.  The interpolation flap was carefully excised within this deep plane to maintain its blood supply.  The edges of the donor site were undermined.   The donor site was closed in a primary fashion.  The pedicle was then rotated into position and sutured.  Once the tube was sutured into place, adequate blood supply was confirmed with blanching and refill.  The pedicle was then wrapped with xeroform gauze and dressed appropriately with a telfa and gauze bandage to ensure continued blood supply and protect the attached pedicle.
Bcc Infiltrative Histology Text: There were numerous aggregates of basaloid cells demonstrating an infiltrative pattern.
H Plasty Text: Given the location of the defect, shape of the defect and the proximity to free margins a H-plasty was deemed most appropriate for repair.  Using a sterile surgical marker, the appropriate advancement arms of the H-plasty were drawn incorporating the defect and placing the expected incisions within the relaxed skin tension lines where possible. The area thus outlined was incised deep to adipose tissue with a #15 scalpel blade. The skin margins were undermined to an appropriate distance in all directions utilizing iris scissors.  The opposing advancement arms were then advanced into place in opposite direction and anchored with interrupted buried subcutaneous sutures.
Trilobed Flap Text: The defect edges were debeveled with a #15 scalpel blade.  Given the location of the defect and the proximity to free margins a trilobed flap was deemed most appropriate.  Using a sterile surgical marker, an appropriate trilobed flap drawn around the defect.    The area thus outlined was incised deep to adipose tissue with a #15 scalpel blade.  The skin margins were undermined to an appropriate distance in all directions utilizing iris scissors.
O-T Plasty Text: The defect edges were debeveled with a #15 scalpel blade.  Given the location of the defect, shape of the defect and the proximity to free margins an O-T plasty was deemed most appropriate.  Using a sterile surgical marker, an appropriate O-T plasty was drawn incorporating the defect and placing the expected incisions within the relaxed skin tension lines where possible.    The area thus outlined was incised deep to adipose tissue with a #15 scalpel blade.  The skin margins were undermined to an appropriate distance in all directions utilizing iris scissors.
Consent (Temporal Branch)/Introductory Paragraph: The rationale for Mohs was explained to the patient and consent was obtained. The risks, benefits and alternatives to therapy were discussed in detail. Specifically, the risks of damage to the temporal branch of the facial nerve, infection, scarring, bleeding, prolonged wound healing, incomplete removal, allergy to anesthesia, and recurrence were addressed. Prior to the procedure, the treatment site was clearly identified and confirmed by the patient. All components of Universal Protocol/PAUSE Rule completed.
Mastoid Interpolation Flap Text: A decision was made to reconstruct the defect utilizing an interpolation axial flap and a staged reconstruction.  A telfa template was made of the defect.  This telfa template was then used to outline the mastoid interpolation flap.  The donor area for the pedicle flap was then injected with anesthesia.  The flap was excised through the skin and subcutaneous tissue down to the layer of the underlying musculature.  The pedicle flap was carefully excised within this deep plane to maintain its blood supply.  The edges of the donor site were undermined.   The donor site was closed in a primary fashion.  The pedicle was then rotated into position and sutured.  Once the tube was sutured into place, adequate blood supply was confirmed with blanching and refill.  The pedicle was then wrapped with xeroform gauze and dressed appropriately with a telfa and gauze bandage to ensure continued blood supply and protect the attached pedicle.
Cheiloplasty (Less Than 50%) Text: A decision was made to reconstruct the defect with a  cheiloplasty.  The defect was undermined extensively.  Additional obicularis oris muscle was excised with a 15 blade scalpel.  The defect was converted into a full thickness wedge, of less than 50% of the vertical height of the lip, to facilite a better cosmetic result.  Small vessels were then tied off with 5-0 monocyrl. The obicularis oris, superficial fascia, adipose and dermis were then reapproximated.  After the deeper layers were approximated the epidermis was reapproximated with particular care given to realign the vermillion border.
Dermal Autograft Text: The defect edges were debeveled with a #15 scalpel blade.  Given the location of the defect, shape of the defect and the proximity to free margins a dermal autograft was deemed most appropriate.  Using a sterile surgical marker, the primary defect shape was transferred to the donor site. The area thus outlined was incised deep to adipose tissue with a #15 scalpel blade.  The harvested graft was then trimmed of adipose and epidermal tissue until only dermis was left.  The skin graft was then placed in the primary defect and oriented appropriately.
Helical Rim Text: The closure involved the helical rim.
O-Z Flap Text: The defect edges were debeveled with a #15 scalpel blade.  Given the location of the defect, shape of the defect and the proximity to free margins an O-Z flap was deemed most appropriate.  Using a sterile surgical marker, an appropriate transposition flap was drawn incorporating the defect and placing the expected incisions within the relaxed skin tension lines where possible. The area thus outlined was incised deep to adipose tissue with a #15 scalpel blade.  The skin margins were undermined to an appropriate distance in all directions utilizing iris scissors.
Initial Size Of Lesion: 0.5
Information: Selecting Yes will display possible errors in your note based on the variables you have selected. This validation is only offered as a suggestion for you. PLEASE NOTE THAT THE VALIDATION TEXT WILL BE REMOVED WHEN YOU FINALIZE YOUR NOTE. IF YOU WANT TO FAX A PRELIMINARY NOTE YOU WILL NEED TO TOGGLE THIS TO 'NO' IF YOU DO NOT WANT IT IN YOUR FAXED NOTE.
Burow's Advancement Flap Text: The defect edges were debeveled with a #15 scalpel blade.  Given the location of the defect and the proximity to free margins a Burow's advancement flap was deemed most appropriate.  Using a sterile surgical marker, the appropriate advancement flap was drawn incorporating the defect and placing the expected incisions within the relaxed skin tension lines where possible.    The area thus outlined was incised deep to adipose tissue with a #15 scalpel blade.  The skin margins were undermined to an appropriate distance in all directions utilizing iris scissors.
Undermining Type: Entire Wound
Suturegard Body: The suture ends were repeatedly re-tightened and re-clamped to achieve the desired tissue expansion.
Manual Repair Warning Statement: We plan on removing the manually selected variable below in favor of our much easier automatic structured text blocks found in the previous tab. We decided to do this to help make the flow better and give you the full power of structured data. Manual selection is never going to be ideal in our platform and I would encourage you to avoid using manual selection from this point on, especially since I will be sunsetting this feature. It is important that you do one of two things with the customized text below. First, you can save all of the text in a word file so you can have it for future reference. Second, transfer the text to the appropriate area in the Library tab. Lastly, if there is a flap or graft type which we do not have you need to let us know right away so I can add it in before the variable is hidden. No need to panic, we plan to give you roughly 6 months to make the change.
Complex Repair And Flap Additional Text (Will Appearing After The Standard Complex Repair Text): The complex repair was not sufficient to completely close the primary defect. The remaining additional defect was repaired with the flap mentioned below.
Tissue Cultured Epidermal Autograft Text: The defect edges were debeveled with a #15 scalpel blade.  Given the location of the defect, shape of the defect and the proximity to free margins a tissue cultured epidermal autograft was deemed most appropriate.  The graft was then trimmed to fit the size of the defect.  The graft was then placed in the primary defect and oriented appropriately.
Secondary Intention Text (Leave Blank If You Do Not Want): The defect will heal with secondary intention.
Area L Indication Text: Tumors in this location are included in Area L (trunk and extremities).  Mohs surgery is indicated for larger tumors, 2 cm or larger, in these anatomic locations.
Non-Graft Cartilage Fenestration Text: The cartilage was fenestrated with a 2mm punch biopsy to help facilitate healing.
Retention Suture Text: Retention sutures were placed to support the closure and prevent dehiscence.
Mohs Rapid Report Verbiage: The area of clinically evident tumor was marked with skin marking ink and appropriately hatched.  The initial incision was made following the Mohs approach through the skin.  The specimen was taken to the lab, divided into the necessary number of pieces, chromacoded and processed according to the Mohs protocol.  This was repeated in successive stages until a tumor free defect was achieved.
Repair Type: Intermediate Layered Repair
Location Indication Override (Is Already Calculated Based On Selected Body Location): Area M
Xenograft Text: The defect edges were debeveled with a #15 scalpel blade.  Given the location of the defect, shape of the defect and the proximity to free margins a xenograft was deemed most appropriate.  The graft was then trimmed to fit the size of the defect.  The graft was then placed in the primary defect and oriented appropriately.
Simple / Intermediate / Complex Repair - Final Wound Length In Cm: 3.6
Home Suture Removal Text: Patient was provided instructions on removing sutures and will remove their sutures at home.  If they have any questions or difficulties they will call the office.
Cheiloplasty (Complex) Text: A decision was made to reconstruct the defect with a  cheiloplasty.  The defect was undermined extensively.  Additional obicularis oris muscle was excised with a 15 blade scalpel.  The defect was converted into a full thickness wedge to facilite a better cosmetic result.  Small vessels were then tied off with 5-0 monocyrl. The obicularis oris, superficial fascia, adipose and dermis were then reapproximated.  After the deeper layers were approximated the epidermis was reapproximated with particular care given to realign the vermillion border.
S Plasty Text: Given the location and shape of the defect, and the orientation of relaxed skin tension lines, an S-plasty was deemed most appropriate for repair.  Using a sterile surgical marker, the appropriate outline of the S-plasty was drawn, incorporating the defect and placing the expected incisions within the relaxed skin tension lines where possible.  The area thus outlined was incised deep to adipose tissue with a #15 scalpel blade.  The skin margins were undermined to an appropriate distance in all directions utilizing iris scissors. The skin flaps were advanced over the defect.  The opposing margins were then approximated with interrupted buried subcutaneous sutures.
Location Indication Override (Is Already Calculated Based On Selected Body Location): Area L
Ear Star Wedge Flap Text: The defect edges were debeveled with a #15 blade scalpel.  Given the location of the defect and the proximity to free margins (helical rim) an ear star wedge flap was deemed most appropriate.  Using a sterile surgical marker, the appropriate flap was drawn incorporating the defect and placing the expected incisions between the helical rim and antihelix where possible.  The area thus outlined was incised through and through with a #15 scalpel blade.
Consent 1/Introductory Paragraph: The rationale for Mohs was explained to the patient and consent was obtained. The risks, benefits and alternatives to therapy were discussed in detail. Specifically, the risks of infection, scarring, bleeding, prolonged wound healing, incomplete removal, allergy to anesthesia, nerve injury and recurrence were addressed. Prior to the procedure, the treatment site was clearly identified and confirmed by the patient. All components of Universal Protocol/PAUSE Rule completed.
Split-Thickness Skin Graft Text: The defect edges were debeveled with a #15 scalpel blade.  Given the location of the defect, shape of the defect and the proximity to free margins a split thickness skin graft was deemed most appropriate.  Using a sterile surgical marker, the primary defect shape was transferred to the donor site. The split thickness graft was then harvested.  The skin graft was then placed in the primary defect and oriented appropriately.
Initial Size Of Lesion: 0.4
Suturegard Retention Suture: 2-0 Nylon
Partial Purse String (Simple) Text: Given the location of the defect and the characteristics of the surrounding skin a simple purse string closure was deemed most appropriate.  Undermining was performed circumfirentially around the surgical defect.  A purse string suture was then placed and tightened. Wound tension only allowed a partial closure of the circular defect.
Bilateral Helical Rim Advancement Flap Text: The defect edges were debeveled with a #15 blade scalpel.  Given the location of the defect and the proximity to free margins (helical rim) a bilateral helical rim advancement flap was deemed most appropriate.  Using a sterile surgical marker, the appropriate advancement flaps were drawn incorporating the defect and placing the expected incisions between the helical rim and antihelix where possible.  The area thus outlined was incised through and through with a #15 scalpel blade.  With a skin hook and iris scissors, the flaps were gently and sharply undermined and freed up.
Transposition Flap Text: The defect edges were debeveled with a #15 scalpel blade.  Given the location of the defect and the proximity to free margins a transposition flap was deemed most appropriate.  Using a sterile surgical marker, an appropriate transposition flap was drawn incorporating the defect.    The area thus outlined was incised deep to adipose tissue with a #15 scalpel blade.  The skin margins were undermined to an appropriate distance in all directions utilizing iris scissors.
Bilobed Transposition Flap Text: The defect edges were debeveled with a #15 scalpel blade.  Given the location of the defect and the proximity to free margins a bilobed transposition flap was deemed most appropriate.  Using a sterile surgical marker, an appropriate bilobe flap drawn around the defect.    The area thus outlined was incised deep to adipose tissue with a #15 scalpel blade.  The skin margins were undermined to an appropriate distance in all directions utilizing iris scissors.
Mercedes Flap Text: The defect edges were debeveled with a #15 scalpel blade.  Given the location of the defect, shape of the defect and the proximity to free margins a Mercedes flap was deemed most appropriate.  Using a sterile surgical marker, an appropriate advancement flap was drawn incorporating the defect and placing the expected incisions within the relaxed skin tension lines where possible. The area thus outlined was incised deep to adipose tissue with a #15 scalpel blade.  The skin margins were undermined to an appropriate distance in all directions utilizing iris scissors.
Same Histology In Subsequent Stages Text: The pattern and morphology of the tumor is as described in the first stage.
Consent (Lip)/Introductory Paragraph: The rationale for Mohs was explained to the patient and consent was obtained. The risks, benefits and alternatives to therapy were discussed in detail. Specifically, the risks of lip deformity, changes in the oral aperture, infection, scarring, bleeding, prolonged wound healing, incomplete removal, allergy to anesthesia, nerve injury and recurrence were addressed. Prior to the procedure, the treatment site was clearly identified and confirmed by the patient. All components of Universal Protocol/PAUSE Rule completed.
Rhomboid Transposition Flap Text: The defect edges were debeveled with a #15 scalpel blade.  Given the location of the defect and the proximity to free margins a rhomboid transposition flap was deemed most appropriate.  Using a sterile surgical marker, an appropriate rhomboid flap was drawn incorporating the defect.    The area thus outlined was incised deep to adipose tissue with a #15 scalpel blade.  The skin margins were undermined to an appropriate distance in all directions utilizing iris scissors.
Length To Time In Minutes Device Was In Place: 10
Ear Wedge Repair Text: A wedge excision was completed by carrying down an excision through the full thickness of the ear and cartilage with an inward facing Burow's triangle. The wound was then closed in a layered fashion.
O-Z Plasty Text: The defect edges were debeveled with a #15 scalpel blade.  Given the location of the defect, shape of the defect and the proximity to free margins an O-Z plasty (double transposition flap) was deemed most appropriate.  Using a sterile surgical marker, the appropriate transposition flaps were drawn incorporating the defect and placing the expected incisions within the relaxed skin tension lines where possible.    The area thus outlined was incised deep to adipose tissue with a #15 scalpel blade.  The skin margins were undermined to an appropriate distance in all directions utilizing iris scissors.  Hemostasis was achieved with electrocautery.  The flaps were then transposed into place, one clockwise and the other counterclockwise, and anchored with interrupted buried subcutaneous sutures.
Mauc Instructions: By selecting yes to the question below the MAUC number will be added into the note.  This will be calculated automatically based on the diagnosis chosen, the size entered, the body zone selected (H,M,L) and the specific indications you chose. You will also have the option to override the Mohs AUC if you disagree with the automatically calculated number and this option is found in the Case Summary tab.
Purse String (Simple) Text: Given the location of the defect and the characteristics of the surrounding skin a pursestring closure was deemed most appropriate.  Undermining was performed circumfirentially around the surgical defect.  A purstring suture was then placed and tightened.
Advancement Flap (Single) Text: The defect edges were debeveled with a #15 scalpel blade.  Given the location of the defect and the proximity to free margins a single advancement flap was deemed most appropriate.  Using a sterile surgical marker, an appropriate advancement flap was drawn incorporating the defect and placing the expected incisions within the relaxed skin tension lines where possible.    The area thus outlined was incised deep to adipose tissue with a #15 scalpel blade.  The skin margins were undermined to an appropriate distance in all directions utilizing iris scissors.
Composite Graft Text: The defect edges were debeveled with a #15 scalpel blade.  Given the location of the defect, shape of the defect, the proximity to free margins and the fact the defect was full thickness a composite graft was deemed most appropriate.  The defect was outline and then transferred to the donor site.  A full thickness graft was then excised from the donor site. The graft was then placed in the primary defect, oriented appropriately and then sutured into place.  The secondary defect was then repaired using a primary closure.
Consent (Ear)/Introductory Paragraph: The rationale for Mohs was explained to the patient and consent was obtained. The risks, benefits and alternatives to therapy were discussed in detail. Specifically, the risks of ear deformity, infection, scarring, bleeding, prolonged wound healing, incomplete removal, allergy to anesthesia, nerve injury and recurrence were addressed. Prior to the procedure, the treatment site was clearly identified and confirmed by the patient. All components of Universal Protocol/PAUSE Rule completed.
Eye Protection Verbiage: Before proceeding with the stage, a plastic scleral shield was inserted. The globe was anesthetized with a few drops of 1% lidocaine with 1:100,000 epinephrine. Then, an appropriate sized scleral shield was chosen and coated with lacrilube ointment. The shield was gently inserted and left in place for the duration of each stage. After the stage was completed, the shield was gently removed.
Surgeon/Pathologist Verbiage (Will Incorporate Name Of Surgeon From Intro If Not Blank): operated in two distinct and integrated capacities as the surgeon and pathologist.
Consent Type: Consent 1 (Standard)
Alternatives Discussed Intro (Do Not Add Period): I discussed alternative treatments to Mohs surgery and specifically discussed the risks and benefits of
V-Y Flap Text: The defect edges were debeveled with a #15 scalpel blade.  Given the location of the defect, shape of the defect and the proximity to free margins a V-Y flap was deemed most appropriate.  Using a sterile surgical marker, an appropriate advancement flap was drawn incorporating the defect and placing the expected incisions within the relaxed skin tension lines where possible.    The area thus outlined was incised deep to adipose tissue with a #15 scalpel blade.  The skin margins were undermined to an appropriate distance in all directions utilizing iris scissors.
Mohs Case Number: [id number]
Plastic Surgeon (A): Dr Zepeda
Full Thickness Lip Wedge Repair (Flap) Text: Given the location of the defect and the proximity to free margins a full thickness wedge repair was deemed most appropriate.  Using a sterile surgical marker, the appropriate repair was drawn incorporating the defect and placing the expected incisions perpendicular to the vermillion border.  The vermillion border was also meticulously outlined to ensure appropriate reapproximation during the repair.  The area thus outlined was incised through and through with a #15 scalpel blade.  The muscularis and dermis were reaproximated with deep sutures following hemostasis. Care was taken to realign the vermillion border before proceeding with the superficial closure.  Once the vermillion was realigned the superfical and mucosal closure was finished.
Advancement-Rotation Flap Text: The defect edges were debeveled with a #15 scalpel blade.  Given the location of the defect, shape of the defect and the proximity to free margins an advancement-rotation flap was deemed most appropriate.  Using a sterile surgical marker, an appropriate flap was drawn incorporating the defect and placing the expected incisions within the relaxed skin tension lines where possible. The area thus outlined was incised deep to adipose tissue with a #15 scalpel blade.  The skin margins were undermined to an appropriate distance in all directions utilizing iris scissors.
Cartilage Graft Text: The defect edges were debeveled with a #15 scalpel blade.  Given the location of the defect, shape of the defect, the fact the defect involved a full thickness cartilage defect a cartilage graft was deemed most appropriate.  An appropriate donor site was identified, cleansed, and anesthetized. The cartilage graft was then harvested and transferred to the recipient site, oriented appropriately and then sutured into place.  The secondary defect was then repaired using a primary closure.
Mohs Case Number: [id number]
Nostril Rim Text: The closure involved the nostril rim.
Detail Level: Detailed
Helical Rim Advancement Flap Text: The defect edges were debeveled with a #15 blade scalpel.  Given the location of the defect and the proximity to free margins (helical rim) a double helical rim advancement flap was deemed most appropriate.  Using a sterile surgical marker, the appropriate advancement flaps were drawn incorporating the defect and placing the expected incisions between the helical rim and antihelix where possible.  The area thus outlined was incised through and through with a #15 scalpel blade.  With a skin hook and iris scissors, the flaps were gently and sharply undermined and freed up.
Island Pedicle Flap-Requiring Vessel Identification Text: The defect edges were debeveled with a #15 scalpel blade.  Given the location of the defect, shape of the defect and the proximity to free margins an island pedicle advancement flap was deemed most appropriate.  Using a sterile surgical marker, an appropriate advancement flap was drawn, based on the axial vessel mentioned above, incorporating the defect, outlining the appropriate donor tissue and placing the expected incisions within the relaxed skin tension lines where possible.    The area thus outlined was incised deep to adipose tissue with a #15 scalpel blade.  The skin margins were undermined to an appropriate distance in all directions around the primary defect and laterally outward around the island pedicle utilizing iris scissors.  There was minimal undermining beneath the pedicle flap.
Purse String (Intermediate) Text: Given the location of the defect and the characteristics of the surrounding skin a pursestring intermediate closure was deemed most appropriate.  Undermining was performed circumfirentially around the surgical defect.  A purstring suture was then placed and tightened.
Cheek Interpolation Flap Text: A decision was made to reconstruct the defect utilizing an interpolation axial flap and a staged reconstruction.  A telfa template was made of the defect.  This telfa template was then used to outline the Cheek Interpolation flap.  The donor area for the pedicle flap was then injected with anesthesia.  The flap was excised through the skin and subcutaneous tissue down to the layer of the underlying musculature.  The interpolation flap was carefully excised within this deep plane to maintain its blood supply.  The edges of the donor site were undermined.   The donor site was closed in a primary fashion.  The pedicle was then rotated into position and sutured.  Once the tube was sutured into place, adequate blood supply was confirmed with blanching and refill.  The pedicle was then wrapped with xeroform gauze and dressed appropriately with a telfa and gauze bandage to ensure continued blood supply and protect the attached pedicle.
Bcc Histology Text: There were numerous aggregates of basaloid cells.
Bilobed Flap Text: The defect edges were debeveled with a #15 scalpel blade.  Given the location of the defect and the proximity to free margins a bilobe flap was deemed most appropriate.  Using a sterile surgical marker, an appropriate bilobe flap drawn around the defect.    The area thus outlined was incised deep to adipose tissue with a #15 scalpel blade.  The skin margins were undermined to an appropriate distance in all directions utilizing iris scissors.
Graft Donor Site Epidermal Sutures (Optional): 6-0 Prolene
Island Pedicle Flap With Canthal Suspension Text: The defect edges were debeveled with a #15 scalpel blade.  Given the location of the defect, shape of the defect and the proximity to free margins an island pedicle advancement flap was deemed most appropriate.  Using a sterile surgical marker, an appropriate advancement flap was drawn incorporating the defect, outlining the appropriate donor tissue and placing the expected incisions within the relaxed skin tension lines where possible. The area thus outlined was incised deep to adipose tissue with a #15 scalpel blade.  The skin margins were undermined to an appropriate distance in all directions around the primary defect and laterally outward around the island pedicle utilizing iris scissors.  There was minimal undermining beneath the pedicle flap. A suspension suture was placed in the canthal tendon to prevent tension and prevent ectropion.
Retention Suture Bite Size: 3 mm
Posterior Auricular Interpolation Flap Text: A decision was made to reconstruct the defect utilizing an interpolation axial flap and a staged reconstruction.  A telfa template was made of the defect.  This telfa template was then used to outline the posterior auricular interpolation flap.  The donor area for the pedicle flap was then injected with anesthesia.  The flap was excised through the skin and subcutaneous tissue down to the layer of the underlying musculature.  The pedicle flap was carefully excised within this deep plane to maintain its blood supply.  The edges of the donor site were undermined.   The donor site was closed in a primary fashion.  The pedicle was then rotated into position and sutured.  Once the tube was sutured into place, adequate blood supply was confirmed with blanching and refill.  The pedicle was then wrapped with xeroform gauze and dressed appropriately with a telfa and gauze bandage to ensure continued blood supply and protect the attached pedicle.
Muscle Hinge Flap Text: The defect edges were debeveled with a #15 scalpel blade.  Given the size, depth and location of the defect and the proximity to free margins a muscle hinge flap was deemed most appropriate.  Using a sterile surgical marker, an appropriate hinge flap was drawn incorporating the defect. The area thus outlined was incised with a #15 scalpel blade.  The skin margins were undermined to an appropriate distance in all directions utilizing iris scissors.
Crescentic Advancement Flap Text: The defect edges were debeveled with a #15 scalpel blade.  Given the location of the defect and the proximity to free margins a crescentic advancement flap was deemed most appropriate.  Using a sterile surgical marker, the appropriate advancement flap was drawn incorporating the defect and placing the expected incisions within the relaxed skin tension lines where possible.    The area thus outlined was incised deep to adipose tissue with a #15 scalpel blade.  The skin margins were undermined to an appropriate distance in all directions utilizing iris scissors.
Consent (Marginal Mandibular)/Introductory Paragraph: The rationale for Mohs was explained to the patient and consent was obtained. The risks, benefits and alternatives to therapy were discussed in detail. Specifically, the risks of damage to the marginal mandibular branch of the facial nerve, infection, scarring, bleeding, prolonged wound healing, incomplete removal, allergy to anesthesia, and recurrence were addressed. Prior to the procedure, the treatment site was clearly identified and confirmed by the patient. All components of Universal Protocol/PAUSE Rule completed.
Medical Necessity Statement: Based on my medical judgement, Mohs surgery is the most appropriate treatment for this cancer compared to other treatments.
Rotation Flap Text: The defect edges were debeveled with a #15 scalpel blade.  Given the location of the defect, shape of the defect and the proximity to free margins a rotation flap was deemed most appropriate.  Using a sterile surgical marker, an appropriate rotation flap was drawn incorporating the defect and placing the expected incisions within the relaxed skin tension lines where possible.    The area thus outlined was incised deep to adipose tissue with a #15 scalpel blade.  The skin margins were undermined to an appropriate distance in all directions utilizing iris scissors.
Repair Anesthesia Method: local infiltration

## 2020-01-01 ENCOUNTER — APPOINTMENT (RX ONLY)
Dept: URBAN - METROPOLITAN AREA CLINIC 23 | Facility: CLINIC | Age: 80
Setting detail: DERMATOLOGY
End: 2020-01-01

## 2020-01-01 ENCOUNTER — APPOINTMENT (RX ONLY)
Dept: URBAN - METROPOLITAN AREA CLINIC 24 | Facility: CLINIC | Age: 80
Setting detail: DERMATOLOGY
End: 2020-01-01

## 2020-01-01 DIAGNOSIS — L82.1 OTHER SEBORRHEIC KERATOSIS: ICD-10-CM

## 2020-01-01 DIAGNOSIS — L82.0 INFLAMED SEBORRHEIC KERATOSIS: ICD-10-CM

## 2020-01-01 DIAGNOSIS — L57.0 ACTINIC KERATOSIS: ICD-10-CM

## 2020-01-01 DIAGNOSIS — L56.8 OTHER SPECIFIED ACUTE SKIN CHANGES DUE TO ULTRAVIOLET RADIATION: ICD-10-CM

## 2020-01-01 DIAGNOSIS — Z85.828 PERSONAL HISTORY OF OTHER MALIGNANT NEOPLASM OF SKIN: ICD-10-CM

## 2020-01-01 DIAGNOSIS — L56.8 OTHER SPECIFIED ACUTE SKIN CHANGES DUE TO ULTRAVIOLET RADIATION: ICD-10-CM | Status: IMPROVED

## 2020-01-01 PROCEDURE — ? OTHER

## 2020-01-01 PROCEDURE — 17003 DESTRUCT PREMALG LES 2-14: CPT | Mod: 59

## 2020-01-01 PROCEDURE — 99214 OFFICE O/P EST MOD 30 MIN: CPT | Mod: 25

## 2020-01-01 PROCEDURE — 17000 DESTRUCT PREMALG LESION: CPT | Mod: 59

## 2020-01-01 PROCEDURE — ? LIQUID NITROGEN

## 2020-01-01 PROCEDURE — ? COUNSELING

## 2020-01-01 PROCEDURE — 17110 DESTRUCTION B9 LES UP TO 14: CPT

## 2020-01-01 PROCEDURE — ? PRESCRIPTION

## 2020-01-01 PROCEDURE — 99213 OFFICE O/P EST LOW 20 MIN: CPT | Mod: 25

## 2020-01-01 RX ORDER — FLUOROURACIL 2 G/40G
CREAM TOPICAL BID
Qty: 1 | Refills: 3 | Status: CANCELLED
Stop reason: WASHOUT

## 2020-01-01 ASSESSMENT — LOCATION DETAILED DESCRIPTION DERM
LOCATION DETAILED: RIGHT ANTERIOR DISTAL THIGH
LOCATION DETAILED: LEFT MID PREAURICULAR CHEEK
LOCATION DETAILED: RIGHT INFERIOR LATERAL UPPER BACK
LOCATION DETAILED: LEFT INFERIOR ANTERIOR NECK
LOCATION DETAILED: LEFT ANTERIOR DISTAL UPPER ARM
LOCATION DETAILED: RIGHT MEDIAL DISTAL PRETIBIAL REGION
LOCATION DETAILED: UPPER STERNUM
LOCATION DETAILED: RIGHT SUPERIOR UPPER BACK
LOCATION DETAILED: LEFT MEDIAL FRONTAL SCALP
LOCATION DETAILED: RIGHT SUPERIOR PARIETAL SCALP
LOCATION DETAILED: LEFT PROXIMAL DORSAL FOREARM
LOCATION DETAILED: RIGHT PROXIMAL CALF
LOCATION DETAILED: LEFT PROXIMAL POSTERIOR UPPER ARM
LOCATION DETAILED: LEFT DISTAL PRETIBIAL REGION
LOCATION DETAILED: RIGHT ANTERIOR PROXIMAL UPPER ARM
LOCATION DETAILED: RIGHT PROXIMAL PRETIBIAL REGION
LOCATION DETAILED: RIGHT LOWER CUTANEOUS LIP
LOCATION DETAILED: RIGHT DISTAL POSTERIOR UPPER ARM
LOCATION DETAILED: LEFT CENTRAL FRONTAL SCALP
LOCATION DETAILED: RIGHT SUPERIOR MEDIAL UPPER BACK
LOCATION DETAILED: RIGHT CENTRAL EYEBROW
LOCATION DETAILED: RIGHT ANTERIOR PROXIMAL THIGH
LOCATION DETAILED: RIGHT ANTERIOR MEDIAL DISTAL THIGH
LOCATION DETAILED: LEFT SUPERIOR LATERAL FOREHEAD
LOCATION DETAILED: LEFT SUPERIOR FOREHEAD
LOCATION DETAILED: LEFT LATERAL SUPERIOR CHEST
LOCATION DETAILED: RIGHT LATERAL PROXIMAL PRETIBIAL REGION
LOCATION DETAILED: LEFT PROXIMAL LATERAL CALF
LOCATION DETAILED: RIGHT INFERIOR LATERAL NECK
LOCATION DETAILED: LEFT CENTRAL LATERAL NECK
LOCATION DETAILED: LEFT INFERIOR VERMILION LIP
LOCATION DETAILED: LEFT LATERAL DISTAL PRETIBIAL REGION
LOCATION DETAILED: LEFT INFERIOR VERMILION LIP
LOCATION DETAILED: RIGHT DISTAL CALF
LOCATION DETAILED: RIGHT DISTAL LATERAL CALF
LOCATION DETAILED: LEFT LATERAL MANDIBULAR CHEEK
LOCATION DETAILED: RIGHT INFERIOR ANTERIOR NECK
LOCATION DETAILED: LEFT ANTERIOR SHOULDER
LOCATION DETAILED: LEFT ANTERIOR SHOULDER
LOCATION DETAILED: LEFT INFERIOR NASAL CHEEK
LOCATION DETAILED: LEFT DISTAL CALF
LOCATION DETAILED: RIGHT DISTAL LATERAL POSTERIOR THIGH
LOCATION DETAILED: RIGHT POSTERIOR SHOULDER
LOCATION DETAILED: RIGHT DORSAL 2ND TOE
LOCATION DETAILED: RIGHT ANTERIOR SHOULDER
LOCATION DETAILED: LEFT SUPERIOR MEDIAL MALAR CHEEK
LOCATION DETAILED: LEFT CLAVICULAR NECK
LOCATION DETAILED: LEFT CENTRAL POSTAURICULAR SKIN
LOCATION DETAILED: RIGHT INFERIOR UPPER BACK
LOCATION DETAILED: LEFT SUPERIOR LATERAL NECK
LOCATION DETAILED: RIGHT ANTERIOR PROXIMAL UPPER ARM
LOCATION DETAILED: LEFT ANTERIOR PROXIMAL UPPER ARM
LOCATION DETAILED: RIGHT DISTAL PRETIBIAL REGION
LOCATION DETAILED: RIGHT MEDIAL TRAPEZIAL NECK
LOCATION DETAILED: MEDIAL FRONTAL SCALP
LOCATION DETAILED: RIGHT RADIAL DORSAL HAND
LOCATION DETAILED: LEFT INFERIOR POSTAURICULAR SKIN
LOCATION DETAILED: LEFT ANTERIOR PROXIMAL THIGH

## 2020-01-01 ASSESSMENT — LOCATION SIMPLE DESCRIPTION DERM
LOCATION SIMPLE: LEFT SCALP
LOCATION SIMPLE: LEFT POSTERIOR UPPER ARM
LOCATION SIMPLE: FRONTAL SCALP
LOCATION SIMPLE: LEFT UPPER ARM
LOCATION SIMPLE: RIGHT UPPER ARM
LOCATION SIMPLE: LEFT CHEEK
LOCATION SIMPLE: RIGHT CALF
LOCATION SIMPLE: RIGHT POSTERIOR THIGH
LOCATION SIMPLE: LEFT CALF
LOCATION SIMPLE: RIGHT PRETIBIAL REGION
LOCATION SIMPLE: LEFT ANTERIOR NECK
LOCATION SIMPLE: SCALP
LOCATION SIMPLE: LEFT FOREARM
LOCATION SIMPLE: LEFT CHEEK
LOCATION SIMPLE: SCALP
LOCATION SIMPLE: RIGHT LIP
LOCATION SIMPLE: LEFT LIP
LOCATION SIMPLE: RIGHT THIGH
LOCATION SIMPLE: LEFT SHOULDER
LOCATION SIMPLE: RIGHT UPPER BACK
LOCATION SIMPLE: LEFT PRETIBIAL REGION
LOCATION SIMPLE: CHEST
LOCATION SIMPLE: RIGHT POSTERIOR UPPER ARM
LOCATION SIMPLE: LEFT FOREHEAD
LOCATION SIMPLE: LEFT THIGH
LOCATION SIMPLE: RIGHT SHOULDER
LOCATION SIMPLE: RIGHT EYEBROW
LOCATION SIMPLE: POSTERIOR NECK
LOCATION SIMPLE: LEFT LIP
LOCATION SIMPLE: RIGHT ANTERIOR NECK
LOCATION SIMPLE: RIGHT HAND
LOCATION SIMPLE: NECK
LOCATION SIMPLE: LEFT SHOULDER
LOCATION SIMPLE: RIGHT 2ND TOE
LOCATION SIMPLE: RIGHT UPPER ARM

## 2020-01-01 ASSESSMENT — LOCATION ZONE DERM
LOCATION ZONE: HAND
LOCATION ZONE: NECK
LOCATION ZONE: FACE
LOCATION ZONE: ARM
LOCATION ZONE: ARM
LOCATION ZONE: TOE
LOCATION ZONE: LEG
LOCATION ZONE: FACE
LOCATION ZONE: NECK
LOCATION ZONE: SCALP
LOCATION ZONE: SCALP
LOCATION ZONE: TRUNK
LOCATION ZONE: LIP
LOCATION ZONE: LIP
LOCATION ZONE: TRUNK

## 2020-01-14 ENCOUNTER — APPOINTMENT (RX ONLY)
Dept: URBAN - METROPOLITAN AREA CLINIC 24 | Facility: CLINIC | Age: 80
Setting detail: DERMATOLOGY
End: 2020-01-14

## 2020-01-14 DIAGNOSIS — Z48.01 ENCOUNTER FOR CHANGE OR REMOVAL OF SURGICAL WOUND DRESSING: ICD-10-CM

## 2020-01-14 PROCEDURE — ? SUTURE REMOVAL (GLOBAL PERIOD)

## 2020-01-14 PROCEDURE — 99024 POSTOP FOLLOW-UP VISIT: CPT

## 2020-01-14 ASSESSMENT — LOCATION DETAILED DESCRIPTION DERM
LOCATION DETAILED: LEFT MEDIAL PROXIMAL PRETIBIAL REGION
LOCATION DETAILED: LEFT ANTERIOR DISTAL THIGH

## 2020-01-14 ASSESSMENT — LOCATION SIMPLE DESCRIPTION DERM
LOCATION SIMPLE: LEFT PRETIBIAL REGION
LOCATION SIMPLE: LEFT THIGH

## 2020-01-14 ASSESSMENT — LOCATION ZONE DERM: LOCATION ZONE: LEG

## 2020-01-14 NOTE — PROCEDURE: SUTURE REMOVAL (GLOBAL PERIOD)
Detail Level: Detailed
Add 95697 Cpt? (Important Note: In 2017 The Use Of 87543 Is Being Tracked By Cms To Determine Future Global Period Reimbursement For Global Periods): no

## 2020-01-21 ENCOUNTER — RX ONLY (OUTPATIENT)
Age: 80
Setting detail: RX ONLY
End: 2020-01-21

## 2020-01-21 ENCOUNTER — APPOINTMENT (RX ONLY)
Dept: URBAN - METROPOLITAN AREA CLINIC 24 | Facility: CLINIC | Age: 80
Setting detail: DERMATOLOGY
End: 2020-01-21

## 2020-01-21 PROBLEM — C44.729 SQUAMOUS CELL CARCINOMA OF SKIN OF LEFT LOWER LIMB, INCLUDING HIP: Status: ACTIVE | Noted: 2020-01-21

## 2020-01-21 PROBLEM — C44.02 SQUAMOUS CELL CARCINOMA OF SKIN OF LIP: Status: ACTIVE | Noted: 2020-01-21

## 2020-01-21 PROCEDURE — 17313 MOHS 1 STAGE T/A/L: CPT

## 2020-01-21 PROCEDURE — 12032 INTMD RPR S/A/T/EXT 2.6-7.5: CPT

## 2020-01-21 PROCEDURE — 17314 MOHS ADDL STAGE T/A/L: CPT

## 2020-01-21 PROCEDURE — ? MOHS SURGERY

## 2020-01-21 PROCEDURE — 17312 MOHS ADDL STAGE: CPT

## 2020-01-21 PROCEDURE — 17311 MOHS 1 STAGE H/N/HF/G: CPT

## 2020-01-21 NOTE — PROCEDURE: MOHS SURGERY
Stage 12: Additional Anesthesia Type: 1% lidocaine with epinephrine
Repair Performed By Another Provider Text (Leave Blank If You Do Not Want): After obtaining clear surgical margins the defect was repaired by another provider.
Tumor Debulked?: curette
Second Skin Substitute Units (Will Override Primary Defect Units If Greater Than 0): 0
Closure 2 Information: This tab is for additional flaps and grafts, including complex repair and grafts and complex repair and flaps. You can also specify a different location for the additional defect, if the location is the same you do not need to select a new one. We will insert the automated text for the repair you select below just as we do for solitary flaps and grafts. Please note that at this time if you select a location with a different insurance zone you will need to override the ICD10 and CPT if appropriate.
O-T Plasty Text: The defect edges were debeveled with a #15 scalpel blade.  Given the location of the defect, shape of the defect and the proximity to free margins an O-T plasty was deemed most appropriate.  Using a sterile surgical marker, an appropriate O-T plasty was drawn incorporating the defect and placing the expected incisions within the relaxed skin tension lines where possible.    The area thus outlined was incised deep to adipose tissue with a #15 scalpel blade.  The skin margins were undermined to an appropriate distance in all directions utilizing iris scissors.
Plastic Surgeon Procedure Text (C): After obtaining clear surgical margins the patient was sent to plastics for surgical repair.  The patient understands they will receive post-surgical care and follow-up from the referring physician's office.
Closure 3 Information: This tab is for additional flaps and grafts above and beyond our usual structured repairs.  Please note if you enter information here it will not currently bill and you will need to add the billing information manually.
Display The Individual Mohs Indications As Separate Paragraphs: Yes
Mid-Level Procedure Text (B): After obtaining clear surgical margins the patient was sent to a mid-level provider for surgical repair.  The patient understands they will receive post-surgical care and follow-up from the mid-level provider.
Nostril Rim Text: The closure involved the nostril rim.
Bcc Infiltrative Histology Text: There were numerous aggregates of basaloid cells demonstrating an infiltrative pattern.
Otolaryngologist Procedure Text (B): After obtaining clear surgical margins the patient was sent to otolaryngology for surgical repair.  The patient understands they will receive post-surgical care and follow-up from the referring physician's office.
Where Do You Want The Question To Include Opioid Counseling Located?: Case Summary Tab
Complex Requirements: Debridement Of Wound Edges?: No
Purse String (Simple) Text: Given the location of the defect and the characteristics of the surrounding skin a pursestring closure was deemed most appropriate.  Undermining was performed circumfirentially around the surgical defect.  A purstring suture was then placed and tightened.
Primary Defect Length In Cm (Final Defect Size - Required For Flaps/Grafts): 1.5
Subsequent Stages Histo Method Verbiage: Using a similar technique to that described above, a thin layer of tissue was removed from all areas where tumor was visible on the previous stage.  The tissue was again oriented, mapped, dyed, and processed as above.
Stage 1: Number Of Blocks?: 1
Advancement Flap (Double) Text: The defect edges were debeveled with a #15 scalpel blade.  Given the location of the defect and the proximity to free margins a double advancement flap was deemed most appropriate.  Using a sterile surgical marker, the appropriate advancement flaps were drawn incorporating the defect and placing the expected incisions within the relaxed skin tension lines where possible.    The area thus outlined was incised deep to adipose tissue with a #15 scalpel blade.  The skin margins were undermined to an appropriate distance in all directions utilizing iris scissors.
Posterior Auricular Interpolation Flap Text: A decision was made to reconstruct the defect utilizing an interpolation axial flap and a staged reconstruction.  A telfa template was made of the defect.  This telfa template was then used to outline the posterior auricular interpolation flap.  The donor area for the pedicle flap was then injected with anesthesia.  The flap was excised through the skin and subcutaneous tissue down to the layer of the underlying musculature.  The pedicle flap was carefully excised within this deep plane to maintain its blood supply.  The edges of the donor site were undermined.   The donor site was closed in a primary fashion.  The pedicle was then rotated into position and sutured.  Once the tube was sutured into place, adequate blood supply was confirmed with blanching and refill.  The pedicle was then wrapped with xeroform gauze and dressed appropriately with a telfa and gauze bandage to ensure continued blood supply and protect the attached pedicle.
Anesthesia Volume In Cc: 2
O-L Flap Text: The defect edges were debeveled with a #15 scalpel blade.  Given the location of the defect, shape of the defect and the proximity to free margins an O-L flap was deemed most appropriate.  Using a sterile surgical marker, an appropriate advancement flap was drawn incorporating the defect and placing the expected incisions within the relaxed skin tension lines where possible.    The area thus outlined was incised deep to adipose tissue with a #15 scalpel blade.  The skin margins were undermined to an appropriate distance in all directions utilizing iris scissors.
Hatchet Flap Text: The defect edges were debeveled with a #15 scalpel blade.  Given the location of the defect, shape of the defect and the proximity to free margins a hatchet flap was deemed most appropriate.  Using a sterile surgical marker, an appropriate hatchet flap was drawn incorporating the defect and placing the expected incisions within the relaxed skin tension lines where possible.    The area thus outlined was incised deep to adipose tissue with a #15 scalpel blade.  The skin margins were undermined to an appropriate distance in all directions utilizing iris scissors.
Consent Type: Consent 1 (Standard)
Intermediate Repair Preamble Text (Leave Blank If You Do Not Want): Undermining was performed with blunt dissection.
Alternatives Discussed Intro (Do Not Add Period): I discussed alternative treatments to Mohs surgery and specifically discussed the risks and benefits of
Bilateral Helical Rim Advancement Flap Text: The defect edges were debeveled with a #15 blade scalpel.  Given the location of the defect and the proximity to free margins (helical rim) a bilateral helical rim advancement flap was deemed most appropriate.  Using a sterile surgical marker, the appropriate advancement flaps were drawn incorporating the defect and placing the expected incisions between the helical rim and antihelix where possible.  The area thus outlined was incised through and through with a #15 scalpel blade.  With a skin hook and iris scissors, the flaps were gently and sharply undermined and freed up.
Inflammation Suggestive Of Cancer Camouflage Histology Text: There was a dense lymphocytic infiltrate which prevented adequate histologic evaluation of adjacent structures.
Ear Star Wedge Flap Text: The defect edges were debeveled with a #15 blade scalpel.  Given the location of the defect and the proximity to free margins (helical rim) an ear star wedge flap was deemed most appropriate.  Using a sterile surgical marker, the appropriate flap was drawn incorporating the defect and placing the expected incisions between the helical rim and antihelix where possible.  The area thus outlined was incised through and through with a #15 scalpel blade.
Cheiloplasty (Less Than 50%) Text: A decision was made to reconstruct the defect with a  cheiloplasty.  The defect was undermined extensively.  Additional obicularis oris muscle was excised with a 15 blade scalpel.  The defect was converted into a full thickness wedge, of less than 50% of the vertical height of the lip, to facilite a better cosmetic result.  Small vessels were then tied off with 5-0 monocyrl. The obicularis oris, superficial fascia, adipose and dermis were then reapproximated.  After the deeper layers were approximated the epidermis was reapproximated with particular care given to realign the vermillion border.
Lazy S Complex Repair Preamble Text (Leave Blank If You Do Not Want): Extensive wide undermining was performed.
Graft Basting Suture (Optional): 5-0 PDS
Mauc Instructions: By selecting yes to the question below the MAUC number will be added into the note.  This will be calculated automatically based on the diagnosis chosen, the size entered, the body zone selected (H,M,L) and the specific indications you chose. You will also have the option to override the Mohs AUC if you disagree with the automatically calculated number and this option is found in the Case Summary tab.
Crescentic Advancement Flap Text: The defect edges were debeveled with a #15 scalpel blade.  Given the location of the defect and the proximity to free margins a crescentic advancement flap was deemed most appropriate.  Using a sterile surgical marker, the appropriate advancement flap was drawn incorporating the defect and placing the expected incisions within the relaxed skin tension lines where possible.    The area thus outlined was incised deep to adipose tissue with a #15 scalpel blade.  The skin margins were undermined to an appropriate distance in all directions utilizing iris scissors.
Simple / Intermediate / Complex Repair - Final Wound Length In Cm: 3
Repair Type: Intermediate Layered Repair
Detail Level: Detailed
Suturegard Intro: Intraoperative tissue expansion was performed, utilizing the SUTUREGARD device, in order to reduce wound tension.
A-T Advancement Flap Text: The defect edges were debeveled with a #15 scalpel blade.  Given the location of the defect, shape of the defect and the proximity to free margins an A-T advancement flap was deemed most appropriate.  Using a sterile surgical marker, an appropriate advancement flap was drawn incorporating the defect and placing the expected incisions within the relaxed skin tension lines where possible.    The area thus outlined was incised deep to adipose tissue with a #15 scalpel blade.  The skin margins were undermined to an appropriate distance in all directions utilizing iris scissors.
Mohs Histo Method Verbiage: The section(s) were then chromacoded and processed in the Mohs lab using the Mohs protocol and submitted for frozen section.
Star Wedge Flap Text: The defect edges were debeveled with a #15 scalpel blade.  Given the location of the defect, shape of the defect and the proximity to free margins a star wedge flap was deemed most appropriate.  Using a sterile surgical marker, an appropriate rotation flap was drawn incorporating the defect and placing the expected incisions within the relaxed skin tension lines where possible. The area thus outlined was incised deep to adipose tissue with a #15 scalpel blade.  The skin margins were undermined to an appropriate distance in all directions utilizing iris scissors.
Retention Suture Bite Size: 3 mm
Consent (Near Eyelid Margin)/Introductory Paragraph: The rationale for Mohs was explained to the patient and consent was obtained. The risks, benefits and alternatives to therapy were discussed in detail. Specifically, the risks of ectropion or eyelid deformity, infection, scarring, bleeding, prolonged wound healing, incomplete removal, allergy to anesthesia, nerve injury and recurrence were addressed. Prior to the procedure, the treatment site was clearly identified and confirmed by the patient. All components of Universal Protocol/PAUSE Rule completed.
Cheiloplasty (Complex) Text: A decision was made to reconstruct the defect with a  cheiloplasty.  The defect was undermined extensively.  Additional obicularis oris muscle was excised with a 15 blade scalpel.  The defect was converted into a full thickness wedge to facilite a better cosmetic result.  Small vessels were then tied off with 5-0 monocyrl. The obicularis oris, superficial fascia, adipose and dermis were then reapproximated.  After the deeper layers were approximated the epidermis was reapproximated with particular care given to realign the vermillion border.
Island Pedicle Flap-Requiring Vessel Identification Text: The defect edges were debeveled with a #15 scalpel blade.  Given the location of the defect, shape of the defect and the proximity to free margins an island pedicle advancement flap was deemed most appropriate.  Using a sterile surgical marker, an appropriate advancement flap was drawn, based on the axial vessel mentioned above, incorporating the defect, outlining the appropriate donor tissue and placing the expected incisions within the relaxed skin tension lines where possible.    The area thus outlined was incised deep to adipose tissue with a #15 scalpel blade.  The skin margins were undermined to an appropriate distance in all directions around the primary defect and laterally outward around the island pedicle utilizing iris scissors.  There was minimal undermining beneath the pedicle flap.
O-Z Flap Text: The defect edges were debeveled with a #15 scalpel blade.  Given the location of the defect, shape of the defect and the proximity to free margins an O-Z flap was deemed most appropriate.  Using a sterile surgical marker, an appropriate transposition flap was drawn incorporating the defect and placing the expected incisions within the relaxed skin tension lines where possible. The area thus outlined was incised deep to adipose tissue with a #15 scalpel blade.  The skin margins were undermined to an appropriate distance in all directions utilizing iris scissors.
Modified Advancement Flap Text: The defect edges were debeveled with a #15 scalpel blade.  Given the location of the defect, shape of the defect and the proximity to free margins a modified advancement flap was deemed most appropriate.  Using a sterile surgical marker, an appropriate advancement flap was drawn incorporating the defect and placing the expected incisions within the relaxed skin tension lines where possible.    The area thus outlined was incised deep to adipose tissue with a #15 scalpel blade.  The skin margins were undermined to an appropriate distance in all directions utilizing iris scissors.
Oculoplastic Surgeon Procedure Text (B): After obtaining clear surgical margins the patient was sent to oculoplastics for surgical repair.  The patient understands they will receive post-surgical care and follow-up from the referring physician's office.
Initial Size Of Lesion: 0.8
Xenograft Text: The defect edges were debeveled with a #15 scalpel blade.  Given the location of the defect, shape of the defect and the proximity to free margins a xenograft was deemed most appropriate.  The graft was then trimmed to fit the size of the defect.  The graft was then placed in the primary defect and oriented appropriately.
Pain Refusal Text: I offered to prescribe pain medication but the patient refused to take this medication.
Graft Cartilage Fenestration Text: The cartilage was fenestrated with a 2mm punch biopsy to help facilitate graft survival and healing.
Consent (Nose)/Introductory Paragraph: The rationale for Mohs was explained to the patient and consent was obtained. The risks, benefits and alternatives to therapy were discussed in detail. Specifically, the risks of nasal deformity, changes in the flow of air through the nose, infection, scarring, bleeding, prolonged wound healing, incomplete removal, allergy to anesthesia, nerve injury and recurrence were addressed. Prior to the procedure, the treatment site was clearly identified and confirmed by the patient. All components of Universal Protocol/PAUSE Rule completed.
Consent (Scalp)/Introductory Paragraph: The rationale for Mohs was explained to the patient and consent was obtained. The risks, benefits and alternatives to therapy were discussed in detail. Specifically, the risks of changes in hair growth pattern secondary to repair, infection, scarring, bleeding, prolonged wound healing, incomplete removal, allergy to anesthesia, nerve injury and recurrence were addressed. Prior to the procedure, the treatment site was clearly identified and confirmed by the patient. All components of Universal Protocol/PAUSE Rule completed.
Dressing: dry sterile dressing
Epidermal Autograft Text: The defect edges were debeveled with a #15 scalpel blade.  Given the location of the defect, shape of the defect and the proximity to free margins an epidermal autograft was deemed most appropriate.  Using a sterile surgical marker, the primary defect shape was transferred to the donor site. The epidermal graft was then harvested.  The skin graft was then placed in the primary defect and oriented appropriately.
Trilobed Flap Text: The defect edges were debeveled with a #15 scalpel blade.  Given the location of the defect and the proximity to free margins a trilobed flap was deemed most appropriate.  Using a sterile surgical marker, an appropriate trilobed flap drawn around the defect.    The area thus outlined was incised deep to adipose tissue with a #15 scalpel blade.  The skin margins were undermined to an appropriate distance in all directions utilizing iris scissors.
Double Island Pedicle Flap Text: The defect edges were debeveled with a #15 scalpel blade.  Given the location of the defect, shape of the defect and the proximity to free margins a double island pedicle advancement flap was deemed most appropriate.  Using a sterile surgical marker, an appropriate advancement flap was drawn incorporating the defect, outlining the appropriate donor tissue and placing the expected incisions within the relaxed skin tension lines where possible.    The area thus outlined was incised deep to adipose tissue with a #15 scalpel blade.  The skin margins were undermined to an appropriate distance in all directions around the primary defect and laterally outward around the island pedicle utilizing iris scissors.  There was minimal undermining beneath the pedicle flap.
Anesthesia Type: 1% lidocaine with 1:200,000 epinephrine and a 1:10 solution of 8.4% sodium bicarbonate
Dermal Autograft Text: The defect edges were debeveled with a #15 scalpel blade.  Given the location of the defect, shape of the defect and the proximity to free margins a dermal autograft was deemed most appropriate.  Using a sterile surgical marker, the primary defect shape was transferred to the donor site. The area thus outlined was incised deep to adipose tissue with a #15 scalpel blade.  The harvested graft was then trimmed of adipose and epidermal tissue until only dermis was left.  The skin graft was then placed in the primary defect and oriented appropriately.
Mastoid Interpolation Flap Text: A decision was made to reconstruct the defect utilizing an interpolation axial flap and a staged reconstruction.  A telfa template was made of the defect.  This telfa template was then used to outline the mastoid interpolation flap.  The donor area for the pedicle flap was then injected with anesthesia.  The flap was excised through the skin and subcutaneous tissue down to the layer of the underlying musculature.  The pedicle flap was carefully excised within this deep plane to maintain its blood supply.  The edges of the donor site were undermined.   The donor site was closed in a primary fashion.  The pedicle was then rotated into position and sutured.  Once the tube was sutured into place, adequate blood supply was confirmed with blanching and refill.  The pedicle was then wrapped with xeroform gauze and dressed appropriately with a telfa and gauze bandage to ensure continued blood supply and protect the attached pedicle.
Plastic Surgeon (A): Dr Zepeda
Bilobed Transposition Flap Text: The defect edges were debeveled with a #15 scalpel blade.  Given the location of the defect and the proximity to free margins a bilobed transposition flap was deemed most appropriate.  Using a sterile surgical marker, an appropriate bilobe flap drawn around the defect.    The area thus outlined was incised deep to adipose tissue with a #15 scalpel blade.  The skin margins were undermined to an appropriate distance in all directions utilizing iris scissors.
Consent 2/Introductory Paragraph: Mohs surgery was explained to the patient and consent was obtained. The risks, benefits and alternatives to therapy were discussed in detail. Specifically, the risks of infection, scarring, bleeding, prolonged wound healing, incomplete removal, allergy to anesthesia, nerve injury and recurrence were addressed. Prior to the procedure, the treatment site was clearly identified and confirmed by the patient. All components of Universal Protocol/PAUSE Rule completed.
O-Z Plasty Text: The defect edges were debeveled with a #15 scalpel blade.  Given the location of the defect, shape of the defect and the proximity to free margins an O-Z plasty (double transposition flap) was deemed most appropriate.  Using a sterile surgical marker, the appropriate transposition flaps were drawn incorporating the defect and placing the expected incisions within the relaxed skin tension lines where possible.    The area thus outlined was incised deep to adipose tissue with a #15 scalpel blade.  The skin margins were undermined to an appropriate distance in all directions utilizing iris scissors.  Hemostasis was achieved with electrocautery.  The flaps were then transposed into place, one clockwise and the other counterclockwise, and anchored with interrupted buried subcutaneous sutures.
Hemostasis: Electrocautery
Purse String (Intermediate) Text: Given the location of the defect and the characteristics of the surrounding skin a pursestring intermediate closure was deemed most appropriate.  Undermining was performed circumfirentially around the surgical defect.  A purstring suture was then placed and tightened.
Complex Repair And Graft Additional Text (Will Appearing After The Standard Complex Repair Text): The complex repair was not sufficient to completely close the primary defect. The remaining additional defect was repaired with the graft mentioned below.
Split-Thickness Skin Graft Text: The defect edges were debeveled with a #15 scalpel blade.  Given the location of the defect, shape of the defect and the proximity to free margins a split thickness skin graft was deemed most appropriate.  Using a sterile surgical marker, the primary defect shape was transferred to the donor site. The split thickness graft was then harvested.  The skin graft was then placed in the primary defect and oriented appropriately.
Mohs Rapid Report Verbiage: The area of clinically evident tumor was marked with skin marking ink and appropriately hatched.  The initial incision was made following the Mohs approach through the skin.  The specimen was taken to the lab, divided into the necessary number of pieces, chromacoded and processed according to the Mohs protocol.  This was repeated in successive stages until a tumor free defect was achieved.
Advancement-Rotation Flap Text: The defect edges were debeveled with a #15 scalpel blade.  Given the location of the defect, shape of the defect and the proximity to free margins an advancement-rotation flap was deemed most appropriate.  Using a sterile surgical marker, an appropriate flap was drawn incorporating the defect and placing the expected incisions within the relaxed skin tension lines where possible. The area thus outlined was incised deep to adipose tissue with a #15 scalpel blade.  The skin margins were undermined to an appropriate distance in all directions utilizing iris scissors.
Retention Suture Text: Retention sutures were placed to support the closure and prevent dehiscence.
Localized Dermabrasion With Wire Brush Text: The patient was draped in routine manner.  Localized dermabrasion using 3 x 17 mm wire brush was performed in routine manner to papillary dermis. This spot dermabrasion is being performed to complete skin cancer reconstruction. It also will eliminate the other sun damaged precancerous cells that are known to be part of the regional effect of a lifetime's worth of sun exposure. This localized dermabrasion is therapeutic and should not be considered cosmetic in any regard.
Bcc Histology Text: There were numerous aggregates of basaloid cells.
Vermilion Border Text: The closure involved the vermilion border.
Z Plasty Text: The lesion was extirpated to the level of the fat with a #15 scalpel blade.  Given the location of the defect, shape of the defect and the proximity to free margins a Z-plasty was deemed most appropriate for repair.  Using a sterile surgical marker, the appropriate transposition arms of the Z-plasty were drawn incorporating the defect and placing the expected incisions within the relaxed skin tension lines where possible.    The area thus outlined was incised deep to adipose tissue with a #15 scalpel blade.  The skin margins were undermined to an appropriate distance in all directions utilizing iris scissors.  The opposing transposition arms were then transposed into place in opposite direction and anchored with interrupted buried subcutaneous sutures.
Surgeon Performing Repair (Optional): Dr Seymour
No Residual Tumor Seen Histology Text: There were no malignant cells seen in the sections examined.
Body Location Override (Optional - Billing Will Still Be Based On Selected Body Map Location If Applicable): R lower cutaneous lip
Asc Procedure Text (B): After obtaining clear surgical margins the patient was sent to an ASC for surgical repair.  The patient understands they will receive post-surgical care and follow-up from the ASC physician.
Suturegard Body: The suture ends were repeatedly re-tightened and re-clamped to achieve the desired tissue expansion.
Island Pedicle Flap With Canthal Suspension Text: The defect edges were debeveled with a #15 scalpel blade.  Given the location of the defect, shape of the defect and the proximity to free margins an island pedicle advancement flap was deemed most appropriate.  Using a sterile surgical marker, an appropriate advancement flap was drawn incorporating the defect, outlining the appropriate donor tissue and placing the expected incisions within the relaxed skin tension lines where possible. The area thus outlined was incised deep to adipose tissue with a #15 scalpel blade.  The skin margins were undermined to an appropriate distance in all directions around the primary defect and laterally outward around the island pedicle utilizing iris scissors.  There was minimal undermining beneath the pedicle flap. A suspension suture was placed in the canthal tendon to prevent tension and prevent ectropion.
Paramedian Forehead Flap Text: A decision was made to reconstruct the defect utilizing an interpolation axial flap and a staged reconstruction.  A telfa template was made of the defect.  This telfa template was then used to outline the paramedian forehead pedicle flap.  The donor area for the pedicle flap was then injected with anesthesia.  The flap was excised through the skin and subcutaneous tissue down to the layer of the underlying musculature.  The pedicle flap was carefully excised within this deep plane to maintain its blood supply.  The edges of the donor site were undermined.   The donor site was closed in a primary fashion.  The pedicle was then rotated into position and sutured.  Once the tube was sutured into place, adequate blood supply was confirmed with blanching and refill.  The pedicle was then wrapped with xeroform gauze and dressed appropriately with a telfa and gauze bandage to ensure continued blood supply and protect the attached pedicle.
Wound Care (No Sutures): Petrolatum
Rhomboid Transposition Flap Text: The defect edges were debeveled with a #15 scalpel blade.  Given the location of the defect and the proximity to free margins a rhomboid transposition flap was deemed most appropriate.  Using a sterile surgical marker, an appropriate rhomboid flap was drawn incorporating the defect.    The area thus outlined was incised deep to adipose tissue with a #15 scalpel blade.  The skin margins were undermined to an appropriate distance in all directions utilizing iris scissors.
Advancement Flap (Single) Text: The defect edges were debeveled with a #15 scalpel blade.  Given the location of the defect and the proximity to free margins a single advancement flap was deemed most appropriate.  Using a sterile surgical marker, an appropriate advancement flap was drawn incorporating the defect and placing the expected incisions within the relaxed skin tension lines where possible.    The area thus outlined was incised deep to adipose tissue with a #15 scalpel blade.  The skin margins were undermined to an appropriate distance in all directions utilizing iris scissors.
Skin Substitute Text: The defect edges were debeveled with a #15 scalpel blade.  Given the location of the defect, shape of the defect and the proximity to free margins a skin substitute graft was deemed most appropriate.  The graft material was trimmed to fit the size of the defect. The graft was then placed in the primary defect and oriented appropriately.
Keystone Flap Text: The defect edges were debeveled with a #15 scalpel blade.  Given the location of the defect, shape of the defect a keystone flap was deemed most appropriate.  Using a sterile surgical marker, an appropriate keystone flap was drawn incorporating the defect, outlining the appropriate donor tissue and placing the expected incisions within the relaxed skin tension lines where possible. The area thus outlined was incised deep to adipose tissue with a #15 scalpel blade.  The skin margins were undermined to an appropriate distance in all directions around the primary defect and laterally outward around the flap utilizing iris scissors.
Primary Defect Width In Cm (Final Defect Size - Required For Flaps/Grafts): 1.1
Post-Care Instructions: I reviewed with the patient in detail post-care instructions. Patient is not to engage in any heavy lifting, exercise, or swimming for the next 14 days. Should the patient develop any fevers, chills, bleeding, severe pain patient will contact the office immediately..\\n.
Consent (Spinal Accessory)/Introductory Paragraph: The rationale for Mohs was explained to the patient and consent was obtained. The risks, benefits and alternatives to therapy were discussed in detail. Specifically, the risks of damage to the spinal accessory nerve, infection, scarring, bleeding, prolonged wound healing, incomplete removal, allergy to anesthesia, and recurrence were addressed. Prior to the procedure, the treatment site was clearly identified and confirmed by the patient. All components of Universal Protocol/PAUSE Rule completed.
Cheek-To-Nose Interpolation Flap Text: A decision was made to reconstruct the defect utilizing an interpolation axial flap and a staged reconstruction.  A telfa template was made of the defect.  This telfa template was then used to outline the Cheek-To-Nose Interpolation flap.  The donor area for the pedicle flap was then injected with anesthesia.  The flap was excised through the skin and subcutaneous tissue down to the layer of the underlying musculature.  The interpolation flap was carefully excised within this deep plane to maintain its blood supply.  The edges of the donor site were undermined.   The donor site was closed in a primary fashion.  The pedicle was then rotated into position and sutured.  Once the tube was sutured into place, adequate blood supply was confirmed with blanching and refill.  The pedicle was then wrapped with xeroform gauze and dressed appropriately with a telfa and gauze bandage to ensure continued blood supply and protect the attached pedicle.
Donor Site Anesthesia Type: same as repair anesthesia
Secondary Intention Text (Leave Blank If You Do Not Want): The defect will heal with secondary intention.
Same Histology In Subsequent Stages Text: The pattern and morphology of the tumor is as described in the first stage.
Consent (Ear)/Introductory Paragraph: The rationale for Mohs was explained to the patient and consent was obtained. The risks, benefits and alternatives to therapy were discussed in detail. Specifically, the risks of ear deformity, infection, scarring, bleeding, prolonged wound healing, incomplete removal, allergy to anesthesia, nerve injury and recurrence were addressed. Prior to the procedure, the treatment site was clearly identified and confirmed by the patient. All components of Universal Protocol/PAUSE Rule completed.
Consent (Lip)/Introductory Paragraph: The rationale for Mohs was explained to the patient and consent was obtained. The risks, benefits and alternatives to therapy were discussed in detail. Specifically, the risks of lip deformity, changes in the oral aperture, infection, scarring, bleeding, prolonged wound healing, incomplete removal, allergy to anesthesia, nerve injury and recurrence were addressed. Prior to the procedure, the treatment site was clearly identified and confirmed by the patient. All components of Universal Protocol/PAUSE Rule completed.
Undermining Type: Entire Wound
Complex Repair And Flap Additional Text (Will Appearing After The Standard Complex Repair Text): The complex repair was not sufficient to completely close the primary defect. The remaining additional defect was repaired with the flap mentioned below.
H Plasty Text: Given the location of the defect, shape of the defect and the proximity to free margins a H-plasty was deemed most appropriate for repair.  Using a sterile surgical marker, the appropriate advancement arms of the H-plasty were drawn incorporating the defect and placing the expected incisions within the relaxed skin tension lines where possible. The area thus outlined was incised deep to adipose tissue with a #15 scalpel blade. The skin margins were undermined to an appropriate distance in all directions utilizing iris scissors.  The opposing advancement arms were then advanced into place in opposite direction and anchored with interrupted buried subcutaneous sutures.
Epidermal Sutures: 4-0 Prolene
Previous Accession (Optional): [id number]
Repair Anesthesia Method: local infiltration
Double O-Z Plasty Text: The defect edges were debeveled with a #15 scalpel blade.  Given the location of the defect, shape of the defect and the proximity to free margins a Double O-Z plasty (double transposition flap) was deemed most appropriate.  Using a sterile surgical marker, the appropriate transposition flaps were drawn incorporating the defect and placing the expected incisions within the relaxed skin tension lines where possible. The area thus outlined was incised deep to adipose tissue with a #15 scalpel blade.  The skin margins were undermined to an appropriate distance in all directions utilizing iris scissors.  Hemostasis was achieved with electrocautery.  The flaps were then transposed into place, one clockwise and the other counterclockwise, and anchored with interrupted buried subcutaneous sutures.
Home Suture Removal Text: Patient was provided instructions on removing sutures and will remove their sutures at home.  If they have any questions or difficulties they will call the office.
Double O-Z Flap Text: The defect edges were debeveled with a #15 scalpel blade.  Given the location of the defect, shape of the defect and the proximity to free margins a Double O-Z flap was deemed most appropriate.  Using a sterile surgical marker, an appropriate transposition flap was drawn incorporating the defect and placing the expected incisions within the relaxed skin tension lines where possible. The area thus outlined was incised deep to adipose tissue with a #15 scalpel blade.  The skin margins were undermined to an appropriate distance in all directions utilizing iris scissors.
Helical Rim Advancement Flap Text: The defect edges were debeveled with a #15 blade scalpel.  Given the location of the defect and the proximity to free margins (helical rim) a double helical rim advancement flap was deemed most appropriate.  Using a sterile surgical marker, the appropriate advancement flaps were drawn incorporating the defect and placing the expected incisions between the helical rim and antihelix where possible.  The area thus outlined was incised through and through with a #15 scalpel blade.  With a skin hook and iris scissors, the flaps were gently and sharply undermined and freed up.
V-Y Flap Text: The defect edges were debeveled with a #15 scalpel blade.  Given the location of the defect, shape of the defect and the proximity to free margins a V-Y flap was deemed most appropriate.  Using a sterile surgical marker, an appropriate advancement flap was drawn incorporating the defect and placing the expected incisions within the relaxed skin tension lines where possible.    The area thus outlined was incised deep to adipose tissue with a #15 scalpel blade.  The skin margins were undermined to an appropriate distance in all directions utilizing iris scissors.
Consent 3/Introductory Paragraph: I gave the patient a chance to ask questions they had about the procedure.  Following this I explained the Mohs procedure and consent was obtained. The risks, benefits and alternatives to therapy were discussed in detail. Specifically, the risks of infection, scarring, bleeding, prolonged wound healing, incomplete removal, allergy to anesthesia, nerve injury and recurrence were addressed. Prior to the procedure, the treatment site was clearly identified and confirmed by the patient. All components of Universal Protocol/PAUSE Rule completed.
Muscle Hinge Flap Text: The defect edges were debeveled with a #15 scalpel blade.  Given the size, depth and location of the defect and the proximity to free margins a muscle hinge flap was deemed most appropriate.  Using a sterile surgical marker, an appropriate hinge flap was drawn incorporating the defect. The area thus outlined was incised with a #15 scalpel blade.  The skin margins were undermined to an appropriate distance in all directions utilizing iris scissors.
Non-Graft Cartilage Fenestration Text: The cartilage was fenestrated with a 2mm punch biopsy to help facilitate healing.
Bi-Rhombic Flap Text: The defect edges were debeveled with a #15 scalpel blade.  Given the location of the defect and the proximity to free margins a bi-rhombic flap was deemed most appropriate.  Using a sterile surgical marker, an appropriate rhombic flap was drawn incorporating the defect. The area thus outlined was incised deep to adipose tissue with a #15 scalpel blade.  The skin margins were undermined to an appropriate distance in all directions utilizing iris scissors.
No Repair - Repaired With Adjacent Surgical Defect Text (Leave Blank If You Do Not Want): After obtaining clear surgical margins the defect was repaired concurrently with another surgical defect which was in close approximation.
Dorsal Nasal Flap Text: The defect edges were debeveled with a #15 scalpel blade.  Given the location of the defect and the proximity to free margins a dorsal nasal flap was deemed most appropriate.  Using a sterile surgical marker, an appropriate dorsal nasal flap was drawn around the defect.    The area thus outlined was incised deep to adipose tissue with a #15 scalpel blade.  The skin margins were undermined to an appropriate distance in all directions utilizing iris scissors.
Epidermal Closure: running and interrupted
Consent 1/Introductory Paragraph: The rationale for Mohs was explained to the patient and consent was obtained. The risks, benefits and alternatives to therapy were discussed in detail. Specifically, the risks of infection, scarring, bleeding, prolonged wound healing, incomplete removal, allergy to anesthesia, nerve injury and recurrence were addressed. Prior to the procedure, the treatment site was clearly identified and confirmed by the patient. All components of Universal Protocol/PAUSE Rule completed.
Wound Care: Bacitracin
Graft Donor Site Epidermal Sutures (Optional): 6-0 Prolene
Information: Selecting Yes will display possible errors in your note based on the variables you have selected. This validation is only offered as a suggestion for you. PLEASE NOTE THAT THE VALIDATION TEXT WILL BE REMOVED WHEN YOU FINALIZE YOUR NOTE. IF YOU WANT TO FAX A PRELIMINARY NOTE YOU WILL NEED TO TOGGLE THIS TO 'NO' IF YOU DO NOT WANT IT IN YOUR FAXED NOTE.
O-T Advancement Flap Text: The defect edges were debeveled with a #15 scalpel blade.  Given the location of the defect, shape of the defect and the proximity to free margins an O-T advancement flap was deemed most appropriate.  Using a sterile surgical marker, an appropriate advancement flap was drawn incorporating the defect and placing the expected incisions within the relaxed skin tension lines where possible.    The area thus outlined was incised deep to adipose tissue with a #15 scalpel blade.  The skin margins were undermined to an appropriate distance in all directions utilizing iris scissors.
Area L Indication Text: Tumors in this location are included in Area L (trunk and extremities).  Mohs surgery is indicated for larger tumors, 2 cm or larger, in these anatomic locations.
Tissue Cultured Epidermal Autograft Text: The defect edges were debeveled with a #15 scalpel blade.  Given the location of the defect, shape of the defect and the proximity to free margins a tissue cultured epidermal autograft was deemed most appropriate.  The graft was then trimmed to fit the size of the defect.  The graft was then placed in the primary defect and oriented appropriately.
W Plasty Text: The lesion was extirpated to the level of the fat with a #15 scalpel blade.  Given the location of the defect, shape of the defect and the proximity to free margins a W-plasty was deemed most appropriate for repair.  Using a sterile surgical marker, the appropriate transposition arms of the W-plasty were drawn incorporating the defect and placing the expected incisions within the relaxed skin tension lines where possible.    The area thus outlined was incised deep to adipose tissue with a #15 scalpel blade.  The skin margins were undermined to an appropriate distance in all directions utilizing iris scissors.  The opposing transposition arms were then transposed into place in opposite direction and anchored with interrupted buried subcutaneous sutures.
Rotation Flap Text: The defect edges were debeveled with a #15 scalpel blade.  Given the location of the defect, shape of the defect and the proximity to free margins a rotation flap was deemed most appropriate.  Using a sterile surgical marker, an appropriate rotation flap was drawn incorporating the defect and placing the expected incisions within the relaxed skin tension lines where possible.    The area thus outlined was incised deep to adipose tissue with a #15 scalpel blade.  The skin margins were undermined to an appropriate distance in all directions utilizing iris scissors.
Repair Type: Referred to plastics for closure
Manual Repair Warning Statement: We plan on removing the manually selected variable below in favor of our much easier automatic structured text blocks found in the previous tab. We decided to do this to help make the flow better and give you the full power of structured data. Manual selection is never going to be ideal in our platform and I would encourage you to avoid using manual selection from this point on, especially since I will be sunsetting this feature. It is important that you do one of two things with the customized text below. First, you can save all of the text in a word file so you can have it for future reference. Second, transfer the text to the appropriate area in the Library tab. Lastly, if there is a flap or graft type which we do not have you need to let us know right away so I can add it in before the variable is hidden. No need to panic, we plan to give you roughly 6 months to make the change.
Postop Diagnosis: same
Stage 2: Additional Anesthesia Type: 1% lidocaine with 1:100,000 epinephrine
Mohs Method Verbiage: An incision at a 45 degree angle following the standard Mohs approach was done and the specimen was harvested as a microscopic controlled layer.
Interpolation Flap Text: A decision was made to reconstruct the defect utilizing an interpolation axial flap and a staged reconstruction.  A telfa template was made of the defect.  This telfa template was then used to outline the interpolation flap.  The donor area for the pedicle flap was then injected with anesthesia.  The flap was excised through the skin and subcutaneous tissue down to the layer of the underlying musculature.  The interpolation flap was carefully excised within this deep plane to maintain its blood supply.  The edges of the donor site were undermined.   The donor site was closed in a primary fashion.  The pedicle was then rotated into position and sutured.  Once the tube was sutured into place, adequate blood supply was confirmed with blanching and refill.  The pedicle was then wrapped with xeroform gauze and dressed appropriately with a telfa and gauze bandage to ensure continued blood supply and protect the attached pedicle.
Ftsg Text: The defect edges were debeveled with a #15 scalpel blade.  Given the location of the defect, shape of the defect and the proximity to free margins a full thickness skin graft was deemed most appropriate.  Using a sterile surgical marker, the primary defect shape was transferred to the donor site. The area thus outlined was incised deep to adipose tissue with a #15 scalpel blade.  The harvested graft was then trimmed of adipose tissue until only dermis and epidermis was left.  The skin margins of the secondary defect were undermined to an appropriate distance in all directions utilizing iris scissors.  The secondary defect was closed with interrupted buried subcutaneous sutures.  The skin edges were then re-apposed with running  sutures.  The skin graft was then placed in the primary defect and oriented appropriately.
Mucosal Advancement Flap Text: Given the location of the defect, shape of the defect and the proximity to free margins a mucosal advancement flap was deemed most appropriate. Incisions were made with a 15 blade scalpel in the appropriate fashion along the cutaneous vermillion border and the mucosal lip. The remaining actinically damaged mucosal tissue was excised.  The mucosal advancement flap was then elevated to the gingival sulcus with care taken to preserve the neurovascular structures and advanced into the primary defect. Care was taken to ensure that precise realignment of the vermillion border was achieved.
Length To Time In Minutes Device Was In Place: 10
Spiral Flap Text: The defect edges were debeveled with a #15 scalpel blade.  Given the location of the defect, shape of the defect and the proximity to free margins a spiral flap was deemed most appropriate.  Using a sterile surgical marker, an appropriate rotation flap was drawn incorporating the defect and placing the expected incisions within the relaxed skin tension lines where possible. The area thus outlined was incised deep to adipose tissue with a #15 scalpel blade.  The skin margins were undermined to an appropriate distance in all directions utilizing iris scissors.
Bilobed Flap Text: The defect edges were debeveled with a #15 scalpel blade.  Given the location of the defect and the proximity to free margins a bilobe flap was deemed most appropriate.  Using a sterile surgical marker, an appropriate bilobe flap drawn around the defect.    The area thus outlined was incised deep to adipose tissue with a #15 scalpel blade.  The skin margins were undermined to an appropriate distance in all directions utilizing iris scissors.
Partial Purse String (Simple) Text: Given the location of the defect and the characteristics of the surrounding skin a simple purse string closure was deemed most appropriate.  Undermining was performed circumfirentially around the surgical defect.  A purse string suture was then placed and tightened. Wound tension only allowed a partial closure of the circular defect.
Ear Wedge Repair Text: A wedge excision was completed by carrying down an excision through the full thickness of the ear and cartilage with an inward facing Burow's triangle. The wound was then closed in a layered fashion.
Consent (Temporal Branch)/Introductory Paragraph: The rationale for Mohs was explained to the patient and consent was obtained. The risks, benefits and alternatives to therapy were discussed in detail. Specifically, the risks of damage to the temporal branch of the facial nerve, infection, scarring, bleeding, prolonged wound healing, incomplete removal, allergy to anesthesia, and recurrence were addressed. Prior to the procedure, the treatment site was clearly identified and confirmed by the patient. All components of Universal Protocol/PAUSE Rule completed.
Anesthesia Type: 0.25% lidocaine with 1:400,000 epinephrine and a 1:10 solution of 8.4% sodium bicarbonate
Mohs Case Number: 
Tarsorrhaphy Text: A tarsorrhaphy was performed using Frost sutures.
Location Indication Override (Is Already Calculated Based On Selected Body Location): Area M
Surgeon/Pathologist Verbiage (Will Incorporate Name Of Surgeon From Intro If Not Blank): operated in two distinct and integrated capacities as the surgeon and pathologist.
Suturegard Retention Suture: 2-0 Nylon
Melolabial Transposition Flap Text: The defect edges were debeveled with a #15 scalpel blade.  Given the location of the defect and the proximity to free margins a melolabial flap was deemed most appropriate.  Using a sterile surgical marker, an appropriate melolabial transposition flap was drawn incorporating the defect.    The area thus outlined was incised deep to adipose tissue with a #15 scalpel blade.  The skin margins were undermined to an appropriate distance in all directions utilizing iris scissors.
Melolabial Interpolation Flap Text: A decision was made to reconstruct the defect utilizing an interpolation axial flap and a staged reconstruction.  A telfa template was made of the defect.  This telfa template was then used to outline the melolabial interpolation flap.  The donor area for the pedicle flap was then injected with anesthesia.  The flap was excised through the skin and subcutaneous tissue down to the layer of the underlying musculature.  The pedicle flap was carefully excised within this deep plane to maintain its blood supply.  The edges of the donor site were undermined.   The donor site was closed in a primary fashion.  The pedicle was then rotated into position and sutured.  Once the tube was sutured into place, adequate blood supply was confirmed with blanching and refill.  The pedicle was then wrapped with xeroform gauze and dressed appropriately with a telfa and gauze bandage to ensure continued blood supply and protect the attached pedicle.
Area H Indication Text: Tumors in this location are included in Area H (eyelids, eyebrows, nose, lips, chin, ear, pre-auricular, post-auricular, temple, genitalia, hands, feet, ankles and areola).  Tissue conservation is critical in these anatomic locations.
V-Y Plasty Text: The defect edges were debeveled with a #15 scalpel blade.  Given the location of the defect, shape of the defect and the proximity to free margins an V-Y advancement flap was deemed most appropriate.  Using a sterile surgical marker, an appropriate advancement flap was drawn incorporating the defect and placing the expected incisions within the relaxed skin tension lines where possible.    The area thus outlined was incised deep to adipose tissue with a #15 scalpel blade.  The skin margins were undermined to an appropriate distance in all directions utilizing iris scissors.
Estimated Blood Loss (Cc): minimal
Surgeon: Hitesh gonzales
Area M Indication Text: Tumors in this location are included in Area M (cheek, forehead, scalp, neck, jawline and pretibial skin).  Mohs surgery is indicated for tumors 1 cm or larger in these anatomic locations.
Cheek Interpolation Flap Text: A decision was made to reconstruct the defect utilizing an interpolation axial flap and a staged reconstruction.  A telfa template was made of the defect.  This telfa template was then used to outline the Cheek Interpolation flap.  The donor area for the pedicle flap was then injected with anesthesia.  The flap was excised through the skin and subcutaneous tissue down to the layer of the underlying musculature.  The interpolation flap was carefully excised within this deep plane to maintain its blood supply.  The edges of the donor site were undermined.   The donor site was closed in a primary fashion.  The pedicle was then rotated into position and sutured.  Once the tube was sutured into place, adequate blood supply was confirmed with blanching and refill.  The pedicle was then wrapped with xeroform gauze and dressed appropriately with a telfa and gauze bandage to ensure continued blood supply and protect the attached pedicle.
Rhombic Flap Text: The defect edges were debeveled with a #15 scalpel blade.  Given the location of the defect and the proximity to free margins a rhombic flap was deemed most appropriate.  Using a sterile surgical marker, an appropriate rhombic flap was drawn incorporating the defect.    The area thus outlined was incised deep to adipose tissue with a #15 scalpel blade.  The skin margins were undermined to an appropriate distance in all directions utilizing iris scissors.
Mercedes Flap Text: The defect edges were debeveled with a #15 scalpel blade.  Given the location of the defect, shape of the defect and the proximity to free margins a Mercedes flap was deemed most appropriate.  Using a sterile surgical marker, an appropriate advancement flap was drawn incorporating the defect and placing the expected incisions within the relaxed skin tension lines where possible. The area thus outlined was incised deep to adipose tissue with a #15 scalpel blade.  The skin margins were undermined to an appropriate distance in all directions utilizing iris scissors.
Composite Graft Text: The defect edges were debeveled with a #15 scalpel blade.  Given the location of the defect, shape of the defect, the proximity to free margins and the fact the defect was full thickness a composite graft was deemed most appropriate.  The defect was outline and then transferred to the donor site.  A full thickness graft was then excised from the donor site. The graft was then placed in the primary defect, oriented appropriately and then sutured into place.  The secondary defect was then repaired using a primary closure.
Medical Necessity Statement: Based on my medical judgement, Mohs surgery is the most appropriate treatment for this cancer compared to other treatments.
Helical Rim Text: The closure involved the helical rim.
Eye Protection Verbiage: Before proceeding with the stage, a plastic scleral shield was inserted. The globe was anesthetized with a few drops of 1% lidocaine with 1:100,000 epinephrine. Then, an appropriate sized scleral shield was chosen and coated with lacrilube ointment. The shield was gently inserted and left in place for the duration of each stage. After the stage was completed, the shield was gently removed.
Deep Sutures: 4-0 Vicryl
Chonodrocutaneous Helical Advancement Flap Text: The defect edges were debeveled with a #15 scalpel blade.  Given the location of the defect and the proximity to free margins a chondrocutaneous helical advancement flap was deemed most appropriate.  Using a sterile surgical marker, the appropriate advancement flap was drawn incorporating the defect and placing the expected incisions within the relaxed skin tension lines where possible.    The area thus outlined was incised deep to adipose tissue with a #15 scalpel blade.  The skin margins were undermined to an appropriate distance in all directions utilizing iris scissors.
S Plasty Text: Given the location and shape of the defect, and the orientation of relaxed skin tension lines, an S-plasty was deemed most appropriate for repair.  Using a sterile surgical marker, the appropriate outline of the S-plasty was drawn, incorporating the defect and placing the expected incisions within the relaxed skin tension lines where possible.  The area thus outlined was incised deep to adipose tissue with a #15 scalpel blade.  The skin margins were undermined to an appropriate distance in all directions utilizing iris scissors. The skin flaps were advanced over the defect.  The opposing margins were then approximated with interrupted buried subcutaneous sutures.
Initial Size Of Lesion: 0.3
Full Thickness Lip Wedge Repair (Flap) Text: Given the location of the defect and the proximity to free margins a full thickness wedge repair was deemed most appropriate.  Using a sterile surgical marker, the appropriate repair was drawn incorporating the defect and placing the expected incisions perpendicular to the vermillion border.  The vermillion border was also meticulously outlined to ensure appropriate reapproximation during the repair.  The area thus outlined was incised through and through with a #15 scalpel blade.  The muscularis and dermis were reaproximated with deep sutures following hemostasis. Care was taken to realign the vermillion border before proceeding with the superficial closure.  Once the vermillion was realigned the superfical and mucosal closure was finished.
Partial Purse String (Intermediate) Text: Given the location of the defect and the characteristics of the surrounding skin an intermediate purse string closure was deemed most appropriate.  Undermining was performed circumfirentially around the surgical defect.  A purse string suture was then placed and tightened. Wound tension only allowed a partial closure of the circular defect.
Graft Donor Site Bandage (Optional-Leave Blank If You Don't Want In Note): Steri-strips and a pressure bandage were applied to the donor site.
Number Of Stages: 4
Consent (Marginal Mandibular)/Introductory Paragraph: The rationale for Mohs was explained to the patient and consent was obtained. The risks, benefits and alternatives to therapy were discussed in detail. Specifically, the risks of damage to the marginal mandibular branch of the facial nerve, infection, scarring, bleeding, prolonged wound healing, incomplete removal, allergy to anesthesia, and recurrence were addressed. Prior to the procedure, the treatment site was clearly identified and confirmed by the patient. All components of Universal Protocol/PAUSE Rule completed.
Debridement Text: The wound edges were debrided prior to proceeding with the closure to facilitate wound healing.
Island Pedicle Flap Text: The defect edges were debeveled with a #15 scalpel blade.  Given the location of the defect, shape of the defect and the proximity to free margins an island pedicle advancement flap was deemed most appropriate.  Using a sterile surgical marker, an appropriate advancement flap was drawn incorporating the defect, outlining the appropriate donor tissue and placing the expected incisions within the relaxed skin tension lines where possible.    The area thus outlined was incised deep to adipose tissue with a #15 scalpel blade.  The skin margins were undermined to an appropriate distance in all directions around the primary defect and laterally outward around the island pedicle utilizing iris scissors.  There was minimal undermining beneath the pedicle flap.
Alar Island Pedicle Flap Text: The defect edges were debeveled with a #15 scalpel blade.  Given the location of the defect, shape of the defect and the proximity to the alar rim an island pedicle advancement flap was deemed most appropriate.  Using a sterile surgical marker, an appropriate advancement flap was drawn incorporating the defect, outlining the appropriate donor tissue and placing the expected incisions within the nasal ala running parallel to the alar rim. The area thus outlined was incised with a #15 scalpel blade.  The skin margins were undermined minimally to an appropriate distance in all directions around the primary defect and laterally outward around the island pedicle utilizing iris scissors.  There was minimal undermining beneath the pedicle flap.
Stage 3: Additional Anesthesia Type: 0.5% lidocaine with 1:200,000 epinephrine and a 1:10 solution of 8.4% sodium bicarbonate
Cartilage Graft Text: The defect edges were debeveled with a #15 scalpel blade.  Given the location of the defect, shape of the defect, the fact the defect involved a full thickness cartilage defect a cartilage graft was deemed most appropriate.  An appropriate donor site was identified, cleansed, and anesthetized. The cartilage graft was then harvested and transferred to the recipient site, oriented appropriately and then sutured into place.  The secondary defect was then repaired using a primary closure.
Banner Transposition Flap Text: The defect edges were debeveled with a #15 scalpel blade.  Given the location of the defect and the proximity to free margins a Banner transposition flap was deemed most appropriate.  Using a sterile surgical marker, an appropriate flap drawn around the defect. The area thus outlined was incised deep to adipose tissue with a #15 scalpel blade.  The skin margins were undermined to an appropriate distance in all directions utilizing iris scissors.
Unna Boot Text: An Unna boot was placed to help immobilize the limb and facilitate more rapid healing.
Burow's Advancement Flap Text: The defect edges were debeveled with a #15 scalpel blade.  Given the location of the defect and the proximity to free margins a Burow's advancement flap was deemed most appropriate.  Using a sterile surgical marker, the appropriate advancement flap was drawn incorporating the defect and placing the expected incisions within the relaxed skin tension lines where possible.    The area thus outlined was incised deep to adipose tissue with a #15 scalpel blade.  The skin margins were undermined to an appropriate distance in all directions utilizing iris scissors.
Location Indication Override (Is Already Calculated Based On Selected Body Location): Area H
Mohs Case Number: 
Primary Defect Width In Cm (Final Defect Size - Required For Flaps/Grafts): 1.9
Transposition Flap Text: The defect edges were debeveled with a #15 scalpel blade.  Given the location of the defect and the proximity to free margins a transposition flap was deemed most appropriate.  Using a sterile surgical marker, an appropriate transposition flap was drawn incorporating the defect.    The area thus outlined was incised deep to adipose tissue with a #15 scalpel blade.  The skin margins were undermined to an appropriate distance in all directions utilizing iris scissors.

## 2020-02-05 ENCOUNTER — APPOINTMENT (RX ONLY)
Dept: URBAN - METROPOLITAN AREA CLINIC 23 | Facility: CLINIC | Age: 80
Setting detail: DERMATOLOGY
End: 2020-02-05

## 2020-02-05 DIAGNOSIS — L82.1 OTHER SEBORRHEIC KERATOSIS: ICD-10-CM

## 2020-02-05 DIAGNOSIS — Z48.01 ENCOUNTER FOR CHANGE OR REMOVAL OF SURGICAL WOUND DRESSING: ICD-10-CM

## 2020-02-05 PROBLEM — J30.1 ALLERGIC RHINITIS DUE TO POLLEN: Status: ACTIVE | Noted: 2020-02-05

## 2020-02-05 PROCEDURE — ? SUTURE REMOVAL (GLOBAL PERIOD)

## 2020-02-05 PROCEDURE — ? OTHER

## 2020-02-05 ASSESSMENT — LOCATION SIMPLE DESCRIPTION DERM
LOCATION SIMPLE: LEFT CALF
LOCATION SIMPLE: LEFT POPLITEAL SKIN
LOCATION SIMPLE: LEFT PRETIBIAL REGION
LOCATION SIMPLE: LEFT FOREHEAD

## 2020-02-05 ASSESSMENT — LOCATION ZONE DERM
LOCATION ZONE: FACE
LOCATION ZONE: LEG

## 2020-02-05 ASSESSMENT — LOCATION DETAILED DESCRIPTION DERM
LOCATION DETAILED: LEFT SUPERIOR FOREHEAD
LOCATION DETAILED: LEFT PROXIMAL CALF
LOCATION DETAILED: LEFT POPLITEAL SKIN
LOCATION DETAILED: LEFT PROXIMAL PRETIBIAL REGION

## 2020-02-05 NOTE — PROCEDURE: SUTURE REMOVAL (GLOBAL PERIOD)
Add 24248 Cpt? (Important Note: In 2017 The Use Of 21596 Is Being Tracked By Cms To Determine Future Global Period Reimbursement For Global Periods): no
Detail Level: Detailed

## 2020-02-10 ENCOUNTER — APPOINTMENT (RX ONLY)
Dept: URBAN - METROPOLITAN AREA CLINIC 23 | Facility: CLINIC | Age: 80
Setting detail: DERMATOLOGY
End: 2020-02-10

## 2020-02-10 DIAGNOSIS — L57.0 ACTINIC KERATOSIS: ICD-10-CM

## 2020-02-10 DIAGNOSIS — L82.1 OTHER SEBORRHEIC KERATOSIS: ICD-10-CM

## 2020-02-10 DIAGNOSIS — D485 NEOPLASM OF UNCERTAIN BEHAVIOR OF SKIN: ICD-10-CM

## 2020-02-10 DIAGNOSIS — L82.0 INFLAMED SEBORRHEIC KERATOSIS: ICD-10-CM

## 2020-02-10 DIAGNOSIS — Z85.828 PERSONAL HISTORY OF OTHER MALIGNANT NEOPLASM OF SKIN: ICD-10-CM

## 2020-02-10 PROBLEM — D48.5 NEOPLASM OF UNCERTAIN BEHAVIOR OF SKIN: Status: ACTIVE | Noted: 2020-02-10

## 2020-02-10 PROCEDURE — 11102 TANGNTL BX SKIN SINGLE LES: CPT | Mod: 59

## 2020-02-10 PROCEDURE — 17000 DESTRUCT PREMALG LESION: CPT | Mod: 59

## 2020-02-10 PROCEDURE — 17003 DESTRUCT PREMALG LES 2-14: CPT | Mod: 59

## 2020-02-10 PROCEDURE — 99214 OFFICE O/P EST MOD 30 MIN: CPT | Mod: 25

## 2020-02-10 PROCEDURE — ? LIQUID NITROGEN

## 2020-02-10 PROCEDURE — 11103 TANGNTL BX SKIN EA SEP/ADDL: CPT

## 2020-02-10 PROCEDURE — ? BIOPSY BY SHAVE METHOD

## 2020-02-10 PROCEDURE — 17110 DESTRUCTION B9 LES UP TO 14: CPT

## 2020-02-10 PROCEDURE — ? OTHER

## 2020-02-10 ASSESSMENT — LOCATION SIMPLE DESCRIPTION DERM
LOCATION SIMPLE: LEFT ANTERIOR NECK
LOCATION SIMPLE: LEFT PRETIBIAL REGION
LOCATION SIMPLE: RIGHT UPPER ARM
LOCATION SIMPLE: RIGHT LIP
LOCATION SIMPLE: CHEST
LOCATION SIMPLE: POSTERIOR NECK
LOCATION SIMPLE: LEFT UPPER ARM
LOCATION SIMPLE: LEFT SCALP
LOCATION SIMPLE: SCALP
LOCATION SIMPLE: RIGHT 2ND TOE
LOCATION SIMPLE: LEFT THIGH
LOCATION SIMPLE: LEFT POSTERIOR UPPER ARM
LOCATION SIMPLE: RIGHT PRETIBIAL REGION
LOCATION SIMPLE: RIGHT CALF
LOCATION SIMPLE: RIGHT MIDDLE FINGER
LOCATION SIMPLE: LEFT HAND
LOCATION SIMPLE: LEFT CALF
LOCATION SIMPLE: RIGHT KNEE
LOCATION SIMPLE: RIGHT HAND

## 2020-02-10 ASSESSMENT — LOCATION DETAILED DESCRIPTION DERM
LOCATION DETAILED: LEFT CENTRAL FRONTAL SCALP
LOCATION DETAILED: 2ND WEB SPACE LEFT HAND
LOCATION DETAILED: LEFT DISTAL CALF
LOCATION DETAILED: RIGHT KNEE
LOCATION DETAILED: RIGHT DISTAL PRETIBIAL REGION
LOCATION DETAILED: RIGHT ANTERIOR PROXIMAL UPPER ARM
LOCATION DETAILED: LEFT PROXIMAL POSTERIOR UPPER ARM
LOCATION DETAILED: RIGHT LATERAL PROXIMAL PRETIBIAL REGION
LOCATION DETAILED: LEFT ANTERIOR PROXIMAL THIGH
LOCATION DETAILED: RIGHT PROXIMAL DORSAL MIDDLE FINGER
LOCATION DETAILED: RIGHT DORSAL 2ND TOE
LOCATION DETAILED: UPPER STERNUM
LOCATION DETAILED: LEFT MEDIAL TRAPEZIAL NECK
LOCATION DETAILED: LEFT RADIAL DORSAL HAND
LOCATION DETAILED: LEFT LATERAL DISTAL PRETIBIAL REGION
LOCATION DETAILED: LEFT ANTERIOR DISTAL UPPER ARM
LOCATION DETAILED: RIGHT LOWER CUTANEOUS LIP
LOCATION DETAILED: RIGHT SUPERIOR PARIETAL SCALP
LOCATION DETAILED: RIGHT DISTAL CALF
LOCATION DETAILED: RIGHT RADIAL DORSAL HAND
LOCATION DETAILED: LEFT CLAVICULAR NECK
LOCATION DETAILED: LEFT DISTAL PRETIBIAL REGION
LOCATION DETAILED: 4TH WEB SPACE RIGHT HAND
LOCATION DETAILED: LEFT ULNAR DORSAL HAND
LOCATION DETAILED: RIGHT PROXIMAL CALF
LOCATION DETAILED: LEFT ANTERIOR PROXIMAL UPPER ARM
LOCATION DETAILED: LEFT PROXIMAL LATERAL CALF
LOCATION DETAILED: LEFT ANTERIOR LATERAL DISTAL UPPER ARM
LOCATION DETAILED: RIGHT DORSAL MIDDLE FINGER METACARPOPHALANGEAL JOINT

## 2020-02-10 ASSESSMENT — LOCATION ZONE DERM
LOCATION ZONE: NECK
LOCATION ZONE: LIP
LOCATION ZONE: HAND
LOCATION ZONE: FINGER
LOCATION ZONE: ARM
LOCATION ZONE: TOE
LOCATION ZONE: LEG
LOCATION ZONE: TRUNK
LOCATION ZONE: SCALP

## 2020-02-10 NOTE — PROCEDURE: BIOPSY BY SHAVE METHOD
Bill 44163 For Specimen Handling/Conveyance To Laboratory?: no
Biopsy Type: H and E
Was A Bandage Applied: Yes
Electrodesiccation And Curettage Text: The wound bed was treated with electrodesiccation and curettage after the biopsy was performed.
Wound Care: Vaseline
Notification Instructions: Patient will be notified of biopsy results. However, patient instructed to call the office if not contacted within 2 weeks.
Additional Anesthesia Volume In Cc (Will Not Render If 0): 0
Cryotherapy Text: The wound bed was treated with cryotherapy after the biopsy was performed.
Silver Nitrate Text: The wound bed was treated with silver nitrate after the biopsy was performed.
Detail Level: Detailed
Path Notes (To The Dermatopathologist): .
Post-Care Instructions: I reviewed with the patient in detail post-care instructions. Patient is to keep the biopsy site dry overnight, and then apply bacitracin twice daily until healed. Patient may apply hydrogen peroxide soaks to remove any crusting.
Outside Pathology Billing: outside pathology read only
Billing Type: Third-Party Bill
Consent: Written consent was obtained and risks were reviewed including but not limited to scarring, infection, bleeding, scabbing, incomplete removal, nerve damage and allergy to anesthesia.
Anesthesia Volume In Cc: 0.5
Accession #: [id number]
Biopsy Method: Dermablade
Type Of Destruction Used: Curettage
Hemostasis: Electrocautery
Electrodesiccation Text: The wound bed was treated with electrodesiccation after the biopsy was performed.
Dressing: pressure dressing with telfa
Depth Of Biopsy: dermis
Anesthesia Type: 1% lidocaine with 1:200,000 epinephrine and a 1:10 solution of 8.4% sodium bicarbonate

## 2020-02-10 NOTE — PROCEDURE: LIQUID NITROGEN
Duration Of Freeze Thaw-Cycle (Seconds): 5
Medical Necessity Clause: This procedure was medically necessary because the lesions that were treated were irritated and enlarging
Post-Care Instructions: I reviewed with the patient in detail post-care instructions. Patient is to wear sunprotection, and avoid picking at any of the treated lesions. Pt may apply Vaseline to crusted or scabbing areas. Will re-check at follow up
Render Note In Bullet Format When Appropriate: No
Consent: The patient's verbal consent was obtained including but not limited to risks of crusting, scabbing, blistering, scarring, darker or lighter pigmentary change, recurrence, incomplete removal and infection.
Number Of Freeze-Thaw Cycles: 1 freeze-thaw cycle
Detail Level: Detailed
Post-Care Instructions: I reviewed with the patient in detail post-care instructions. Patient is to wear sunprotection, and avoid picking at any of the treated lesions. Pt may apply Vaseline to crusted or scabbing areas.
Medical Necessity Information: It is in your best interest to select a reason for this procedure from the list below. All of these items fulfill various CMS LCD requirements except the new and changing color options.

## 2020-03-30 ENCOUNTER — APPOINTMENT (RX ONLY)
Dept: URBAN - METROPOLITAN AREA CLINIC 23 | Facility: CLINIC | Age: 80
Setting detail: DERMATOLOGY
End: 2020-03-30

## 2020-03-30 DIAGNOSIS — L82.1 OTHER SEBORRHEIC KERATOSIS: ICD-10-CM

## 2020-03-30 DIAGNOSIS — L57.0 ACTINIC KERATOSIS: ICD-10-CM

## 2020-03-30 PROBLEM — C44.619 BASAL CELL CARCINOMA OF SKIN OF LEFT UPPER LIMB, INCLUDING SHOULDER: Status: ACTIVE | Noted: 2020-03-30

## 2020-03-30 PROCEDURE — 17003 DESTRUCT PREMALG LES 2-14: CPT

## 2020-03-30 PROCEDURE — ? OTHER

## 2020-03-30 PROCEDURE — ? COUNSELING

## 2020-03-30 PROCEDURE — ? MOHS SURGERY

## 2020-03-30 PROCEDURE — ? LIQUID NITROGEN

## 2020-03-30 PROCEDURE — 17314 MOHS ADDL STAGE T/A/L: CPT

## 2020-03-30 PROCEDURE — 99213 OFFICE O/P EST LOW 20 MIN: CPT | Mod: 25

## 2020-03-30 PROCEDURE — 17313 MOHS 1 STAGE T/A/L: CPT

## 2020-03-30 PROCEDURE — 12032 INTMD RPR S/A/T/EXT 2.6-7.5: CPT | Mod: 59

## 2020-03-30 PROCEDURE — 17000 DESTRUCT PREMALG LESION: CPT

## 2020-03-30 ASSESSMENT — LOCATION ZONE DERM
LOCATION ZONE: FACE
LOCATION ZONE: HAND
LOCATION ZONE: SCALP
LOCATION ZONE: LEG

## 2020-03-30 ASSESSMENT — LOCATION DETAILED DESCRIPTION DERM
LOCATION DETAILED: POSTERIOR MID-PARIETAL SCALP
LOCATION DETAILED: LEFT CENTRAL PARIETAL SCALP
LOCATION DETAILED: RIGHT INFERIOR LATERAL FOREHEAD
LOCATION DETAILED: LEFT SUPERIOR PARIETAL SCALP
LOCATION DETAILED: RIGHT CENTRAL ZYGOMA
LOCATION DETAILED: 1ST WEB SPACE LEFT HAND
LOCATION DETAILED: RIGHT LATERAL ZYGOMA
LOCATION DETAILED: LEFT DISTAL PRETIBIAL REGION

## 2020-03-30 ASSESSMENT — LOCATION SIMPLE DESCRIPTION DERM
LOCATION SIMPLE: RIGHT FOREHEAD
LOCATION SIMPLE: RIGHT ZYGOMA
LOCATION SIMPLE: LEFT HAND
LOCATION SIMPLE: POSTERIOR SCALP
LOCATION SIMPLE: LEFT PRETIBIAL REGION
LOCATION SIMPLE: SCALP

## 2020-03-30 NOTE — PROCEDURE: MOHS SURGERY
Muscle Hinge Flap Text: The defect edges were debeveled with a #15 scalpel blade.  Given the size, depth and location of the defect and the proximity to free margins a muscle hinge flap was deemed most appropriate.  Using a sterile surgical marker, an appropriate hinge flap was drawn incorporating the defect. The area thus outlined was incised with a #15 scalpel blade.  The skin margins were undermined to an appropriate distance in all directions utilizing iris scissors.
Otolaryngologist Procedure Text (A): After obtaining clear surgical margins the patient was sent to otolaryngology for surgical repair.  The patient understands they will receive post-surgical care and follow-up from the referring physician's office.
Modified Advancement Flap Text: The defect edges were debeveled with a #15 scalpel blade.  Given the location of the defect, shape of the defect and the proximity to free margins a modified advancement flap was deemed most appropriate.  Using a sterile surgical marker, an appropriate advancement flap was drawn incorporating the defect and placing the expected incisions within the relaxed skin tension lines where possible.    The area thus outlined was incised deep to adipose tissue with a #15 scalpel blade.  The skin margins were undermined to an appropriate distance in all directions utilizing iris scissors.
O-T Advancement Flap Text: The defect edges were debeveled with a #15 scalpel blade.  Given the location of the defect, shape of the defect and the proximity to free margins an O-T advancement flap was deemed most appropriate.  Using a sterile surgical marker, an appropriate advancement flap was drawn incorporating the defect and placing the expected incisions within the relaxed skin tension lines where possible.    The area thus outlined was incised deep to adipose tissue with a #15 scalpel blade.  The skin margins were undermined to an appropriate distance in all directions utilizing iris scissors.
Oculoplastic Surgeon Procedure Text (C): After obtaining clear surgical margins the patient was sent to oculoplastics for surgical repair.  The patient understands they will receive post-surgical care and follow-up from the referring physician's office.
Repair Performed By Another Provider Text (Leave Blank If You Do Not Want): After obtaining clear surgical margins the defect was repaired by another provider.
Special Stains Stage 1 - Results: Base On Clearance Noted Above
Island Pedicle Flap With Canthal Suspension Text: The defect edges were debeveled with a #15 scalpel blade.  Given the location of the defect, shape of the defect and the proximity to free margins an island pedicle advancement flap was deemed most appropriate.  Using a sterile surgical marker, an appropriate advancement flap was drawn incorporating the defect, outlining the appropriate donor tissue and placing the expected incisions within the relaxed skin tension lines where possible. The area thus outlined was incised deep to adipose tissue with a #15 scalpel blade.  The skin margins were undermined to an appropriate distance in all directions around the primary defect and laterally outward around the island pedicle utilizing iris scissors.  There was minimal undermining beneath the pedicle flap. A suspension suture was placed in the canthal tendon to prevent tension and prevent ectropion.
Debridement Text: The wound edges were debrided prior to proceeding with the closure to facilitate wound healing.
Ear Star Wedge Flap Text: The defect edges were debeveled with a #15 blade scalpel.  Given the location of the defect and the proximity to free margins (helical rim) an ear star wedge flap was deemed most appropriate.  Using a sterile surgical marker, the appropriate flap was drawn incorporating the defect and placing the expected incisions between the helical rim and antihelix where possible.  The area thus outlined was incised through and through with a #15 scalpel blade.
Tarsorrhaphy Performed?: No
Closure 2 Information: This tab is for additional flaps and grafts, including complex repair and grafts and complex repair and flaps. You can also specify a different location for the additional defect, if the location is the same you do not need to select a new one. We will insert the automated text for the repair you select below just as we do for solitary flaps and grafts. Please note that at this time if you select a location with a different insurance zone you will need to override the ICD10 and CPT if appropriate.
Surgeon Performing Repair (Optional): Dr Seymour
Consent Type: Consent 1 (Standard)
Mohs Method Verbiage: An incision at a 45 degree angle following the standard Mohs approach was done and the specimen was harvested as a microscopic controlled layer.
Stage 14: Additional Anesthesia Volume In Cc: 0
Anesthesia Volume In Cc: 1
Bcc Histology Text: There were numerous aggregates of basaloid cells.
Inflammation Suggestive Of Cancer Camouflage Histology Text: There was a dense lymphocytic infiltrate which prevented adequate histologic evaluation of adjacent structures.
Partial Purse String (Intermediate) Text: Given the location of the defect and the characteristics of the surrounding skin an intermediate purse string closure was deemed most appropriate.  Undermining was performed circumfirentially around the surgical defect.  A purse string suture was then placed and tightened. Wound tension only allowed a partial closure of the circular defect.
Dermal Autograft Text: The defect edges were debeveled with a #15 scalpel blade.  Given the location of the defect, shape of the defect and the proximity to free margins a dermal autograft was deemed most appropriate.  Using a sterile surgical marker, the primary defect shape was transferred to the donor site. The area thus outlined was incised deep to adipose tissue with a #15 scalpel blade.  The harvested graft was then trimmed of adipose and epidermal tissue until only dermis was left.  The skin graft was then placed in the primary defect and oriented appropriately.
Ear Wedge Repair Text: A wedge excision was completed by carrying down an excision through the full thickness of the ear and cartilage with an inward facing Burow's triangle. The wound was then closed in a layered fashion.
Interpolation Flap Text: A decision was made to reconstruct the defect utilizing an interpolation axial flap and a staged reconstruction.  A telfa template was made of the defect.  This telfa template was then used to outline the interpolation flap.  The donor area for the pedicle flap was then injected with anesthesia.  The flap was excised through the skin and subcutaneous tissue down to the layer of the underlying musculature.  The interpolation flap was carefully excised within this deep plane to maintain its blood supply.  The edges of the donor site were undermined.   The donor site was closed in a primary fashion.  The pedicle was then rotated into position and sutured.  Once the tube was sutured into place, adequate blood supply was confirmed with blanching and refill.  The pedicle was then wrapped with xeroform gauze and dressed appropriately with a telfa and gauze bandage to ensure continued blood supply and protect the attached pedicle.
S Plasty Text: Given the location and shape of the defect, and the orientation of relaxed skin tension lines, an S-plasty was deemed most appropriate for repair.  Using a sterile surgical marker, the appropriate outline of the S-plasty was drawn, incorporating the defect and placing the expected incisions within the relaxed skin tension lines where possible.  The area thus outlined was incised deep to adipose tissue with a #15 scalpel blade.  The skin margins were undermined to an appropriate distance in all directions utilizing iris scissors. The skin flaps were advanced over the defect.  The opposing margins were then approximated with interrupted buried subcutaneous sutures.
Consent 1/Introductory Paragraph: The rationale for Mohs was explained to the patient and consent was obtained. The risks, benefits and alternatives to therapy were discussed in detail. Specifically, the risks of infection, scarring, bleeding, prolonged wound healing, incomplete removal, allergy to anesthesia, nerve injury and recurrence were addressed. Prior to the procedure, the treatment site was clearly identified and confirmed by the patient. All components of Universal Protocol/PAUSE Rule completed.
Estimated Blood Loss (Cc): minimal
Stage 14: Additional Anesthesia Type: 1% lidocaine with epinephrine
Closure 4 Information: This tab is for additional flaps and grafts above and beyond our usual structured repairs.  Please note if you enter information here it will not currently bill and you will need to add the billing information manually.
Pain Refusal Text: I offered to prescribe pain medication but the patient refused to take this medication.
Home Suture Removal Text: Patient was provided instructions on removing sutures and will remove their sutures at home.  If they have any questions or difficulties they will call the office.
Asc Procedure Text (A): After obtaining clear surgical margins the patient was sent to an ASC for surgical repair.  The patient understands they will receive post-surgical care and follow-up from the ASC physician.
No Repair - Repaired With Adjacent Surgical Defect Text (Leave Blank If You Do Not Want): After obtaining clear surgical margins the defect was repaired concurrently with another surgical defect which was in close approximation.
Crescentic Intermediate Repair Preamble Text (Leave Blank If You Do Not Want): Undermining was performed with blunt dissection.
Mohs Rapid Report Verbiage: The area of clinically evident tumor was marked with skin marking ink and appropriately hatched.  The initial incision was made following the Mohs approach through the skin.  The specimen was taken to the lab, divided into the necessary number of pieces, chromacoded and processed according to the Mohs protocol.  This was repeated in successive stages until a tumor free defect was achieved.
Surgeon/Pathologist Verbiage (Will Incorporate Name Of Surgeon From Intro If Not Blank): operated in two distinct and integrated capacities as the surgeon and pathologist.
Stage 3: Additional Anesthesia Type: 0.5% lidocaine with 1:200,000 epinephrine and a 1:10 solution of 8.4% sodium bicarbonate
Simple / Intermediate / Complex Repair - Final Wound Length In Cm: 5.4
Suturegard Retention Suture: 2-0 Nylon
Localized Dermabrasion With Wire Brush Text: The patient was draped in routine manner.  Localized dermabrasion using 3 x 17 mm wire brush was performed in routine manner to papillary dermis. This spot dermabrasion is being performed to complete skin cancer reconstruction. It also will eliminate the other sun damaged precancerous cells that are known to be part of the regional effect of a lifetime's worth of sun exposure. This localized dermabrasion is therapeutic and should not be considered cosmetic in any regard.
Skin Substitute Text: The defect edges were debeveled with a #15 scalpel blade.  Given the location of the defect, shape of the defect and the proximity to free margins a skin substitute graft was deemed most appropriate.  The graft material was trimmed to fit the size of the defect. The graft was then placed in the primary defect and oriented appropriately.
Graft Donor Site Epidermal Sutures (Optional): 6-0 Prolene
Full Thickness Lip Wedge Repair (Flap) Text: Given the location of the defect and the proximity to free margins a full thickness wedge repair was deemed most appropriate.  Using a sterile surgical marker, the appropriate repair was drawn incorporating the defect and placing the expected incisions perpendicular to the vermillion border.  The vermillion border was also meticulously outlined to ensure appropriate reapproximation during the repair.  The area thus outlined was incised through and through with a #15 scalpel blade.  The muscularis and dermis were reaproximated with deep sutures following hemostasis. Care was taken to realign the vermillion border before proceeding with the superficial closure.  Once the vermillion was realigned the superfical and mucosal closure was finished.
Graft Basting Suture (Optional): 5-0 PDS
Mid-Level Procedure Text (A): After obtaining clear surgical margins the patient was sent to a mid-level provider for surgical repair.  The patient understands they will receive post-surgical care and follow-up from the mid-level provider.
Bcc Infiltrative Histology Text: There were numerous aggregates of basaloid cells demonstrating an infiltrative pattern.
Melolabial Interpolation Flap Text: A decision was made to reconstruct the defect utilizing an interpolation axial flap and a staged reconstruction.  A telfa template was made of the defect.  This telfa template was then used to outline the melolabial interpolation flap.  The donor area for the pedicle flap was then injected with anesthesia.  The flap was excised through the skin and subcutaneous tissue down to the layer of the underlying musculature.  The pedicle flap was carefully excised within this deep plane to maintain its blood supply.  The edges of the donor site were undermined.   The donor site was closed in a primary fashion.  The pedicle was then rotated into position and sutured.  Once the tube was sutured into place, adequate blood supply was confirmed with blanching and refill.  The pedicle was then wrapped with xeroform gauze and dressed appropriately with a telfa and gauze bandage to ensure continued blood supply and protect the attached pedicle.
V-Y Plasty Text: The defect edges were debeveled with a #15 scalpel blade.  Given the location of the defect, shape of the defect and the proximity to free margins an V-Y advancement flap was deemed most appropriate.  Using a sterile surgical marker, an appropriate advancement flap was drawn incorporating the defect and placing the expected incisions within the relaxed skin tension lines where possible.    The area thus outlined was incised deep to adipose tissue with a #15 scalpel blade.  The skin margins were undermined to an appropriate distance in all directions utilizing iris scissors.
Mart-1 - Negative Histology Text: MART-1 staining demonstrates a normal density and pattern of melanocytes along the dermal-epidermal junction. The surgical margins are negative for tumor cells.
Consent 2/Introductory Paragraph: Mohs surgery was explained to the patient and consent was obtained. The risks, benefits and alternatives to therapy were discussed in detail. Specifically, the risks of infection, scarring, bleeding, prolonged wound healing, incomplete removal, allergy to anesthesia, nerve injury and recurrence were addressed. Prior to the procedure, the treatment site was clearly identified and confirmed by the patient. All components of Universal Protocol/PAUSE Rule completed.
Show Additional Anesthesia Variables In The Stage Tabs: Yes
Plastic Surgeon Procedure Text (A): After obtaining clear surgical margins the patient was sent to plastics for surgical repair.  The patient understands they will receive post-surgical care and follow-up from the referring physician's office.
Complex Repair Preamble Text (Leave Blank If You Do Not Want): Extensive wide undermining was performed.
Mauc Instructions: By selecting yes to the question below the MAUC number will be added into the note.  This will be calculated automatically based on the diagnosis chosen, the size entered, the body zone selected (H,M,L) and the specific indications you chose. You will also have the option to override the Mohs AUC if you disagree with the automatically calculated number and this option is found in the Case Summary tab.
Consent (Near Eyelid Margin)/Introductory Paragraph: The rationale for Mohs was explained to the patient and consent was obtained. The risks, benefits and alternatives to therapy were discussed in detail. Specifically, the risks of ectropion or eyelid deformity, infection, scarring, bleeding, prolonged wound healing, incomplete removal, allergy to anesthesia, nerve injury and recurrence were addressed. Prior to the procedure, the treatment site was clearly identified and confirmed by the patient. All components of Universal Protocol/PAUSE Rule completed.
Unna Boot Text: An Unna boot was placed to help immobilize the limb and facilitate more rapid healing.
Secondary Intention Text (Leave Blank If You Do Not Want): The defect will heal with secondary intention.
Subsequent Stages Histo Method Verbiage: Using a similar technique to that described above, a thin layer of tissue was removed from all areas where tumor was visible on the previous stage.  The tissue was again oriented, mapped, dyed, and processed as above.
Detail Level: Detailed
O-T Plasty Text: The defect edges were debeveled with a #15 scalpel blade.  Given the location of the defect, shape of the defect and the proximity to free margins an O-T plasty was deemed most appropriate.  Using a sterile surgical marker, an appropriate O-T plasty was drawn incorporating the defect and placing the expected incisions within the relaxed skin tension lines where possible.    The area thus outlined was incised deep to adipose tissue with a #15 scalpel blade.  The skin margins were undermined to an appropriate distance in all directions utilizing iris scissors.
Dorsal Nasal Flap Text: The defect edges were debeveled with a #15 scalpel blade.  Given the location of the defect and the proximity to free margins a dorsal nasal flap was deemed most appropriate.  Using a sterile surgical marker, an appropriate dorsal nasal flap was drawn around the defect.    The area thus outlined was incised deep to adipose tissue with a #15 scalpel blade.  The skin margins were undermined to an appropriate distance in all directions utilizing iris scissors.
Helical Rim Advancement Flap Text: The defect edges were debeveled with a #15 blade scalpel.  Given the location of the defect and the proximity to free margins (helical rim) a double helical rim advancement flap was deemed most appropriate.  Using a sterile surgical marker, the appropriate advancement flaps were drawn incorporating the defect and placing the expected incisions between the helical rim and antihelix where possible.  The area thus outlined was incised through and through with a #15 scalpel blade.  With a skin hook and iris scissors, the flaps were gently and sharply undermined and freed up.
Star Wedge Flap Text: The defect edges were debeveled with a #15 scalpel blade.  Given the location of the defect, shape of the defect and the proximity to free margins a star wedge flap was deemed most appropriate.  Using a sterile surgical marker, an appropriate rotation flap was drawn incorporating the defect and placing the expected incisions within the relaxed skin tension lines where possible. The area thus outlined was incised deep to adipose tissue with a #15 scalpel blade.  The skin margins were undermined to an appropriate distance in all directions utilizing iris scissors.
Advancement-Rotation Flap Text: The defect edges were debeveled with a #15 scalpel blade.  Given the location of the defect, shape of the defect and the proximity to free margins an advancement-rotation flap was deemed most appropriate.  Using a sterile surgical marker, an appropriate flap was drawn incorporating the defect and placing the expected incisions within the relaxed skin tension lines where possible. The area thus outlined was incised deep to adipose tissue with a #15 scalpel blade.  The skin margins were undermined to an appropriate distance in all directions utilizing iris scissors.
Consent (Marginal Mandibular)/Introductory Paragraph: The rationale for Mohs was explained to the patient and consent was obtained. The risks, benefits and alternatives to therapy were discussed in detail. Specifically, the risks of damage to the marginal mandibular branch of the facial nerve, infection, scarring, bleeding, prolonged wound healing, incomplete removal, allergy to anesthesia, and recurrence were addressed. Prior to the procedure, the treatment site was clearly identified and confirmed by the patient. All components of Universal Protocol/PAUSE Rule completed.
Crescentic Advancement Flap Text: The defect edges were debeveled with a #15 scalpel blade.  Given the location of the defect and the proximity to free margins a crescentic advancement flap was deemed most appropriate.  Using a sterile surgical marker, the appropriate advancement flap was drawn incorporating the defect and placing the expected incisions within the relaxed skin tension lines where possible.    The area thus outlined was incised deep to adipose tissue with a #15 scalpel blade.  The skin margins were undermined to an appropriate distance in all directions utilizing iris scissors.
Primary Defect Width In Cm (Final Defect Size - Required For Flaps/Grafts): 2.5
Surgeon: Hitesh gonzales
Tumor Debulked?: curette
Same Histology In Subsequent Stages Text: The pattern and morphology of the tumor is as described in the first stage.
Cheek Interpolation Flap Text: A decision was made to reconstruct the defect utilizing an interpolation axial flap and a staged reconstruction.  A telfa template was made of the defect.  This telfa template was then used to outline the Cheek Interpolation flap.  The donor area for the pedicle flap was then injected with anesthesia.  The flap was excised through the skin and subcutaneous tissue down to the layer of the underlying musculature.  The interpolation flap was carefully excised within this deep plane to maintain its blood supply.  The edges of the donor site were undermined.   The donor site was closed in a primary fashion.  The pedicle was then rotated into position and sutured.  Once the tube was sutured into place, adequate blood supply was confirmed with blanching and refill.  The pedicle was then wrapped with xeroform gauze and dressed appropriately with a telfa and gauze bandage to ensure continued blood supply and protect the attached pedicle.
Purse String (Intermediate) Text: Given the location of the defect and the characteristics of the surrounding skin a pursestring intermediate closure was deemed most appropriate.  Undermining was performed circumfirentially around the surgical defect.  A purstring suture was then placed and tightened.
Composite Graft Text: The defect edges were debeveled with a #15 scalpel blade.  Given the location of the defect, shape of the defect, the proximity to free margins and the fact the defect was full thickness a composite graft was deemed most appropriate.  The defect was outline and then transferred to the donor site.  A full thickness graft was then excised from the donor site. The graft was then placed in the primary defect, oriented appropriately and then sutured into place.  The secondary defect was then repaired using a primary closure.
Cheiloplasty (Less Than 50%) Text: A decision was made to reconstruct the defect with a  cheiloplasty.  The defect was undermined extensively.  Additional obicularis oris muscle was excised with a 15 blade scalpel.  The defect was converted into a full thickness wedge, of less than 50% of the vertical height of the lip, to facilite a better cosmetic result.  Small vessels were then tied off with 5-0 monocyrl. The obicularis oris, superficial fascia, adipose and dermis were then reapproximated.  After the deeper layers were approximated the epidermis was reapproximated with particular care given to realign the vermillion border.
Anesthesia Type: 1% lidocaine with 1:100,000 epinephrine and a 1:3 solution of 8.4% sodium bicarbonate
Postop Diagnosis: same
Consent (Spinal Accessory)/Introductory Paragraph: The rationale for Mohs was explained to the patient and consent was obtained. The risks, benefits and alternatives to therapy were discussed in detail. Specifically, the risks of damage to the spinal accessory nerve, infection, scarring, bleeding, prolonged wound healing, incomplete removal, allergy to anesthesia, and recurrence were addressed. Prior to the procedure, the treatment site was clearly identified and confirmed by the patient. All components of Universal Protocol/PAUSE Rule completed.
Bilateral Helical Rim Advancement Flap Text: The defect edges were debeveled with a #15 blade scalpel.  Given the location of the defect and the proximity to free margins (helical rim) a bilateral helical rim advancement flap was deemed most appropriate.  Using a sterile surgical marker, the appropriate advancement flaps were drawn incorporating the defect and placing the expected incisions between the helical rim and antihelix where possible.  The area thus outlined was incised through and through with a #15 scalpel blade.  With a skin hook and iris scissors, the flaps were gently and sharply undermined and freed up.
Transposition Flap Text: The defect edges were debeveled with a #15 scalpel blade.  Given the location of the defect and the proximity to free margins a transposition flap was deemed most appropriate.  Using a sterile surgical marker, an appropriate transposition flap was drawn incorporating the defect.    The area thus outlined was incised deep to adipose tissue with a #15 scalpel blade.  The skin margins were undermined to an appropriate distance in all directions utilizing iris scissors.
Mercedes Flap Text: The defect edges were debeveled with a #15 scalpel blade.  Given the location of the defect, shape of the defect and the proximity to free margins a Mercedes flap was deemed most appropriate.  Using a sterile surgical marker, an appropriate advancement flap was drawn incorporating the defect and placing the expected incisions within the relaxed skin tension lines where possible. The area thus outlined was incised deep to adipose tissue with a #15 scalpel blade.  The skin margins were undermined to an appropriate distance in all directions utilizing iris scissors.
A-T Advancement Flap Text: The defect edges were debeveled with a #15 scalpel blade.  Given the location of the defect, shape of the defect and the proximity to free margins an A-T advancement flap was deemed most appropriate.  Using a sterile surgical marker, an appropriate advancement flap was drawn incorporating the defect and placing the expected incisions within the relaxed skin tension lines where possible.    The area thus outlined was incised deep to adipose tissue with a #15 scalpel blade.  The skin margins were undermined to an appropriate distance in all directions utilizing iris scissors.
O-Z Plasty Text: The defect edges were debeveled with a #15 scalpel blade.  Given the location of the defect, shape of the defect and the proximity to free margins an O-Z plasty (double transposition flap) was deemed most appropriate.  Using a sterile surgical marker, the appropriate transposition flaps were drawn incorporating the defect and placing the expected incisions within the relaxed skin tension lines where possible.    The area thus outlined was incised deep to adipose tissue with a #15 scalpel blade.  The skin margins were undermined to an appropriate distance in all directions utilizing iris scissors.  Hemostasis was achieved with electrocautery.  The flaps were then transposed into place, one clockwise and the other counterclockwise, and anchored with interrupted buried subcutaneous sutures.
Island Pedicle Flap Text: The defect edges were debeveled with a #15 scalpel blade.  Given the location of the defect, shape of the defect and the proximity to free margins an island pedicle advancement flap was deemed most appropriate.  Using a sterile surgical marker, an appropriate advancement flap was drawn incorporating the defect, outlining the appropriate donor tissue and placing the expected incisions within the relaxed skin tension lines where possible.    The area thus outlined was incised deep to adipose tissue with a #15 scalpel blade.  The skin margins were undermined to an appropriate distance in all directions around the primary defect and laterally outward around the island pedicle utilizing iris scissors.  There was minimal undermining beneath the pedicle flap.
Where Do You Want The Question To Include Opioid Counseling Located?: Case Summary Tab
Eye Protection Verbiage: Before proceeding with the stage, a plastic scleral shield was inserted. The globe was anesthetized with a few drops of 1% lidocaine with 1:100,000 epinephrine. Then, an appropriate sized scleral shield was chosen and coated with lacrilube ointment. The shield was gently inserted and left in place for the duration of each stage. After the stage was completed, the shield was gently removed.
No Residual Tumor Seen Histology Text: There were no malignant cells seen in the sections examined.
Manual Repair Warning Statement: We plan on removing the manually selected variable below in favor of our much easier automatic structured text blocks found in the previous tab. We decided to do this to help make the flow better and give you the full power of structured data. Manual selection is never going to be ideal in our platform and I would encourage you to avoid using manual selection from this point on, especially since I will be sunsetting this feature. It is important that you do one of two things with the customized text below. First, you can save all of the text in a word file so you can have it for future reference. Second, transfer the text to the appropriate area in the Library tab. Lastly, if there is a flap or graft type which we do not have you need to let us know right away so I can add it in before the variable is hidden. No need to panic, we plan to give you roughly 6 months to make the change.
Partial Purse String (Simple) Text: Given the location of the defect and the characteristics of the surrounding skin a simple purse string closure was deemed most appropriate.  Undermining was performed circumfirentially around the surgical defect.  A purse string suture was then placed and tightened. Wound tension only allowed a partial closure of the circular defect.
Epidermal Autograft Text: The defect edges were debeveled with a #15 scalpel blade.  Given the location of the defect, shape of the defect and the proximity to free margins an epidermal autograft was deemed most appropriate.  Using a sterile surgical marker, the primary defect shape was transferred to the donor site. The epidermal graft was then harvested.  The skin graft was then placed in the primary defect and oriented appropriately.
Cheiloplasty (Complex) Text: A decision was made to reconstruct the defect with a  cheiloplasty.  The defect was undermined extensively.  Additional obicularis oris muscle was excised with a 15 blade scalpel.  The defect was converted into a full thickness wedge to facilite a better cosmetic result.  Small vessels were then tied off with 5-0 monocyrl. The obicularis oris, superficial fascia, adipose and dermis were then reapproximated.  After the deeper layers were approximated the epidermis was reapproximated with particular care given to realign the vermillion border.
Cheek-To-Nose Interpolation Flap Text: A decision was made to reconstruct the defect utilizing an interpolation axial flap and a staged reconstruction.  A telfa template was made of the defect.  This telfa template was then used to outline the Cheek-To-Nose Interpolation flap.  The donor area for the pedicle flap was then injected with anesthesia.  The flap was excised through the skin and subcutaneous tissue down to the layer of the underlying musculature.  The interpolation flap was carefully excised within this deep plane to maintain its blood supply.  The edges of the donor site were undermined.   The donor site was closed in a primary fashion.  The pedicle was then rotated into position and sutured.  Once the tube was sutured into place, adequate blood supply was confirmed with blanching and refill.  The pedicle was then wrapped with xeroform gauze and dressed appropriately with a telfa and gauze bandage to ensure continued blood supply and protect the attached pedicle.
Double O-Z Plasty Text: The defect edges were debeveled with a #15 scalpel blade.  Given the location of the defect, shape of the defect and the proximity to free margins a Double O-Z plasty (double transposition flap) was deemed most appropriate.  Using a sterile surgical marker, the appropriate transposition flaps were drawn incorporating the defect and placing the expected incisions within the relaxed skin tension lines where possible. The area thus outlined was incised deep to adipose tissue with a #15 scalpel blade.  The skin margins were undermined to an appropriate distance in all directions utilizing iris scissors.  Hemostasis was achieved with electrocautery.  The flaps were then transposed into place, one clockwise and the other counterclockwise, and anchored with interrupted buried subcutaneous sutures.
Nostril Rim Text: The closure involved the nostril rim.
Length To Time In Minutes Device Was In Place: 10
Alternatives Discussed Intro (Do Not Add Period): I discussed alternative treatments to Mohs surgery and specifically discussed the risks and benefits of
Information: Selecting Yes will display possible errors in your note based on the variables you have selected. This validation is only offered as a suggestion for you. PLEASE NOTE THAT THE VALIDATION TEXT WILL BE REMOVED WHEN YOU FINALIZE YOUR NOTE. IF YOU WANT TO FAX A PRELIMINARY NOTE YOU WILL NEED TO TOGGLE THIS TO 'NO' IF YOU DO NOT WANT IT IN YOUR FAXED NOTE.
Helical Rim Text: The closure involved the helical rim.
Non-Graft Cartilage Fenestration Text: The cartilage was fenestrated with a 2mm punch biopsy to help facilitate healing.
Suturegard Body: The suture ends were repeatedly re-tightened and re-clamped to achieve the desired tissue expansion.
Island Pedicle Flap-Requiring Vessel Identification Text: The defect edges were debeveled with a #15 scalpel blade.  Given the location of the defect, shape of the defect and the proximity to free margins an island pedicle advancement flap was deemed most appropriate.  Using a sterile surgical marker, an appropriate advancement flap was drawn, based on the axial vessel mentioned above, incorporating the defect, outlining the appropriate donor tissue and placing the expected incisions within the relaxed skin tension lines where possible.    The area thus outlined was incised deep to adipose tissue with a #15 scalpel blade.  The skin margins were undermined to an appropriate distance in all directions around the primary defect and laterally outward around the island pedicle utilizing iris scissors.  There was minimal undermining beneath the pedicle flap.
Bilobed Transposition Flap Text: The defect edges were debeveled with a #15 scalpel blade.  Given the location of the defect and the proximity to free margins a bilobed transposition flap was deemed most appropriate.  Using a sterile surgical marker, an appropriate bilobe flap drawn around the defect.    The area thus outlined was incised deep to adipose tissue with a #15 scalpel blade.  The skin margins were undermined to an appropriate distance in all directions utilizing iris scissors.
Consent (Lip)/Introductory Paragraph: The rationale for Mohs was explained to the patient and consent was obtained. The risks, benefits and alternatives to therapy were discussed in detail. Specifically, the risks of lip deformity, changes in the oral aperture, infection, scarring, bleeding, prolonged wound healing, incomplete removal, allergy to anesthesia, nerve injury and recurrence were addressed. Prior to the procedure, the treatment site was clearly identified and confirmed by the patient. All components of Universal Protocol/PAUSE Rule completed.
Consent 3/Introductory Paragraph: I gave the patient a chance to ask questions they had about the procedure.  Following this I explained the Mohs procedure and consent was obtained. The risks, benefits and alternatives to therapy were discussed in detail. Specifically, the risks of infection, scarring, bleeding, prolonged wound healing, incomplete removal, allergy to anesthesia, nerve injury and recurrence were addressed. Prior to the procedure, the treatment site was clearly identified and confirmed by the patient. All components of Universal Protocol/PAUSE Rule completed.
Number Of Stages: 4
Rhomboid Transposition Flap Text: The defect edges were debeveled with a #15 scalpel blade.  Given the location of the defect and the proximity to free margins a rhomboid transposition flap was deemed most appropriate.  Using a sterile surgical marker, an appropriate rhomboid flap was drawn incorporating the defect.    The area thus outlined was incised deep to adipose tissue with a #15 scalpel blade.  The skin margins were undermined to an appropriate distance in all directions utilizing iris scissors.
Rotation Flap Text: The defect edges were debeveled with a #15 scalpel blade.  Given the location of the defect, shape of the defect and the proximity to free margins a rotation flap was deemed most appropriate.  Using a sterile surgical marker, an appropriate rotation flap was drawn incorporating the defect and placing the expected incisions within the relaxed skin tension lines where possible.    The area thus outlined was incised deep to adipose tissue with a #15 scalpel blade.  The skin margins were undermined to an appropriate distance in all directions utilizing iris scissors.
Double O-Z Flap Text: The defect edges were debeveled with a #15 scalpel blade.  Given the location of the defect, shape of the defect and the proximity to free margins a Double O-Z flap was deemed most appropriate.  Using a sterile surgical marker, an appropriate transposition flap was drawn incorporating the defect and placing the expected incisions within the relaxed skin tension lines where possible. The area thus outlined was incised deep to adipose tissue with a #15 scalpel blade.  The skin margins were undermined to an appropriate distance in all directions utilizing iris scissors.
Retention Suture Bite Size: 3 mm
Burow's Advancement Flap Text: The defect edges were debeveled with a #15 scalpel blade.  Given the location of the defect and the proximity to free margins a Burow's advancement flap was deemed most appropriate.  Using a sterile surgical marker, the appropriate advancement flap was drawn incorporating the defect and placing the expected incisions within the relaxed skin tension lines where possible.    The area thus outlined was incised deep to adipose tissue with a #15 scalpel blade.  The skin margins were undermined to an appropriate distance in all directions utilizing iris scissors.
Vermilion Border Text: The closure involved the vermilion border.
Medical Necessity Statement: Based on my medical judgement, Mohs surgery is the most appropriate treatment for this cancer compared to other treatments.
Donor Site Anesthesia Type: same as repair anesthesia
Dressing (No Sutures): dry sterile dressing
Graft Cartilage Fenestration Text: The cartilage was fenestrated with a 2mm punch biopsy to help facilitate graft survival and healing.
Hemostasis: Electrocautery
Anesthesia Type: 1% lidocaine without epinephrine and a 1:3 solution of 8.4% sodium bicarbonate
Keystone Flap Text: The defect edges were debeveled with a #15 scalpel blade.  Given the location of the defect, shape of the defect a keystone flap was deemed most appropriate.  Using a sterile surgical marker, an appropriate keystone flap was drawn incorporating the defect, outlining the appropriate donor tissue and placing the expected incisions within the relaxed skin tension lines where possible. The area thus outlined was incised deep to adipose tissue with a #15 scalpel blade.  The skin margins were undermined to an appropriate distance in all directions around the primary defect and laterally outward around the flap utilizing iris scissors.
Trilobed Flap Text: The defect edges were debeveled with a #15 scalpel blade.  Given the location of the defect and the proximity to free margins a trilobed flap was deemed most appropriate.  Using a sterile surgical marker, an appropriate trilobed flap drawn around the defect.    The area thus outlined was incised deep to adipose tissue with a #15 scalpel blade.  The skin margins were undermined to an appropriate distance in all directions utilizing iris scissors.
Bi-Rhombic Flap Text: The defect edges were debeveled with a #15 scalpel blade.  Given the location of the defect and the proximity to free margins a bi-rhombic flap was deemed most appropriate.  Using a sterile surgical marker, an appropriate rhombic flap was drawn incorporating the defect. The area thus outlined was incised deep to adipose tissue with a #15 scalpel blade.  The skin margins were undermined to an appropriate distance in all directions utilizing iris scissors.
Consent (Scalp)/Introductory Paragraph: The rationale for Mohs was explained to the patient and consent was obtained. The risks, benefits and alternatives to therapy were discussed in detail. Specifically, the risks of changes in hair growth pattern secondary to repair, infection, scarring, bleeding, prolonged wound healing, incomplete removal, allergy to anesthesia, nerve injury and recurrence were addressed. Prior to the procedure, the treatment site was clearly identified and confirmed by the patient. All components of Universal Protocol/PAUSE Rule completed.
Consent (Temporal Branch)/Introductory Paragraph: The rationale for Mohs was explained to the patient and consent was obtained. The risks, benefits and alternatives to therapy were discussed in detail. Specifically, the risks of damage to the temporal branch of the facial nerve, infection, scarring, bleeding, prolonged wound healing, incomplete removal, allergy to anesthesia, and recurrence were addressed. Prior to the procedure, the treatment site was clearly identified and confirmed by the patient. All components of Universal Protocol/PAUSE Rule completed.
Spiral Flap Text: The defect edges were debeveled with a #15 scalpel blade.  Given the location of the defect, shape of the defect and the proximity to free margins a spiral flap was deemed most appropriate.  Using a sterile surgical marker, an appropriate rotation flap was drawn incorporating the defect and placing the expected incisions within the relaxed skin tension lines where possible. The area thus outlined was incised deep to adipose tissue with a #15 scalpel blade.  The skin margins were undermined to an appropriate distance in all directions utilizing iris scissors.
V-Y Flap Text: The defect edges were debeveled with a #15 scalpel blade.  Given the location of the defect, shape of the defect and the proximity to free margins a V-Y flap was deemed most appropriate.  Using a sterile surgical marker, an appropriate advancement flap was drawn incorporating the defect and placing the expected incisions within the relaxed skin tension lines where possible.    The area thus outlined was incised deep to adipose tissue with a #15 scalpel blade.  The skin margins were undermined to an appropriate distance in all directions utilizing iris scissors.
Chonodrocutaneous Helical Advancement Flap Text: The defect edges were debeveled with a #15 scalpel blade.  Given the location of the defect and the proximity to free margins a chondrocutaneous helical advancement flap was deemed most appropriate.  Using a sterile surgical marker, the appropriate advancement flap was drawn incorporating the defect and placing the expected incisions within the relaxed skin tension lines where possible.    The area thus outlined was incised deep to adipose tissue with a #15 scalpel blade.  The skin margins were undermined to an appropriate distance in all directions utilizing iris scissors.
Primary Defect Length In Cm (Final Defect Size - Required For Flaps/Grafts): 3.5
Graft Donor Site Bandage (Optional-Leave Blank If You Don't Want In Note): Steri-strips and a pressure bandage were applied to the donor site.
Paramedian Forehead Flap Text: A decision was made to reconstruct the defect utilizing an interpolation axial flap and a staged reconstruction.  A telfa template was made of the defect.  This telfa template was then used to outline the paramedian forehead pedicle flap.  The donor area for the pedicle flap was then injected with anesthesia.  The flap was excised through the skin and subcutaneous tissue down to the layer of the underlying musculature.  The pedicle flap was carefully excised within this deep plane to maintain its blood supply.  The edges of the donor site were undermined.   The donor site was closed in a primary fashion.  The pedicle was then rotated into position and sutured.  Once the tube was sutured into place, adequate blood supply was confirmed with blanching and refill.  The pedicle was then wrapped with xeroform gauze and dressed appropriately with a telfa and gauze bandage to ensure continued blood supply and protect the attached pedicle.
Z Plasty Text: The lesion was extirpated to the level of the fat with a #15 scalpel blade.  Given the location of the defect, shape of the defect and the proximity to free margins a Z-plasty was deemed most appropriate for repair.  Using a sterile surgical marker, the appropriate transposition arms of the Z-plasty were drawn incorporating the defect and placing the expected incisions within the relaxed skin tension lines where possible.    The area thus outlined was incised deep to adipose tissue with a #15 scalpel blade.  The skin margins were undermined to an appropriate distance in all directions utilizing iris scissors.  The opposing transposition arms were then transposed into place in opposite direction and anchored with interrupted buried subcutaneous sutures.
Area L Indication Text: Tumors in this location are included in Area L (trunk and extremities).  Mohs surgery is indicated for larger tumors, 2 cm or larger, in these anatomic locations.
Location Indication Override (Is Already Calculated Based On Selected Body Location): Area L
Complex Repair And Graft Additional Text (Will Appearing After The Standard Complex Repair Text): The complex repair was not sufficient to completely close the primary defect. The remaining additional defect was repaired with the graft mentioned below.
Purse String (Simple) Text: Given the location of the defect and the characteristics of the surrounding skin a pursestring closure was deemed most appropriate.  Undermining was performed circumfirentially around the surgical defect.  A purstring suture was then placed and tightened.
Cartilage Graft Text: The defect edges were debeveled with a #15 scalpel blade.  Given the location of the defect, shape of the defect, the fact the defect involved a full thickness cartilage defect a cartilage graft was deemed most appropriate.  An appropriate donor site was identified, cleansed, and anesthetized. The cartilage graft was then harvested and transferred to the recipient site, oriented appropriately and then sutured into place.  The secondary defect was then repaired using a primary closure.
Stage 2: Additional Anesthesia Type: 1% lidocaine with 1:100,000 epinephrine
Tarsorrhaphy Text: A tarsorrhaphy was performed using Frost sutures.
Tissue Cultured Epidermal Autograft Text: The defect edges were debeveled with a #15 scalpel blade.  Given the location of the defect, shape of the defect and the proximity to free margins a tissue cultured epidermal autograft was deemed most appropriate.  The graft was then trimmed to fit the size of the defect.  The graft was then placed in the primary defect and oriented appropriately.
Ftsg Text: The defect edges were debeveled with a #15 scalpel blade.  Given the location of the defect, shape of the defect and the proximity to free margins a full thickness skin graft was deemed most appropriate.  Using a sterile surgical marker, the primary defect shape was transferred to the donor site. The area thus outlined was incised deep to adipose tissue with a #15 scalpel blade.  The harvested graft was then trimmed of adipose tissue until only dermis and epidermis was left.  The skin margins of the secondary defect were undermined to an appropriate distance in all directions utilizing iris scissors.  The secondary defect was closed with interrupted buried subcutaneous sutures.  The skin edges were then re-apposed with running  sutures.  The skin graft was then placed in the primary defect and oriented appropriately.
Mastoid Interpolation Flap Text: A decision was made to reconstruct the defect utilizing an interpolation axial flap and a staged reconstruction.  A telfa template was made of the defect.  This telfa template was then used to outline the mastoid interpolation flap.  The donor area for the pedicle flap was then injected with anesthesia.  The flap was excised through the skin and subcutaneous tissue down to the layer of the underlying musculature.  The pedicle flap was carefully excised within this deep plane to maintain its blood supply.  The edges of the donor site were undermined.   The donor site was closed in a primary fashion.  The pedicle was then rotated into position and sutured.  Once the tube was sutured into place, adequate blood supply was confirmed with blanching and refill.  The pedicle was then wrapped with xeroform gauze and dressed appropriately with a telfa and gauze bandage to ensure continued blood supply and protect the attached pedicle.
Retention Suture Text: Retention sutures were placed to support the closure and prevent dehiscence.
Area H Indication Text: Tumors in this location are included in Area H (eyelids, eyebrows, nose, lips, chin, ear, pre-auricular, post-auricular, temple, genitalia, hands, feet, ankles and areola).  Tissue conservation is critical in these anatomic locations.
H Plasty Text: Given the location of the defect, shape of the defect and the proximity to free margins a H-plasty was deemed most appropriate for repair.  Using a sterile surgical marker, the appropriate advancement arms of the H-plasty were drawn incorporating the defect and placing the expected incisions within the relaxed skin tension lines where possible. The area thus outlined was incised deep to adipose tissue with a #15 scalpel blade. The skin margins were undermined to an appropriate distance in all directions utilizing iris scissors.  The opposing advancement arms were then advanced into place in opposite direction and anchored with interrupted buried subcutaneous sutures.
Repair Type: Intermediate Layered Repair
Plastic Surgeon (A): Dr Zepeda
Undermining Type: Entire Wound
Consent (Ear)/Introductory Paragraph: The rationale for Mohs was explained to the patient and consent was obtained. The risks, benefits and alternatives to therapy were discussed in detail. Specifically, the risks of ear deformity, infection, scarring, bleeding, prolonged wound healing, incomplete removal, allergy to anesthesia, nerve injury and recurrence were addressed. Prior to the procedure, the treatment site was clearly identified and confirmed by the patient. All components of Universal Protocol/PAUSE Rule completed.
O-L Flap Text: The defect edges were debeveled with a #15 scalpel blade.  Given the location of the defect, shape of the defect and the proximity to free margins an O-L flap was deemed most appropriate.  Using a sterile surgical marker, an appropriate advancement flap was drawn incorporating the defect and placing the expected incisions within the relaxed skin tension lines where possible.    The area thus outlined was incised deep to adipose tissue with a #15 scalpel blade.  The skin margins were undermined to an appropriate distance in all directions utilizing iris scissors.
Advancement Flap (Single) Text: The defect edges were debeveled with a #15 scalpel blade.  Given the location of the defect and the proximity to free margins a single advancement flap was deemed most appropriate.  Using a sterile surgical marker, an appropriate advancement flap was drawn incorporating the defect and placing the expected incisions within the relaxed skin tension lines where possible.    The area thus outlined was incised deep to adipose tissue with a #15 scalpel blade.  The skin margins were undermined to an appropriate distance in all directions utilizing iris scissors.
Initial Size Of Lesion: 2.3
Alar Island Pedicle Flap Text: The defect edges were debeveled with a #15 scalpel blade.  Given the location of the defect, shape of the defect and the proximity to the alar rim an island pedicle advancement flap was deemed most appropriate.  Using a sterile surgical marker, an appropriate advancement flap was drawn incorporating the defect, outlining the appropriate donor tissue and placing the expected incisions within the nasal ala running parallel to the alar rim. The area thus outlined was incised with a #15 scalpel blade.  The skin margins were undermined minimally to an appropriate distance in all directions around the primary defect and laterally outward around the island pedicle utilizing iris scissors.  There was minimal undermining beneath the pedicle flap.
Banner Transposition Flap Text: The defect edges were debeveled with a #15 scalpel blade.  Given the location of the defect and the proximity to free margins a Banner transposition flap was deemed most appropriate.  Using a sterile surgical marker, an appropriate flap drawn around the defect. The area thus outlined was incised deep to adipose tissue with a #15 scalpel blade.  The skin margins were undermined to an appropriate distance in all directions utilizing iris scissors.
Melolabial Transposition Flap Text: The defect edges were debeveled with a #15 scalpel blade.  Given the location of the defect and the proximity to free margins a melolabial flap was deemed most appropriate.  Using a sterile surgical marker, an appropriate melolabial transposition flap was drawn incorporating the defect.    The area thus outlined was incised deep to adipose tissue with a #15 scalpel blade.  The skin margins were undermined to an appropriate distance in all directions utilizing iris scissors.
Mucosal Advancement Flap Text: Given the location of the defect, shape of the defect and the proximity to free margins a mucosal advancement flap was deemed most appropriate. Incisions were made with a 15 blade scalpel in the appropriate fashion along the cutaneous vermillion border and the mucosal lip. The remaining actinically damaged mucosal tissue was excised.  The mucosal advancement flap was then elevated to the gingival sulcus with care taken to preserve the neurovascular structures and advanced into the primary defect. Care was taken to ensure that precise realignment of the vermillion border was achieved.
Xenograft Text: The defect edges were debeveled with a #15 scalpel blade.  Given the location of the defect, shape of the defect and the proximity to free margins a xenograft was deemed most appropriate.  The graft was then trimmed to fit the size of the defect.  The graft was then placed in the primary defect and oriented appropriately.
Split-Thickness Skin Graft Text: The defect edges were debeveled with a #15 scalpel blade.  Given the location of the defect, shape of the defect and the proximity to free margins a split thickness skin graft was deemed most appropriate.  Using a sterile surgical marker, the primary defect shape was transferred to the donor site. The split thickness graft was then harvested.  The skin graft was then placed in the primary defect and oriented appropriately.
Posterior Auricular Interpolation Flap Text: A decision was made to reconstruct the defect utilizing an interpolation axial flap and a staged reconstruction.  A telfa template was made of the defect.  This telfa template was then used to outline the posterior auricular interpolation flap.  The donor area for the pedicle flap was then injected with anesthesia.  The flap was excised through the skin and subcutaneous tissue down to the layer of the underlying musculature.  The pedicle flap was carefully excised within this deep plane to maintain its blood supply.  The edges of the donor site were undermined.   The donor site was closed in a primary fashion.  The pedicle was then rotated into position and sutured.  Once the tube was sutured into place, adequate blood supply was confirmed with blanching and refill.  The pedicle was then wrapped with xeroform gauze and dressed appropriately with a telfa and gauze bandage to ensure continued blood supply and protect the attached pedicle.
W Plasty Text: The lesion was extirpated to the level of the fat with a #15 scalpel blade.  Given the location of the defect, shape of the defect and the proximity to free margins a W-plasty was deemed most appropriate for repair.  Using a sterile surgical marker, the appropriate transposition arms of the W-plasty were drawn incorporating the defect and placing the expected incisions within the relaxed skin tension lines where possible.    The area thus outlined was incised deep to adipose tissue with a #15 scalpel blade.  The skin margins were undermined to an appropriate distance in all directions utilizing iris scissors.  The opposing transposition arms were then transposed into place in opposite direction and anchored with interrupted buried subcutaneous sutures.
Area M Indication Text: Tumors in this location are included in Area M (cheek, forehead, scalp, neck, jawline and pretibial skin).  Mohs surgery is indicated for tumors 1 cm or larger in these anatomic locations.
Complex Repair And Flap Additional Text (Will Appearing After The Standard Complex Repair Text): The complex repair was not sufficient to completely close the primary defect. The remaining additional defect was repaired with the flap mentioned below.
Post-Care Instructions: I reviewed with the patient in detail post-care instructions. Patient is not to engage in any heavy lifting, exercise, or swimming for the next 14 days. Should the patient develop any fevers, chills, bleeding, severe pain patient will contact the office immediately..\\n.
Suturegard Intro: Intraoperative tissue expansion was performed, utilizing the SUTUREGARD device, in order to reduce wound tension.
Epidermal Closure: running and interrupted
Epidermal Sutures: 4-0 Prolene
Deep Sutures: 4-0 Vicryl
Mart-1 - Positive Histology Text: MART-1 staining demonstrates areas of higher density and clustering of melanocytes with Pagetoid spread upwards within the epidermis. The surgical margins are positive for tumor cells.
Mohs Case Number: [id number]
Consent (Nose)/Introductory Paragraph: The rationale for Mohs was explained to the patient and consent was obtained. The risks, benefits and alternatives to therapy were discussed in detail. Specifically, the risks of nasal deformity, changes in the flow of air through the nose, infection, scarring, bleeding, prolonged wound healing, incomplete removal, allergy to anesthesia, nerve injury and recurrence were addressed. Prior to the procedure, the treatment site was clearly identified and confirmed by the patient. All components of Universal Protocol/PAUSE Rule completed.
Advancement Flap (Double) Text: The defect edges were debeveled with a #15 scalpel blade.  Given the location of the defect and the proximity to free margins a double advancement flap was deemed most appropriate.  Using a sterile surgical marker, the appropriate advancement flaps were drawn incorporating the defect and placing the expected incisions within the relaxed skin tension lines where possible.    The area thus outlined was incised deep to adipose tissue with a #15 scalpel blade.  The skin margins were undermined to an appropriate distance in all directions utilizing iris scissors.
Wound Care (No Sutures): Petrolatum
Wound Care: Bacitracin
X Size Of Lesion In Cm (Optional): 1.2
Mohs Histo Method Verbiage: The section(s) were then chromacoded and processed in the Mohs lab using the Mohs protocol and submitted for frozen section.
Double Island Pedicle Flap Text: The defect edges were debeveled with a #15 scalpel blade.  Given the location of the defect, shape of the defect and the proximity to free margins a double island pedicle advancement flap was deemed most appropriate.  Using a sterile surgical marker, an appropriate advancement flap was drawn incorporating the defect, outlining the appropriate donor tissue and placing the expected incisions within the relaxed skin tension lines where possible.    The area thus outlined was incised deep to adipose tissue with a #15 scalpel blade.  The skin margins were undermined to an appropriate distance in all directions around the primary defect and laterally outward around the island pedicle utilizing iris scissors.  There was minimal undermining beneath the pedicle flap.
Bilobed Flap Text: The defect edges were debeveled with a #15 scalpel blade.  Given the location of the defect and the proximity to free margins a bilobe flap was deemed most appropriate.  Using a sterile surgical marker, an appropriate bilobe flap drawn around the defect.    The area thus outlined was incised deep to adipose tissue with a #15 scalpel blade.  The skin margins were undermined to an appropriate distance in all directions utilizing iris scissors.
Rhombic Flap Text: The defect edges were debeveled with a #15 scalpel blade.  Given the location of the defect and the proximity to free margins a rhombic flap was deemed most appropriate.  Using a sterile surgical marker, an appropriate rhombic flap was drawn incorporating the defect.    The area thus outlined was incised deep to adipose tissue with a #15 scalpel blade.  The skin margins were undermined to an appropriate distance in all directions utilizing iris scissors.
Hatchet Flap Text: The defect edges were debeveled with a #15 scalpel blade.  Given the location of the defect, shape of the defect and the proximity to free margins a hatchet flap was deemed most appropriate.  Using a sterile surgical marker, an appropriate hatchet flap was drawn incorporating the defect and placing the expected incisions within the relaxed skin tension lines where possible.    The area thus outlined was incised deep to adipose tissue with a #15 scalpel blade.  The skin margins were undermined to an appropriate distance in all directions utilizing iris scissors.
O-Z Flap Text: The defect edges were debeveled with a #15 scalpel blade.  Given the location of the defect, shape of the defect and the proximity to free margins an O-Z flap was deemed most appropriate.  Using a sterile surgical marker, an appropriate transposition flap was drawn incorporating the defect and placing the expected incisions within the relaxed skin tension lines where possible. The area thus outlined was incised deep to adipose tissue with a #15 scalpel blade.  The skin margins were undermined to an appropriate distance in all directions utilizing iris scissors.
Repair Anesthesia Method: local infiltration

## 2020-03-30 NOTE — PROCEDURE: LIQUID NITROGEN
Duration Of Freeze Thaw-Cycle (Seconds): 5
Number Of Freeze-Thaw Cycles: 1 freeze-thaw cycle
Detail Level: Detailed
Post-Care Instructions: I reviewed with the patient in detail post-care instructions. Patient is to wear sunprotection, and avoid picking at any of the treated lesions. Pt may apply Vaseline to crusted or scabbing areas. Will re-check at follow up
Render Post-Care Instructions In Note?: no
Consent: The patient's verbal consent was obtained including but not limited to risks of crusting, scabbing, blistering, scarring, darker or lighter pigmentary change, recurrence, incomplete removal and infection.

## 2020-04-13 ENCOUNTER — APPOINTMENT (RX ONLY)
Dept: URBAN - METROPOLITAN AREA CLINIC 23 | Facility: CLINIC | Age: 80
Setting detail: DERMATOLOGY
End: 2020-04-13

## 2020-04-13 DIAGNOSIS — Z48.01 ENCOUNTER FOR CHANGE OR REMOVAL OF SURGICAL WOUND DRESSING: ICD-10-CM

## 2020-04-13 PROCEDURE — 99024 POSTOP FOLLOW-UP VISIT: CPT

## 2020-04-13 PROCEDURE — ? SUTURE REMOVAL (GLOBAL PERIOD)

## 2020-04-13 ASSESSMENT — LOCATION SIMPLE DESCRIPTION DERM: LOCATION SIMPLE: LEFT POSTERIOR UPPER ARM

## 2020-04-13 ASSESSMENT — LOCATION DETAILED DESCRIPTION DERM: LOCATION DETAILED: LEFT DISTAL POSTERIOR UPPER ARM

## 2020-04-13 ASSESSMENT — LOCATION ZONE DERM: LOCATION ZONE: ARM

## 2020-04-13 NOTE — PROCEDURE: SUTURE REMOVAL (GLOBAL PERIOD)
Add 34531 Cpt? (Important Note: In 2017 The Use Of 97244 Is Being Tracked By Cms To Determine Future Global Period Reimbursement For Global Periods): no
Detail Level: Detailed

## 2020-06-04 ENCOUNTER — APPOINTMENT (RX ONLY)
Dept: URBAN - METROPOLITAN AREA CLINIC 23 | Facility: CLINIC | Age: 80
Setting detail: DERMATOLOGY
End: 2020-06-04

## 2020-06-04 DIAGNOSIS — D485 NEOPLASM OF UNCERTAIN BEHAVIOR OF SKIN: ICD-10-CM

## 2020-06-04 DIAGNOSIS — Z85.828 PERSONAL HISTORY OF OTHER MALIGNANT NEOPLASM OF SKIN: ICD-10-CM

## 2020-06-04 DIAGNOSIS — L57.0 ACTINIC KERATOSIS: ICD-10-CM

## 2020-06-04 DIAGNOSIS — L82.1 OTHER SEBORRHEIC KERATOSIS: ICD-10-CM

## 2020-06-04 PROBLEM — D48.5 NEOPLASM OF UNCERTAIN BEHAVIOR OF SKIN: Status: ACTIVE | Noted: 2020-06-04

## 2020-06-04 PROBLEM — J30.1 ALLERGIC RHINITIS DUE TO POLLEN: Status: ACTIVE | Noted: 2020-06-04

## 2020-06-04 PROBLEM — L85.3 XEROSIS CUTIS: Status: ACTIVE | Noted: 2020-06-04

## 2020-06-04 PROBLEM — L55.1 SUNBURN OF SECOND DEGREE: Status: ACTIVE | Noted: 2020-06-04

## 2020-06-04 PROCEDURE — ? LIQUID NITROGEN

## 2020-06-04 PROCEDURE — ? BIOPSY BY SHAVE METHOD

## 2020-06-04 PROCEDURE — ? OTHER

## 2020-06-04 PROCEDURE — ? COUNSELING

## 2020-06-04 PROCEDURE — 11103 TANGNTL BX SKIN EA SEP/ADDL: CPT

## 2020-06-04 PROCEDURE — 11102 TANGNTL BX SKIN SINGLE LES: CPT

## 2020-06-04 PROCEDURE — 17000 DESTRUCT PREMALG LESION: CPT | Mod: 59

## 2020-06-04 PROCEDURE — 99213 OFFICE O/P EST LOW 20 MIN: CPT | Mod: 25

## 2020-06-04 ASSESSMENT — LOCATION DETAILED DESCRIPTION DERM
LOCATION DETAILED: LEFT CENTRAL EYEBROW
LOCATION DETAILED: LEFT ANTERIOR PROXIMAL THIGH
LOCATION DETAILED: LEFT PROXIMAL POSTERIOR UPPER ARM
LOCATION DETAILED: LEFT CLAVICULAR NECK
LOCATION DETAILED: RIGHT INFERIOR LATERAL MALAR CHEEK
LOCATION DETAILED: UPPER STERNUM
LOCATION DETAILED: LEFT PROXIMAL LATERAL POSTERIOR UPPER ARM
LOCATION DETAILED: RIGHT LATERAL PROXIMAL PRETIBIAL REGION
LOCATION DETAILED: LEFT MEDIAL SUPERIOR CHEST
LOCATION DETAILED: LEFT CENTRAL FRONTAL SCALP
LOCATION DETAILED: RIGHT DORSAL 2ND TOE
LOCATION DETAILED: RIGHT PROXIMAL CALF
LOCATION DETAILED: RIGHT POSTERIOR ANKLE
LOCATION DETAILED: LEFT ANTERIOR PROXIMAL UPPER ARM
LOCATION DETAILED: RIGHT DISTAL PRETIBIAL REGION
LOCATION DETAILED: LEFT DISTAL PRETIBIAL REGION
LOCATION DETAILED: LEFT PROXIMAL LATERAL CALF
LOCATION DETAILED: RIGHT SUPERIOR PARIETAL SCALP
LOCATION DETAILED: LEFT SUPERIOR LATERAL NECK
LOCATION DETAILED: RIGHT LOWER CUTANEOUS LIP
LOCATION DETAILED: LEFT DISTAL CALF
LOCATION DETAILED: LEFT SUPERIOR LATERAL FOREHEAD

## 2020-06-04 ASSESSMENT — LOCATION SIMPLE DESCRIPTION DERM
LOCATION SIMPLE: CHEST
LOCATION SIMPLE: LEFT EYEBROW
LOCATION SIMPLE: LEFT ANTERIOR NECK
LOCATION SIMPLE: LEFT UPPER ARM
LOCATION SIMPLE: NECK
LOCATION SIMPLE: RIGHT PRETIBIAL REGION
LOCATION SIMPLE: LEFT FOREHEAD
LOCATION SIMPLE: RIGHT ANKLE
LOCATION SIMPLE: RIGHT LIP
LOCATION SIMPLE: LEFT CALF
LOCATION SIMPLE: RIGHT CHEEK
LOCATION SIMPLE: LEFT SCALP
LOCATION SIMPLE: RIGHT CALF
LOCATION SIMPLE: LEFT PRETIBIAL REGION
LOCATION SIMPLE: SCALP
LOCATION SIMPLE: RIGHT 2ND TOE
LOCATION SIMPLE: LEFT POSTERIOR UPPER ARM
LOCATION SIMPLE: LEFT THIGH

## 2020-06-04 ASSESSMENT — LOCATION ZONE DERM
LOCATION ZONE: SCALP
LOCATION ZONE: ARM
LOCATION ZONE: LIP
LOCATION ZONE: TOE
LOCATION ZONE: NECK
LOCATION ZONE: TRUNK
LOCATION ZONE: FACE
LOCATION ZONE: LEG

## 2020-06-04 NOTE — PROCEDURE: BIOPSY BY SHAVE METHOD
Bill For Surgical Tray: no
Cryotherapy Text: The wound bed was treated with cryotherapy after the biopsy was performed.
X Size Of Lesion In Cm: 0
Biopsy Type: H and E
Electrodesiccation Text: The wound bed was treated with electrodesiccation after the biopsy was performed.
Accession #: [id number]
Post-Care Instructions: I reviewed with the patient in detail post-care instructions. Patient is to keep the biopsy site dry overnight, and then apply bacitracin twice daily until healed. Patient may apply hydrogen peroxide soaks to remove any crusting.
Anesthesia Type: 1% lidocaine with 1:200,000 epinephrine and a 1:10 solution of 8.4% sodium bicarbonate
Biopsy Method: Dermablade
Information: Selecting Yes will display possible errors in your note based on the variables you have selected. This validation is only offered as a suggestion for you. PLEASE NOTE THAT THE VALIDATION TEXT WILL BE REMOVED WHEN YOU FINALIZE YOUR NOTE. IF YOU WANT TO FAX A PRELIMINARY NOTE YOU WILL NEED TO TOGGLE THIS TO 'NO' IF YOU DO NOT WANT IT IN YOUR FAXED NOTE.
Silver Nitrate Text: The wound bed was treated with silver nitrate after the biopsy was performed.
Was A Bandage Applied: Yes
Outside Pathology Billing: outside pathology read only
Billing Type: Third-Party Bill
Electrodesiccation And Curettage Text: The wound bed was treated with electrodesiccation and curettage after the biopsy was performed.
Type Of Destruction Used: Curettage
Wound Care: Vaseline
Notification Instructions: Patient will be notified of biopsy results. However, patient instructed to call the office if not contacted within 2 weeks.
Detail Level: Detailed
Path Notes (To The Dermatopathologist): .
Dressing: pressure dressing with telfa
Anesthesia Volume In Cc: 0.5
Consent: Written consent was obtained and risks were reviewed including but not limited to scarring, infection, bleeding, scabbing, incomplete removal, nerve damage and allergy to anesthesia.
Hemostasis: Electrocautery
Depth Of Biopsy: dermis

## 2020-06-04 NOTE — PROCEDURE: LIQUID NITROGEN
Number Of Freeze-Thaw Cycles: 1 freeze-thaw cycle
Consent: The patient's verbal consent was obtained including but not limited to risks of crusting, scabbing, blistering, scarring, darker or lighter pigmentary change, recurrence, incomplete removal and infection.
Post-Care Instructions: I reviewed with the patient in detail post-care instructions. Patient is to wear sunprotection, and avoid picking at any of the treated lesions. Pt may apply Vaseline to crusted or scabbing areas. Will re-check at follow up
Render Note In Bullet Format When Appropriate: No
Duration Of Freeze Thaw-Cycle (Seconds): 5
Detail Level: Detailed

## 2020-07-23 ENCOUNTER — APPOINTMENT (RX ONLY)
Dept: URBAN - METROPOLITAN AREA CLINIC 23 | Facility: CLINIC | Age: 80
Setting detail: DERMATOLOGY
End: 2020-07-23

## 2020-07-23 DIAGNOSIS — L57.0 ACTINIC KERATOSIS: ICD-10-CM

## 2020-07-23 PROBLEM — I10 ESSENTIAL (PRIMARY) HYPERTENSION: Status: ACTIVE | Noted: 2020-07-23

## 2020-07-23 PROBLEM — L55.1 SUNBURN OF SECOND DEGREE: Status: ACTIVE | Noted: 2020-07-23

## 2020-07-23 PROBLEM — C44.629 SQUAMOUS CELL CARCINOMA OF SKIN OF LEFT UPPER LIMB, INCLUDING SHOULDER: Status: ACTIVE | Noted: 2020-07-23

## 2020-07-23 PROCEDURE — 17000 DESTRUCT PREMALG LESION: CPT | Mod: 59

## 2020-07-23 PROCEDURE — ? EXCISION

## 2020-07-23 PROCEDURE — 11602 EXC TR-EXT MAL+MARG 1.1-2 CM: CPT

## 2020-07-23 PROCEDURE — ? LIQUID NITROGEN

## 2020-07-23 PROCEDURE — 12032 INTMD RPR S/A/T/EXT 2.6-7.5: CPT | Mod: 59

## 2020-07-23 ASSESSMENT — LOCATION ZONE DERM: LOCATION ZONE: LEG

## 2020-07-23 ASSESSMENT — LOCATION SIMPLE DESCRIPTION DERM: LOCATION SIMPLE: RIGHT ANKLE

## 2020-07-23 ASSESSMENT — LOCATION DETAILED DESCRIPTION DERM: LOCATION DETAILED: RIGHT POSTERIOR ANKLE

## 2020-07-23 NOTE — PROCEDURE: EXCISION
Primary Defect Length (In Cm): 0
Melolabial Transposition Flap Text: The defect edges were debeveled with a #15 scalpel blade.  Given the location of the defect and the proximity to free margins a melolabial flap was deemed most appropriate.  Using a sterile surgical marker, an appropriate melolabial transposition flap was drawn incorporating the defect.    The area thus outlined was incised deep to adipose tissue with a #15 scalpel blade.  The skin margins were undermined to an appropriate distance in all directions utilizing iris scissors.
Complex Requirements: Extensive Undermining Performed?: No
Split-Thickness Skin Graft Text: The defect edges were debeveled with a #15 scalpel blade.  Given the location of the defect, shape of the defect and the proximity to free margins a split thickness skin graft was deemed most appropriate.  Using a sterile surgical marker, the primary defect shape was transferred to the donor site. The split thickness graft was then harvested.  The skin graft was then placed in the primary defect and oriented appropriately.
Interpolation Flap Text: A decision was made to reconstruct the defect utilizing an interpolation axial flap and a staged reconstruction.  A telfa template was made of the defect.  This telfa template was then used to outline the interpolation flap.  The donor area for the pedicle flap was then injected with anesthesia.  The flap was excised through the skin and subcutaneous tissue down to the layer of the underlying musculature.  The interpolation flap was carefully excised within this deep plane to maintain its blood supply.  The edges of the donor site were undermined.   The donor site was closed in a primary fashion.  The pedicle was then rotated into position and sutured.  Once the tube was sutured into place, adequate blood supply was confirmed with blanching and refill.  The pedicle was then wrapped with xeroform gauze and dressed appropriately with a telfa and gauze bandage to ensure continued blood supply and protect the attached pedicle.
V-Y Plasty Text: The defect edges were debeveled with a #15 scalpel blade.  Given the location of the defect, shape of the defect and the proximity to free margins an V-Y advancement flap was deemed most appropriate.  Using a sterile surgical marker, an appropriate advancement flap was drawn incorporating the defect and placing the expected incisions within the relaxed skin tension lines where possible.    The area thus outlined was incised deep to adipose tissue with a #15 scalpel blade.  The skin margins were undermined to an appropriate distance in all directions utilizing iris scissors.
Alar Island Pedicle Flap Text: The defect edges were debeveled with a #15 scalpel blade.  Given the location of the defect, shape of the defect and the proximity to the alar rim an island pedicle advancement flap was deemed most appropriate.  Using a sterile surgical marker, an appropriate advancement flap was drawn incorporating the defect, outlining the appropriate donor tissue and placing the expected incisions within the nasal ala running parallel to the alar rim. The area thus outlined was incised with a #15 scalpel blade.  The skin margins were undermined minimally to an appropriate distance in all directions around the primary defect and laterally outward around the island pedicle utilizing iris scissors.  There was minimal undermining beneath the pedicle flap.
Banner Transposition Flap Text: The defect edges were debeveled with a #15 scalpel blade.  Given the location of the defect and the proximity to free margins a Banner transposition flap was deemed most appropriate.  Using a sterile surgical marker, an appropriate flap drawn around the defect. The area thus outlined was incised deep to adipose tissue with a #15 scalpel blade.  The skin margins were undermined to an appropriate distance in all directions utilizing iris scissors.
Complex Repair And Epidermal Autograft Text: The defect edges were debeveled with a #15 scalpel blade.  The primary defect was closed partially with a complex linear closure.  Given the location of the defect, shape of the defect and the proximity to free margins an epidermal autograft was deemed most appropriate to repair the remaining defect.  The graft was trimmed to fit the size of the remaining defect.  The graft was then placed in the primary defect, oriented appropriately, and sutured into place.
Complex Repair And Double M Plasty Text: The defect edges were debeveled with a #15 scalpel blade.  The primary defect was closed partially with a complex linear closure.  Given the location of the remaining defect, shape of the defect and the proximity to free margins a double M plasty was deemed most appropriate for complete closure of the defect.  Using a sterile surgical marker, an appropriate advancement flap was drawn incorporating the defect and placing the expected incisions within the relaxed skin tension lines where possible.    The area thus outlined was incised deep to adipose tissue with a #15 scalpel blade.  The skin margins were undermined to an appropriate distance in all directions utilizing iris scissors.
Complex Repair And Bilobe Flap Text: The defect edges were debeveled with a #15 scalpel blade.  The primary defect was closed partially with a complex linear closure.  Given the location of the remaining defect, shape of the defect and the proximity to free margins a bilobe flap was deemed most appropriate for complete closure of the defect.  Using a sterile surgical marker, an appropriate advancement flap was drawn incorporating the defect and placing the expected incisions within the relaxed skin tension lines where possible.    The area thus outlined was incised deep to adipose tissue with a #15 scalpel blade.  The skin margins were undermined to an appropriate distance in all directions utilizing iris scissors.
Partial Purse String (Simple) Text: Given the location of the defect and the characteristics of the surrounding skin a simple purse string closure was deemed most appropriate.  Undermining was performed circumferentially around the surgical defect.  A purse string suture was then placed and tightened. Wound tension of the circular defect prevented complete closure of the wound.
Dermal Autograft Text: The defect edges were debeveled with a #15 scalpel blade.  Given the location of the defect, shape of the defect and the proximity to free margins a dermal autograft was deemed most appropriate.  Using a sterile surgical marker, the primary defect shape was transferred to the donor site. The area thus outlined was incised deep to adipose tissue with a #15 scalpel blade.  The harvested graft was then trimmed of adipose and epidermal tissue until only dermis was left.  The skin graft was then placed in the primary defect and oriented appropriately.
Deep Sutures: 4-0 Vicryl
Nostril Rim Text: The closure involved the nostril rim.
Suturegard Intro: Intraoperative tissue expansion was performed, utilizing the SUTUREGARD device, in order to reduce wound tension.
Modified Advancement Flap Text: The defect edges were debeveled with a #15 scalpel blade.  Given the location of the defect, shape of the defect and the proximity to free margins a modified advancement flap was deemed most appropriate.  Using a sterile surgical marker, an appropriate advancement flap was drawn incorporating the defect and placing the expected incisions within the relaxed skin tension lines where possible.    The area thus outlined was incised deep to adipose tissue with a #15 scalpel blade.  The skin margins were undermined to an appropriate distance in all directions utilizing iris scissors.
O-T Advancement Flap Text: The defect edges were debeveled with a #15 scalpel blade.  Given the location of the defect, shape of the defect and the proximity to free margins an O-T advancement flap was deemed most appropriate.  Using a sterile surgical marker, an appropriate advancement flap was drawn incorporating the defect and placing the expected incisions within the relaxed skin tension lines where possible.    The area thus outlined was incised deep to adipose tissue with a #15 scalpel blade.  The skin margins were undermined to an appropriate distance in all directions utilizing iris scissors.
No Repair - Repaired With Adjacent Surgical Defect Text (Leave Blank If You Do Not Want): After the excision the defect was repaired concurrently with another surgical defect which was in close approximation.
Show Repair Surgeon Variable: Yes
Burow's Graft Text: The defect edges were debeveled with a #15 scalpel blade.  Given the location of the defect, shape of the defect, the proximity to free margins and the presence of a standing cone deformity a Burow's skin graft was deemed most appropriate. The standing cone was removed and this tissue was then trimmed to the shape of the primary defect. The adipose tissue was also removed until only dermis and epidermis were left.  The skin margins of the secondary defect were undermined to an appropriate distance in all directions utilizing iris scissors.  The secondary defect was closed with interrupted buried subcutaneous sutures.  The skin edges were then re-apposed with running  sutures.  The skin graft was then placed in the primary defect and oriented appropriately.
Melolabial Interpolation Flap Text: A decision was made to reconstruct the defect utilizing an interpolation axial flap and a staged reconstruction.  A telfa template was made of the defect.  This telfa template was then used to outline the melolabial interpolation flap.  The donor area for the pedicle flap was then injected with anesthesia.  The flap was excised through the skin and subcutaneous tissue down to the layer of the underlying musculature.  The pedicle flap was carefully excised within this deep plane to maintain its blood supply.  The edges of the donor site were undermined.   The donor site was closed in a primary fashion.  The pedicle was then rotated into position and sutured.  Once the tube was sutured into place, adequate blood supply was confirmed with blanching and refill.  The pedicle was then wrapped with xeroform gauze and dressed appropriately with a telfa and gauze bandage to ensure continued blood supply and protect the attached pedicle.
Anesthesia Type: 1% lidocaine with epinephrine and a 1:10 solution of 8.4% sodium bicarbonate
Anesthesia Volume In Cc: 9
H Plasty Text: Given the location of the defect, shape of the defect and the proximity to free margins a H-plasty was deemed most appropriate for repair.  Using a sterile surgical marker, the appropriate advancement arms of the H-plasty were drawn incorporating the defect and placing the expected incisions within the relaxed skin tension lines where possible. The area thus outlined was incised deep to adipose tissue with a #15 scalpel blade. The skin margins were undermined to an appropriate distance in all directions utilizing iris scissors.  The opposing advancement arms were then advanced into place in opposite direction and anchored with interrupted buried subcutaneous sutures.
Double Island Pedicle Flap Text: The defect edges were debeveled with a #15 scalpel blade.  Given the location of the defect, shape of the defect and the proximity to free margins a double island pedicle advancement flap was deemed most appropriate.  Using a sterile surgical marker, an appropriate advancement flap was drawn incorporating the defect, outlining the appropriate donor tissue and placing the expected incisions within the relaxed skin tension lines where possible.    The area thus outlined was incised deep to adipose tissue with a #15 scalpel blade.  The skin margins were undermined to an appropriate distance in all directions around the primary defect and laterally outward around the island pedicle utilizing iris scissors.  There was minimal undermining beneath the pedicle flap.
Bilobed Flap Text: The defect edges were debeveled with a #15 scalpel blade.  Given the location of the defect and the proximity to free margins a bilobe flap was deemed most appropriate.  Using a sterile surgical marker, an appropriate bilobe flap drawn around the defect.    The area thus outlined was incised deep to adipose tissue with a #15 scalpel blade.  The skin margins were undermined to an appropriate distance in all directions utilizing iris scissors.
Rhombic Flap Text: The defect edges were debeveled with a #15 scalpel blade.  Given the location of the defect and the proximity to free margins a rhombic flap was deemed most appropriate.  Using a sterile surgical marker, an appropriate rhombic flap was drawn incorporating the defect.    The area thus outlined was incised deep to adipose tissue with a #15 scalpel blade.  The skin margins were undermined to an appropriate distance in all directions utilizing iris scissors.
Complex Repair And Melolabial Flap Text: The defect edges were debeveled with a #15 scalpel blade.  The primary defect was closed partially with a complex linear closure.  Given the location of the remaining defect, shape of the defect and the proximity to free margins a melolabial flap was deemed most appropriate for complete closure of the defect.  Using a sterile surgical marker, an appropriate advancement flap was drawn incorporating the defect and placing the expected incisions within the relaxed skin tension lines where possible.    The area thus outlined was incised deep to adipose tissue with a #15 scalpel blade.  The skin margins were undermined to an appropriate distance in all directions utilizing iris scissors.
Complex Repair And Single Advancement Flap Text: The defect edges were debeveled with a #15 scalpel blade.  The primary defect was closed partially with a complex linear closure.  Given the location of the remaining defect, shape of the defect and the proximity to free margins a single advancement flap was deemed most appropriate for complete closure of the defect.  Using a sterile surgical marker, an appropriate advancement flap was drawn incorporating the defect and placing the expected incisions within the relaxed skin tension lines where possible.    The area thus outlined was incised deep to adipose tissue with a #15 scalpel blade.  The skin margins were undermined to an appropriate distance in all directions utilizing iris scissors.
Skin Substitute Text: The defect edges were debeveled with a #15 scalpel blade.  Given the location of the defect, shape of the defect and the proximity to free margins a skin substitute graft was deemed most appropriate.  The graft material was trimmed to fit the size of the defect. The graft was then placed in the primary defect and oriented appropriately.
Complex Repair And Dermal Autograft Text: The defect edges were debeveled with a #15 scalpel blade.  The primary defect was closed partially with a complex linear closure.  Given the location of the defect, shape of the defect and the proximity to free margins an dermal autograft was deemed most appropriate to repair the remaining defect.  The graft was trimmed to fit the size of the remaining defect.  The graft was then placed in the primary defect, oriented appropriately, and sutured into place.
Complex Repair And W Plasty Text: The defect edges were debeveled with a #15 scalpel blade.  The primary defect was closed partially with a complex linear closure.  Given the location of the remaining defect, shape of the defect and the proximity to free margins a W plasty was deemed most appropriate for complete closure of the defect.  Using a sterile surgical marker, an appropriate advancement flap was drawn incorporating the defect and placing the expected incisions within the relaxed skin tension lines where possible.    The area thus outlined was incised deep to adipose tissue with a #15 scalpel blade.  The skin margins were undermined to an appropriate distance in all directions utilizing iris scissors.
Suturegard Body: The suture ends were repeatedly re-tightened and re-clamped to achieve the desired tissue expansion.
Mucosal Advancement Flap Text: Given the location of the defect, shape of the defect and the proximity to free margins a mucosal advancement flap was deemed most appropriate. Incisions were made with a 15 blade scalpel in the appropriate fashion along the cutaneous vermillion border and the mucosal lip. The remaining actinically damaged mucosal tissue was excised.  The mucosal advancement flap was then elevated to the gingival sulcus with care taken to preserve the neurovascular structures and advanced into the primary defect. Care was taken to ensure that precise realignment of the vermillion border was achieved.
O-L Flap Text: The defect edges were debeveled with a #15 scalpel blade.  Given the location of the defect, shape of the defect and the proximity to free margins an O-L flap was deemed most appropriate.  Using a sterile surgical marker, an appropriate advancement flap was drawn incorporating the defect and placing the expected incisions within the relaxed skin tension lines where possible.    The area thus outlined was incised deep to adipose tissue with a #15 scalpel blade.  The skin margins were undermined to an appropriate distance in all directions utilizing iris scissors.
Advancement Flap (Single) Text: The defect edges were debeveled with a #15 scalpel blade.  Given the location of the defect and the proximity to free margins a single advancement flap was deemed most appropriate.  Using a sterile surgical marker, an appropriate advancement flap was drawn incorporating the defect and placing the expected incisions within the relaxed skin tension lines where possible.    The area thus outlined was incised deep to adipose tissue with a #15 scalpel blade.  The skin margins were undermined to an appropriate distance in all directions utilizing iris scissors.
Saucerization Excision Additional Text (Leave Blank If You Do Not Want): The margin was drawn around the clinically apparent lesion.  Incisions were then made along these lines, in a tangential fashion, to the appropriate tissue plane and the lesion was extirpated.
Crescentic Intermediate Repair Preamble Text (Leave Blank If You Do Not Want): Undermining was performed with blunt dissection.
Pre-Excision Curettage Text (Leave Blank If You Do Not Want): Prior to drawing the surgical margin the visible lesion was removed with electrodesiccation and curettage to clearly define the lesion size.
Mercedes Flap Text: The defect edges were debeveled with a #15 scalpel blade.  Given the location of the defect, shape of the defect and the proximity to free margins a Mercedes flap was deemed most appropriate.  Using a sterile surgical marker, an appropriate advancement flap was drawn incorporating the defect and placing the expected incisions within the relaxed skin tension lines where possible. The area thus outlined was incised deep to adipose tissue with a #15 scalpel blade.  The skin margins were undermined to an appropriate distance in all directions utilizing iris scissors.
A-T Advancement Flap Text: The defect edges were debeveled with a #15 scalpel blade.  Given the location of the defect, shape of the defect and the proximity to free margins an A-T advancement flap was deemed most appropriate.  Using a sterile surgical marker, an appropriate advancement flap was drawn incorporating the defect and placing the expected incisions within the relaxed skin tension lines where possible.    The area thus outlined was incised deep to adipose tissue with a #15 scalpel blade.  The skin margins were undermined to an appropriate distance in all directions utilizing iris scissors.
Repair Performed By Another Provider Text (Leave Blank If You Do Not Want): After the tissue was excised the defect was repaired by another provider.
Fusiform Excision Additional Text (Leave Blank If You Do Not Want): The margin was drawn around the clinically apparent lesion.  A fusiform shape was then drawn on the skin incorporating the lesion and margins.  Incisions were then made along these lines to the appropriate tissue plane and the lesion was extirpated.
M-Plasty Complex Repair Preamble Text (Leave Blank If You Do Not Want): Extensive wide undermining was performed.
Where Do You Want The Question To Include Opioid Counseling Located?: Case Summary Tab
Information: Selecting Yes will display possible errors in your note based on the variables you have selected. This validation is only offered as a suggestion for you. PLEASE NOTE THAT THE VALIDATION TEXT WILL BE REMOVED WHEN YOU FINALIZE YOUR NOTE. IF YOU WANT TO FAX A PRELIMINARY NOTE YOU WILL NEED TO TOGGLE THIS TO 'NO' IF YOU DO NOT WANT IT IN YOUR FAXED NOTE.
O-Z Plasty Text: The defect edges were debeveled with a #15 scalpel blade.  Given the location of the defect, shape of the defect and the proximity to free margins an O-Z plasty (double transposition flap) was deemed most appropriate.  Using a sterile surgical marker, the appropriate transposition flaps were drawn incorporating the defect and placing the expected incisions within the relaxed skin tension lines where possible.    The area thus outlined was incised deep to adipose tissue with a #15 scalpel blade.  The skin margins were undermined to an appropriate distance in all directions utilizing iris scissors.  Hemostasis was achieved with electrocautery.  The flaps were then transposed into place, one clockwise and the other counterclockwise, and anchored with interrupted buried subcutaneous sutures.
Island Pedicle Flap Text: The defect edges were debeveled with a #15 scalpel blade.  Given the location of the defect, shape of the defect and the proximity to free margins an island pedicle advancement flap was deemed most appropriate.  Using a sterile surgical marker, an appropriate advancement flap was drawn incorporating the defect, outlining the appropriate donor tissue and placing the expected incisions within the relaxed skin tension lines where possible.    The area thus outlined was incised deep to adipose tissue with a #15 scalpel blade.  The skin margins were undermined to an appropriate distance in all directions around the primary defect and laterally outward around the island pedicle utilizing iris scissors.  There was minimal undermining beneath the pedicle flap.
Bilateral Helical Rim Advancement Flap Text: The defect edges were debeveled with a #15 blade scalpel.  Given the location of the defect and the proximity to free margins (helical rim) a bilateral helical rim advancement flap was deemed most appropriate.  Using a sterile surgical marker, the appropriate advancement flaps were drawn incorporating the defect and placing the expected incisions between the helical rim and antihelix where possible.  The area thus outlined was incised through and through with a #15 scalpel blade.  With a skin hook and iris scissors, the flaps were gently and sharply undermined and freed up.
Transposition Flap Text: The defect edges were debeveled with a #15 scalpel blade.  Given the location of the defect and the proximity to free margins a transposition flap was deemed most appropriate.  Using a sterile surgical marker, an appropriate transposition flap was drawn incorporating the defect.    The area thus outlined was incised deep to adipose tissue with a #15 scalpel blade.  The skin margins were undermined to an appropriate distance in all directions utilizing iris scissors.
Lip Wedge Excision Repair Text: Given the location of the defect and the proximity to free margins a full thickness wedge repair was deemed most appropriate.  Using a sterile surgical marker, the appropriate repair was drawn incorporating the defect and placing the expected incisions perpendicular to the vermillion border.  The vermillion border was also meticulously outlined to ensure appropriate reapproximation during the repair.  The area thus outlined was incised through and through with a #15 scalpel blade.  The muscularis and dermis were reaproximated with deep sutures following hemostasis. Care was taken to realign the vermillion border before proceeding with the superficial closure.  Once the vermillion was realigned the superfical and mucosal closure was finished.
Cheek Interpolation Flap Text: A decision was made to reconstruct the defect utilizing an interpolation axial flap and a staged reconstruction.  A telfa template was made of the defect.  This telfa template was then used to outline the Cheek Interpolation flap.  The donor area for the pedicle flap was then injected with anesthesia.  The flap was excised through the skin and subcutaneous tissue down to the layer of the underlying musculature.  The interpolation flap was carefully excised within this deep plane to maintain its blood supply.  The edges of the donor site were undermined.   The donor site was closed in a primary fashion.  The pedicle was then rotated into position and sutured.  Once the tube was sutured into place, adequate blood supply was confirmed with blanching and refill.  The pedicle was then wrapped with xeroform gauze and dressed appropriately with a telfa and gauze bandage to ensure continued blood supply and protect the attached pedicle.
Suture Removal: 14 days
Complex Repair And Burow's Graft Text: The defect edges were debeveled with a #15 scalpel blade.  The primary defect was closed partially with a complex linear closure.  Given the location of the defect, shape of the defect, the proximity to free margins and the presence of a standing cone deformity a Burow's graft was deemed most appropriate to repair the remaining defect.  The graft was trimmed to fit the size of the remaining defect.  The graft was then placed in the primary defect, oriented appropriately, and sutured into place.
Complex Repair And V-Y Plasty Text: The defect edges were debeveled with a #15 scalpel blade.  The primary defect was closed partially with a complex linear closure.  Given the location of the remaining defect, shape of the defect and the proximity to free margins a V-Y plasty was deemed most appropriate for complete closure of the defect.  Using a sterile surgical marker, an appropriate advancement flap was drawn incorporating the defect and placing the expected incisions within the relaxed skin tension lines where possible.    The area thus outlined was incised deep to adipose tissue with a #15 scalpel blade.  The skin margins were undermined to an appropriate distance in all directions utilizing iris scissors.
Helical Rim Text: The closure involved the helical rim.
Complex Repair And O-T Advancement Flap Text: The defect edges were debeveled with a #15 scalpel blade.  The primary defect was closed partially with a complex linear closure.  Given the location of the remaining defect, shape of the defect and the proximity to free margins an O-T advancement flap was deemed most appropriate for complete closure of the defect.  Using a sterile surgical marker, an appropriate advancement flap was drawn incorporating the defect and placing the expected incisions within the relaxed skin tension lines where possible.    The area thus outlined was incised deep to adipose tissue with a #15 scalpel blade.  The skin margins were undermined to an appropriate distance in all directions utilizing iris scissors.
Purse String (Simple) Text: Given the location of the defect and the characteristics of the surrounding skin a purse string simple closure was deemed most appropriate.  Undermining was performed circumferentially around the surgical defect.  A purse string suture was then placed and tightened.
Estimated Blood Loss (Cc): minimal
Elliptical Excision Additional Text (Leave Blank If You Do Not Want): The margin was drawn around the clinically apparent lesion.  An elliptical shape was then drawn on the skin incorporating the lesion and margins.  Incisions were then made along these lines to the appropriate tissue plane and the lesion was extirpated.
Positioning (Leave Blank If You Do Not Want): The patient was placed in a comfortable position exposing the surgical site.
Billing Type: Third-Party Bill
Island Pedicle Flap With Canthal Suspension Text: The defect edges were debeveled with a #15 scalpel blade.  Given the location of the defect, shape of the defect and the proximity to free margins an island pedicle advancement flap was deemed most appropriate.  Using a sterile surgical marker, an appropriate advancement flap was drawn incorporating the defect, outlining the appropriate donor tissue and placing the expected incisions within the relaxed skin tension lines where possible. The area thus outlined was incised deep to adipose tissue with a #15 scalpel blade.  The skin margins were undermined to an appropriate distance in all directions around the primary defect and laterally outward around the island pedicle utilizing iris scissors.  There was minimal undermining beneath the pedicle flap. A suspension suture was placed in the canthal tendon to prevent tension and prevent ectropion.
Ear Star Wedge Flap Text: The defect edges were debeveled with a #15 blade scalpel.  Given the location of the defect and the proximity to free margins (helical rim) an ear star wedge flap was deemed most appropriate.  Using a sterile surgical marker, the appropriate flap was drawn incorporating the defect and placing the expected incisions between the helical rim and antihelix where possible.  The area thus outlined was incised through and through with a #15 scalpel blade.
Muscle Hinge Flap Text: The defect edges were debeveled with a #15 scalpel blade.  Given the size, depth and location of the defect and the proximity to free margins a muscle hinge flap was deemed most appropriate.  Using a sterile surgical marker, an appropriate hinge flap was drawn incorporating the defect. The area thus outlined was incised with a #15 scalpel blade.  The skin margins were undermined to an appropriate distance in all directions utilizing iris scissors.
Hemigard Retention Suture: 2-0 Nylon
Ftsg Text: The defect edges were debeveled with a #15 scalpel blade.  Given the location of the defect, shape of the defect and the proximity to free margins a full thickness skin graft was deemed most appropriate.  Using a sterile surgical marker, the primary defect shape was transferred to the donor site. The area thus outlined was incised deep to adipose tissue with a #15 scalpel blade.  The harvested graft was then trimmed of adipose tissue until only dermis and epidermis was left.  The skin margins of the secondary defect were undermined to an appropriate distance in all directions utilizing iris scissors.  The secondary defect was closed with interrupted buried subcutaneous sutures.  The skin edges were then re-apposed with running  sutures.  The skin graft was then placed in the primary defect and oriented appropriately.
Consent was obtained from the patient. The risks and benefits to therapy were discussed in detail. Specifically, the risks of infection, scarring, bleeding, prolonged wound healing, incomplete removal, allergy to anesthesia, nerve injury and recurrence were addressed. Prior to the procedure, the treatment site was clearly identified and confirmed by the patient. All components of Universal Protocol/PAUSE Rule completed.
Cheek-To-Nose Interpolation Flap Text: A decision was made to reconstruct the defect utilizing an interpolation axial flap and a staged reconstruction.  A telfa template was made of the defect.  This telfa template was then used to outline the Cheek-To-Nose Interpolation flap.  The donor area for the pedicle flap was then injected with anesthesia.  The flap was excised through the skin and subcutaneous tissue down to the layer of the underlying musculature.  The interpolation flap was carefully excised within this deep plane to maintain its blood supply.  The edges of the donor site were undermined.   The donor site was closed in a primary fashion.  The pedicle was then rotated into position and sutured.  Once the tube was sutured into place, adequate blood supply was confirmed with blanching and refill.  The pedicle was then wrapped with xeroform gauze and dressed appropriately with a telfa and gauze bandage to ensure continued blood supply and protect the attached pedicle.
Excision Depth: adipose tissue
Double O-Z Plasty Text: The defect edges were debeveled with a #15 scalpel blade.  Given the location of the defect, shape of the defect and the proximity to free margins a Double O-Z plasty (double transposition flap) was deemed most appropriate.  Using a sterile surgical marker, the appropriate transposition flaps were drawn incorporating the defect and placing the expected incisions within the relaxed skin tension lines where possible. The area thus outlined was incised deep to adipose tissue with a #15 scalpel blade.  The skin margins were undermined to an appropriate distance in all directions utilizing iris scissors.  Hemostasis was achieved with electrocautery.  The flaps were then transposed into place, one clockwise and the other counterclockwise, and anchored with interrupted buried subcutaneous sutures.
Complex Repair And Split-Thickness Skin Graft Text: The defect edges were debeveled with a #15 scalpel blade.  The primary defect was closed partially with a complex linear closure.  Given the location of the defect, shape of the defect and the proximity to free margins a split thickness skin graft was deemed most appropriate to repair the remaining defect.  The graft was trimmed to fit the size of the remaining defect.  The graft was then placed in the primary defect, oriented appropriately, and sutured into place.
Complex Repair And M Plasty Text: The defect edges were debeveled with a #15 scalpel blade.  The primary defect was closed partially with a complex linear closure.  Given the location of the remaining defect, shape of the defect and the proximity to free margins an M plasty was deemed most appropriate for complete closure of the defect.  Using a sterile surgical marker, an appropriate advancement flap was drawn incorporating the defect and placing the expected incisions within the relaxed skin tension lines where possible.    The area thus outlined was incised deep to adipose tissue with a #15 scalpel blade.  The skin margins were undermined to an appropriate distance in all directions utilizing iris scissors.
Complex Repair And O-L Flap Text: The defect edges were debeveled with a #15 scalpel blade.  The primary defect was closed partially with a complex linear closure.  Given the location of the remaining defect, shape of the defect and the proximity to free margins an O-L flap was deemed most appropriate for complete closure of the defect.  Using a sterile surgical marker, an appropriate flap was drawn incorporating the defect and placing the expected incisions within the relaxed skin tension lines where possible.    The area thus outlined was incised deep to adipose tissue with a #15 scalpel blade.  The skin margins were undermined to an appropriate distance in all directions utilizing iris scissors.
Partial Purse String (Intermediate) Text: Given the location of the defect and the characteristics of the surrounding skin an intermediate purse string closure was deemed most appropriate.  Undermining was performed circumferentially around the surgical defect.  A purse string suture was then placed and tightened. Wound tension of the circular defect prevented complete closure of the wound.
Epidermal Autograft Text: The defect edges were debeveled with a #15 scalpel blade.  Given the location of the defect, shape of the defect and the proximity to free margins an epidermal autograft was deemed most appropriate.  Using a sterile surgical marker, the primary defect shape was transferred to the donor site. The epidermal graft was then harvested.  The skin graft was then placed in the primary defect and oriented appropriately.
Vermilion Border Text: The closure involved the vermilion border.
Excision Method: Elliptical
Outside Pathology Billing: outside pathology read only
Complex Repair And Ftsg Text: The defect edges were debeveled with a #15 scalpel blade.  The primary defect was closed partially with a complex linear closure.  Given the location of the defect, shape of the defect and the proximity to free margins a full thickness skin graft was deemed most appropriate to repair the remaining defect.  The graft was trimmed to fit the size of the remaining defect.  The graft was then placed in the primary defect, oriented appropriately, and sutured into place.
Complex Repair And Transposition Flap Text: The defect edges were debeveled with a #15 scalpel blade.  The primary defect was closed partially with a complex linear closure.  Given the location of the remaining defect, shape of the defect and the proximity to free margins a transposition flap was deemed most appropriate for complete closure of the defect.  Using a sterile surgical marker, an appropriate advancement flap was drawn incorporating the defect and placing the expected incisions within the relaxed skin tension lines where possible.    The area thus outlined was incised deep to adipose tissue with a #15 scalpel blade.  The skin margins were undermined to an appropriate distance in all directions utilizing iris scissors.
Complex Repair And A-T Advancement Flap Text: The defect edges were debeveled with a #15 scalpel blade.  The primary defect was closed partially with a complex linear closure.  Given the location of the remaining defect, shape of the defect and the proximity to free margins an A-T advancement flap was deemed most appropriate for complete closure of the defect.  Using a sterile surgical marker, an appropriate advancement flap was drawn incorporating the defect and placing the expected incisions within the relaxed skin tension lines where possible.    The area thus outlined was incised deep to adipose tissue with a #15 scalpel blade.  The skin margins were undermined to an appropriate distance in all directions utilizing iris scissors.
Purse String (Intermediate) Text: Given the location of the defect and the characteristics of the surrounding skin a pursestring intermediate closure was deemed most appropriate.  Undermining was performed circumfirentially around the surgical defect.  A purstring suture was then placed and tightened.
X Size Of Lesion In Cm (Optional): 1
Intermediate / Complex Repair - Final Wound Length In Cm: 5
Home Suture Removal Text: Patient was provided a home suture removal kit and will remove their sutures at home.  If they have any questions or difficulties they will call the office.
Rotation Flap Text: The defect edges were debeveled with a #15 scalpel blade.  Given the location of the defect, shape of the defect and the proximity to free margins a rotation flap was deemed most appropriate.  Using a sterile surgical marker, an appropriate rotation flap was drawn incorporating the defect and placing the expected incisions within the relaxed skin tension lines where possible.    The area thus outlined was incised deep to adipose tissue with a #15 scalpel blade.  The skin margins were undermined to an appropriate distance in all directions utilizing iris scissors.
V-Y Flap Text: The defect edges were debeveled with a #15 scalpel blade.  Given the location of the defect, shape of the defect and the proximity to free margins a V-Y flap was deemed most appropriate.  Using a sterile surgical marker, an appropriate advancement flap was drawn incorporating the defect and placing the expected incisions within the relaxed skin tension lines where possible.    The area thus outlined was incised deep to adipose tissue with a #15 scalpel blade.  The skin margins were undermined to an appropriate distance in all directions utilizing iris scissors.
Chonodrocutaneous Helical Advancement Flap Text: The defect edges were debeveled with a #15 scalpel blade.  Given the location of the defect and the proximity to free margins a chondrocutaneous helical advancement flap was deemed most appropriate.  Using a sterile surgical marker, the appropriate advancement flap was drawn incorporating the defect and placing the expected incisions within the relaxed skin tension lines where possible.    The area thus outlined was incised deep to adipose tissue with a #15 scalpel blade.  The skin margins were undermined to an appropriate distance in all directions utilizing iris scissors.
Perilesional Excision Additional Text (Leave Blank If You Do Not Want): The margin was drawn around the clinically apparent lesion. Incisions were then made along these lines to the appropriate tissue plane and the lesion was extirpated.
Graft Donor Site Bandage (Optional-Leave Blank If You Don't Want In Note): Steri-strips and a pressure bandage were applied to the donor site.
Composite Graft Text: The defect edges were debeveled with a #15 scalpel blade.  Given the location of the defect, shape of the defect, the proximity to free margins and the fact the defect was full thickness a composite graft was deemed most appropriate.  The defect was outline and then transferred to the donor site.  A full thickness graft was then excised from the donor site. The graft was then placed in the primary defect, oriented appropriately and then sutured into place.  The secondary defect was then repaired using a primary closure.
Posterior Auricular Interpolation Flap Text: A decision was made to reconstruct the defect utilizing an interpolation axial flap and a staged reconstruction.  A telfa template was made of the defect.  This telfa template was then used to outline the posterior auricular interpolation flap.  The donor area for the pedicle flap was then injected with anesthesia.  The flap was excised through the skin and subcutaneous tissue down to the layer of the underlying musculature.  The pedicle flap was carefully excised within this deep plane to maintain its blood supply.  The edges of the donor site were undermined.   The donor site was closed in a primary fashion.  The pedicle was then rotated into position and sutured.  Once the tube was sutured into place, adequate blood supply was confirmed with blanching and refill.  The pedicle was then wrapped with xeroform gauze and dressed appropriately with a telfa and gauze bandage to ensure continued blood supply and protect the attached pedicle.
Path Notes (To The Dermatopathologist): Please check margins.
Z Plasty Text: The lesion was extirpated to the level of the fat with a #15 scalpel blade.  Given the location of the defect, shape of the defect and the proximity to free margins a Z-plasty was deemed most appropriate for repair.  Using a sterile surgical marker, the appropriate transposition arms of the Z-plasty were drawn incorporating the defect and placing the expected incisions within the relaxed skin tension lines where possible.    The area thus outlined was incised deep to adipose tissue with a #15 scalpel blade.  The skin margins were undermined to an appropriate distance in all directions utilizing iris scissors.  The opposing transposition arms were then transposed into place in opposite direction and anchored with interrupted buried subcutaneous sutures.
Retention Suture Text: Retention sutures were placed to support the closure and prevent dehiscence.
Keystone Flap Text: The defect edges were debeveled with a #15 scalpel blade.  Given the location of the defect, shape of the defect a keystone flap was deemed most appropriate.  Using a sterile surgical marker, an appropriate keystone flap was drawn incorporating the defect, outlining the appropriate donor tissue and placing the expected incisions within the relaxed skin tension lines where possible. The area thus outlined was incised deep to adipose tissue with a #15 scalpel blade.  The skin margins were undermined to an appropriate distance in all directions around the primary defect and laterally outward around the flap utilizing iris scissors.
Trilobed Flap Text: The defect edges were debeveled with a #15 scalpel blade.  Given the location of the defect and the proximity to free margins a trilobed flap was deemed most appropriate.  Using a sterile surgical marker, an appropriate trilobed flap drawn around the defect.    The area thus outlined was incised deep to adipose tissue with a #15 scalpel blade.  The skin margins were undermined to an appropriate distance in all directions utilizing iris scissors.
Bi-Rhombic Flap Text: The defect edges were debeveled with a #15 scalpel blade.  Given the location of the defect and the proximity to free margins a bi-rhombic flap was deemed most appropriate.  Using a sterile surgical marker, an appropriate rhombic flap was drawn incorporating the defect. The area thus outlined was incised deep to adipose tissue with a #15 scalpel blade.  The skin margins were undermined to an appropriate distance in all directions utilizing iris scissors.
Size Of Margin In Cm: 0.3
Spiral Flap Text: The defect edges were debeveled with a #15 scalpel blade.  Given the location of the defect, shape of the defect and the proximity to free margins a spiral flap was deemed most appropriate.  Using a sterile surgical marker, an appropriate rotation flap was drawn incorporating the defect and placing the expected incisions within the relaxed skin tension lines where possible. The area thus outlined was incised deep to adipose tissue with a #15 scalpel blade.  The skin margins were undermined to an appropriate distance in all directions utilizing iris scissors.
Hemigard Postcare Instructions: The HEMIGARD strips are to remain completely dry for at least 5-7 days.
Undermining Type: Entire Wound
Graft Basting Suture (Optional): 5-0 Prolene
Advancement-Rotation Flap Text: The defect edges were debeveled with a #15 scalpel blade.  Given the location of the defect, shape of the defect and the proximity to free margins an advancement-rotation flap was deemed most appropriate.  Using a sterile surgical marker, an appropriate flap was drawn incorporating the defect and placing the expected incisions within the relaxed skin tension lines where possible. The area thus outlined was incised deep to adipose tissue with a #15 scalpel blade.  The skin margins were undermined to an appropriate distance in all directions utilizing iris scissors.
Crescentic Advancement Flap Text: The defect edges were debeveled with a #15 scalpel blade.  Given the location of the defect and the proximity to free margins a crescentic advancement flap was deemed most appropriate.  Using a sterile surgical marker, the appropriate advancement flap was drawn incorporating the defect and placing the expected incisions within the relaxed skin tension lines where possible.    The area thus outlined was incised deep to adipose tissue with a #15 scalpel blade.  The skin margins were undermined to an appropriate distance in all directions utilizing iris scissors.
Curvilinear Excision Additional Text (Leave Blank If You Do Not Want): The margin was drawn around the clinically apparent lesion.  A curvilinear shape was then drawn on the skin incorporating the lesion and margins.  Incisions were then made along these lines to the appropriate tissue plane and the lesion was extirpated.
Retention Suture Bite Size: 3 mm
Repair Type: Intermediate
Paramedian Forehead Flap Text: A decision was made to reconstruct the defect utilizing an interpolation axial flap and a staged reconstruction.  A telfa template was made of the defect.  This telfa template was then used to outline the paramedian forehead pedicle flap.  The donor area for the pedicle flap was then injected with anesthesia.  The flap was excised through the skin and subcutaneous tissue down to the layer of the underlying musculature.  The pedicle flap was carefully excised within this deep plane to maintain its blood supply.  The edges of the donor site were undermined.   The donor site was closed in a primary fashion.  The pedicle was then rotated into position and sutured.  Once the tube was sutured into place, adequate blood supply was confirmed with blanching and refill.  The pedicle was then wrapped with xeroform gauze and dressed appropriately with a telfa and gauze bandage to ensure continued blood supply and protect the attached pedicle.
Zygomaticofacial Flap Text: Given the location of the defect, shape of the defect and the proximity to free margins a zygomaticofacial flap was deemed most appropriate for repair.  Using a sterile surgical marker, the appropriate flap was drawn incorporating the defect and placing the expected incisions within the relaxed skin tension lines where possible. The area thus outlined was incised deep to adipose tissue with a #15 scalpel blade with preservation of a vascular pedicle.  The skin margins were undermined to an appropriate distance in all directions utilizing iris scissors.  The flap was then placed into the defect and anchored with interrupted buried subcutaneous sutures.
O-T Plasty Text: The defect edges were debeveled with a #15 scalpel blade.  Given the location of the defect, shape of the defect and the proximity to free margins an O-T plasty was deemed most appropriate.  Using a sterile surgical marker, an appropriate O-T plasty was drawn incorporating the defect and placing the expected incisions within the relaxed skin tension lines where possible.    The area thus outlined was incised deep to adipose tissue with a #15 scalpel blade.  The skin margins were undermined to an appropriate distance in all directions utilizing iris scissors.
Dorsal Nasal Flap Text: The defect edges were debeveled with a #15 scalpel blade.  Given the location of the defect and the proximity to free margins a dorsal nasal flap was deemed most appropriate.  Using a sterile surgical marker, an appropriate dorsal nasal flap was drawn around the defect.    The area thus outlined was incised deep to adipose tissue with a #15 scalpel blade.  The skin margins were undermined to an appropriate distance in all directions utilizing iris scissors.
Helical Rim Advancement Flap Text: The defect edges were debeveled with a #15 blade scalpel.  Given the location of the defect and the proximity to free margins (helical rim) a double helical rim advancement flap was deemed most appropriate.  Using a sterile surgical marker, the appropriate advancement flaps were drawn incorporating the defect and placing the expected incisions between the helical rim and antihelix where possible.  The area thus outlined was incised through and through with a #15 scalpel blade.  With a skin hook and iris scissors, the flaps were gently and sharply undermined and freed up.
Star Wedge Flap Text: The defect edges were debeveled with a #15 scalpel blade.  Given the location of the defect, shape of the defect and the proximity to free margins a star wedge flap was deemed most appropriate.  Using a sterile surgical marker, an appropriate rotation flap was drawn incorporating the defect and placing the expected incisions within the relaxed skin tension lines where possible. The area thus outlined was incised deep to adipose tissue with a #15 scalpel blade.  The skin margins were undermined to an appropriate distance in all directions utilizing iris scissors.
Complex Repair And Skin Substitute Graft Text: The defect edges were debeveled with a #15 scalpel blade.  The primary defect was closed partially with a complex linear closure.  Given the location of the remaining defect, shape of the defect and the proximity to free margins a skin substitute graft was deemed most appropriate to repair the remaining defect.  The graft was trimmed to fit the size of the remaining defect.  The graft was then placed in the primary defect, oriented appropriately, and sutured into place.
Island Pedicle Flap-Requiring Vessel Identification Text: The defect edges were debeveled with a #15 scalpel blade.  Given the location of the defect, shape of the defect and the proximity to free margins an island pedicle advancement flap was deemed most appropriate.  Using a sterile surgical marker, an appropriate advancement flap was drawn, based on the axial vessel mentioned above, incorporating the defect, outlining the appropriate donor tissue and placing the expected incisions within the relaxed skin tension lines where possible.    The area thus outlined was incised deep to adipose tissue with a #15 scalpel blade.  The skin margins were undermined to an appropriate distance in all directions around the primary defect and laterally outward around the island pedicle utilizing iris scissors.  There was minimal undermining beneath the pedicle flap.
Dressing: pressure dressing with telfa
Bilobed Transposition Flap Text: The defect edges were debeveled with a #15 scalpel blade.  Given the location of the defect and the proximity to free margins a bilobed transposition flap was deemed most appropriate.  Using a sterile surgical marker, an appropriate bilobe flap drawn around the defect.    The area thus outlined was incised deep to adipose tissue with a #15 scalpel blade.  The skin margins were undermined to an appropriate distance in all directions utilizing iris scissors.
Epidermal Closure: running and interrupted
Rhomboid Transposition Flap Text: The defect edges were debeveled with a #15 scalpel blade.  Given the location of the defect and the proximity to free margins a rhomboid transposition flap was deemed most appropriate.  Using a sterile surgical marker, an appropriate rhomboid flap was drawn incorporating the defect.    The area thus outlined was incised deep to adipose tissue with a #15 scalpel blade.  The skin margins were undermined to an appropriate distance in all directions utilizing iris scissors.
Epidermal Sutures: 4-0 Prolene
Complex Repair And Double Advancement Flap Text: The defect edges were debeveled with a #15 scalpel blade.  The primary defect was closed partially with a complex linear closure.  Given the location of the remaining defect, shape of the defect and the proximity to free margins a double advancement flap was deemed most appropriate for complete closure of the defect.  Using a sterile surgical marker, an appropriate advancement flap was drawn incorporating the defect and placing the expected incisions within the relaxed skin tension lines where possible.    The area thus outlined was incised deep to adipose tissue with a #15 scalpel blade.  The skin margins were undermined to an appropriate distance in all directions utilizing iris scissors.
Tissue Cultured Epidermal Autograft Text: The defect edges were debeveled with a #15 scalpel blade.  Given the location of the defect, shape of the defect and the proximity to free margins a tissue cultured epidermal autograft was deemed most appropriate.  The graft was then trimmed to fit the size of the defect.  The graft was then placed in the primary defect and oriented appropriately.
Complex Repair And Tissue Cultured Epidermal Autograft Text: The defect edges were debeveled with a #15 scalpel blade.  The primary defect was closed partially with a complex linear closure.  Given the location of the defect, shape of the defect and the proximity to free margins an tissue cultured epidermal autograft was deemed most appropriate to repair the remaining defect.  The graft was trimmed to fit the size of the remaining defect.  The graft was then placed in the primary defect, oriented appropriately, and sutured into place.
Complex Repair And Z Plasty Text: The defect edges were debeveled with a #15 scalpel blade.  The primary defect was closed partially with a complex linear closure.  Given the location of the remaining defect, shape of the defect and the proximity to free margins a Z plasty was deemed most appropriate for complete closure of the defect.  Using a sterile surgical marker, an appropriate advancement flap was drawn incorporating the defect and placing the expected incisions within the relaxed skin tension lines where possible.    The area thus outlined was incised deep to adipose tissue with a #15 scalpel blade.  The skin margins were undermined to an appropriate distance in all directions utilizing iris scissors.
Complex Repair And Rotation Flap Text: The defect edges were debeveled with a #15 scalpel blade.  The primary defect was closed partially with a complex linear closure.  Given the location of the remaining defect, shape of the defect and the proximity to free margins a rotation flap was deemed most appropriate for complete closure of the defect.  Using a sterile surgical marker, an appropriate advancement flap was drawn incorporating the defect and placing the expected incisions within the relaxed skin tension lines where possible.    The area thus outlined was incised deep to adipose tissue with a #15 scalpel blade.  The skin margins were undermined to an appropriate distance in all directions utilizing iris scissors.
Hemigard Intro: Due to skin fragility and wound tension, it was decided to use HEMIGARD adhesive retention suture devices to permit a linear closure. The skin was cleaned and dried for a 6cm distance away from the wound. Excessive hair, if present, was removed to allow for adhesion.
O-Z Flap Text: The defect edges were debeveled with a #15 scalpel blade.  Given the location of the defect, shape of the defect and the proximity to free margins an O-Z flap was deemed most appropriate.  Using a sterile surgical marker, an appropriate transposition flap was drawn incorporating the defect and placing the expected incisions within the relaxed skin tension lines where possible. The area thus outlined was incised deep to adipose tissue with a #15 scalpel blade.  The skin margins were undermined to an appropriate distance in all directions utilizing iris scissors.
Advancement Flap (Double) Text: The defect edges were debeveled with a #15 scalpel blade.  Given the location of the defect and the proximity to free margins a double advancement flap was deemed most appropriate.  Using a sterile surgical marker, the appropriate advancement flaps were drawn incorporating the defect and placing the expected incisions within the relaxed skin tension lines where possible.    The area thus outlined was incised deep to adipose tissue with a #15 scalpel blade.  The skin margins were undermined to an appropriate distance in all directions utilizing iris scissors.
Slit Excision Additional Text (Leave Blank If You Do Not Want): A linear line was drawn on the skin overlying the lesion. An incision was made slowly until the lesion was visualized.  Once visualized, the lesion was removed with blunt dissection.
Anesthesia Type: 1% lidocaine with 1:200,000 epinephrine
Peng Advancement Flap Text: The defect edges were debeveled with a #15 scalpel blade.  Given the location of the defect, shape of the defect and the proximity to free margins a Peng advancement flap was deemed most appropriate.  Using a sterile surgical marker, an appropriate advancement flap was drawn incorporating the defect and placing the expected incisions within the relaxed skin tension lines where possible. The area thus outlined was incised deep to adipose tissue with a #15 scalpel blade.  The skin margins were undermined to an appropriate distance in all directions utilizing iris scissors.
Length To Time In Minutes Device Was In Place: 10
Scalpel Size: 15 blade
Debridement Text: The wound edges were debrided prior to proceeding with the closure to facilitate wound healing.
Epidermal Closure Graft Donor Site (Optional): simple interrupted
Complex Repair And Dorsal Nasal Flap Text: The defect edges were debeveled with a #15 scalpel blade.  The primary defect was closed partially with a complex linear closure.  Given the location of the remaining defect, shape of the defect and the proximity to free margins a dorsal nasal flap was deemed most appropriate for complete closure of the defect.  Using a sterile surgical marker, an appropriate flap was drawn incorporating the defect and placing the expected incisions within the relaxed skin tension lines where possible.    The area thus outlined was incised deep to adipose tissue with a #15 scalpel blade.  The skin margins were undermined to an appropriate distance in all directions utilizing iris scissors.
Complex Repair And Rhombic Flap Text: The defect edges were debeveled with a #15 scalpel blade.  The primary defect was closed partially with a complex linear closure.  Given the location of the remaining defect, shape of the defect and the proximity to free margins a rhombic flap was deemed most appropriate for complete closure of the defect.  Using a sterile surgical marker, an appropriate advancement flap was drawn incorporating the defect and placing the expected incisions within the relaxed skin tension lines where possible.    The area thus outlined was incised deep to adipose tissue with a #15 scalpel blade.  The skin margins were undermined to an appropriate distance in all directions utilizing iris scissors.
Complex Repair And Modified Advancement Flap Text: The defect edges were debeveled with a #15 scalpel blade.  The primary defect was closed partially with a complex linear closure.  Given the location of the remaining defect, shape of the defect and the proximity to free margins a modified advancement flap was deemed most appropriate for complete closure of the defect.  Using a sterile surgical marker, an appropriate advancement flap was drawn incorporating the defect and placing the expected incisions within the relaxed skin tension lines where possible.    The area thus outlined was incised deep to adipose tissue with a #15 scalpel blade.  The skin margins were undermined to an appropriate distance in all directions utilizing iris scissors.
Xenograft Text: The defect edges were debeveled with a #15 scalpel blade.  Given the location of the defect, shape of the defect and the proximity to free margins a xenograft was deemed most appropriate.  The graft was then trimmed to fit the size of the defect.  The graft was then placed in the primary defect and oriented appropriately.
Size Of Lesion In Cm: 1.1
Burow's Advancement Flap Text: The defect edges were debeveled with a #15 scalpel blade.  Given the location of the defect and the proximity to free margins a Burow's advancement flap was deemed most appropriate.  Using a sterile surgical marker, the appropriate advancement flap was drawn incorporating the defect and placing the expected incisions within the relaxed skin tension lines where possible.    The area thus outlined was incised deep to adipose tissue with a #15 scalpel blade.  The skin margins were undermined to an appropriate distance in all directions utilizing iris scissors.
Detail Level: Detailed
Hemostasis: Electrocautery
Post-Care Instructions: I reviewed with the patient in detail post-care instructions. Patient is not to engage in any heavy lifting, exercise, or swimming for the next 14 days. Should the patient develop any fevers, chills, bleeding, severe pain patient will contact the office immediately.
Hatchet Flap Text: The defect edges were debeveled with a #15 scalpel blade.  Given the location of the defect, shape of the defect and the proximity to free margins a hatchet flap was deemed most appropriate.  Using a sterile surgical marker, an appropriate hatchet flap was drawn incorporating the defect and placing the expected incisions within the relaxed skin tension lines where possible.    The area thus outlined was incised deep to adipose tissue with a #15 scalpel blade.  The skin margins were undermined to an appropriate distance in all directions utilizing iris scissors.
Double O-Z Flap Text: The defect edges were debeveled with a #15 scalpel blade.  Given the location of the defect, shape of the defect and the proximity to free margins a Double O-Z flap was deemed most appropriate.  Using a sterile surgical marker, an appropriate transposition flap was drawn incorporating the defect and placing the expected incisions within the relaxed skin tension lines where possible. The area thus outlined was incised deep to adipose tissue with a #15 scalpel blade.  The skin margins were undermined to an appropriate distance in all directions utilizing iris scissors.
Cartilage Graft Text: The defect edges were debeveled with a #15 scalpel blade.  Given the location of the defect, shape of the defect, the fact the defect involved a full thickness cartilage defect a cartilage graft was deemed most appropriate.  An appropriate donor site was identified, cleansed, and anesthetized. The cartilage graft was then harvested and transferred to the recipient site, oriented appropriately and then sutured into place.  The secondary defect was then repaired using a primary closure.
Mastoid Interpolation Flap Text: A decision was made to reconstruct the defect utilizing an interpolation axial flap and a staged reconstruction.  A telfa template was made of the defect.  This telfa template was then used to outline the mastoid interpolation flap.  The donor area for the pedicle flap was then injected with anesthesia.  The flap was excised through the skin and subcutaneous tissue down to the layer of the underlying musculature.  The pedicle flap was carefully excised within this deep plane to maintain its blood supply.  The edges of the donor site were undermined.   The donor site was closed in a primary fashion.  The pedicle was then rotated into position and sutured.  Once the tube was sutured into place, adequate blood supply was confirmed with blanching and refill.  The pedicle was then wrapped with xeroform gauze and dressed appropriately with a telfa and gauze bandage to ensure continued blood supply and protect the attached pedicle.
W Plasty Text: The lesion was extirpated to the level of the fat with a #15 scalpel blade.  Given the location of the defect, shape of the defect and the proximity to free margins a W-plasty was deemed most appropriate for repair.  Using a sterile surgical marker, the appropriate transposition arms of the W-plasty were drawn incorporating the defect and placing the expected incisions within the relaxed skin tension lines where possible.    The area thus outlined was incised deep to adipose tissue with a #15 scalpel blade.  The skin margins were undermined to an appropriate distance in all directions utilizing iris scissors.  The opposing transposition arms were then transposed into place in opposite direction and anchored with interrupted buried subcutaneous sutures.

## 2020-07-23 NOTE — PROCEDURE: LIQUID NITROGEN
Duration Of Freeze Thaw-Cycle (Seconds): 5
Render Note In Bullet Format When Appropriate: No
Number Of Freeze-Thaw Cycles: 1 freeze-thaw cycle
Post-Care Instructions: I reviewed with the patient in detail post-care instructions. Patient is to wear sunprotection, and avoid picking at any of the treated lesions. Pt may apply Vaseline to crusted or scabbing areas. Will re-check at follow up
Consent: The patient's verbal consent was obtained including but not limited to risks of crusting, scabbing, blistering, scarring, darker or lighter pigmentary change, recurrence, incomplete removal and infection.
Detail Level: Detailed

## 2020-07-29 ENCOUNTER — APPOINTMENT (RX ONLY)
Dept: URBAN - METROPOLITAN AREA CLINIC 23 | Facility: CLINIC | Age: 80
Setting detail: DERMATOLOGY
End: 2020-07-29

## 2020-08-04 ENCOUNTER — APPOINTMENT (RX ONLY)
Dept: URBAN - METROPOLITAN AREA CLINIC 23 | Facility: CLINIC | Age: 80
Setting detail: DERMATOLOGY
End: 2020-08-04

## 2020-08-04 DIAGNOSIS — Z48.01 ENCOUNTER FOR CHANGE OR REMOVAL OF SURGICAL WOUND DRESSING: ICD-10-CM

## 2020-08-04 DIAGNOSIS — Z41.9 ENCOUNTER FOR PROCEDURE FOR PURPOSES OTHER THAN REMEDYING HEALTH STATE, UNSPECIFIED: ICD-10-CM

## 2020-08-04 PROCEDURE — ? SUTURE REMOVAL (GLOBAL PERIOD)

## 2020-08-04 PROCEDURE — ? COSMETIC CONSULTATION: PRODUCTS

## 2020-08-04 ASSESSMENT — LOCATION ZONE DERM
LOCATION ZONE: FACE
LOCATION ZONE: ARM

## 2020-08-04 ASSESSMENT — LOCATION SIMPLE DESCRIPTION DERM
LOCATION SIMPLE: RIGHT CHEEK
LOCATION SIMPLE: LEFT POSTERIOR UPPER ARM

## 2020-08-04 ASSESSMENT — LOCATION DETAILED DESCRIPTION DERM
LOCATION DETAILED: LEFT PROXIMAL LATERAL POSTERIOR UPPER ARM
LOCATION DETAILED: RIGHT INFERIOR CENTRAL MALAR CHEEK

## 2020-08-04 NOTE — PROCEDURE: SUTURE REMOVAL (GLOBAL PERIOD)
Add 26399 Cpt? (Important Note: In 2017 The Use Of 21964 Is Being Tracked By Cms To Determine Future Global Period Reimbursement For Global Periods): no
Detail Level: Detailed

## 2020-08-04 NOTE — HPI: COSMETIC CONSULTATION
Additional History: Patient came in concerned with SKS and wrinkles\\nshe is 80, doesnt have many concerns just wanted to know what I did.\\nGave her 2020 aesthetic pricing and Radha Alcantar number for facials and chemical peels\\rachel mustafa well

## 2020-08-19 ENCOUNTER — APPOINTMENT (RX ONLY)
Dept: URBAN - METROPOLITAN AREA CLINIC 23 | Facility: CLINIC | Age: 80
Setting detail: DERMATOLOGY
End: 2020-08-19

## 2020-08-19 DIAGNOSIS — L81.4 OTHER MELANIN HYPERPIGMENTATION: ICD-10-CM

## 2020-08-19 DIAGNOSIS — L57.0 ACTINIC KERATOSIS: ICD-10-CM

## 2020-08-19 DIAGNOSIS — Z85.828 PERSONAL HISTORY OF OTHER MALIGNANT NEOPLASM OF SKIN: ICD-10-CM

## 2020-08-19 DIAGNOSIS — L82.1 OTHER SEBORRHEIC KERATOSIS: ICD-10-CM

## 2020-08-19 DIAGNOSIS — L82.0 INFLAMED SEBORRHEIC KERATOSIS: ICD-10-CM

## 2020-08-19 PROCEDURE — 17000 DESTRUCT PREMALG LESION: CPT | Mod: 59

## 2020-08-19 PROCEDURE — ? OTHER

## 2020-08-19 PROCEDURE — 17003 DESTRUCT PREMALG LES 2-14: CPT | Mod: 59

## 2020-08-19 PROCEDURE — ? COUNSELING

## 2020-08-19 PROCEDURE — ? LIQUID NITROGEN

## 2020-08-19 PROCEDURE — 17110 DESTRUCTION B9 LES UP TO 14: CPT

## 2020-08-19 PROCEDURE — 99214 OFFICE O/P EST MOD 30 MIN: CPT | Mod: 25

## 2020-08-19 ASSESSMENT — LOCATION DETAILED DESCRIPTION DERM
LOCATION DETAILED: LEFT CLAVICULAR NECK
LOCATION DETAILED: LEFT PROXIMAL LATERAL CALF
LOCATION DETAILED: LEFT ANTERIOR PROXIMAL THIGH
LOCATION DETAILED: RIGHT CLAVICULAR NECK
LOCATION DETAILED: MID-OCCIPITAL SCALP
LOCATION DETAILED: LEFT SUPERIOR UPPER BACK
LOCATION DETAILED: RIGHT LOWER CUTANEOUS LIP
LOCATION DETAILED: RIGHT DISTAL RADIAL DORSAL FOREARM
LOCATION DETAILED: RIGHT INFERIOR LATERAL NECK
LOCATION DETAILED: RIGHT SUPERIOR LATERAL UPPER BACK
LOCATION DETAILED: LEFT POSTERIOR SHOULDER
LOCATION DETAILED: RIGHT SUPERIOR UPPER BACK
LOCATION DETAILED: RIGHT PROXIMAL PRETIBIAL REGION
LOCATION DETAILED: LEFT MID-UPPER BACK
LOCATION DETAILED: LEFT LATERAL DISTAL PRETIBIAL REGION
LOCATION DETAILED: LEFT DISTAL CALF
LOCATION DETAILED: RIGHT LATERAL UPPER BACK
LOCATION DETAILED: RIGHT LATERAL MANDIBULAR CHEEK
LOCATION DETAILED: LEFT PROXIMAL POSTERIOR UPPER ARM
LOCATION DETAILED: RIGHT SUPERIOR LATERAL FOREHEAD
LOCATION DETAILED: RIGHT LATERAL FOREHEAD
LOCATION DETAILED: LEFT ANTERIOR PROXIMAL UPPER ARM
LOCATION DETAILED: LEFT DISTAL PRETIBIAL REGION
LOCATION DETAILED: RIGHT INFERIOR UPPER BACK
LOCATION DETAILED: LEFT DISTAL POSTERIOR THIGH
LOCATION DETAILED: LEFT CENTRAL FRONTAL SCALP
LOCATION DETAILED: LEFT SUPERIOR FOREHEAD
LOCATION DETAILED: LEFT INFERIOR LATERAL NECK
LOCATION DETAILED: RIGHT LATERAL PROXIMAL PRETIBIAL REGION
LOCATION DETAILED: RIGHT DORSAL 2ND TOE
LOCATION DETAILED: RIGHT DISTAL PRETIBIAL REGION
LOCATION DETAILED: UPPER STERNUM
LOCATION DETAILED: RIGHT SUPERIOR PARIETAL SCALP
LOCATION DETAILED: LEFT SUPERIOR LATERAL FOREHEAD
LOCATION DETAILED: RIGHT ANTERIOR SHOULDER
LOCATION DETAILED: LEFT PROXIMAL PRETIBIAL REGION
LOCATION DETAILED: LEFT SUPERIOR PARIETAL SCALP
LOCATION DETAILED: RIGHT PROXIMAL CALF
LOCATION DETAILED: RIGHT MID-UPPER BACK

## 2020-08-19 ASSESSMENT — LOCATION SIMPLE DESCRIPTION DERM
LOCATION SIMPLE: LEFT ANTERIOR NECK
LOCATION SIMPLE: LEFT SHOULDER
LOCATION SIMPLE: LEFT FOREHEAD
LOCATION SIMPLE: LEFT THIGH
LOCATION SIMPLE: RIGHT CHEEK
LOCATION SIMPLE: RIGHT SHOULDER
LOCATION SIMPLE: RIGHT ANTERIOR NECK
LOCATION SIMPLE: RIGHT PRETIBIAL REGION
LOCATION SIMPLE: SCALP
LOCATION SIMPLE: LEFT UPPER BACK
LOCATION SIMPLE: LEFT POSTERIOR UPPER ARM
LOCATION SIMPLE: RIGHT 2ND TOE
LOCATION SIMPLE: CHEST
LOCATION SIMPLE: LEFT PRETIBIAL REGION
LOCATION SIMPLE: RIGHT CALF
LOCATION SIMPLE: LEFT UPPER ARM
LOCATION SIMPLE: RIGHT LIP
LOCATION SIMPLE: RIGHT BACK
LOCATION SIMPLE: RIGHT UPPER BACK
LOCATION SIMPLE: POSTERIOR SCALP
LOCATION SIMPLE: LEFT SCALP
LOCATION SIMPLE: RIGHT FOREHEAD
LOCATION SIMPLE: LEFT CALF
LOCATION SIMPLE: LEFT POSTERIOR THIGH
LOCATION SIMPLE: RIGHT FOREARM

## 2020-08-19 ASSESSMENT — LOCATION ZONE DERM
LOCATION ZONE: FACE
LOCATION ZONE: TOE
LOCATION ZONE: NECK
LOCATION ZONE: TRUNK
LOCATION ZONE: ARM
LOCATION ZONE: LIP
LOCATION ZONE: LEG
LOCATION ZONE: SCALP

## 2020-08-19 NOTE — PROCEDURE: LIQUID NITROGEN
Number Of Freeze-Thaw Cycles: 1 freeze-thaw cycle
Detail Level: Detailed
Duration Of Freeze Thaw-Cycle (Seconds): 5
Medical Necessity Information: It is in your best interest to select a reason for this procedure from the list below. All of these items fulfill various CMS LCD requirements except the new and changing color options.
Render Note In Bullet Format When Appropriate: No
Medical Necessity Clause: This procedure was medically necessary because the lesions that were treated were irritated.
Consent: The patient's verbal consent was obtained including but not limited to risks of crusting, scabbing, blistering, scarring, darker or lighter pigmentary change, recurrence, incomplete removal and infection.
Post-Care Instructions: I reviewed with the patient in detail post-care instructions. Patient is to wear sunprotection, and avoid picking at any of the treated lesions. Pt may apply Vaseline to crusted or scabbing areas.
Post-Care Instructions: I reviewed with the patient in detail post-care instructions. Patient is to wear sunprotection, and avoid picking at any of the treated lesions. Pt may apply Vaseline to crusted or scabbing areas. Will re-check at follow up

## 2020-08-19 NOTE — PROCEDURE: OTHER
Detail Level: Zone
Other (Free Text): Ln2
Note Text (......Xxx Chief Complaint.): This diagnosis correlates with the

## 2020-10-01 PROBLEM — L57.0 ACTINIC KERATOSIS: Status: ACTIVE | Noted: 2020-01-01

## 2020-10-01 PROBLEM — L82.1 OTHER SEBORRHEIC KERATOSIS: Status: ACTIVE | Noted: 2020-01-01

## 2020-10-01 PROBLEM — L85.3 XEROSIS CUTIS: Status: ACTIVE | Noted: 2020-01-01

## 2020-10-01 PROBLEM — L56.8 OTHER SPECIFIED ACUTE SKIN CHANGES DUE TO ULTRAVIOLET RADIATION: Status: ACTIVE | Noted: 2020-01-01

## 2020-10-01 PROBLEM — I10 ESSENTIAL (PRIMARY) HYPERTENSION: Status: ACTIVE | Noted: 2020-01-01

## 2020-10-01 PROBLEM — Z85.828 PERSONAL HISTORY OF OTHER MALIGNANT NEOPLASM OF SKIN: Status: ACTIVE | Noted: 2020-01-01

## 2020-10-01 PROBLEM — L82.0 INFLAMED SEBORRHEIC KERATOSIS: Status: ACTIVE | Noted: 2020-01-01

## 2020-10-01 NOTE — PROCEDURE: OTHER
Note Text (......Xxx Chief Complaint.): This diagnosis correlates with the
Other (Free Text): Ln2
Detail Level: Simple
Other (Free Text): LN

## 2020-10-01 NOTE — PROCEDURE: LIQUID NITROGEN
Duration Of Freeze Thaw-Cycle (Seconds): 5
Detail Level: Detailed
Consent: The patient's verbal consent was obtained including but not limited to risks of crusting, scabbing, blistering, scarring, darker or lighter pigmentary change, recurrence, incomplete removal and infection.
Add 52 Modifier (Optional): no
Post-Care Instructions: I reviewed with the patient in detail post-care instructions. Patient is to wear sunprotection, and avoid picking at any of the treated lesions. Pt may apply Vaseline to crusted or scabbing areas.
Number Of Freeze-Thaw Cycles: 1 freeze-thaw cycle
Post-Care Instructions: I reviewed with the patient in detail post-care instructions. Patient is to wear sunprotection, and avoid picking at any of the treated lesions. Pt may apply Vaseline to crusted or scabbing areas. Will re-check at follow up
Medical Necessity Clause: This procedure was medically necessary because the lesions that were treated were irritated.
Medical Necessity Information: It is in your best interest to select a reason for this procedure from the list below. All of these items fulfill various CMS LCD requirements except the new and changing color options.

## 2020-10-15 PROBLEM — R00.2 PALPITATIONS: Status: ACTIVE | Noted: 2020-01-01

## 2020-12-21 PROBLEM — L82.1 OTHER SEBORRHEIC KERATOSIS: Status: ACTIVE | Noted: 2020-01-01

## 2020-12-21 PROBLEM — I10 ESSENTIAL (PRIMARY) HYPERTENSION: Status: ACTIVE | Noted: 2020-01-01

## 2020-12-21 PROBLEM — L56.8 OTHER SPECIFIED ACUTE SKIN CHANGES DUE TO ULTRAVIOLET RADIATION: Status: ACTIVE | Noted: 2020-01-01

## 2020-12-21 PROBLEM — L82.0 INFLAMED SEBORRHEIC KERATOSIS: Status: ACTIVE | Noted: 2020-01-01

## 2020-12-21 NOTE — PROCEDURE: LIQUID NITROGEN
Number Of Freeze-Thaw Cycles: 1 freeze-thaw cycle
Medical Necessity Clause: This procedure was medically necessary because the lesions that were treated were irritated.
Render Post-Care Instructions In Note?: no
Consent: The patient's verbal consent was obtained including but not limited to risks of crusting, scabbing, blistering, scarring, darker or lighter pigmentary change, recurrence, incomplete removal and infection.
Post-Care Instructions: I reviewed with the patient in detail post-care instructions. Patient is to wear sunprotection, and avoid picking at any of the treated lesions. Pt may apply Vaseline to crusted or scabbing areas.
Medical Necessity Information: It is in your best interest to select a reason for this procedure from the list below. All of these items fulfill various CMS LCD requirements except the new and changing color options.
Detail Level: Simple

## 2020-12-21 NOTE — PROCEDURE: OTHER
Detail Level: Zone
Note Text (......Xxx Chief Complaint.): This diagnosis correlates with the
Other (Free Text): Ln2
Other (Free Text): Patient completed a weeks course of zyclara on the lips and had a great response to the treatment.

## 2021-01-01 ENCOUNTER — APPOINTMENT (OUTPATIENT)
Dept: GENERAL RADIOLOGY | Age: 81
DRG: 441 | End: 2021-01-01
Attending: PHYSICIAN ASSISTANT
Payer: MEDICARE

## 2021-01-01 ENCOUNTER — APPOINTMENT (RX ONLY)
Dept: URBAN - METROPOLITAN AREA CLINIC 23 | Facility: CLINIC | Age: 81
Setting detail: DERMATOLOGY
End: 2021-01-01

## 2021-01-01 ENCOUNTER — APPOINTMENT (OUTPATIENT)
Dept: PHYSICAL THERAPY | Age: 81
End: 2021-01-01
Payer: MEDICARE

## 2021-01-01 ENCOUNTER — APPOINTMENT (OUTPATIENT)
Dept: ULTRASOUND IMAGING | Age: 81
DRG: 441 | End: 2021-01-01
Attending: EMERGENCY MEDICINE
Payer: MEDICARE

## 2021-01-01 ENCOUNTER — HOSPITAL ENCOUNTER (OUTPATIENT)
Dept: PHYSICAL THERAPY | Age: 81
Discharge: HOME | End: 2021-02-10
Payer: MEDICARE

## 2021-01-01 ENCOUNTER — HOSPITAL ENCOUNTER (EMERGENCY)
Age: 81
Discharge: HOME | End: 2021-09-18
Attending: EMERGENCY MEDICINE
Payer: MEDICARE

## 2021-01-01 ENCOUNTER — HOSPITAL ENCOUNTER (INPATIENT)
Age: 81
LOS: 4 days | DRG: 441 | End: 2021-09-26
Attending: EMERGENCY MEDICINE | Admitting: FAMILY MEDICINE
Payer: MEDICARE

## 2021-01-01 ENCOUNTER — HOSPITAL ENCOUNTER (OUTPATIENT)
Dept: PHYSICAL THERAPY | Age: 81
Discharge: HOME | End: 2021-02-05
Payer: MEDICARE

## 2021-01-01 ENCOUNTER — ANESTHESIA EVENT (OUTPATIENT)
Dept: SURGERY | Age: 81
DRG: 470 | End: 2021-01-01
Payer: MEDICARE

## 2021-01-01 ENCOUNTER — ANESTHESIA (OUTPATIENT)
Dept: SURGERY | Age: 81
DRG: 470 | End: 2021-01-01
Payer: MEDICARE

## 2021-01-01 ENCOUNTER — HOSPITAL ENCOUNTER (INPATIENT)
Age: 81
LOS: 1 days | Discharge: HOME | DRG: 470 | End: 2021-05-08
Attending: ORTHOPAEDIC SURGERY | Admitting: ORTHOPAEDIC SURGERY
Payer: MEDICARE

## 2021-01-01 ENCOUNTER — APPOINTMENT (OUTPATIENT)
Dept: GENERAL RADIOLOGY | Age: 81
DRG: 441 | End: 2021-01-01
Attending: INTERNAL MEDICINE
Payer: MEDICARE

## 2021-01-01 ENCOUNTER — HOSPITAL ENCOUNTER (OUTPATIENT)
Dept: PHYSICAL THERAPY | Age: 81
Discharge: HOME | End: 2021-03-10
Payer: MEDICARE

## 2021-01-01 ENCOUNTER — HOSPITAL ENCOUNTER (OUTPATIENT)
Dept: PHYSICAL THERAPY | Age: 81
Discharge: HOME | End: 2021-02-17
Payer: MEDICARE

## 2021-01-01 ENCOUNTER — HOSPITAL ENCOUNTER (OUTPATIENT)
Dept: PHYSICAL THERAPY | Age: 81
Discharge: HOME | End: 2021-03-12
Payer: MEDICARE

## 2021-01-01 ENCOUNTER — HOSPITAL ENCOUNTER (OUTPATIENT)
Dept: PHYSICAL THERAPY | Age: 81
Discharge: HOME | End: 2021-02-19
Payer: MEDICARE

## 2021-01-01 ENCOUNTER — HOSPITAL ENCOUNTER (OUTPATIENT)
Dept: PHYSICAL THERAPY | Age: 81
Discharge: HOME | End: 2021-02-26
Payer: MEDICARE

## 2021-01-01 ENCOUNTER — HOSPITAL ENCOUNTER (OUTPATIENT)
Dept: PHYSICAL THERAPY | Age: 81
Discharge: HOME | End: 2021-02-24
Payer: MEDICARE

## 2021-01-01 ENCOUNTER — HOSPITAL ENCOUNTER (INPATIENT)
Dept: ULTRASOUND IMAGING | Age: 81
DRG: 441 | End: 2021-01-01
Attending: INTERNAL MEDICINE
Payer: MEDICARE

## 2021-01-01 ENCOUNTER — HOSPITAL ENCOUNTER (OUTPATIENT)
Dept: PHYSICAL THERAPY | Age: 81
Discharge: HOME | End: 2021-02-02
Payer: MEDICARE

## 2021-01-01 ENCOUNTER — HOSPITAL ENCOUNTER (OUTPATIENT)
Dept: SURGERY | Age: 81
Discharge: HOME | End: 2021-04-19
Attending: ORTHOPAEDIC SURGERY
Payer: MEDICARE

## 2021-01-01 ENCOUNTER — HOSPITAL ENCOUNTER (OUTPATIENT)
Dept: PHYSICAL THERAPY | Age: 81
Discharge: HOME | End: 2021-04-19
Attending: ORTHOPAEDIC SURGERY
Payer: MEDICARE

## 2021-01-01 ENCOUNTER — HOSPITAL ENCOUNTER (OUTPATIENT)
Dept: PHYSICAL THERAPY | Age: 81
Discharge: HOME | End: 2021-02-12
Payer: MEDICARE

## 2021-01-01 ENCOUNTER — HOSPITAL ENCOUNTER (OUTPATIENT)
Dept: PHYSICAL THERAPY | Age: 81
Discharge: HOME | End: 2021-03-16
Payer: MEDICARE

## 2021-01-01 VITALS
BODY MASS INDEX: 22.57 KG/M2 | OXYGEN SATURATION: 96 % | TEMPERATURE: 97.9 F | SYSTOLIC BLOOD PRESSURE: 184 MMHG | HEIGHT: 67 IN | WEIGHT: 143.8 LBS | HEART RATE: 56 BPM | RESPIRATION RATE: 16 BRPM | DIASTOLIC BLOOD PRESSURE: 87 MMHG

## 2021-01-01 VITALS
DIASTOLIC BLOOD PRESSURE: 69 MMHG | HEART RATE: 60 BPM | HEIGHT: 67 IN | TEMPERATURE: 97.8 F | WEIGHT: 143.8 LBS | BODY MASS INDEX: 22.57 KG/M2 | SYSTOLIC BLOOD PRESSURE: 156 MMHG | OXYGEN SATURATION: 96 % | RESPIRATION RATE: 16 BRPM

## 2021-01-01 VITALS
SYSTOLIC BLOOD PRESSURE: 110 MMHG | OXYGEN SATURATION: 99 % | HEART RATE: 98 BPM | BODY MASS INDEX: 22.5 KG/M2 | DIASTOLIC BLOOD PRESSURE: 89 MMHG | TEMPERATURE: 97.6 F | RESPIRATION RATE: 24 BRPM | HEIGHT: 66 IN | WEIGHT: 140 LBS

## 2021-01-01 VITALS
TEMPERATURE: 97.3 F | HEIGHT: 66 IN | WEIGHT: 138.89 LBS | OXYGEN SATURATION: 55 % | DIASTOLIC BLOOD PRESSURE: 135 MMHG | BODY MASS INDEX: 22.32 KG/M2 | HEART RATE: 46 BPM | SYSTOLIC BLOOD PRESSURE: 161 MMHG

## 2021-01-01 DIAGNOSIS — D22 MELANOCYTIC NEVI: ICD-10-CM

## 2021-01-01 DIAGNOSIS — L57.0 ACTINIC KERATOSIS: ICD-10-CM

## 2021-01-01 DIAGNOSIS — E87.5 HYPERKALEMIA: ICD-10-CM

## 2021-01-01 DIAGNOSIS — R74.8 ELEVATED LIVER ENZYMES: ICD-10-CM

## 2021-01-01 DIAGNOSIS — Z85.828 PERSONAL HISTORY OF OTHER MALIGNANT NEOPLASM OF SKIN: ICD-10-CM

## 2021-01-01 DIAGNOSIS — L57.8 OTHER SKIN CHANGES DUE TO CHRONIC EXPOSURE TO NONIONIZING RADIATION: ICD-10-CM

## 2021-01-01 DIAGNOSIS — R57.9 SHOCK (HCC): ICD-10-CM

## 2021-01-01 DIAGNOSIS — R04.89 PULMONARY HEMORRHAGE: ICD-10-CM

## 2021-01-01 DIAGNOSIS — N17.9 ACUTE KIDNEY INJURY (HCC): ICD-10-CM

## 2021-01-01 DIAGNOSIS — L81.4 OTHER MELANIN HYPERPIGMENTATION: ICD-10-CM

## 2021-01-01 DIAGNOSIS — E87.20 METABOLIC ACIDOSIS: ICD-10-CM

## 2021-01-01 DIAGNOSIS — K85.90 ACUTE PANCREATITIS, UNSPECIFIED COMPLICATION STATUS, UNSPECIFIED PANCREATITIS TYPE: Primary | ICD-10-CM

## 2021-01-01 DIAGNOSIS — L85.3 XEROSIS CUTIS: ICD-10-CM

## 2021-01-01 DIAGNOSIS — L72.0 EPIDERMAL CYST: ICD-10-CM

## 2021-01-01 DIAGNOSIS — I26.99 ACUTE PULMONARY EMBOLISM, UNSPECIFIED PULMONARY EMBOLISM TYPE, UNSPECIFIED WHETHER ACUTE COR PULMONALE PRESENT (HCC): ICD-10-CM

## 2021-01-01 DIAGNOSIS — L82.0 INFLAMED SEBORRHEIC KERATOSIS: ICD-10-CM

## 2021-01-01 DIAGNOSIS — I46.9 CARDIAC ARREST (HCC): ICD-10-CM

## 2021-01-01 DIAGNOSIS — L82.1 OTHER SEBORRHEIC KERATOSIS: ICD-10-CM

## 2021-01-01 DIAGNOSIS — U07.1 COVID-19: Primary | ICD-10-CM

## 2021-01-01 DIAGNOSIS — N17.9 AKI (ACUTE KIDNEY INJURY) (HCC): ICD-10-CM

## 2021-01-01 DIAGNOSIS — D18.0 HEMANGIOMA: ICD-10-CM

## 2021-01-01 DIAGNOSIS — Z96.651 STATUS POST TOTAL RIGHT KNEE REPLACEMENT: Primary | ICD-10-CM

## 2021-01-01 DIAGNOSIS — U07.1 COVID-19: ICD-10-CM

## 2021-01-01 LAB
ACTIN IGG SERPL-ACNC: 6 UNITS (ref 0–19)
ALBUMIN SERPL-MCNC: 2.4 G/DL (ref 3.2–4.6)
ALBUMIN SERPL-MCNC: 2.5 G/DL (ref 3.2–4.6)
ALBUMIN SERPL-MCNC: 2.5 G/DL (ref 3.2–4.6)
ALBUMIN SERPL-MCNC: 2.6 G/DL (ref 3.2–4.6)
ALBUMIN SERPL-MCNC: 2.7 G/DL (ref 3.2–4.6)
ALBUMIN SERPL-MCNC: 2.7 G/DL (ref 3.2–4.6)
ALBUMIN/GLOB SERPL: 0.6 {RATIO} (ref 1.2–3.5)
ALBUMIN/GLOB SERPL: 0.7 {RATIO} (ref 1.2–3.5)
ALP SERPL-CCNC: 114 U/L (ref 50–130)
ALP SERPL-CCNC: 114 U/L (ref 50–130)
ALP SERPL-CCNC: 120 U/L (ref 50–136)
ALP SERPL-CCNC: 129 U/L (ref 50–136)
ALP SERPL-CCNC: 130 U/L (ref 50–136)
ALP SERPL-CCNC: 134 U/L (ref 50–136)
ALT SERPL-CCNC: 577 U/L (ref 12–65)
ALT SERPL-CCNC: 590 U/L (ref 12–65)
ALT SERPL-CCNC: 628 U/L (ref 12–65)
ALT SERPL-CCNC: 636 U/L (ref 12–65)
ALT SERPL-CCNC: 668 U/L (ref 12–65)
ALT SERPL-CCNC: 686 U/L (ref 12–65)
ANA SER QL: NEGATIVE
ANION GAP SERPL CALC-SCNC: 11 MMOL/L (ref 7–16)
ANION GAP SERPL CALC-SCNC: 13 MMOL/L (ref 7–16)
ANION GAP SERPL CALC-SCNC: 19 MMOL/L (ref 7–16)
ANION GAP SERPL CALC-SCNC: 2 MMOL/L (ref 7–16)
ANION GAP SERPL CALC-SCNC: 8 MMOL/L (ref 7–16)
ANION GAP SERPL CALC-SCNC: 8 MMOL/L (ref 7–16)
ANION GAP SERPL CALC-SCNC: 9 MMOL/L (ref 7–16)
APAP SERPL-MCNC: <2 UG/ML (ref 10–30)
ARTERIAL PATENCY WRIST A: ABNORMAL
AST SERPL-CCNC: 212 U/L (ref 15–37)
AST SERPL-CCNC: 220 U/L (ref 15–37)
AST SERPL-CCNC: 284 U/L (ref 15–37)
AST SERPL-CCNC: 328 U/L (ref 15–37)
AST SERPL-CCNC: 338 U/L (ref 15–37)
AST SERPL-CCNC: 376 U/L (ref 15–37)
B PERT DNA SPEC QL NAA+PROBE: NOT DETECTED
BACTERIA SPEC CULT: NORMAL
BASE DEFICIT BLD-SCNC: 24.5 MMOL/L
BASE DEFICIT BLDV-SCNC: 24.3 MMOL/L
BASOPHILS # BLD: 0 K/UL (ref 0–0.2)
BASOPHILS NFR BLD: 0 % (ref 0–2)
BDY SITE: ABNORMAL
BDY SITE: ABNORMAL
BILIRUB DIRECT SERPL-MCNC: 0.8 MG/DL
BILIRUB SERPL-MCNC: 1.3 MG/DL (ref 0.2–1.1)
BILIRUB SERPL-MCNC: 1.4 MG/DL (ref 0.2–1.1)
BILIRUB SERPL-MCNC: 1.5 MG/DL (ref 0.2–1.1)
BILIRUB SERPL-MCNC: 1.6 MG/DL (ref 0.2–1.1)
BORDETELLA PARAPERTUSSIS PCR, BORPAR: NOT DETECTED
BUN SERPL-MCNC: 33 MG/DL (ref 8–23)
BUN SERPL-MCNC: 59 MG/DL (ref 8–23)
BUN SERPL-MCNC: 64 MG/DL (ref 8–23)
BUN SERPL-MCNC: 71 MG/DL (ref 8–23)
BUN SERPL-MCNC: 78 MG/DL (ref 8–23)
BUN SERPL-MCNC: 80 MG/DL (ref 8–23)
BUN SERPL-MCNC: 80 MG/DL (ref 8–23)
C PNEUM DNA SPEC QL NAA+PROBE: NOT DETECTED
CALCIUM SERPL-MCNC: 8.1 MG/DL (ref 8.3–10.4)
CALCIUM SERPL-MCNC: 8.5 MG/DL (ref 8.3–10.4)
CALCIUM SERPL-MCNC: 8.5 MG/DL (ref 8.3–10.4)
CALCIUM SERPL-MCNC: 8.6 MG/DL (ref 8.3–10.4)
CALCIUM SERPL-MCNC: 8.6 MG/DL (ref 8.3–10.4)
CALCIUM SERPL-MCNC: 8.7 MG/DL (ref 8.3–10.4)
CALCIUM SERPL-MCNC: 8.7 MG/DL (ref 8.3–10.4)
CHLORIDE SERPL-SCNC: 106 MMOL/L (ref 98–107)
CHLORIDE SERPL-SCNC: 108 MMOL/L (ref 98–107)
CHLORIDE SERPL-SCNC: 108 MMOL/L (ref 98–107)
CHLORIDE SERPL-SCNC: 109 MMOL/L (ref 98–107)
CHLORIDE SERPL-SCNC: 110 MMOL/L (ref 98–107)
CHLORIDE SERPL-SCNC: 110 MMOL/L (ref 98–107)
CHLORIDE SERPL-SCNC: 111 MMOL/L (ref 98–107)
CO2 SERPL-SCNC: 10 MMOL/L (ref 21–32)
CO2 SERPL-SCNC: 18 MMOL/L (ref 21–32)
CO2 SERPL-SCNC: 19 MMOL/L (ref 21–32)
CO2 SERPL-SCNC: 19 MMOL/L (ref 21–32)
CO2 SERPL-SCNC: 20 MMOL/L (ref 21–32)
CO2 SERPL-SCNC: 21 MMOL/L (ref 21–32)
CO2 SERPL-SCNC: 27 MMOL/L (ref 21–32)
COVID-19 RAPID TEST, COVR: NOT DETECTED
CREAT SERPL-MCNC: 1.18 MG/DL (ref 0.6–1)
CREAT SERPL-MCNC: 2.03 MG/DL (ref 0.6–1)
CREAT SERPL-MCNC: 2.12 MG/DL (ref 0.6–1)
CREAT SERPL-MCNC: 2.34 MG/DL (ref 0.6–1)
CREAT SERPL-MCNC: 2.35 MG/DL (ref 0.6–1)
CREAT SERPL-MCNC: 2.41 MG/DL (ref 0.6–1)
CREAT SERPL-MCNC: 2.47 MG/DL (ref 0.6–1)
D DIMER PPP FEU-MCNC: 16.01 UG/ML(FEU)
DIFFERENTIAL METHOD BLD: ABNORMAL
EOSINOPHIL # BLD: 0.1 K/UL (ref 0–0.8)
EOSINOPHIL # BLD: 0.2 K/UL (ref 0–0.8)
EOSINOPHIL # BLD: 0.2 K/UL (ref 0–0.8)
EOSINOPHIL NFR BLD: 2 % (ref 0.5–7.8)
EOSINOPHIL NFR BLD: 2 % (ref 0.5–7.8)
EOSINOPHIL NFR BLD: 3 % (ref 0.5–7.8)
ERYTHROCYTE [DISTWIDTH] IN BLOOD BY AUTOMATED COUNT: 13.3 % (ref 11.9–14.6)
ERYTHROCYTE [DISTWIDTH] IN BLOOD BY AUTOMATED COUNT: 15.7 % (ref 11.9–14.6)
ERYTHROCYTE [DISTWIDTH] IN BLOOD BY AUTOMATED COUNT: 15.7 % (ref 11.9–14.6)
ERYTHROCYTE [DISTWIDTH] IN BLOOD BY AUTOMATED COUNT: 15.9 % (ref 11.9–14.6)
FLUAV SUBTYP SPEC NAA+PROBE: NOT DETECTED
FLUBV RNA SPEC QL NAA+PROBE: NOT DETECTED
GLOBULIN SER CALC-MCNC: 3.9 G/DL (ref 2.3–3.5)
GLOBULIN SER CALC-MCNC: 4 G/DL (ref 2.3–3.5)
GLOBULIN SER CALC-MCNC: 4.1 G/DL (ref 2.3–3.5)
GLOBULIN SER CALC-MCNC: 4.3 G/DL (ref 2.3–3.5)
GLOBULIN SER CALC-MCNC: 4.4 G/DL (ref 2.3–3.5)
GLOBULIN SER CALC-MCNC: 4.9 G/DL (ref 2.3–3.5)
GLUCOSE BLD STRIP.AUTO-MCNC: 138 MG/DL (ref 65–100)
GLUCOSE SERPL-MCNC: 110 MG/DL (ref 65–100)
GLUCOSE SERPL-MCNC: 117 MG/DL (ref 65–100)
GLUCOSE SERPL-MCNC: 127 MG/DL (ref 65–100)
GLUCOSE SERPL-MCNC: 182 MG/DL (ref 65–100)
GLUCOSE SERPL-MCNC: 85 MG/DL (ref 65–100)
GLUCOSE SERPL-MCNC: 96 MG/DL (ref 65–100)
GLUCOSE SERPL-MCNC: 98 MG/DL (ref 65–100)
HADV DNA SPEC QL NAA+PROBE: NOT DETECTED
HAV IGM SER QL: NONREACTIVE
HBV CORE IGM SER QL: NONREACTIVE
HBV SURFACE AG SER QL: NONREACTIVE
HCO3 BLD-SCNC: 10.5 MMOL/L (ref 22–26)
HCO3 BLDV-SCNC: 10.9 MMOL/L (ref 23–28)
HCOV 229E RNA SPEC QL NAA+PROBE: NOT DETECTED
HCOV HKU1 RNA SPEC QL NAA+PROBE: NOT DETECTED
HCOV NL63 RNA SPEC QL NAA+PROBE: NOT DETECTED
HCOV OC43 RNA SPEC QL NAA+PROBE: NOT DETECTED
HCT VFR BLD AUTO: 35.9 % (ref 35.8–46.3)
HCT VFR BLD AUTO: 38 % (ref 35.8–46.3)
HCT VFR BLD AUTO: 38.1 % (ref 35.8–46.3)
HCT VFR BLD AUTO: 39.3 % (ref 35.8–46.3)
HCT VFR BLD AUTO: 40 % (ref 35.8–46.3)
HCT VFR BLD AUTO: 41 % (ref 35.8–46.3)
HCT VFR BLD AUTO: 44.8 % (ref 35.8–46.3)
HCV AB SER QL: NONREACTIVE
HGB BLD-MCNC: 10.2 G/DL (ref 11.7–15.4)
HGB BLD-MCNC: 11.6 G/DL (ref 11.7–15.4)
HGB BLD-MCNC: 12.1 G/DL (ref 11.7–15.4)
HGB BLD-MCNC: 12.2 G/DL (ref 11.7–15.4)
HGB BLD-MCNC: 12.2 G/DL (ref 11.7–15.4)
HGB BLD-MCNC: 12.6 G/DL (ref 11.7–15.4)
HGB BLD-MCNC: 13.1 G/DL (ref 11.7–15.4)
HMPV RNA SPEC QL NAA+PROBE: NOT DETECTED
HPIV1 RNA SPEC QL NAA+PROBE: NOT DETECTED
HPIV2 RNA SPEC QL NAA+PROBE: NOT DETECTED
HPIV3 RNA SPEC QL NAA+PROBE: NOT DETECTED
HPIV4 RNA SPEC QL NAA+PROBE: NOT DETECTED
IMM GRANULOCYTES # BLD AUTO: 0 K/UL (ref 0–0.5)
IMM GRANULOCYTES NFR BLD AUTO: 0 % (ref 0–5)
INR PPP: 1.4
INR PPP: 1.4
INR PPP: 1.5
LIPASE SERPL-CCNC: 1605 U/L (ref 73–393)
LIPASE SERPL-CCNC: 2227 U/L (ref 73–393)
LYMPHOCYTES # BLD: 1.4 K/UL (ref 0.5–4.6)
LYMPHOCYTES # BLD: 1.7 K/UL (ref 0.5–4.6)
LYMPHOCYTES # BLD: 6.8 K/UL (ref 0.5–4.6)
LYMPHOCYTES NFR BLD: 19 % (ref 13–44)
LYMPHOCYTES NFR BLD: 22 % (ref 13–44)
LYMPHOCYTES NFR BLD: 56 % (ref 13–44)
M PNEUMO DNA SPEC QL NAA+PROBE: NOT DETECTED
MAGNESIUM SERPL-MCNC: 2.3 MG/DL (ref 1.8–2.4)
MCH RBC QN AUTO: 30.9 PG (ref 26.1–32.9)
MCH RBC QN AUTO: 31.5 PG (ref 26.1–32.9)
MCH RBC QN AUTO: 31.6 PG (ref 26.1–32.9)
MCH RBC QN AUTO: 31.7 PG (ref 26.1–32.9)
MCH RBC QN AUTO: 31.9 PG (ref 26.1–32.9)
MCH RBC QN AUTO: 31.9 PG (ref 26.1–32.9)
MCH RBC QN AUTO: 33 PG (ref 26.1–32.9)
MCHC RBC AUTO-ENTMCNC: 29.2 G/DL (ref 31.4–35)
MCHC RBC AUTO-ENTMCNC: 30.4 G/DL (ref 31.4–35)
MCHC RBC AUTO-ENTMCNC: 30.5 G/DL (ref 31.4–35)
MCHC RBC AUTO-ENTMCNC: 30.5 G/DL (ref 31.4–35)
MCHC RBC AUTO-ENTMCNC: 30.7 G/DL (ref 31.4–35)
MCHC RBC AUTO-ENTMCNC: 31 G/DL (ref 31.4–35)
MCHC RBC AUTO-ENTMCNC: 33.7 G/DL (ref 31.4–35)
MCV RBC AUTO: 101.3 FL (ref 79.6–97.8)
MCV RBC AUTO: 102.9 FL (ref 79.6–97.8)
MCV RBC AUTO: 103.3 FL (ref 79.6–97.8)
MCV RBC AUTO: 103.5 FL (ref 79.6–97.8)
MCV RBC AUTO: 104.4 FL (ref 79.6–97.8)
MCV RBC AUTO: 108 FL (ref 79.6–97.8)
MCV RBC AUTO: 97.8 FL (ref 79.6–97.8)
MONOCYTES # BLD: 0.7 K/UL (ref 0.1–1.3)
MONOCYTES # BLD: 0.7 K/UL (ref 0.1–1.3)
MONOCYTES # BLD: 1.2 K/UL (ref 0.1–1.3)
MONOCYTES NFR BLD: 10 % (ref 4–12)
NEUTS SEG # BLD: 3.9 K/UL (ref 1.7–8.2)
NEUTS SEG # BLD: 4.9 K/UL (ref 1.7–8.2)
NEUTS SEG # BLD: 4.9 K/UL (ref 1.7–8.2)
NEUTS SEG NFR BLD: 32 % (ref 43–78)
NEUTS SEG NFR BLD: 65 % (ref 43–78)
NEUTS SEG NFR BLD: 68 % (ref 43–78)
NRBC # BLD: 0 K/UL (ref 0–0.2)
NRBC # BLD: 0 K/UL (ref 0–0.2)
NRBC # BLD: 0.02 K/UL (ref 0–0.2)
NRBC # BLD: 0.03 K/UL (ref 0–0.2)
NRBC # BLD: 0.05 K/UL (ref 0–0.2)
NRBC # BLD: 0.05 K/UL (ref 0–0.2)
NRBC # BLD: 0.07 K/UL (ref 0–0.2)
O2/TOTAL GAS SETTING VFR VENT: 100 %
O2/TOTAL GAS SETTING VFR VENT: 100 %
PCO2 BLD: 69.1 MMHG (ref 35–45)
PCO2 BLDV: 68.2 MMHG (ref 41–51)
PEEP RESPIRATORY: 10 CMH2O
PEEP RESPIRATORY: 8 CMH2O
PH BLD: 6.79 [PH] (ref 7.35–7.45)
PH BLDV: 6.81 [PH] (ref 7.32–7.42)
PLATELET # BLD AUTO: 103 K/UL (ref 150–450)
PLATELET # BLD AUTO: 107 K/UL (ref 150–450)
PLATELET # BLD AUTO: 138 K/UL (ref 150–450)
PLATELET # BLD AUTO: 195 K/UL (ref 150–450)
PLATELET # BLD AUTO: 59 K/UL (ref 150–450)
PLATELET # BLD AUTO: 66 K/UL (ref 150–450)
PLATELET # BLD AUTO: 99 K/UL (ref 150–450)
PLATELET COMMENTS,PCOM: ABNORMAL
PMV BLD AUTO: 10.9 FL (ref 9.4–12.3)
PMV BLD AUTO: 11.2 FL (ref 9.4–12.3)
PMV BLD AUTO: 11.4 FL (ref 9.4–12.3)
PMV BLD AUTO: 11.7 FL (ref 9.4–12.3)
PMV BLD AUTO: 11.8 FL (ref 9.4–12.3)
PMV BLD AUTO: 12.2 FL (ref 9.4–12.3)
PMV BLD AUTO: 9.8 FL (ref 9.4–12.3)
PO2 BLD: 76 MMHG (ref 75–100)
PO2 BLDV: 50 MMHG
POTASSIUM SERPL-SCNC: 3.7 MMOL/L (ref 3.5–5.1)
POTASSIUM SERPL-SCNC: 4.3 MMOL/L (ref 3.5–5.1)
POTASSIUM SERPL-SCNC: 4.3 MMOL/L (ref 3.5–5.1)
POTASSIUM SERPL-SCNC: 4.7 MMOL/L (ref 3.5–5.1)
POTASSIUM SERPL-SCNC: 5.1 MMOL/L (ref 3.5–5.1)
POTASSIUM SERPL-SCNC: 5.6 MMOL/L (ref 3.5–5.1)
POTASSIUM SERPL-SCNC: 5.8 MMOL/L (ref 3.5–5.1)
PROT SERPL-MCNC: 6.4 G/DL (ref 6.3–8.2)
PROT SERPL-MCNC: 6.6 G/DL (ref 6.3–8.2)
PROT SERPL-MCNC: 6.6 G/DL (ref 6.3–8.2)
PROT SERPL-MCNC: 6.7 G/DL (ref 6.3–8.2)
PROT SERPL-MCNC: 7.1 G/DL (ref 6.3–8.2)
PROT SERPL-MCNC: 7.6 G/DL (ref 6.3–8.2)
PROTHROMBIN TIME: 17.6 SEC (ref 12.6–14.5)
PROTHROMBIN TIME: 17.9 SEC (ref 12.6–14.5)
PROTHROMBIN TIME: 18.2 SEC (ref 12.6–14.5)
PROTHROMBIN TIME: 18.4 SEC (ref 12.6–14.5)
PROTHROMBIN TIME: 18.5 SEC (ref 12.6–14.5)
RBC # BLD AUTO: 3.67 M/UL (ref 4.05–5.2)
RBC # BLD AUTO: 3.68 M/UL (ref 4.05–5.2)
RBC # BLD AUTO: 3.75 M/UL (ref 4.05–5.2)
RBC # BLD AUTO: 3.82 M/UL (ref 4.05–5.2)
RBC # BLD AUTO: 3.83 M/UL (ref 4.05–5.2)
RBC # BLD AUTO: 3.97 M/UL (ref 4.05–5.2)
RBC # BLD AUTO: 4.15 M/UL (ref 4.05–5.2)
RBC MORPH BLD: ABNORMAL
RSV RNA SPEC QL NAA+PROBE: NOT DETECTED
RV+EV RNA SPEC QL NAA+PROBE: NOT DETECTED
SAO2 % BLD: 75.1 % (ref 95–98)
SAO2 % BLDV: 50.7 % (ref 65–88)
SARS-COV-2 PCR, COVPCR: NOT DETECTED
SARS-COV-2, COV2: NORMAL
SERVICE CMNT-IMP: ABNORMAL
SERVICE CMNT-IMP: NORMAL
SODIUM SERPL-SCNC: 135 MMOL/L (ref 136–145)
SODIUM SERPL-SCNC: 137 MMOL/L (ref 136–145)
SODIUM SERPL-SCNC: 138 MMOL/L (ref 136–145)
SODIUM SERPL-SCNC: 138 MMOL/L (ref 136–145)
SODIUM SERPL-SCNC: 139 MMOL/L (ref 136–145)
SODIUM SERPL-SCNC: 139 MMOL/L (ref 136–145)
SODIUM SERPL-SCNC: 140 MMOL/L (ref 136–145)
SOURCE, COVRS: NORMAL
SPECIMEN TYPE: ABNORMAL
SPECIMEN TYPE: ABNORMAL
VENTILATION MODE VENT: ABNORMAL
VENTILATION MODE VENT: ABNORMAL
VT SETTING VENT: 340 ML
VT SETTING VENT: 340 ML
WBC # BLD AUTO: 12.1 K/UL (ref 4.3–11.1)
WBC # BLD AUTO: 3.8 K/UL (ref 4.3–11.1)
WBC # BLD AUTO: 6.2 K/UL (ref 4.3–11.1)
WBC # BLD AUTO: 6.2 K/UL (ref 4.3–11.1)
WBC # BLD AUTO: 7.3 K/UL (ref 4.3–11.1)
WBC # BLD AUTO: 7.4 K/UL (ref 4.3–11.1)
WBC # BLD AUTO: 7.6 K/UL (ref 4.3–11.1)
WBC MORPH BLD: ABNORMAL

## 2021-01-01 PROCEDURE — 97110 THERAPEUTIC EXERCISES: CPT

## 2021-01-01 PROCEDURE — 74011250636 HC RX REV CODE- 250/636: Performed by: ORTHOPAEDIC SURGERY

## 2021-01-01 PROCEDURE — 17003 DESTRUCT PREMALG LES 2-14: CPT | Mod: 59

## 2021-01-01 PROCEDURE — 85027 COMPLETE CBC AUTOMATED: CPT

## 2021-01-01 PROCEDURE — 74011250636 HC RX REV CODE- 250/636: Performed by: PHYSICIAN ASSISTANT

## 2021-01-01 PROCEDURE — 76210000006 HC OR PH I REC 0.5 TO 1 HR: Performed by: ORTHOPAEDIC SURGERY

## 2021-01-01 PROCEDURE — 17110 DESTRUCTION B9 LES UP TO 14: CPT

## 2021-01-01 PROCEDURE — 77030031139 HC SUT VCRL2 J&J -A: Performed by: ORTHOPAEDIC SURGERY

## 2021-01-01 PROCEDURE — 82803 BLOOD GASES ANY COMBINATION: CPT

## 2021-01-01 PROCEDURE — 3E0T3BZ INTRODUCTION OF ANESTHETIC AGENT INTO PERIPHERAL NERVES AND PLEXI, PERCUTANEOUS APPROACH: ICD-10-PCS | Performed by: ANESTHESIOLOGY

## 2021-01-01 PROCEDURE — 97140 MANUAL THERAPY 1/> REGIONS: CPT

## 2021-01-01 PROCEDURE — 77030027138 HC INCENT SPIROMETER -A

## 2021-01-01 PROCEDURE — 76942 ECHO GUIDE FOR BIOPSY: CPT | Performed by: ORTHOPAEDIC SURGERY

## 2021-01-01 PROCEDURE — 83516 IMMUNOASSAY NONANTIBODY: CPT

## 2021-01-01 PROCEDURE — 85610 PROTHROMBIN TIME: CPT

## 2021-01-01 PROCEDURE — 65270000029 HC RM PRIVATE

## 2021-01-01 PROCEDURE — 74011000258 HC RX REV CODE- 258: Performed by: PHYSICIAN ASSISTANT

## 2021-01-01 PROCEDURE — 80053 COMPREHEN METABOLIC PANEL: CPT

## 2021-01-01 PROCEDURE — 77030002912 HC SUT ETHBND J&J -A: Performed by: ORTHOPAEDIC SURGERY

## 2021-01-01 PROCEDURE — 86038 ANTINUCLEAR ANTIBODIES: CPT

## 2021-01-01 PROCEDURE — ? PRESCRIPTION

## 2021-01-01 PROCEDURE — ? TREATMENT REGIMEN

## 2021-01-01 PROCEDURE — 97016 VASOPNEUMATIC DEVICE THERAPY: CPT

## 2021-01-01 PROCEDURE — C1776 JOINT DEVICE (IMPLANTABLE): HCPCS | Performed by: ORTHOPAEDIC SURGERY

## 2021-01-01 PROCEDURE — 82248 BILIRUBIN DIRECT: CPT

## 2021-01-01 PROCEDURE — 36415 COLL VENOUS BLD VENIPUNCTURE: CPT

## 2021-01-01 PROCEDURE — 77030020268 HC MISC GENERAL SUPPLY: Performed by: ORTHOPAEDIC SURGERY

## 2021-01-01 PROCEDURE — 71045 X-RAY EXAM CHEST 1 VIEW: CPT

## 2021-01-01 PROCEDURE — 99213 OFFICE O/P EST LOW 20 MIN: CPT | Mod: 25

## 2021-01-01 PROCEDURE — 74011250636 HC RX REV CODE- 250/636: Performed by: INTERNAL MEDICINE

## 2021-01-01 PROCEDURE — 85379 FIBRIN DEGRADATION QUANT: CPT

## 2021-01-01 PROCEDURE — 74011250637 HC RX REV CODE- 250/637: Performed by: FAMILY MEDICINE

## 2021-01-01 PROCEDURE — 85018 HEMOGLOBIN: CPT

## 2021-01-01 PROCEDURE — 74011000636 HC RX REV CODE- 636: Performed by: PHYSICIAN ASSISTANT

## 2021-01-01 PROCEDURE — 77030003665 HC NDL SPN BBMI -A: Performed by: ANESTHESIOLOGY

## 2021-01-01 PROCEDURE — 87641 MR-STAPH DNA AMP PROBE: CPT

## 2021-01-01 PROCEDURE — 0SRC0JA REPLACEMENT OF RIGHT KNEE JOINT WITH SYNTHETIC SUBSTITUTE, UNCEMENTED, OPEN APPROACH: ICD-10-PCS | Performed by: ORTHOPAEDIC SURGERY

## 2021-01-01 PROCEDURE — 74011000250 HC RX REV CODE- 250: Performed by: FAMILY MEDICINE

## 2021-01-01 PROCEDURE — 36600 WITHDRAWAL OF ARTERIAL BLOOD: CPT

## 2021-01-01 PROCEDURE — 76060000035 HC ANESTHESIA 2 TO 2.5 HR: Performed by: ORTHOPAEDIC SURGERY

## 2021-01-01 PROCEDURE — 99218 HC RM OBSERVATION: CPT

## 2021-01-01 PROCEDURE — 80048 BASIC METABOLIC PNL TOTAL CA: CPT

## 2021-01-01 PROCEDURE — 17000 DESTRUCT PREMALG LESION: CPT | Mod: 59

## 2021-01-01 PROCEDURE — 94002 VENT MGMT INPAT INIT DAY: CPT

## 2021-01-01 PROCEDURE — 85025 COMPLETE CBC W/AUTO DIFF WBC: CPT

## 2021-01-01 PROCEDURE — 74011250637 HC RX REV CODE- 250/637: Performed by: PHYSICIAN ASSISTANT

## 2021-01-01 PROCEDURE — 80143 DRUG ASSAY ACETAMINOPHEN: CPT

## 2021-01-01 PROCEDURE — 0202U NFCT DS 22 TRGT SARS-COV-2: CPT

## 2021-01-01 PROCEDURE — 97116 GAIT TRAINING THERAPY: CPT

## 2021-01-01 PROCEDURE — 77030039760: Performed by: ORTHOPAEDIC SURGERY

## 2021-01-01 PROCEDURE — 77030029820: Performed by: ORTHOPAEDIC SURGERY

## 2021-01-01 PROCEDURE — 97535 SELF CARE MNGMENT TRAINING: CPT

## 2021-01-01 PROCEDURE — 5A1935Z RESPIRATORY VENTILATION, LESS THAN 24 CONSECUTIVE HOURS: ICD-10-PCS | Performed by: INTERNAL MEDICINE

## 2021-01-01 PROCEDURE — ? COUNSELING

## 2021-01-01 PROCEDURE — M0243 CASIRIVI AND IMDEVI INFUSION: HCPCS

## 2021-01-01 PROCEDURE — ? LIQUID NITROGEN

## 2021-01-01 PROCEDURE — 74011000250 HC RX REV CODE- 250

## 2021-01-01 PROCEDURE — 77030007880 HC KT SPN EPDRL BBMI -B: Performed by: ANESTHESIOLOGY

## 2021-01-01 PROCEDURE — 74011000250 HC RX REV CODE- 250: Performed by: INTERNAL MEDICINE

## 2021-01-01 PROCEDURE — 74011000258 HC RX REV CODE- 258: Performed by: ORTHOPAEDIC SURGERY

## 2021-01-01 PROCEDURE — 74011250636 HC RX REV CODE- 250/636: Performed by: EMERGENCY MEDICINE

## 2021-01-01 PROCEDURE — 77030029828 HC FEM TIB CKPNT KT DISP STRY -B: Performed by: ORTHOPAEDIC SURGERY

## 2021-01-01 PROCEDURE — 83690 ASSAY OF LIPASE: CPT

## 2021-01-01 PROCEDURE — 2709999900 HC NON-CHARGEABLE SUPPLY: Performed by: ORTHOPAEDIC SURGERY

## 2021-01-01 PROCEDURE — 77030035236 HC SUT PDS STRATFX BARB J&J -B: Performed by: ORTHOPAEDIC SURGERY

## 2021-01-01 PROCEDURE — 77030040361 HC SLV COMPR DVT MDII -B

## 2021-01-01 PROCEDURE — 92950 HEART/LUNG RESUSCITATION CPR: CPT

## 2021-01-01 PROCEDURE — 8E0Y0CZ ROBOTIC ASSISTED PROCEDURE OF LOWER EXTREMITY, OPEN APPROACH: ICD-10-PCS | Performed by: ORTHOPAEDIC SURGERY

## 2021-01-01 PROCEDURE — ? OTHER

## 2021-01-01 PROCEDURE — 74011000250 HC RX REV CODE- 250: Performed by: ANESTHESIOLOGY

## 2021-01-01 PROCEDURE — 74011000250 HC RX REV CODE- 250: Performed by: ORTHOPAEDIC SURGERY

## 2021-01-01 PROCEDURE — 77030008462 HC STPLR SKN PROX J&J -A: Performed by: ORTHOPAEDIC SURGERY

## 2021-01-01 PROCEDURE — 97165 OT EVAL LOW COMPLEX 30 MIN: CPT

## 2021-01-01 PROCEDURE — 74011000250 HC RX REV CODE- 250: Performed by: PHYSICIAN ASSISTANT

## 2021-01-01 PROCEDURE — 74011250636 HC RX REV CODE- 250/636: Performed by: ANESTHESIOLOGY

## 2021-01-01 PROCEDURE — 76010010054 HC POST OP PAIN BLOCK: Performed by: ORTHOPAEDIC SURGERY

## 2021-01-01 PROCEDURE — 27447 TOTAL KNEE ARTHROPLASTY: CPT | Performed by: ORTHOPAEDIC SURGERY

## 2021-01-01 PROCEDURE — 74011636637 HC RX REV CODE- 636/637: Performed by: INTERNAL MEDICINE

## 2021-01-01 PROCEDURE — 03HY32Z INSERTION OF MONITORING DEVICE INTO UPPER ARTERY, PERCUTANEOUS APPROACH: ICD-10-PCS | Performed by: ANESTHESIOLOGY

## 2021-01-01 PROCEDURE — 74011250636 HC RX REV CODE- 250/636

## 2021-01-01 PROCEDURE — 5A12012 PERFORMANCE OF CARDIAC OUTPUT, SINGLE, MANUAL: ICD-10-PCS | Performed by: INTERNAL MEDICINE

## 2021-01-01 PROCEDURE — 20985 CPTR-ASST DIR MS PX: CPT | Performed by: ORTHOPAEDIC SURGERY

## 2021-01-01 PROCEDURE — 74011636637 HC RX REV CODE- 636/637: Performed by: FAMILY MEDICINE

## 2021-01-01 PROCEDURE — 97161 PT EVAL LOW COMPLEX 20 MIN: CPT

## 2021-01-01 PROCEDURE — 76010000171 HC OR TIME 2 TO 2.5 HR INTENSV-TIER 1: Performed by: ORTHOPAEDIC SURGERY

## 2021-01-01 PROCEDURE — 96374 THER/PROPH/DIAG INJ IV PUSH: CPT

## 2021-01-01 PROCEDURE — 94760 N-INVAS EAR/PLS OXIMETRY 1: CPT

## 2021-01-01 PROCEDURE — 83735 ASSAY OF MAGNESIUM: CPT

## 2021-01-01 PROCEDURE — 74011250637 HC RX REV CODE- 250/637: Performed by: ANESTHESIOLOGY

## 2021-01-01 PROCEDURE — 2709999900 HC NON-CHARGEABLE SUPPLY

## 2021-01-01 PROCEDURE — 87635 SARS-COV-2 COVID-19 AMP PRB: CPT

## 2021-01-01 PROCEDURE — 77030012935 HC DRSG AQUACEL BMS -B: Performed by: ORTHOPAEDIC SURGERY

## 2021-01-01 PROCEDURE — 99284 EMERGENCY DEPT VISIT MOD MDM: CPT

## 2021-01-01 PROCEDURE — 80074 ACUTE HEPATITIS PANEL: CPT

## 2021-01-01 PROCEDURE — 77030040922 HC BLNKT HYPOTHRM STRY -A: Performed by: ANESTHESIOLOGY

## 2021-01-01 PROCEDURE — 94761 N-INVAS EAR/PLS OXIMETRY MLT: CPT

## 2021-01-01 PROCEDURE — 77030020044 HC CLD THERAPY UNIT -B

## 2021-01-01 PROCEDURE — 77030019557 HC ELECTRD VES SEAL MEDT -F: Performed by: ORTHOPAEDIC SURGERY

## 2021-01-01 PROCEDURE — 77030003602 HC NDL NRV BLK BBMI -B: Performed by: ANESTHESIOLOGY

## 2021-01-01 PROCEDURE — 82962 GLUCOSE BLOOD TEST: CPT

## 2021-01-01 PROCEDURE — 74022 RADEX COMPL AQT ABD SERIES: CPT

## 2021-01-01 PROCEDURE — 99223 1ST HOSP IP/OBS HIGH 75: CPT | Performed by: INTERNAL MEDICINE

## 2021-01-01 PROCEDURE — 99214 OFFICE O/P EST MOD 30 MIN: CPT | Mod: 25

## 2021-01-01 PROCEDURE — 94762 N-INVAS EAR/PLS OXIMTRY CONT: CPT

## 2021-01-01 PROCEDURE — 0BH17EZ INSERTION OF ENDOTRACHEAL AIRWAY INTO TRACHEA, VIA NATURAL OR ARTIFICIAL OPENING: ICD-10-PCS | Performed by: INTERNAL MEDICINE

## 2021-01-01 PROCEDURE — 76705 ECHO EXAM OF ABDOMEN: CPT

## 2021-01-01 PROCEDURE — 3E0T33Z INTRODUCTION OF ANTI-INFLAMMATORY INTO PERIPHERAL NERVES AND PLEXI, PERCUTANEOUS APPROACH: ICD-10-PCS | Performed by: ANESTHESIOLOGY

## 2021-01-01 PROCEDURE — C1751 CATH, INF, PER/CENT/MIDLINE: HCPCS

## 2021-01-01 PROCEDURE — 77030038149 HC BLD SAW SAG STRY -D: Performed by: ORTHOPAEDIC SURGERY

## 2021-01-01 DEVICE — KNEE K2 TOT HEMI ADV CMTLS -- IMPL CAPPED K2: Type: IMPLANTABLE DEVICE | Status: FUNCTIONAL

## 2021-01-01 DEVICE — COMPNT FEM CR TRIATHLN 3 R PA --: Type: IMPLANTABLE DEVICE | Site: KNEE | Status: FUNCTIONAL

## 2021-01-01 DEVICE — BASEPLATE TIB SZ 3 AP44MM ML67MM KNEE TRITANIUM 4 CRUCFRM: Type: IMPLANTABLE DEVICE | Site: KNEE | Status: FUNCTIONAL

## 2021-01-01 DEVICE — INSERT TIB SZ 3 THK9MM UNIV KNEE POLYETH CNDYL STBL PRI NEUT: Type: IMPLANTABLE DEVICE | Site: KNEE | Status: FUNCTIONAL

## 2021-01-01 RX ORDER — SODIUM CHLORIDE, SODIUM LACTATE, POTASSIUM CHLORIDE, CALCIUM CHLORIDE 600; 310; 30; 20 MG/100ML; MG/100ML; MG/100ML; MG/100ML
100 INJECTION, SOLUTION INTRAVENOUS CONTINUOUS
Status: DISCONTINUED | OUTPATIENT
Start: 2021-01-01 | End: 2021-01-01 | Stop reason: HOSPADM

## 2021-01-01 RX ORDER — SODIUM CHLORIDE 0.9 % (FLUSH) 0.9 %
5-40 SYRINGE (ML) INJECTION AS NEEDED
Status: DISCONTINUED | OUTPATIENT
Start: 2021-01-01 | End: 2021-01-01 | Stop reason: HOSPADM

## 2021-01-01 RX ORDER — DEXAMETHASONE SODIUM PHOSPHATE 4 MG/ML
INJECTION, SOLUTION INTRA-ARTICULAR; INTRALESIONAL; INTRAMUSCULAR; INTRAVENOUS; SOFT TISSUE
Status: COMPLETED | OUTPATIENT
Start: 2021-01-01 | End: 2021-01-01

## 2021-01-01 RX ORDER — HYDROMORPHONE HYDROCHLORIDE 1 MG/ML
1 INJECTION, SOLUTION INTRAMUSCULAR; INTRAVENOUS; SUBCUTANEOUS
Status: DISCONTINUED | OUTPATIENT
Start: 2021-01-01 | End: 2021-01-01 | Stop reason: HOSPADM

## 2021-01-01 RX ORDER — ACETAMINOPHEN 325 MG/1
975 TABLET ORAL ONCE
Status: DISCONTINUED | OUTPATIENT
Start: 2021-01-01 | End: 2021-01-01

## 2021-01-01 RX ORDER — DIPHENHYDRAMINE HCL 25 MG
25 CAPSULE ORAL
Status: DISCONTINUED | OUTPATIENT
Start: 2021-01-01 | End: 2021-01-01 | Stop reason: HOSPADM

## 2021-01-01 RX ORDER — ROPIVACAINE HYDROCHLORIDE 2 MG/ML
INJECTION, SOLUTION EPIDURAL; INFILTRATION; PERINEURAL AS NEEDED
Status: DISCONTINUED | OUTPATIENT
Start: 2021-01-01 | End: 2021-01-01 | Stop reason: HOSPADM

## 2021-01-01 RX ORDER — LIDOCAINE HYDROCHLORIDE 20 MG/ML
INJECTION, SOLUTION EPIDURAL; INFILTRATION; INTRACAUDAL; PERINEURAL AS NEEDED
Status: DISCONTINUED | OUTPATIENT
Start: 2021-01-01 | End: 2021-01-01 | Stop reason: HOSPADM

## 2021-01-01 RX ORDER — EPINEPHRINE 0.1 MG/ML
1 INJECTION INTRACARDIAC; INTRAVENOUS ONCE
Status: COMPLETED | OUTPATIENT
Start: 2021-01-01 | End: 2021-01-01

## 2021-01-01 RX ORDER — INSULIN LISPRO 100 [IU]/ML
10 INJECTION, SOLUTION INTRAVENOUS; SUBCUTANEOUS ONCE
Status: DISCONTINUED | OUTPATIENT
Start: 2021-01-01 | End: 2021-01-01 | Stop reason: ALTCHOICE

## 2021-01-01 RX ORDER — OXYCODONE HYDROCHLORIDE 5 MG/1
5-10 TABLET ORAL
Qty: 60 TAB | Refills: 0 | Status: SHIPPED | OUTPATIENT
Start: 2021-01-01 | End: 2021-01-01

## 2021-01-01 RX ORDER — ONDANSETRON 4 MG/1
4 TABLET, ORALLY DISINTEGRATING ORAL
Status: DISCONTINUED | OUTPATIENT
Start: 2021-01-01 | End: 2021-01-01 | Stop reason: HOSPADM

## 2021-01-01 RX ORDER — ONDANSETRON 2 MG/ML
4 INJECTION INTRAMUSCULAR; INTRAVENOUS
Status: DISCONTINUED | OUTPATIENT
Start: 2021-01-01 | End: 2021-01-01 | Stop reason: HOSPADM

## 2021-01-01 RX ORDER — OXYCODONE HYDROCHLORIDE 5 MG/1
5 TABLET ORAL
Status: DISCONTINUED | OUTPATIENT
Start: 2021-01-01 | End: 2021-01-01 | Stop reason: HOSPADM

## 2021-01-01 RX ORDER — ASPIRIN 81 MG/1
81 TABLET ORAL EVERY 12 HOURS
Status: DISCONTINUED | OUTPATIENT
Start: 2021-01-01 | End: 2021-01-01 | Stop reason: HOSPADM

## 2021-01-01 RX ORDER — HYDROMORPHONE HYDROCHLORIDE 1 MG/ML
0.25 INJECTION, SOLUTION INTRAMUSCULAR; INTRAVENOUS; SUBCUTANEOUS ONCE
Status: COMPLETED | OUTPATIENT
Start: 2021-01-01 | End: 2021-01-01

## 2021-01-01 RX ORDER — EPINEPHRINE 0.1 MG/ML
INJECTION INTRACARDIAC; INTRAVENOUS
Status: COMPLETED | OUTPATIENT
Start: 2021-01-01 | End: 2021-01-01

## 2021-01-01 RX ORDER — AMOXICILLIN 250 MG
2 CAPSULE ORAL DAILY
Status: DISCONTINUED | OUTPATIENT
Start: 2021-01-01 | End: 2021-01-01 | Stop reason: HOSPADM

## 2021-01-01 RX ORDER — POLYETHYLENE GLYCOL 3350 17 G/17G
17 POWDER, FOR SOLUTION ORAL DAILY PRN
Status: DISCONTINUED | OUTPATIENT
Start: 2021-01-01 | End: 2021-01-01 | Stop reason: HOSPADM

## 2021-01-01 RX ORDER — HEPARIN SODIUM 5000 [USP'U]/ML
80 INJECTION, SOLUTION INTRAVENOUS; SUBCUTANEOUS ONCE
Status: COMPLETED | OUTPATIENT
Start: 2021-01-01 | End: 2021-01-01

## 2021-01-01 RX ORDER — ASPIRIN 81 MG/1
81 TABLET ORAL EVERY EVENING
COMMUNITY
End: 2021-01-01

## 2021-01-01 RX ORDER — DEXTROSE 50 % IN WATER (D50W) INTRAVENOUS SYRINGE
25 ONCE
Status: COMPLETED | OUTPATIENT
Start: 2021-01-01 | End: 2021-01-01

## 2021-01-01 RX ORDER — SODIUM CHLORIDE 0.9 % (FLUSH) 0.9 %
5-40 SYRINGE (ML) INJECTION EVERY 8 HOURS
Status: DISCONTINUED | OUTPATIENT
Start: 2021-01-01 | End: 2021-01-01 | Stop reason: HOSPADM

## 2021-01-01 RX ORDER — NALOXONE HYDROCHLORIDE 0.4 MG/ML
.2-.4 INJECTION, SOLUTION INTRAMUSCULAR; INTRAVENOUS; SUBCUTANEOUS
Status: DISCONTINUED | OUTPATIENT
Start: 2021-01-01 | End: 2021-01-01 | Stop reason: HOSPADM

## 2021-01-01 RX ORDER — FLUOROURACIL 2 G/40G
CREAM TOPICAL
Qty: 1 | Refills: 3 | Status: ERX | COMMUNITY
Start: 2021-01-01

## 2021-01-01 RX ORDER — FLUOXETINE HYDROCHLORIDE 20 MG/1
20 CAPSULE ORAL DAILY
Status: DISCONTINUED | OUTPATIENT
Start: 2021-01-01 | End: 2021-01-01 | Stop reason: HOSPADM

## 2021-01-01 RX ORDER — NOREPINEPHRINE BIT/0.9 % NACL 4MG/250ML
PLASTIC BAG, INJECTION (ML) INTRAVENOUS
Status: COMPLETED
Start: 2021-01-01 | End: 2021-01-01

## 2021-01-01 RX ORDER — CALCIUM GLUCONATE 20 MG/ML
2 INJECTION, SOLUTION INTRAVENOUS ONCE
Status: COMPLETED | OUTPATIENT
Start: 2021-01-01 | End: 2021-01-01

## 2021-01-01 RX ORDER — SODIUM BICARBONATE 1 MEQ/ML
100 SYRINGE (ML) INTRAVENOUS ONCE
Status: COMPLETED | OUTPATIENT
Start: 2021-01-01 | End: 2021-01-01

## 2021-01-01 RX ORDER — PANTOPRAZOLE SODIUM 40 MG/1
40 TABLET, DELAYED RELEASE ORAL
Status: DISCONTINUED | OUTPATIENT
Start: 2021-01-01 | End: 2021-01-01 | Stop reason: HOSPADM

## 2021-01-01 RX ORDER — ATENOLOL 50 MG/1
100 TABLET ORAL DAILY
Status: DISCONTINUED | OUTPATIENT
Start: 2021-01-01 | End: 2021-01-01 | Stop reason: HOSPADM

## 2021-01-01 RX ORDER — SODIUM BICARBONATE 1 MEQ/ML
SYRINGE (ML) INTRAVENOUS
Status: COMPLETED | OUTPATIENT
Start: 2021-01-01 | End: 2021-01-01

## 2021-01-01 RX ORDER — FENTANYL CITRATE-0.9 % NACL/PF 25 MCG/ML
0-200 PLASTIC BAG, INJECTION (ML) INJECTION
Status: DISCONTINUED | OUTPATIENT
Start: 2021-01-01 | End: 2021-01-01 | Stop reason: HOSPADM

## 2021-01-01 RX ORDER — OXYCODONE HYDROCHLORIDE 5 MG/1
10 TABLET ORAL
Status: DISCONTINUED | OUTPATIENT
Start: 2021-01-01 | End: 2021-01-01 | Stop reason: HOSPADM

## 2021-01-01 RX ORDER — ONDANSETRON 2 MG/ML
4 INJECTION INTRAMUSCULAR; INTRAVENOUS
Status: COMPLETED | OUTPATIENT
Start: 2021-01-01 | End: 2021-01-01

## 2021-01-01 RX ORDER — CEFAZOLIN SODIUM/WATER 2 G/20 ML
2 SYRINGE (ML) INTRAVENOUS EVERY 8 HOURS
Status: COMPLETED | OUTPATIENT
Start: 2021-01-01 | End: 2021-01-01

## 2021-01-01 RX ORDER — SODIUM CHLORIDE, SODIUM LACTATE, POTASSIUM CHLORIDE, CALCIUM CHLORIDE 600; 310; 30; 20 MG/100ML; MG/100ML; MG/100ML; MG/100ML
175 INJECTION, SOLUTION INTRAVENOUS CONTINUOUS
Status: DISCONTINUED | OUTPATIENT
Start: 2021-01-01 | End: 2021-01-01

## 2021-01-01 RX ORDER — FENTANYL CITRATE 50 UG/ML
100 INJECTION, SOLUTION INTRAMUSCULAR; INTRAVENOUS
Status: COMPLETED | OUTPATIENT
Start: 2021-01-01 | End: 2021-01-01

## 2021-01-01 RX ORDER — HEPARIN SODIUM 5000 [USP'U]/100ML
18-36 INJECTION, SOLUTION INTRAVENOUS
Status: DISCONTINUED | OUTPATIENT
Start: 2021-01-01 | End: 2021-01-01 | Stop reason: HOSPADM

## 2021-01-01 RX ORDER — LIDOCAINE HYDROCHLORIDE 10 MG/ML
0.1 INJECTION INFILTRATION; PERINEURAL AS NEEDED
Status: DISCONTINUED | OUTPATIENT
Start: 2021-01-01 | End: 2021-01-01 | Stop reason: HOSPADM

## 2021-01-01 RX ORDER — SODIUM CHLORIDE 9 MG/ML
100 INJECTION, SOLUTION INTRAVENOUS CONTINUOUS
Status: DISCONTINUED | OUTPATIENT
Start: 2021-01-01 | End: 2021-01-01 | Stop reason: HOSPADM

## 2021-01-01 RX ORDER — LORATADINE 10 MG/1
10 TABLET ORAL DAILY
Status: DISCONTINUED | OUTPATIENT
Start: 2021-01-01 | End: 2021-01-01 | Stop reason: HOSPADM

## 2021-01-01 RX ORDER — CEFAZOLIN SODIUM/WATER 2 G/20 ML
2 SYRINGE (ML) INTRAVENOUS ONCE
Status: COMPLETED | OUTPATIENT
Start: 2021-01-01 | End: 2021-01-01

## 2021-01-01 RX ORDER — PROPOFOL 10 MG/ML
INJECTION, EMULSION INTRAVENOUS AS NEEDED
Status: DISCONTINUED | OUTPATIENT
Start: 2021-01-01 | End: 2021-01-01 | Stop reason: HOSPADM

## 2021-01-01 RX ORDER — ACETAMINOPHEN 500 MG
1000 TABLET ORAL ONCE
Status: COMPLETED | OUTPATIENT
Start: 2021-01-01 | End: 2021-01-01

## 2021-01-01 RX ORDER — ACETAMINOPHEN 650 MG/1
650 SUPPOSITORY RECTAL ONCE
Status: DISCONTINUED | OUTPATIENT
Start: 2021-01-01 | End: 2021-01-01

## 2021-01-01 RX ORDER — BUPIVACAINE HYDROCHLORIDE 7.5 MG/ML
INJECTION, SOLUTION INTRASPINAL
Status: COMPLETED | OUTPATIENT
Start: 2021-01-01 | End: 2021-01-01

## 2021-01-01 RX ORDER — MIDAZOLAM HYDROCHLORIDE 1 MG/ML
2 INJECTION, SOLUTION INTRAMUSCULAR; INTRAVENOUS
Status: DISCONTINUED | OUTPATIENT
Start: 2021-01-01 | End: 2021-01-01 | Stop reason: HOSPADM

## 2021-01-01 RX ORDER — ACETAMINOPHEN 500 MG
1000 TABLET ORAL EVERY 6 HOURS
Status: DISCONTINUED | OUTPATIENT
Start: 2021-01-01 | End: 2021-01-01 | Stop reason: HOSPADM

## 2021-01-01 RX ORDER — SODIUM CHLORIDE, SODIUM LACTATE, POTASSIUM CHLORIDE, CALCIUM CHLORIDE 600; 310; 30; 20 MG/100ML; MG/100ML; MG/100ML; MG/100ML
75 INJECTION, SOLUTION INTRAVENOUS CONTINUOUS
Status: DISCONTINUED | OUTPATIENT
Start: 2021-01-01 | End: 2021-01-01 | Stop reason: HOSPADM

## 2021-01-01 RX ORDER — FUROSEMIDE 20 MG/1
20 TABLET ORAL EVERY OTHER DAY
Status: DISCONTINUED | OUTPATIENT
Start: 2021-01-01 | End: 2021-01-01 | Stop reason: HOSPADM

## 2021-01-01 RX ORDER — FLUTICASONE PROPIONATE 50 MCG
2 SPRAY, SUSPENSION (ML) NASAL DAILY
Status: DISCONTINUED | OUTPATIENT
Start: 2021-01-01 | End: 2021-01-01 | Stop reason: HOSPADM

## 2021-01-01 RX ORDER — CEFAZOLIN SODIUM/WATER 2 G/20 ML
2 SYRINGE (ML) INTRAVENOUS ONCE
Status: CANCELLED | OUTPATIENT
Start: 2021-01-01 | End: 2021-01-01

## 2021-01-01 RX ORDER — PANTOPRAZOLE SODIUM 40 MG/1
40 TABLET, DELAYED RELEASE ORAL DAILY
Status: DISCONTINUED | OUTPATIENT
Start: 2021-01-01 | End: 2021-01-01 | Stop reason: HOSPADM

## 2021-01-01 RX ORDER — FEBUXOSTAT 40 MG/1
40 TABLET, FILM COATED ORAL DAILY
Status: DISCONTINUED | OUTPATIENT
Start: 2021-01-01 | End: 2021-01-01 | Stop reason: HOSPADM

## 2021-01-01 RX ORDER — NOREPINEPHRINE BIT/0.9 % NACL 4MG/250ML
.5-3 PLASTIC BAG, INJECTION (ML) INTRAVENOUS
Status: DISCONTINUED | OUTPATIENT
Start: 2021-01-01 | End: 2021-01-01 | Stop reason: HOSPADM

## 2021-01-01 RX ORDER — DIPHENHYDRAMINE HYDROCHLORIDE 50 MG/ML
12.5 INJECTION, SOLUTION INTRAMUSCULAR; INTRAVENOUS
Status: DISCONTINUED | OUTPATIENT
Start: 2021-01-01 | End: 2021-01-01 | Stop reason: HOSPADM

## 2021-01-01 RX ORDER — PROPOFOL 10 MG/ML
INJECTION, EMULSION INTRAVENOUS
Status: DISCONTINUED | OUTPATIENT
Start: 2021-01-01 | End: 2021-01-01 | Stop reason: HOSPADM

## 2021-01-01 RX ORDER — HYDROMORPHONE HYDROCHLORIDE 1 MG/ML
0.2 INJECTION, SOLUTION INTRAMUSCULAR; INTRAVENOUS; SUBCUTANEOUS
Status: DISCONTINUED | OUTPATIENT
Start: 2021-01-01 | End: 2021-01-01 | Stop reason: HOSPADM

## 2021-01-01 RX ORDER — DEXAMETHASONE SODIUM PHOSPHATE 100 MG/10ML
10 INJECTION INTRAMUSCULAR; INTRAVENOUS ONCE
Status: DISCONTINUED | OUTPATIENT
Start: 2021-01-01 | End: 2021-01-01 | Stop reason: HOSPADM

## 2021-01-01 RX ORDER — ASPIRIN 81 MG/1
81 TABLET ORAL EVERY 12 HOURS
Qty: 70 TAB | Refills: 0 | Status: SHIPPED | OUTPATIENT
Start: 2021-01-01 | End: 2021-01-01

## 2021-01-01 RX ORDER — PHENYLEPHRINE 40 MG/250 ML (160 MCG/ML) IN 0.9 % SODIUM CHLORIDE IV
10-100 SOLUTION
Status: DISCONTINUED | OUTPATIENT
Start: 2021-01-01 | End: 2021-01-01 | Stop reason: HOSPADM

## 2021-01-01 RX ORDER — DEXMEDETOMIDINE HYDROCHLORIDE 4 UG/ML
.1-1.5 INJECTION, SOLUTION INTRAVENOUS
Status: DISCONTINUED | OUTPATIENT
Start: 2021-01-01 | End: 2021-01-01 | Stop reason: SDUPTHER

## 2021-01-01 RX ADMIN — Medication 10 ML: at 22:00

## 2021-01-01 RX ADMIN — CEFAZOLIN SODIUM 2 G: 100 INJECTION, POWDER, LYOPHILIZED, FOR SOLUTION INTRAVENOUS at 16:01

## 2021-01-01 RX ADMIN — SODIUM CHLORIDE, SODIUM LACTATE, POTASSIUM CHLORIDE, AND CALCIUM CHLORIDE 100 ML/HR: 600; 310; 30; 20 INJECTION, SOLUTION INTRAVENOUS at 08:49

## 2021-01-01 RX ADMIN — PROPOFOL 30 MG: 10 INJECTION, EMULSION INTRAVENOUS at 09:42

## 2021-01-01 RX ADMIN — Medication 5 ML: at 22:00

## 2021-01-01 RX ADMIN — Medication 1 AMPULE: at 08:47

## 2021-01-01 RX ADMIN — EPINEPHRINE 1 MG: 0.1 INJECTION, SOLUTION ENDOTRACHEAL; INTRACARDIAC; INTRAVENOUS at 12:32

## 2021-01-01 RX ADMIN — Medication 30 MCG/MIN: at 13:48

## 2021-01-01 RX ADMIN — SODIUM CHLORIDE, SODIUM LACTATE, POTASSIUM CHLORIDE, AND CALCIUM CHLORIDE 175 ML/HR: 600; 310; 30; 20 INJECTION, SOLUTION INTRAVENOUS at 03:17

## 2021-01-01 RX ADMIN — LORATADINE 10 MG: 10 TABLET ORAL at 08:46

## 2021-01-01 RX ADMIN — HEPARIN SODIUM AND DEXTROSE 18 UNITS/KG/HR: 5000; 5 INJECTION INTRAVENOUS at 12:31

## 2021-01-01 RX ADMIN — DEXTROSE MONOHYDRATE 25 G: 25 INJECTION, SOLUTION INTRAVENOUS at 16:18

## 2021-01-01 RX ADMIN — HEPARIN SODIUM 5050 UNITS: 5000 INJECTION INTRAVENOUS; SUBCUTANEOUS at 12:33

## 2021-01-01 RX ADMIN — DEXAMETHASONE SODIUM PHOSPHATE 4 MG: 4 INJECTION, SOLUTION INTRAMUSCULAR; INTRAVENOUS at 09:09

## 2021-01-01 RX ADMIN — INSULIN HUMAN 10 UNITS: 100 INJECTION, SOLUTION PARENTERAL at 13:02

## 2021-01-01 RX ADMIN — Medication 10 ML: at 06:00

## 2021-01-01 RX ADMIN — SODIUM CHLORIDE, SODIUM LACTATE, POTASSIUM CHLORIDE, AND CALCIUM CHLORIDE 175 ML/HR: 600; 310; 30; 20 INJECTION, SOLUTION INTRAVENOUS at 08:35

## 2021-01-01 RX ADMIN — Medication 10 ML: at 13:34

## 2021-01-01 RX ADMIN — Medication 81 MG: at 20:03

## 2021-01-01 RX ADMIN — SODIUM CHLORIDE, SODIUM LACTATE, POTASSIUM CHLORIDE, AND CALCIUM CHLORIDE 175 ML/HR: 600; 310; 30; 20 INJECTION, SOLUTION INTRAVENOUS at 22:15

## 2021-01-01 RX ADMIN — BUPIVACAINE HYDROCHLORIDE IN DEXTROSE 13.5 MG: 7.5 INJECTION, SOLUTION SUBARACHNOID at 09:40

## 2021-01-01 RX ADMIN — PANTOPRAZOLE SODIUM 40 MG: 40 TABLET, DELAYED RELEASE ORAL at 08:21

## 2021-01-01 RX ADMIN — EPINEPHRINE 1 MG: 0.1 INJECTION, SOLUTION ENDOTRACHEAL; INTRACARDIAC; INTRAVENOUS at 13:18

## 2021-01-01 RX ADMIN — SODIUM BICARBONATE 50 MEQ: 84 INJECTION, SOLUTION INTRAVENOUS at 13:15

## 2021-01-01 RX ADMIN — SODIUM BICARBONATE: 84 INJECTION, SOLUTION INTRAVENOUS at 12:30

## 2021-01-01 RX ADMIN — SODIUM CHLORIDE 1000 ML: 900 INJECTION, SOLUTION INTRAVENOUS at 11:45

## 2021-01-01 RX ADMIN — Medication 1 AMPULE: at 20:04

## 2021-01-01 RX ADMIN — ATENOLOL 100 MG: 50 TABLET ORAL at 08:46

## 2021-01-01 RX ADMIN — ACETAMINOPHEN 1000 MG: 500 TABLET, FILM COATED ORAL at 08:41

## 2021-01-01 RX ADMIN — ATENOLOL 100 MG: 50 TABLET ORAL at 09:19

## 2021-01-01 RX ADMIN — TRANEXAMIC ACID 1 G: 100 INJECTION, SOLUTION INTRAVENOUS at 09:46

## 2021-01-01 RX ADMIN — ACETAMINOPHEN 1000 MG: 500 TABLET, FILM COATED ORAL at 23:18

## 2021-01-01 RX ADMIN — PANTOPRAZOLE SODIUM 40 MG: 40 TABLET, DELAYED RELEASE ORAL at 09:19

## 2021-01-01 RX ADMIN — SODIUM BICARBONATE 100 MEQ: 84 INJECTION INTRAVENOUS at 14:43

## 2021-01-01 RX ADMIN — EPINEPHRINE 1 MG: 0.1 INJECTION, SOLUTION ENDOTRACHEAL; INTRACARDIAC; INTRAVENOUS at 11:26

## 2021-01-01 RX ADMIN — SODIUM CHLORIDE, SODIUM LACTATE, POTASSIUM CHLORIDE, AND CALCIUM CHLORIDE: 600; 310; 30; 20 INJECTION, SOLUTION INTRAVENOUS at 11:24

## 2021-01-01 RX ADMIN — ROPIVACAINE HYDROCHLORIDE 20 ML: 2 INJECTION, SOLUTION EPIDURAL; INFILTRATION at 09:09

## 2021-01-01 RX ADMIN — FENTANYL CITRATE 50 MCG: 50 INJECTION INTRAMUSCULAR; INTRAVENOUS at 09:07

## 2021-01-01 RX ADMIN — EPINEPHRINE 2 MCG/MIN: 1 INJECTION INTRAMUSCULAR; INTRAVENOUS; SUBCUTANEOUS at 13:10

## 2021-01-01 RX ADMIN — Medication 10 ML: at 20:04

## 2021-01-01 RX ADMIN — CASIRIVIMAB AND IMDEVIMAB: 600; 600 INJECTION, SOLUTION, CONCENTRATE INTRAVENOUS at 17:36

## 2021-01-01 RX ADMIN — Medication 10 ML: at 22:33

## 2021-01-01 RX ADMIN — ONDANSETRON 4 MG: 2 INJECTION INTRAMUSCULAR; INTRAVENOUS at 16:18

## 2021-01-01 RX ADMIN — OXYCODONE 10 MG: 5 TABLET ORAL at 16:01

## 2021-01-01 RX ADMIN — PANTOPRAZOLE SODIUM 40 MG: 40 TABLET, DELAYED RELEASE ORAL at 16:18

## 2021-01-01 RX ADMIN — ATENOLOL 100 MG: 50 TABLET ORAL at 08:21

## 2021-01-01 RX ADMIN — SODIUM CHLORIDE, SODIUM LACTATE, POTASSIUM CHLORIDE, AND CALCIUM CHLORIDE 175 ML/HR: 600; 310; 30; 20 INJECTION, SOLUTION INTRAVENOUS at 03:31

## 2021-01-01 RX ADMIN — Medication 4 MCG: at 11:40

## 2021-01-01 RX ADMIN — ACETAMINOPHEN 1000 MG: 500 TABLET, FILM COATED ORAL at 16:01

## 2021-01-01 RX ADMIN — SODIUM CHLORIDE, SODIUM LACTATE, POTASSIUM CHLORIDE, AND CALCIUM CHLORIDE 175 ML/HR: 600; 310; 30; 20 INJECTION, SOLUTION INTRAVENOUS at 09:15

## 2021-01-01 RX ADMIN — CALCIUM GLUCONATE 2 G: 20 INJECTION, SOLUTION INTRAVENOUS at 12:38

## 2021-01-01 RX ADMIN — EPINEPHRINE 1 MG: 0.1 INJECTION, SOLUTION ENDOTRACHEAL; INTRACARDIAC; INTRAVENOUS at 12:43

## 2021-01-01 RX ADMIN — ONDANSETRON 4 MG: 4 TABLET, ORALLY DISINTEGRATING ORAL at 16:01

## 2021-01-01 RX ADMIN — PANTOPRAZOLE SODIUM 40 MG: 40 TABLET, DELAYED RELEASE ORAL at 09:15

## 2021-01-01 RX ADMIN — OXYCODONE 10 MG: 5 TABLET ORAL at 02:04

## 2021-01-01 RX ADMIN — SODIUM CHLORIDE, SODIUM LACTATE, POTASSIUM CHLORIDE, AND CALCIUM CHLORIDE 175 ML/HR: 600; 310; 30; 20 INJECTION, SOLUTION INTRAVENOUS at 12:07

## 2021-01-01 RX ADMIN — CEFAZOLIN 2 G: 1 INJECTION, POWDER, FOR SOLUTION INTRAVENOUS at 09:31

## 2021-01-01 RX ADMIN — PANTOPRAZOLE SODIUM 40 MG: 40 TABLET, DELAYED RELEASE ORAL at 05:18

## 2021-01-01 RX ADMIN — ATENOLOL 100 MG: 50 TABLET ORAL at 09:39

## 2021-01-01 RX ADMIN — Medication 10 MCG/MIN: at 12:34

## 2021-01-01 RX ADMIN — LIDOCAINE HYDROCHLORIDE 60 MG: 20 INJECTION, SOLUTION EPIDURAL; INFILTRATION; INTRACAUDAL; PERINEURAL at 09:42

## 2021-01-01 RX ADMIN — PROPOFOL 75 MCG/KG/MIN: 10 INJECTION, EMULSION INTRAVENOUS at 09:42

## 2021-01-01 RX ADMIN — DEXTROSE MONOHYDRATE 25 G: 25 INJECTION, SOLUTION INTRAVENOUS at 13:02

## 2021-01-01 RX ADMIN — Medication 3 AMPULE: at 08:48

## 2021-01-01 RX ADMIN — DOCUSATE SODIUM 50MG AND SENNOSIDES 8.6MG 2 TABLET: 8.6; 5 TABLET, FILM COATED ORAL at 08:46

## 2021-01-01 RX ADMIN — ATENOLOL 100 MG: 50 TABLET ORAL at 09:15

## 2021-01-01 RX ADMIN — SODIUM CHLORIDE 1000 ML: 900 INJECTION, SOLUTION INTRAVENOUS at 16:19

## 2021-01-01 RX ADMIN — EPINEPHRINE 1 MG: 0.1 INJECTION, SOLUTION INTRAVENOUS at 11:23

## 2021-01-01 RX ADMIN — EPINEPHRINE 1 MG: 0.1 INJECTION, SOLUTION ENDOTRACHEAL; INTRACARDIAC; INTRAVENOUS at 11:23

## 2021-01-01 RX ADMIN — CEFAZOLIN SODIUM 2 G: 100 INJECTION, POWDER, LYOPHILIZED, FOR SOLUTION INTRAVENOUS at 01:50

## 2021-01-01 RX ADMIN — Medication 10 ML: at 22:34

## 2021-01-01 RX ADMIN — INSULIN HUMAN 10 UNITS: 100 INJECTION, SOLUTION PARENTERAL at 16:18

## 2021-01-01 RX ADMIN — ACETAMINOPHEN 1000 MG: 500 TABLET, FILM COATED ORAL at 05:18

## 2021-01-01 RX ADMIN — EPINEPHRINE 1 MG: 0.1 INJECTION, SOLUTION ENDOTRACHEAL; INTRACARDIAC; INTRAVENOUS at 13:23

## 2021-01-01 RX ADMIN — HYDROMORPHONE HYDROCHLORIDE 0.25 MG: 1 INJECTION, SOLUTION INTRAMUSCULAR; INTRAVENOUS; SUBCUTANEOUS at 13:35

## 2021-01-01 RX ADMIN — Medication 10 ML: at 02:07

## 2021-01-01 RX ADMIN — SODIUM CHLORIDE 1000 ML: 900 INJECTION, SOLUTION INTRAVENOUS at 17:20

## 2021-01-01 RX ADMIN — Medication 81 MG: at 08:46

## 2021-01-01 RX ADMIN — SODIUM BICARBONATE 50 MEQ: 84 INJECTION, SOLUTION INTRAVENOUS at 13:16

## 2021-01-01 RX ADMIN — FLUOROURACIL: 2 CREAM TOPICAL at 00:00

## 2021-01-01 RX ADMIN — FEBUXOSTAT 40 MG: 40 TABLET, FILM COATED ORAL at 08:49

## 2021-01-01 ASSESSMENT — LOCATION DETAILED DESCRIPTION DERM
LOCATION DETAILED: RIGHT INFERIOR LATERAL NECK
LOCATION DETAILED: LEFT SUPERIOR MEDIAL UPPER BACK
LOCATION DETAILED: RIGHT DISTAL PRETIBIAL REGION
LOCATION DETAILED: NASAL DORSUM
LOCATION DETAILED: RIGHT DISTAL PRETIBIAL REGION
LOCATION DETAILED: RIGHT LATERAL PROXIMAL PRETIBIAL REGION
LOCATION DETAILED: NASAL SUPRATIP
LOCATION DETAILED: LEFT INFERIOR MEDIAL MALAR CHEEK
LOCATION DETAILED: RIGHT PROXIMAL CALF
LOCATION DETAILED: LEFT ANTERIOR PROXIMAL THIGH
LOCATION DETAILED: RIGHT PROXIMAL DORSAL MIDDLE FINGER
LOCATION DETAILED: 2ND WEB SPACE RIGHT HAND
LOCATION DETAILED: RIGHT DORSAL 2ND TOE
LOCATION DETAILED: RIGHT RADIAL DORSAL HAND
LOCATION DETAILED: LEFT DISTAL PRETIBIAL REGION
LOCATION DETAILED: LEFT ANTERIOR PROXIMAL THIGH
LOCATION DETAILED: SUPERIOR THORACIC SPINE
LOCATION DETAILED: RIGHT PROXIMAL RADIAL DORSAL FOREARM
LOCATION DETAILED: LEFT PROXIMAL POSTERIOR UPPER ARM
LOCATION DETAILED: RIGHT LOWER CUTANEOUS LIP
LOCATION DETAILED: LEFT PROXIMAL LATERAL CALF
LOCATION DETAILED: RIGHT INFERIOR ANTERIOR NECK
LOCATION DETAILED: LEFT POPLITEAL SKIN
LOCATION DETAILED: UPPER STERNUM
LOCATION DETAILED: LEFT SUPERIOR MEDIAL MIDBACK
LOCATION DETAILED: RIGHT LOWER CUTANEOUS LIP
LOCATION DETAILED: LEFT PROXIMAL DORSAL FOREARM
LOCATION DETAILED: NASAL DORSUM
LOCATION DETAILED: LEFT PROXIMAL LATERAL CALF
LOCATION DETAILED: RIGHT SUPERIOR PARIETAL SCALP
LOCATION DETAILED: RIGHT PROXIMAL CALF
LOCATION DETAILED: LEFT PROXIMAL POSTERIOR UPPER ARM
LOCATION DETAILED: RIGHT DORSAL 2ND TOE
LOCATION DETAILED: RIGHT MEDIAL SUPERIOR CHEST
LOCATION DETAILED: LEFT ANTERIOR PROXIMAL UPPER ARM
LOCATION DETAILED: LEFT LATERAL PROXIMAL PRETIBIAL REGION
LOCATION DETAILED: LEFT INFERIOR CENTRAL MALAR CHEEK
LOCATION DETAILED: INFERIOR THORACIC SPINE
LOCATION DETAILED: LEFT ANTERIOR PROXIMAL UPPER ARM
LOCATION DETAILED: LEFT DISTAL CALF
LOCATION DETAILED: RIGHT VENTRAL PROXIMAL FOREARM
LOCATION DETAILED: RIGHT SUPERIOR OCCIPITAL SCALP
LOCATION DETAILED: LEFT SUPERIOR FOREHEAD
LOCATION DETAILED: LEFT CLAVICULAR NECK
LOCATION DETAILED: UPPER STERNUM
LOCATION DETAILED: LEFT VENTRAL PROXIMAL FOREARM
LOCATION DETAILED: LEFT INFERIOR VERMILION LIP
LOCATION DETAILED: RIGHT LATERAL PROXIMAL PRETIBIAL REGION
LOCATION DETAILED: RIGHT LATERAL UPPER BACK
LOCATION DETAILED: LEFT CENTRAL FRONTAL SCALP
LOCATION DETAILED: RIGHT PROXIMAL DORSAL INDEX FINGER
LOCATION DETAILED: STERNAL NOTCH
LOCATION DETAILED: RIGHT SUPERIOR MEDIAL UPPER BACK
LOCATION DETAILED: LEFT CLAVICULAR NECK
LOCATION DETAILED: RIGHT ULNAR DORSAL HAND
LOCATION DETAILED: LEFT CENTRAL EYEBROW
LOCATION DETAILED: LEFT INFERIOR MEDIAL UPPER BACK
LOCATION DETAILED: LEFT LATERAL DISTAL PRETIBIAL REGION
LOCATION DETAILED: LEFT CENTRAL MALAR CHEEK
LOCATION DETAILED: RIGHT DISTAL CALF
LOCATION DETAILED: LEFT DISTAL PRETIBIAL REGION
LOCATION DETAILED: RIGHT SUPERIOR PARIETAL SCALP
LOCATION DETAILED: RIGHT MID PREAURICULAR CHEEK
LOCATION DETAILED: LEFT DISTAL CALF
LOCATION DETAILED: LEFT INFERIOR PARIETAL SCALP

## 2021-01-01 ASSESSMENT — LOCATION SIMPLE DESCRIPTION DERM
LOCATION SIMPLE: RIGHT PRETIBIAL REGION
LOCATION SIMPLE: RIGHT ANTERIOR NECK
LOCATION SIMPLE: RIGHT OCCIPITAL SCALP
LOCATION SIMPLE: RIGHT FOREARM
LOCATION SIMPLE: RIGHT LIP
LOCATION SIMPLE: UPPER BACK
LOCATION SIMPLE: LEFT UPPER ARM
LOCATION SIMPLE: LEFT UPPER BACK
LOCATION SIMPLE: LEFT LOWER BACK
LOCATION SIMPLE: LEFT POPLITEAL SKIN
LOCATION SIMPLE: RIGHT HAND
LOCATION SIMPLE: NOSE
LOCATION SIMPLE: LEFT POSTERIOR UPPER ARM
LOCATION SIMPLE: UPPER BACK
LOCATION SIMPLE: LEFT THIGH
LOCATION SIMPLE: RIGHT 2ND TOE
LOCATION SIMPLE: RIGHT LIP
LOCATION SIMPLE: LEFT FOREHEAD
LOCATION SIMPLE: LEFT UPPER ARM
LOCATION SIMPLE: RIGHT UPPER BACK
LOCATION SIMPLE: RIGHT CALF
LOCATION SIMPLE: LEFT THIGH
LOCATION SIMPLE: RIGHT 2ND TOE
LOCATION SIMPLE: LEFT CALF
LOCATION SIMPLE: RIGHT INDEX FINGER
LOCATION SIMPLE: RIGHT PRETIBIAL REGION
LOCATION SIMPLE: NOSE
LOCATION SIMPLE: LEFT LIP
LOCATION SIMPLE: LEFT PRETIBIAL REGION
LOCATION SIMPLE: LEFT ANTERIOR NECK
LOCATION SIMPLE: LEFT CALF
LOCATION SIMPLE: RIGHT MIDDLE FINGER
LOCATION SIMPLE: SCALP
LOCATION SIMPLE: CHEST
LOCATION SIMPLE: LEFT PRETIBIAL REGION
LOCATION SIMPLE: RIGHT CALF
LOCATION SIMPLE: LEFT POSTERIOR UPPER ARM
LOCATION SIMPLE: CHEST
LOCATION SIMPLE: LEFT ANTERIOR NECK
LOCATION SIMPLE: SCALP
LOCATION SIMPLE: LEFT SCALP
LOCATION SIMPLE: LEFT CHEEK
LOCATION SIMPLE: LEFT FOREARM
LOCATION SIMPLE: RIGHT CHEEK
LOCATION SIMPLE: LEFT EYEBROW

## 2021-01-01 ASSESSMENT — LOCATION ZONE DERM
LOCATION ZONE: FINGER
LOCATION ZONE: FACE
LOCATION ZONE: NOSE
LOCATION ZONE: TOE
LOCATION ZONE: LEG
LOCATION ZONE: SCALP
LOCATION ZONE: FACE
LOCATION ZONE: NECK
LOCATION ZONE: TRUNK
LOCATION ZONE: ARM
LOCATION ZONE: TRUNK
LOCATION ZONE: ARM
LOCATION ZONE: LIP
LOCATION ZONE: NOSE
LOCATION ZONE: NECK
LOCATION ZONE: SCALP
LOCATION ZONE: HAND
LOCATION ZONE: LEG
LOCATION ZONE: TOE
LOCATION ZONE: LIP
LOCATION ZONE: TRUNK

## 2021-02-02 NOTE — THERAPY EVALUATION
Loren Jean : 1940 [place] / Product Type: Medicare /  [address]  at [facility] [address], [address], [facility], [address] [facility] [phone]   [phone] OUTPATIENT PHYSICAL THERAPY:Initial Assessment 2021 ICD-10: Treatment Diagnosis:  
M25.561 Pain in Right Knee R26.89 Other Abnormalities of Gait 
M25.661 Stiffness Right Knee Precautions/Allergies:  
Latex, Ceftin [cefuroxime axetil], and Sulfa (sulfonamide antibiotics) Fall Risk Score: 1 (? 5 = High Risk) MD Orders: Eval and Treat MEDICAL/REFERRING DIAGNOSIS: 
Unilateral primary osteoarthritis, right knee [M17.11] DATE OF ONSET: 2 months REFERRING PHYSICIAN: Elver Marquez MD 
RETURN PHYSICIAN APPOINTMENT: TBD by pt ASSESSMENT:  Ms. Chelsie Short has attended 1 physical therapy session including the initial evaluation. Pt presents with c/o of R knee pain secondary to OA. Pt presents with decreased ROM and decreased strength at this time. Recommending skilled PT: manual therapeutic techniques (as appropriate), therapeutic exercises and activities, balance interventions, and a comprehensive home exercise program to address the current impairments, as listed above. Loren Jean will benefit from skilled PT (medically necessary) to address above deficits affecting participation in basic ADLs and overall functional tolerance. PROBLEM LIST (Impacting functional limitations): 1. Decreased Strength 2. Decreased ADL/Functional Activities 3. Decreased Transfer Abilities 4. Decreased Ambulation Ability/Technique 5. Decreased Balance 6. Increased Pain 7. Decreased Flexibility/Joint Mobility 8. Decreased Morgan with Home Exercise Program INTERVENTIONS PLANNED: 
1. Balance Exercise 2. Cold 3. Cryotherapy 4. Electrical Stimulation 5. Family Education 6. Gait Training 7. Heat 8. Home Exercise Program (HEP) 9. Manual Therapy 10. Neuromuscular Re-education/Strengthening 11. Range of Motion (ROM) 12. Therapeutic Activites 13. Therapeutic Exercise/Strengthening 14. Transcutaneous Electrical Nerve Stimulation (TENS) 15. Transfer Training TREATMENT PLAN: 
TREATMENT PLAN: 
Effective Dates: 2/2/2021 TO 5/3/2021 (90 days). Frequency/Duration: 2 times a week for 90 Days GOALS: (Goals have been discussed and agreed upon with patient.) Short-Term Functional Goals: Time Frame: 4 weeks 1. Christal Thomas will be compliant with HEP. 2. Christal Thomas will demonstrate knee ROM 5-125 degrees in order to improve gait mechanics for stair negotiation 3. Christal Thomas will demonstrate 4/5 R LE strength to improve standing and walking tolerance Discharge Goals: Time Frame: 12 weeks 1. Christal Thomas will be independent with HEP. 2. Christal Thomas will demonstrate knee ROM 0-130 degrees in order to improve gait mechanics for stair negotiation 3. Christal Thomas will demonstrate 4+/5 R LE strength to improve standing and walking tolerance 4. Christal Thomas will score 75/80 on LEFS demonstrating overall improvements in functional mobility 5. Pt will demonstrate 30 seconds SLS for improvement in balance to reduce falls risk Rehabilitation Potential For Stated Goals: Fair Regarding Jayla Greenfield's therapy, I certify that the treatment plan above will be carried out by a therapist or under their direction. Thank you for this referral, Federica Tanner, PT, DPT Referring Physician Signature: Pilo Rose MD              Date The information in this section was collected on 2/2/21 (except where otherwise noted).  
HISTORY:  
History of Present Injury/Illness (Reason for Referral): 
 Pt with c/o R knee pain. Pt received cortisone injection for knee pain due to OA and reports finding fair relief so far. Pt reports having difficulty with walking (reporting she is very slow), difficulty getting comfortable to sleep. Pt reports walking up to 2 miles for exercise at this time. CC/Primary Concern: R knee pain Treatment Side: Right Past Medical History/Comorbidities: Ms. Jean Claude Fox  has a past medical history of CAD (coronary artery disease) (4/26/2016), Gait disorder (4/26/2016), Hypertension, Hypertriglyceridemia (4/26/2016), Psychotic disorder ([facility] [address] and Small vessel disease, cerebrovascular (4/26/2016). Ms. Jean Claude Fox  has no past surgical history on file. Social History/Living Environment:  
Lives with . Pain/Symptom Location: Point to lateral knee joint line Worst Pain: 3/10 Current Pain: 0/10 Aggravating Factors: Squat and wearing heavy shoes Alleviating Factors/Positions/Motions: Heat Diagnostic Imaging: Xray: OA at R knee (unilateral) Prior Level of Function/Work/Activity: 
Progressive knee pain Patient Goals:  
Tacy Pass of expectations Current Medications:   
  
Current Outpatient Medications:  
  pantoprazole (PROTONIX) 40 mg tablet, TAKE 1 TABLET BY MOUTH EVERY DAY, Disp: 90 Tab, Rfl: 1   predniSONE (DELTASONE) 5 mg tablet, Take  by mouth., Disp: , Rfl:  
  loratadine (Claritin) 10 mg tablet, Take 10 mg by mouth., Disp: , Rfl:  
  b complex vitamins tablet, Take 1 Tab by mouth daily. , Disp: , Rfl:  
  febuxostat (ULORIC) 40 mg tab tablet, TAKE 1 TABLET BY MOUTH EVERY DAY, Disp: 30 Tab, Rfl: 5 
  atenoloL (TENORMIN) 50 mg tablet, TAKE 2 TABLETS BY MOUTH DAILY INDICATIONS: FAST HEART BEAT DUE TO ANTIPSYCHOTIC MEDICATION, Disp: 180 Tab, Rfl: 3 
  FLUoxetine (PROzac) 20 mg tablet, TAKE 1 TABLET BY MOUTH EVERY DAY, Disp: 90 Tab, Rfl: 3 
  OTHER, Prevagen-- Take 1 tablet by mouth once daily, Disp: , Rfl:  
   valACYclovir (VALTREX) 1 gram tablet, Take 2 tablets twice a day for 1 day as needed for outbreak of fever blister, Disp: 30 Tab, Rfl: 1   conjugated estrogens (Premarin) 0.625 mg tablet, TAKE 1 TABLET BY MOUTH EVERY DAY, Disp: 90 Tab, Rfl: 3 
  naproxen (NAPROSYN) 500 mg tablet, Take 1 Tab by mouth two (2) times daily (with meals). , Disp: 30 Tab, Rfl: 1 
  furosemide (LASIX) 20 mg tablet, Take 1 tablet every other day as directed, Disp: 90 Tab, Rfl: 3 
  niacinamide 500 mg tablet, TK 1 T PO  BID, Disp: , Rfl: 3 
  fluticasone (FLONASE) 50 mcg/actuation nasal spray, nightly., Disp: , Rfl:   
Date Last Reviewed:  2/2/2021 Ambulatory/Rehab Services H2 Model Falls Risk Assessment Risk Factors: 
     No Risk Factors Identified Ability to Rise from Chair: 
     (1)  Pushes up, successful in one attempt Falls Prevention Plan: No modifications necessary Total: (5 or greater = High Risk): 1 ©2010 [facility] [address] [place] Patent #[id number]. Federal Law prohibits the replication, distribution or use without written permission from [facility] [place] Number of Personal Factors/Comorbidities that affect the Plan of Care: 3+: HIGH COMPLEXITY EXAMINATION:  
Inspection 
 
________________________________________________________________________________________________ Observation Gait: Antalgic, Decreased Gait Speed, Decreased Stride Length and Decreased Step Length Assistive Device: None Edema: Not assessed this session. Addition Comments:  
 
________________________________________________________________________________________________ Range of Motion Lower Joint: Passive Passive Active Active Right (Degrees) Left (Degrees) Right (Degrees) Left (Degrees) Hip Flexion Hip Extension Hip Adduction Hip Abduction Hip Internal Rotation Hip External Rotation Knee Flexion   119 degrees 135 degrees Knee Extension   -6 degrees -2 degrees Additional Comments:  
________________________________________________________________________________________________ Strength Lower Extremity Joint:    
 RIGHT LEFT Hip Flexion 4/5 5/5 Hip Extension 3/5 4/5 Hip Internal Rotation 4/5 5/5 Hip External Rotation 3/5 4+/5 Hip Abduction 4/5 5/5 Hip Adduction NT/5 NT/5 Knee Flexion 4/5 5/5 Knee Extension 4+/5 5/5 Additional Comments:  
________________________________________________________________________________________________ Neruo-Vascular C/O Radicular Symptoms: No 
 
 
    Additional Comments:  
________________________________________________________________________________________________ 
________________________________________________________________________________________________ Palpation: TTP at distal ITB Joint mobilization: + medial patellar glide, hypomobility noted with superior and inferior glide Body Structures Involved: 1. Nerves 2. Bones 3. Joints 4. Muscles 5. Ligaments Body Functions Affected: 1. Sensory/Pain 2. Neuromusculoskeletal 
3. Movement Related Activities and Participation Affected: 1. General Tasks and Demands 2. Mobility 3. Self Care 4. Domestic Life 5. Interpersonal Interactions and Relationships 6. Community, Social and [place] Number of elements (examined above) that affect the Plan of Care: 4+: HIGH COMPLEXITY CLINICAL PRESENTATION:  
Presentation: Evolving clinical presentation with changing clinical characteristics: MODERATE COMPLEXITY CLINICAL DECISION MAKING:  
Outcome Measure: Tool Used: Lower Extremity Functional Scale (LEFS) Score:  Initial: 69/80 Most Recent: X/80 (Date: -- ) Interpretation of Score: 20 questions each scored on a 5 point scale with 0 representing \"extreme difficulty or unable to perform\" and 4 representing \"no difficulty\". The lower the score, the greater the functional disability. 80/80 represents no disability. Minimal detectable change is 9 points. Medical Necessity:  
· Skilled intervention continues to be required due to decreased strength, balance, ROM, with increased pain affecting pt participation in daily functional mobility Reason for Services/Other Comments: 
· Patient continues to require skilled intervention due to decreased strength balance and ROM with increased pain affecting pt functional mobility and ADLS. Use of outcome tool(s) and clinical judgement create a POC that gives a: Clear prediction of patient's progress: LOW COMPLEXITY

## 2021-02-02 NOTE — PROGRESS NOTES
Jean Jarquin : 1940 [place] / Product Type: Medicare /  [address]  at [facility] [address] [facility], [address] [place] [address] [phone]   [phone] OUTPATIENT PHYSICAL THERAPY: Daily Treatment Note 2021 Visit Count:  1 ICD-10: Treatment Diagnosis:  
M25.561 Pain in Right Knee R26.89 Other Abnormalities of Gait 
M25.661 Stiffness Right Knee Precautions/Allergies:  
Latex, Ceftin [cefuroxime axetil], and Sulfa (sulfonamide antibiotics) TREATMENT PLAN: 
Effective Dates: 2021 TO 5/3/2021 (90 days). Frequency/Duration: 2 times a week for 90 Days PRE-TREATMENT SYMPTOMS/COMPLAINTS:  R knee pain MEDICATIONS REVIEWED:  2021 TREATMENT:  
(In addition to Assessment/Re-Assessment sessions the following treatments were rendered) THERAPEUTIC EXERCISE: (8 minutes):  Exercises per grid below to improve mobility, strength and balance. Required minimal visual and verbal cues to promote proper body alignment and promote proper body posture. Progressed resistance, range and complexity of movement as indicated. Date: 
2021 Date: 
 Date: Activity/Exercise Parameters Parameters Parameters Supine ITB stretch HEP Seated HS stretch HEP MANUAL THERAPY: (15 minutes): Joint mobilization, Soft tissue mobilization and Manipulation was utilized and necessary because of the patient's restricted joint motion, painful spasm, loss of articular motion and restricted motion of soft tissue.  
+manual stretching R ITB, HS 
+STM distal ITB 
+joint mobilization grade II patella -medial glide 
 
(Used abbreviations: MET - muscle energy technique; PNF - proprioceptive neuromuscular facilitation; NMR - neuromuscular re-education; AP - anterior to posterior; PA - posterior to anterior) MODALITIES: (0 minutes):      none TREATMENT/SESSION ASSESSMENT:  Molly Brennan verbalized understanding of role of PT and POC. Education: on POC and objective findings RECOMMENDATIONS/INTENT FOR NEXT TREATMENT SESSION: \"Next visit will focus on advancements to more challenging activities\". PAIN: Initial: 0/10 Post Session:  0/10 [facility] Portal 
 
Total Treatment Billable Duration: PT Patient Time In/Time Out Time In: 0935 Time Out: 1010 Prashant Enriquez PT, DPT Future Appointments Date Time Provider Sussy Lindsey 2/9/2021 11:00 AM Cali Alas MD [facility] PST PST Visit Approval Visit # Therapist initials Date A NS / Cx < 24 hr >24 hr Cx Comments 1 RANDOLPH 2/2/21 [x]  [] [] Initial evaluation  
    [] [] []   

## 2021-02-05 NOTE — PROGRESS NOTES
Nica Pedraza : 1940 [place] / Product Type: Medicare /  [address]  at [facility] [address], [address] [facility], [place] [address] [address] [facility] [phone]   [phone] OUTPATIENT PHYSICAL THERAPY: Daily Treatment Note 2021 Visit Count:  2 ICD-10: Treatment Diagnosis:  
M25.561 Pain in Right Knee R26.89 Other Abnormalities of Gait 
M25.661 Stiffness Right Knee Precautions/Allergies:  
Latex, Ceftin [cefuroxime axetil], and Sulfa (sulfonamide antibiotics) TREATMENT PLAN: 
Effective Dates: 2021 TO 5/3/2021 (90 days). Frequency/Duration: 2 times a week for 90 Days PRE-TREATMENT SYMPTOMS/COMPLAINTS: Pt reports no pain at knee today. MEDICATIONS REVIEWED:  2021 TREATMENT:  
(In addition to Assessment/Re-Assessment sessions the following treatments were rendered) THERAPEUTIC EXERCISE: (25 minutes):  Exercises per grid below to improve mobility, strength and balance. Required minimal visual and verbal cues to promote proper body alignment and promote proper body posture. Progressed resistance, range and complexity of movement as indicated. Date: 
2021 Date: 
21 Date: Activity/Exercise Parameters Parameters Parameters Supine ITB stretch HEP Seated HS stretch HEP    
ASLR  15 X Bridge  15 X   
clamshell  15 X Reverse clamshell  15 X S/l hip abduction  15 X Quad set  10 seconds hold 5 X LAQ  15 X Slant board stretch  30 seconds 3X Heel raises  15 X Step ups Standing HS curls  15 X SLS MANUAL THERAPY: (15 minutes): Joint mobilization, Soft tissue mobilization and Manipulation was utilized and necessary because of the patient's restricted joint motion, painful spasm, loss of articular motion and restricted motion of soft tissue.  
+manual stretching R ITB, HS 
+STM R ITB 
 +joint mobilization grade II patella -medial glide, superior glide +PROM knee flexion, extension 
 
(Used abbreviations: MET - muscle energy technique; PNF - proprioceptive neuromuscular facilitation; NMR - neuromuscular re-education; AP - anterior to posterior; PA - posterior to anterior) MODALITIES: (15 minutes):      game ready to reduce inflammation and edema while providing pain relief. B: 39.0 cm, A: 38.0 cm   
 
TREATMENT/SESSION ASSESSMENT:  Pt presents with hypomobility  Noted at patella with medial and superior glide. Pt with ITB TTP with improvement with stretching and manual techniques. Pt tolerated exercises well requiring VC and TC for technique to prevent compensations. Fair edema and knee valgus noted R compared to L. Pt ambulates with very short step/stride length and narrowed WILLY. Education: on POC and objective findings RECOMMENDATIONS/INTENT FOR NEXT TREATMENT SESSION: \"Next visit will focus on advancements to more challenging activities\". PAIN: Initial: 0/10 Post Session:  1/10 [facility] Portal 
 
Total Treatment Billable Duration: PT Patient Time In/Time Out Time In: 0930 Time Out: 1030 Suman Thomas PT, DPT Future Appointments Date Time Provider [facility] 2/10/2021  7:00 PM Lukasz Favre A [facility]  
2/12/2021  9:30 AM Lukasz Favre A [facility]  
2/17/2021  9:30 AM Lukasz Favre A [facility]  
2/19/2021  9:30 AM Lukasz Favre A [facility]  
2/24/2021  9:30 AM Lukasz Favre A [facility]  
2/26/2021  9:30 AM Lukasz Favre A [facility]  
3/1/2021 11:10 AM Ignacio Haq MD [facility] PST PST Visit Approval Visit # Therapist initials Date A NS / Cx < 24 hr >24 hr Cx Comments 1 RANDOLPH 2/2/21 [x]  [] [] Initial evaluation 2 RANDOLPH 2/5/21 [x] [] []   
    [] [] []   
    [] [] [] [] [] []   
    [] [] []   

## 2021-02-10 NOTE — PROGRESS NOTES
Stephanie Carl : 1940 [place] / Product Type: Medicare /  [address] at [address] [facility] [address], [address] [place] [address], [address] [phone]   [phone] CANCELLATION NOTE Pt was a cancellation for physical therapy appointment today due to unable to make this appointment. Please disregard below information as it is inapplicable to today's note. OUTPATIENT PHYSICAL THERAPY: Daily Treatment Note 2/10/2021 Visit Count:  3 ICD-10: Treatment Diagnosis:  
M25.561 Pain in Right Knee R26.89 Other Abnormalities of Gait 
M25.661 Stiffness Right Knee Precautions/Allergies:  
Latex, Ceftin [cefuroxime axetil], and Sulfa (sulfonamide antibiotics) TREATMENT PLAN: 
Effective Dates: 2021 TO 5/3/2021 (90 days). Frequency/Duration: 2 times a week for 90 Days PRE-TREATMENT SYMPTOMS/COMPLAINTS: Pt reports no pain at knee today. MEDICATIONS REVIEWED:  2/10/2021 TREATMENT:  
(In addition to Assessment/Re-Assessment sessions the following treatments were rendered) THERAPEUTIC EXERCISE: (25 minutes):  Exercises per grid below to improve mobility, strength and balance. Required minimal visual and verbal cues to promote proper body alignment and promote proper body posture. Progressed resistance, range and complexity of movement as indicated. Date: 
2021 Date: 
21 Date: Activity/Exercise Parameters Parameters Parameters Supine ITB stretch HEP Seated HS stretch HEP    
ASLR  15 X Bridge  15 X   
clamshell  15 X Reverse clamshell  15 X S/l hip abduction  15 X Quad set  10 seconds hold 5 X LAQ  15 X Slant board stretch  30 seconds 3X Heel raises  15 X Step ups Standing HS curls  15 X SLS     
 
 MANUAL THERAPY: (15 minutes): Joint mobilization, Soft tissue mobilization and Manipulation was utilized and necessary because of the patient's restricted joint motion, painful spasm, loss of articular motion and restricted motion of soft tissue.  
+manual stretching R ITB, HS 
+STM R ITB 
+joint mobilization grade II patella -medial glide, superior glide +PROM knee flexion, extension 
 
(Used abbreviations: MET - muscle energy technique; PNF - proprioceptive neuromuscular facilitation; NMR - neuromuscular re-education; AP - anterior to posterior; PA - posterior to anterior) MODALITIES: (15 minutes):      game ready to reduce inflammation and edema while providing pain relief. B: 39.0 cm, A: 38.0 cm   
 
TREATMENT/SESSION ASSESSMENT:  Pt presents with hypomobility  Noted at patella with medial and superior glide. Pt with ITB TTP with improvement with stretching and manual techniques. Pt tolerated exercises well requiring VC and TC for technique to prevent compensations. Fair edema and knee valgus noted R compared to L. Pt ambulates with very short step/stride length and narrowed WILLY. Education: on POC and objective findings RECOMMENDATIONS/INTENT FOR NEXT TREATMENT SESSION: \"Next visit will focus on advancements to more challenging activities\". PAIN: Initial: 0/10 Post Session:  1/10 [facility] Portal 
 
Total Treatment Billable Duration: 
  
Kateryna Dickey, PT, DPT Future Appointments Date Time Provider Sussy Portillo 2/10/2021  7:00 PM Cookie Horns A [facility]  
2/12/2021  9:30 AM Cookie Horns A [facility]  
2/17/2021  9:30 AM Cookie Horns A [facility]  
2/19/2021  9:30 AM Cookie Horns A [facility]  
2/24/2021  9:30 AM Cookie Horns A [facility]  
2/26/2021  9:30 AM Cookie Horns A [facility]  
3/1/2021 11:10 AM Dell Gomez MD [facility] PST PST Visit Approval Visit # Therapist initials Date A NS / Cx < 24 hr >24 hr Cx Comments 1 RANDOLPH 2/2/21 [x]  [] [] Initial evaluation 2 RANDOLPH 2/5/21 [x] [] [] RANDOLPH 2/10/21 [] [] [x]   
    [] [] []   

## 2021-02-12 NOTE — PROGRESS NOTES
Jean Jarquin : 1940 [place] / Product Type: Medicare /  [address]  at [facility] [address], [address] [facility], [place] [address] [address] [facility] [phone]   [phone] OUTPATIENT PHYSICAL THERAPY: Daily Treatment Note 2021 Visit Count:  3 ICD-10: Treatment Diagnosis:  
M25.561 Pain in Right Knee R26.89 Other Abnormalities of Gait 
M25.661 Stiffness Right Knee Precautions/Allergies:  
Latex, Ceftin [cefuroxime axetil], and Sulfa (sulfonamide antibiotics) TREATMENT PLAN: 
Effective Dates: 2021 TO 5/3/2021 (90 days). Frequency/Duration: 2 times a week for 90 Days PRE-TREATMENT SYMPTOMS/COMPLAINTS: Pt reports knee is feeling better. Pt reports only mild soreness after last session MEDICATIONS REVIEWED:  2021 TREATMENT:  
(In addition to Assessment/Re-Assessment sessions the following treatments were rendered) THERAPEUTIC EXERCISE: (25 minutes):  Exercises per grid below to improve mobility, strength and balance. Required minimal visual and verbal cues to promote proper body alignment and promote proper body posture. Progressed resistance, range and complexity of movement as indicated. Date: 
2021 Date: 
21 Date: 
21 Activity/Exercise Parameters Parameters Parameters Supine ITB stretch HEP Seated HS stretch HEP    
ASLR  15 X  20 X Bridge  15 X 20 X   
clamshell  15 X 20 X Reverse clamshell  15 X 20 X S/l hip abduction  15 X  20 X Quad set  10 seconds hold 5 X LAQ  15 X  20 X Slant board stretch  30 seconds 3X Heel raises  15 X 20 X Step ups   4\" 15 X Standing HS curls  15 X  20 X SLS   3 X Gait in II bars   5 laps MANUAL THERAPY: (15 minutes): Joint mobilization, Soft tissue mobilization and Manipulation was utilized and necessary because of the patient's restricted joint motion, painful spasm, loss of articular motion and restricted motion of soft tissue.  
+manual stretching R ITB, HS 
+STM R ITB 
+joint mobilization grade II patella -medial glide, superior glide +PROM knee flexion, extension 
 
(Used abbreviations: MET - muscle energy technique; PNF - proprioceptive neuromuscular facilitation; NMR - neuromuscular re-education; AP - anterior to posterior; PA - posterior to anterior) MODALITIES: (15 minutes):      game ready to reduce inflammation and edema while providing pain relief. B: 38.5 cm, A: 38.0 cm   
 
TREATMENT/SESSION ASSESSMENT:  Worked on improving step/stride length in II bars. Pt did well with improved gait pattern with cues. Pt tolerated progression of exercises well. Continues with fairly significant tightness at R ITB Education: on POC and objective findings RECOMMENDATIONS/INTENT FOR NEXT TREATMENT SESSION: \"Next visit will focus on advancements to more challenging activities\". PAIN: Initial: 0/10 Post Session:  0/10 [facility] Portal 
 
Total Treatment Billable Duration: PT Patient Time In/Time Out Time In: 0930 Time Out: 1030 Katia Keller, PT, DPT Future Appointments Date Time Provider [facility] 2/17/2021  9:30 AM Derryl Conception A [facility]  
2/19/2021  8:45 AM Derryl Conception A [facility]  
2/24/2021  9:30 AM Derryl Conception A [facility]  
2/26/2021  9:30 AM Derryl Conception A [facility]  
3/1/2021 11:10 AM Mitchell Skiff, MD [facility] PST Visit Approval Visit # Therapist initials Date A NS / Cx < 24 hr >24 hr Cx Comments 1 RANDOLPH 2/2/21 [x]  [] [] Initial evaluation 2 RANDOLPH 2/5/21 [x] [] [] RANDOLPH 2/10/21 [] [] [x] 3 RANDOLPH 2/12/21 [x] [] []   
    [] [] []   
    [] [] [] [] [] []   
    [] [] []   

## 2021-02-17 NOTE — PROGRESS NOTES
Lucinda Franco : 1940 [place] / Product Type: Medicare /  [address]  at [facility] 11 [facility], [address] [place] [address], [address] [phone]   [phone] OUTPATIENT PHYSICAL THERAPY: Daily Treatment Note 2021 Visit Count:  4 ICD-10: Treatment Diagnosis:  
M25.561 Pain in Right Knee R26.89 Other Abnormalities of Gait 
M25.661 Stiffness Right Knee Precautions/Allergies:  
Latex, Ceftin [cefuroxime axetil], and Sulfa (sulfonamide antibiotics) TREATMENT PLAN: 
Effective Dates: 2021 TO 5/3/2021 (90 days). Frequency/Duration: 2 times a week for 90 Days PRE-TREATMENT SYMPTOMS/COMPLAINTS: Pt reports knee felt real good after last session. MEDICATIONS REVIEWED:  2021 TREATMENT:  
(In addition to Assessment/Re-Assessment sessions the following treatments were rendered) THERAPEUTIC EXERCISE: (25 minutes):  Exercises per grid below to improve mobility, strength and balance. Required minimal visual and verbal cues to promote proper body alignment and promote proper body posture. Progressed resistance, range and complexity of movement as indicated. Date: 
21 Date: 
21 Date: 
21 Activity/Exercise Parameters Parameters Supine ITB stretch Seated HS stretch ASLR 15 X  20 X 20 X Bridge 15 X 20 X  25 X  
clamshell 15 X 20 X  25 X Reverse clamshell 15 X 20 X 25 X S/l hip abduction 15 X  20 X  20 X Quad set 10 seconds hold 5 X  10 second holds 10 X LAQ 15 X  20 X 25 X Slant board stretch 30 seconds 3X  30 seconds 3 X Heel raises 15 X 20 X 20 X Step ups  4\" 15 X 6\" 10 X Standing HS curls 15 X  20 X  20 X SLS  3 X 3 X Gait in II bars  5 laps SAQ   15 X Hip adduction squeeze   15 X MANUAL THERAPY: (15 minutes): Joint mobilization, Soft tissue mobilization and Manipulation was utilized and necessary because of the patient's restricted joint motion, painful spasm, loss of articular motion and restricted motion of soft tissue.  
+manual stretching R ITB, HS 
+STM R ITB 
+joint mobilization grade II patella -medial glide, superior glide +PROM knee flexion, extension 
 
(Used abbreviations: MET - muscle energy technique; PNF - proprioceptive neuromuscular facilitation; NMR - neuromuscular re-education; AP - anterior to posterior; PA - posterior to anterior) MODALITIES: (15 minutes):      game ready to reduce inflammation and edema while providing pain relief. medium pressure B: 36.0 cm, A: 35.5 cm   
 
TREATMENT/SESSION ASSESSMENT:  Improved gait pattern noted today with minimal cues required. Pt tolerated progression of exercises well requiring VC and TC for technique. Education: on POC and objective findings RECOMMENDATIONS/INTENT FOR NEXT TREATMENT SESSION: \"Next visit will focus on advancements to more challenging activities\". PAIN: Initial: 0/10 Post Session:  0/10 [facility] Portal 
 
Total Treatment Billable Duration: PT Patient Time In/Time Out Time In: 0930 Time Out: 1025 Too Orr PT, DPT Future Appointments Date Time Provider [place] 2/19/2021  8:45 AM Maxime Lindsey A [facility]  
2/24/2021  9:30 AM Maxime Lindsey A [facility]PT [facility]  
2/26/2021  9:30 AM Maxime Lindsey A [facility]PT [facility]  
3/1/2021 11:10 AM Kingsley Essex, MD [facility] PST PST Visit Approval Visit # Therapist initials Date A NS / Cx < 24 hr >24 hr Cx Comments 1 RANDOLPH 2/2/21 [x]  [] [] Initial evaluation 2 RANDOLPH 2/5/21 [x] [] [] RANDOLPH 2/10/21 [] [] [x] 3 RANDOLPH 2/12/21 [x] [] []   
 4 RANDOLPH 2/17/21 [x] [] []   
    [] [] []   
    [] [] [] [] [] []   
    [] [] []

## 2021-02-17 NOTE — PROGRESS NOTES
I am accessing Ms. Greenfield's chart as a part of our department's internal chart auditing process. I certify that Ms. Deepthi Le is, or was, a patient in our department. Thank you, 
Luis Carlos Aguilar, PT, DPT 
2/17/2021

## 2021-02-19 NOTE — PROGRESS NOTES
Kylah Galvan : 1940 [place] / Product Type: Medicare /  [address]  at [address], [address] [place] [address], [place] [address] [address] [facility] [phone]   [phone] OUTPATIENT PHYSICAL THERAPY: Daily Treatment Note 2021 Visit Count:  5 ICD-10: Treatment Diagnosis:  
M25.561 Pain in Right Knee R26.89 Other Abnormalities of Gait 
M25.661 Stiffness Right Knee Precautions/Allergies:  
Latex, Ceftin [cefuroxime axetil], and Sulfa (sulfonamide antibiotics) TREATMENT PLAN: 
Effective Dates: 2021 TO 5/3/2021 (90 days). Frequency/Duration: 2 times a week for 90 Days PRE-TREATMENT SYMPTOMS/COMPLAINTS: Pt reports knee is hurting less and shes feeling it less at night. MEDICATIONS REVIEWED:  2021 TREATMENT:  
(In addition to Assessment/Re-Assessment sessions the following treatments were rendered) THERAPEUTIC EXERCISE: (25 minutes):  Exercises per grid below to improve mobility, strength and balance. Required minimal visual and verbal cues to promote proper body alignment and promote proper body posture. Progressed resistance, range and complexity of movement as indicated. Date: 
21 Date: 
21 Activity/Exercise Parameters Parameters ASLR 15 X  20 X 20 X #1 10 X Bridge 15 X 20 X  25 X 25 X     
clamshell 15 X 20 X  25 X YTB 15 X Reverse clamshell 15 X 20 X 25 X 25 X S/l hip abduction 15 X  20 X  20 X  20X Quad set 10 seconds hold 5 X  10 second holds 10 X 10 seconds holds 10 X LAQ 15 X  20 X 25 X #1 20 X Slant board stretch 30 seconds 3X  30 seconds 3 X 30 seconds 3 X Heel raises 15 X 20 X 20 X  25 X Step ups  4\" 15 X 6\" 10 X 6\" 10 X Standing HS curls 15 X  20 X  20 X 20 X SLS  3 X 3 X  3 X Gait in II bars  5 laps SAQ   15 X #2 15 X Hip adduction squeeze   15 X  15 X MANUAL THERAPY: (15 minutes): Joint mobilization, Soft tissue mobilization and Manipulation was utilized and necessary because of the patient's restricted joint motion, painful spasm, loss of articular motion and restricted motion of soft tissue.  
+manual stretching R ITB, HS 
+STM R ITB 
+joint mobilization grade II patella -medial glide, superior glide +PROM knee flexion, extension 
 
(Used abbreviations: MET - muscle energy technique; PNF - proprioceptive neuromuscular facilitation; NMR - neuromuscular re-education; AP - anterior to posterior; PA - posterior to anterior) MODALITIES: (15 minutes): ICE ONLY - game ready  Not available this session. game ready to reduce inflammation and edema while providing pain relief. medium pressure B: 36.0 cm, A: 35.5 cm   
 
TREATMENT/SESSION ASSESSMENT:   Pt tolerated progression of exercises well requiring VC and TC for technique. Verbal reports of improvement in pain and strength at knee. Improving step and stride length noted with gait. Education: on POC and objective findings RECOMMENDATIONS/INTENT FOR NEXT TREATMENT SESSION: \"Next visit will focus on advancements to more challenging activities\". PAIN: Initial: 0/10 Post Session:  0/10 [facility] Portal 
 
Total Treatment Billable Duration: PT Patient Time In/Time Out Time In: 0845 Time Out: 9197 Juanpablo Salinas, PT, DPT Future Appointments Date Time Provider Sussy Lesliei 2/24/2021  9:30 AM Haider CAGLE [facility]ORPT D  
2/26/2021  9:30 AM Haider CAGLE [facility]ORPT SFD  
3/1/2021 11:10 AM Judy Bolanos MD [facility] PST Visit Approval Visit # Therapist initials Date A NS / Cx < 24 hr >24 hr Cx Comments 1 RANDOLPH 2/2/21 [x]  [] [] Initial evaluation 2 RANDOLPH 2/5/21 [x] [] [] RANDOLPH 2/10/21 [] [] [x] 3 RANDOLPH 2/12/21 [x] [] []   
 4 RANDOLPH 2/17/21 [x] [] []   
 5 RANDOLPH 2/19/21 [x] [] []   
    [] [] []   
    [] [] []   
    [] [] [] [] [] []   
    [] [] []   

## 2021-02-24 NOTE — PROGRESS NOTES
Jenn Strickland : 1940 [place] / Product Type: Medicare /  [address] at 614 [facility] 68, [address] [facility], [place] [address] [address] [facility] [phone]   [phone] OUTPATIENT PHYSICAL THERAPY: Daily Treatment Note 2021 Visit Count:  3 ICD-10: Treatment Diagnosis:  
M25.561 Pain in Right Knee R26.89 Other Abnormalities of Gait 
M25.661 Stiffness Right Knee Precautions/Allergies:  
Latex, Ceftin [cefuroxime axetil], and Sulfa (sulfonamide antibiotics) TREATMENT PLAN: 
Effective Dates: 2021 TO 5/3/2021 (90 days). Frequency/Duration: 2 times a week for 90 Days PRE-TREATMENT SYMPTOMS/COMPLAINTS: Pt reports knee is feeling good. Pt reports walking 1-2 miles and having very minimal pain at  Night. MEDICATIONS REVIEWED:  2021 TREATMENT:  
(In addition to Assessment/Re-Assessment sessions the following treatments were rendered) THERAPEUTIC EXERCISE: (25 minutes):  Exercises per grid below to improve mobility, strength and balance. Required minimal visual and verbal cues to promote proper body alignment and promote proper body posture. Progressed resistance, range and complexity of movement as indicated. Date: 
21 Date: 
21 Activity/Exercise Parameters Parameters ASLR 15 X  20 X 20 X #1 10 X #1 15 X Bridge 15 X 20 X  25 X 25 X 25 X    
clamshell 15 X 20 X  25 X YTB 15 X #2 20 X Reverse clamshell 15 X 20 X 25 X 25 X #2 15 X S/l hip abduction 15 X  20 X  20 X  20X 20 X Quad set 10 seconds hold 5 X  10 second holds 10 X 10 seconds holds 10 X 10 seconds 10 X LAQ 15 X  20 X 25 X #1 20 X #2 20 X Slant board stretch 30 seconds 3X  30 seconds 3 X 30 seconds 3 X 30 seconds 3 X Heel raises 15 X 20 X 20 X  25 X 25 X Step ups  4\" 15 X 6\" 10 X 6\" 10 X 6\" 10 X Standing HS curls 15 X  20 X  20 X 20 X 25 X SLS  3 X 3 X  3 X 3X Gait in II bars  5 laps SAQ   15 X #2 15 X Hip adduction squeeze   15 X  15 X Tap downs     4\" 10 X MANUAL THERAPY: (15 minutes): Joint mobilization, Soft tissue mobilization and Manipulation was utilized and necessary because of the patient's restricted joint motion, painful spasm, loss of articular motion and restricted motion of soft tissue.  
+manual stretching R ITB, HS 
+STM R ITB 
+joint mobilization grade II patella -medial glide, superior glide +PROM knee flexion, extension 
 
(Used abbreviations: MET - muscle energy technique; PNF - proprioceptive neuromuscular facilitation; NMR - neuromuscular re-education; AP - anterior to posterior; PA - posterior to anterior) MODALITIES: (15 minutes): ICE ONLY - game ready  Not available this session. game ready to reduce inflammation and edema while providing pain relief. medium pressure B: 36.0 cm, A: 35.5 cm   
 
TREATMENT/SESSION ASSESSMENT:   Pt demonstrating improvements in knee extension ROM -3 to 4 degrees today. Pt tolerated progression of exercises well. Added step ups however pt does not have eccentric strength to be able to perform in full ROM today. Education: on POC and objective findings RECOMMENDATIONS/INTENT FOR NEXT TREATMENT SESSION: \"Next visit will focus on advancements to more challenging activities\". PAIN: Initial: 0/10 Post Session:  0/10 [facility] Portal 
 
Total Treatment Billable Duration: PT Patient Time In/Time Out Time In: 0930 Time Out: 1025 Franck Cordon, PT, DPT Future Appointments Date Time Provider Sussy Portillo 2/26/2021  9:30 AM Mike QUIROZRPMIRTA SFD  
3/1/2021 11:10 AM Xiomara Lopez MD [facility] PST PST Visit Approval Visit # Therapist initials Date A NS / Cx < 24 hr >24 hr Cx Comments 1 RANDOLPH 2/2/21 [x]  [] [] Initial evaluation 2 RANDOLPH 2/5/21 [x] [] [] RANDOLPH 2/10/21 [] [] [x] 3 RANDOLPH 2/12/21 [x] [] []   
 4 RANDOLPH 2/17/21 [x] [] []   
 5 RANDOLPH 2/19/21 [x] [] []   
 6 RANDOLPH 2/24/21 [x] [] [] PN due next week  
    [] [] []   

## 2021-02-26 NOTE — PROGRESS NOTES
Ngozi Harveyjustus : 1940 [place] / Product Type: Medicare /  [address] at 614 [facility] 68, [address] [facility]", [place] [address] [address] [facility] [phone]   [phone] OUTPATIENT PHYSICAL THERAPY: Daily Treatment Note 2021 Visit Count:  7 ICD-10: Treatment Diagnosis:  
M25.561 Pain in Right Knee R26.89 Other Abnormalities of Gait 
M25.661 Stiffness Right Knee Precautions/Allergies:  
Latex, Ceftin [cefuroxime axetil], and Sulfa (sulfonamide antibiotics) TREATMENT PLAN: 
Effective Dates: 2021 TO 5/3/2021 (90 days). Frequency/Duration: 2 times a week for 90 Days PRE-TREATMENT SYMPTOMS/COMPLAINTS: Pt was able to walk up to 2.5 miles. Pt reports being more fatigued with this but that her knee felt good with it. MEDICATIONS REVIEWED:  2021 TREATMENT:  
(In addition to Assessment/Re-Assessment sessions the following treatments were rendered) THERAPEUTIC EXERCISE: (25 minutes):  Exercises per grid below to improve mobility, strength and balance. Required minimal visual and verbal cues to promote proper body alignment and promote proper body posture. Progressed resistance, range and complexity of movement as indicated. Date: 
21 Date: 
21 Activity/Exercise Parameters Parameters ASLR 15 X  20 X 20 X #1 10 X #1 15 X #1 20 X Bridge 15 X 20 X  25 X 25 X 25 X 25 X    
clamshell 15 X 20 X  25 X YTB 15 X #2 20 X #2 20 X Reverse clamshell 15 X 20 X 25 X 25 X #2 15 X #2 20 X S/l hip abduction 15 X  20 X  20 X  20X 20 X #1 15 X Quad set 10 seconds hold 5 X  10 second holds 10 X 10 seconds holds 10 X 10 seconds 10 X LAQ 15 X  20 X 25 X #1 20 X #2 20 X  #2 20 X Slant board stretch 30 seconds 3X  30 seconds 3 X 30 seconds 3 X 30 seconds 3 X 30 seconds 3 X Heel raises 15 X 20 X 20 X  25 X 25 X 25 X Step ups  4\" 15 X 6\" 10 X 6\" 10 X 6\" 10 X 6\" 15 X Standing HS curls 15 X  20 X  20 X 20 X 25 X #2 15 X SLS  3 X 3 X  3 X 3X 3X Gait in II bars  5 laps SAQ   15 X #2 15 X  #2 20 X Hip adduction squeeze   15 X  15 X   20 X Tap downs     4\" 10 X MANUAL THERAPY: (15 minutes): Joint mobilization, Soft tissue mobilization and Manipulation was utilized and necessary because of the patient's restricted joint motion, painful spasm, loss of articular motion and restricted motion of soft tissue.  
+manual stretching R ITB, HS 
+STM R ITB 
+joint mobilization grade II patella -medial glide, superior glide +PROM knee flexion, extension 
 
(Used abbreviations: MET - muscle energy technique; PNF - proprioceptive neuromuscular facilitation; NMR - neuromuscular re-education; AP - anterior to posterior; PA - posterior to anterior) MODALITIES: (15 minutes):  
      game ready to reduce inflammation and edema while providing pain relief. medium pressure B: 37.0 cm, A: 36.0 cm   
 
TREATMENT/SESSION ASSESSMENT:   Pt tolerated progression of exercises well with improved strength noted. Education: on POC and objective findings RECOMMENDATIONS/INTENT FOR NEXT TREATMENT SESSION: \"Next visit will focus on advancements to more challenging activities\". PAIN: Initial: 0/10 Post Session:  0/10 [facility] Portal 
 
Total Treatment Billable Duration: PT Patient Time In/Time Out Time In: 0930 Time Out: 1030 Chantal Jarrell PT, DPT Future Appointments Date Time Provider Sussy Portillo 3/1/2021 11:10 AM Lennox Ford MD [facility] PST PST Visit Approval Visit # Therapist initials Date A NS / Cx < 24 hr >24 hr Cx Comments 1 RANDOLPH 2/2/21 [x]  [] [] Initial evaluation 2 RANDOLPH 2/5/21 [x] [] [] RANDOLPH 2/10/21 [] [] [x] 3 RANDOLPH 2/12/21 [x] [] []   
 4 RANDOLPH 2/17/21 [x] [] []   
 5 RANDOLPH 2/19/21 [x] [] [] 6 RANDOLPH 2/24/21 [x] [] [] PN due next week  
 7 RANDOLPH 2/26/21 [x] [] []   
    [] [] []   

## 2021-03-10 NOTE — PROGRESS NOTES
Jenn Strickland : 1940 [place] / Product Type: Medicare /  [address] at 614 [place] [address], [address] [place] [address], [facility] [phone]   [phone] OUTPATIENT PHYSICAL THERAPY: Daily Treatment Note 3/10/2021 Visit Count:  8 ICD-10: Treatment Diagnosis:  
M25.561 Pain in Right Knee R26.89 Other Abnormalities of Gait 
M25.661 Stiffness Right Knee Precautions/Allergies:  
Latex, Ceftin [cefuroxime axetil], and Sulfa (sulfonamide antibiotics) TREATMENT PLAN: 
Effective Dates: 2021 TO 5/3/2021 (90 days). Frequency/Duration: 2 times a week for 90 Days PRE-TREATMENT SYMPTOMS/COMPLAINTS: Pt reports following up with MD and recommending ortho consult for possible TKA. Pt scheduled for consult in April. MEDICATIONS REVIEWED:  3/10/2021 TREATMENT:  
(In addition to Assessment/Re-Assessment sessions the following treatments were rendered) THERAPEUTIC EXERCISE: (25 minutes):  Exercises per grid below to improve mobility, strength and balance. Required minimal visual and verbal cues to promote proper body alignment and promote proper body posture. Progressed resistance, range and complexity of movement as indicated. Date: 
21 Date: 
3/10/21 Activity/Exercise Parameters Parameters ASLR 15 X  20 X 20 X #1 10 X #1 15 X #1 20 X #2 15 X Bridge 15 X 20 X  25 X 25 X 25 X 25 X  30 X  
clamshell 15 X 20 X  25 X YTB 15 X #2 20 X #2 20 X #3 20 X Reverse clamshell 15 X 20 X 25 X 25 X #2 15 X #2 20 X #3 15 X S/l hip abduction 15 X  20 X  20 X  20X 20 X #1 15 X  #2 15 X Quad set 10 seconds hold 5 X  10 second holds 10 X 10 seconds holds 10 X 10 seconds 10 X  10 X 10 second holds LAQ 15 X  20 X 25 X #1 20 X #2 20 X  #2 20 X #3 15 X Slant board stretch 30 seconds 3X  30 seconds 3 X 30 seconds 3 X 30 seconds 3 X 30 seconds 3 X  30 seconds 3 X Heel raises 15 X 20 X 20 X  25 X 25 X 25 X  30 X Step ups  4\" 15 X 6\" 10 X 6\" 10 X 6\" 10 X 6\" 15 X Standing HS curls 15 X  20 X  20 X 20 X 25 X #2 15 X #2 20 X SLS  3 X 3 X  3 X 3X 3X 3 X Gait in II bars  5 laps SAQ   15 X #2 15 X  #2 20 X #2 20 X Hip adduction squeeze   15 X  15 X   20 X Tap downs     4\" 10 X MANUAL THERAPY: (15 minutes): Joint mobilization, Soft tissue mobilization and Manipulation was utilized and necessary because of the patient's restricted joint motion, painful spasm, loss of articular motion and restricted motion of soft tissue.  
+manual stretching R ITB, HS 
+STM R ITB 
+joint mobilization grade II patella -medial glide, superior glide +PROM knee flexion, extension 
 
(Used abbreviations: MET - muscle energy technique; PNF - proprioceptive neuromuscular facilitation; NMR - neuromuscular re-education; AP - anterior to posterior; PA - posterior to anterior) MODALITIES: (15 minutes):  
      game ready to reduce inflammation and edema while providing pain relief. medium pressure B: 37.5 cm, A: 37.0 cm   
 
TREATMENT/SESSION ASSESSMENT:   Pt tolerated progression of exercises well requiring VC and TC for technique. Improving strength noted with progression. Educated pt on typical protocol following TKA Education: on POC and objective findings RECOMMENDATIONS/INTENT FOR NEXT TREATMENT SESSION: \"Next visit will focus on advancements to more challenging activities\". PAIN: Initial: 0/10 discomfort Post Session:  0/10 [facility] Portal 
 
Total Treatment Billable Duration: PT Patient Time In/Time Out Time In: 8282 Time Out: 1115 Nguyễn Mishra, PT, DPT Future Appointments Date Time Provider Sussy Portillo 3/12/2021  9:30 AM Coral CAGLE [facility]ORPT SFD  
3/16/2021  9:30 AM Coral CAGLE SFDORPT SFD  
3/18/2021  9:30 AM Coral CAGLE [facility]FDORPT SFD  
4/5/2021 10:50 AM Tylor Castro MD [facility] PST PST  
4/15/2021  9:30 AM Lei Mckeon MD [facility] POAI POA  
  
 
Visit Approval Visit # Therapist initials Date A NS / Cx < 24 hr >24 hr Cx Comments  
 1 RANDOLPH 2/2/21 [x]  [] [] Initial evaluation  
 2 RANDOLPH 2/5/21 [x] [] []   
  RANDOLPH 2/10/21 [] [] [x]   
 3 RANDOLPH 2/12/21 [x] [] []   
 4 RANDOLPH 2/17/21 [x] [] []   
 5 RANDOLPH 2/19/21 [x] [] []   
 6 RANDOLPH 2/24/21 [x] [] []   
 7 RANDOLPH 2/26/21 [x] [] []   
  RANDOLPH 3/4/21 [] [x] [] Thought appointment was 3/5  
 8 RANDOLPH 3/10/21 [x] [] [] PN due next week.  
    [] [] []   

## 2021-03-12 NOTE — PROGRESS NOTES
Jean Jarquin : 1940 [place] / Product Type: Medicare /  [address]  at [facility] [address], [address] [facility], [place] [address] [address] [facility] [phone]   [phone] OUTPATIENT PHYSICAL THERAPY: Daily Treatment Note 3/12/2021 Visit Count:  8 ICD-10: Treatment Diagnosis:  
M25.561 Pain in Right Knee R26.89 Other Abnormalities of Gait 
M25.661 Stiffness Right Knee Precautions/Allergies:  
Latex, Ceftin [cefuroxime axetil], and Sulfa (sulfonamide antibiotics) TREATMENT PLAN: 
Effective Dates: 2021 TO 5/3/2021 (90 days). Frequency/Duration: 2 times a week for 90 Days PRE-TREATMENT SYMPTOMS/COMPLAINTS:  Pt reports knee is feeling good. No real soreness after last session. MEDICATIONS REVIEWED:  3/12/2021 TREATMENT:  
(In addition to Assessment/Re-Assessment sessions the following treatments were rendered) THERAPEUTIC EXERCISE: (25 minutes):  Exercises per grid below to improve mobility, strength and balance. Required minimal visual and verbal cues to promote proper body alignment and promote proper body posture. Progressed resistance, range and complexity of movement as indicated. Date: 
21 Date: 
3/10/21 Date: 
3/12/21 Activity/Exercise ASLR 20 X #1 10 X #1 15 X #1 20 X #2 15 X #2 20 X Bridge 25 X 25 X 25 X 25 X  30 X 30 X   
clamshell 25 X YTB 15 X #2 20 X #2 20 X #3 20 X #3 20 X Reverse clamshell 25 X 25 X #2 15 X #2 20 X #3 15 X #3 20 X S/l hip abduction 20 X  20X 20 X #1 15 X  #2 15 X #2 20 X Quad set 10 second holds 10 X 10 seconds holds 10 X 10 seconds 10 X  10 X 10 second holds  10 X 10 second holds LAQ 25 X #1 20 X #2 20 X  #2 20 X #3 15 X #3 20 X Slant board stretch 30 seconds 3 X 30 seconds 3 X 30 seconds 3 X 30 seconds 3 X  30 seconds 3 X 30 seconds 3 X Heel raises 20 X  25 X 25 X 25 X  30 X 30 X   
Step ups 6\" 10 X 6\" 10 X 6\" 10 X 6\" 15 X Standing HS curls 20 X 20 X 25 X #2 15 X #2 20 X #2 20 X SLS 3 X  3 X 3X 3X 3 X  3X Gait in II bars SAQ 15 X #2 15 X  #2 20 X #2 20 X Hip adduction squeeze 15 X  15 X   20 X Tap downs   4\" 10 X MANUAL THERAPY: (15 minutes): Joint mobilization, Soft tissue mobilization and Manipulation was utilized and necessary because of the patient's restricted joint motion, painful spasm, loss of articular motion and restricted motion of soft tissue.  
+manual stretching R ITB, HS 
+STM R ITB 
+joint mobilization grade II patella -medial glide, superior glide +PROM knee flexion, extension 
 
(Used abbreviations: MET - muscle energy technique; PNF - proprioceptive neuromuscular facilitation; NMR - neuromuscular re-education; AP - anterior to posterior; PA - posterior to anterior) MODALITIES: (15 minutes):  
      game ready to reduce inflammation and edema while providing pain relief. medium pressure B: 37.0 cm, A: 36.8 cm   
 
TREATMENT/SESSION ASSESSMENT:  Pt tolerated progression of exercises. Pt nearing independence with exercises at this time. PN due next visit and anticipate discharge as pt reaching max rehab potential.  
 
Education: on POC and objective findings RECOMMENDATIONS/INTENT FOR NEXT TREATMENT SESSION: \"Next visit will focus on advancements to more challenging activities\". PAIN: Initial: 0/10 discomfort Post Session:  0/10 [facility] Portal 
 
Total Treatment Billable Duration: PT Patient Time In/Time Out Time In: 0930 Time Out: 1030 Too Orr, PT, DPT Future Appointments Date Time Provider Sussy Portillo 3/16/2021  9:30 AM Maxime CAGLE [facility]PT [facility]  
3/18/2021  9:30 AM Maxime CAGLE [facility]  
4/5/2021 10:50 AM Kingsley Essex, MD [facility] PST PST  
4/15/2021  9:30 AM Lilly Garrison MD [facility] POAI POA Visit Approval Visit # Therapist initials Date A NS / Cx < 24 hr >24 hr Cx Comments 1 RANDOLPH 2/2/21 [x]  [] [] Initial evaluation 2 RANDOLPH 2/5/21 [x] [] [] RANDOLPH 2/10/21 [] [] [x] 3 RANDOLPH 2/12/21 [x] [] []   
 4 RANDOLPH 2/17/21 [x] [] []   
 5 RANDOLPH 2/19/21 [x] [] []   
 6 RANDOLPH 2/24/21 [x] [] []   
 7 RANDOLPH 2/26/21 [x] [] [] RANDOLPH 3/4/21 [] [x] [] Thought appointment was 3/5  
 8 RANDOLPH 3/10/21 [x] [] [] PN due next week. 9 RANDOLPH 3/12/21 [x] [] [] PN next visit & anticipate discharge  
    [] [] []   

## 2021-03-16 NOTE — PROGRESS NOTES
Marisol Ellison : 1940 [place] / [place] / Product Type: Medicare /  [address]  at [facility] [address] [facility], [place] [address] [address] [facility] [phone]   [phone] OUTPATIENT PHYSICAL THERAPY: Daily Treatment Note 3/16/2021 Visit Count:  97 ICD-10: Treatment Diagnosis:  
M25.561 Pain in Right Knee R26.89 Other Abnormalities of Gait 
M25.661 Stiffness Right Knee Precautions/Allergies:  
Latex, Ceftin [cefuroxime axetil], and Sulfa (sulfonamide antibiotics) TREATMENT PLAN: 
Effective Dates: 2021 TO 5/3/2021 (90 days). Frequency/Duration: 2 times a week for 90 Days PRE-TREATMENT SYMPTOMS/COMPLAINTS:  Pt reports some discomfort at knee today. MEDICATIONS REVIEWED:  3/16/2021 TREATMENT:  
(In addition to Assessment/Re-Assessment sessions the following treatments were rendered) THERAPEUTIC EXERCISE: (25 minutes):  Exercises per grid below to improve mobility, strength and balance. Required minimal visual and verbal cues to promote proper body alignment and promote proper body posture. Progressed resistance, range and complexity of movement as indicated. Date: 
21 Date: 
3/10/21 Date: 
3/12/21 Date:  
3/16/21 Activity/Exercise ASLR 20 X #1 10 X #1 15 X #1 20 X #2 15 X #2 20 X HEP Bridge 25 X 25 X 25 X 25 X  30 X 30 X HEP  
clamshell 25 X YTB 15 X #2 20 X #2 20 X #3 20 X #3 20 X HEP Reverse clamshell 25 X 25 X #2 15 X #2 20 X #3 15 X #3 20 X HEP  
S/l hip abduction 20 X  20X 20 X #1 15 X  #2 15 X #2 20 X HEP Quad set 10 second holds 10 X 10 seconds holds 10 X 10 seconds 10 X  10 X 10 second holds  10 X 10 second holds HEP  
LAQ 25 X #1 20 X #2 20 X  #2 20 X #3 15 X #3 20 X  HEP Slant board stretch 30 seconds 3 X 30 seconds 3 X 30 seconds 3 X 30 seconds 3 X  30 seconds 3 X 30 seconds 3 X  HEP Heel raises 20 X  25 X 25 X 25 X  30 X 30 X HEP Step ups 6\" 10 X 6\" 10 X 6\" 10 X 6\" 15 X Standing HS curls 20 X 20 X 25 X #2 15 X #2 20 X #2 20 X HEP  
SLS 3 X  3 X 3X 3X 3 X  3X HEP Gait in II bars SAQ 15 X #2 15 X  #2 20 X #2 20 X Hip adduction squeeze 15 X  15 X   20 X Tap downs   4\" 10 X MANUAL THERAPY: (15 minutes): Joint mobilization, Soft tissue mobilization and Manipulation was utilized and necessary because of the patient's restricted joint motion, painful spasm, loss of articular motion and restricted motion of soft tissue.  
+manual stretching R ITB, HS 
+STM R ITB 
+joint mobilization grade II patella -medial glide, superior glide +PROM knee flexion, extension 
 
(Used abbreviations: MET - muscle energy technique; PNF - proprioceptive neuromuscular facilitation; NMR - neuromuscular re-education; AP - anterior to posterior; PA - posterior to anterior) TREATMENT/SESSION ASSESSMENT: Discharged today with HEP for continued work on strengthening and balance in preparation for possible R TKA. Education: on discharge HEP PAIN: Initial: 0/10  Post Session:  0/10 [facility] Portal 
 
Total Treatment Billable Duration: PT Patient Time In/Time Out Time In: 0930 Time Out: 1012 Federica Tanner PT, DPT Future Appointments Date Time Provider Sussy Portillo 3/18/2021  9:30 AM Joo CAGLE [facility]PT S[facility]  
4/5/2021 10:50 AM Skylar Wellington MD [facility] PST PST  
4/15/2021  9:30 AM Rafael Boothe MD [facility] POAI POA Visit Approval Visit # Therapist initials Date A NS / Cx < 24 hr >24 hr Cx Comments 1 RANDOLPH 2/2/21 [x]  [] [] Initial evaluation 2 RANDOLPH 2/5/21 [x] [] [] RANDOLPH 2/10/21 [] [] [x] 3 RANDOLPH 2/12/21 [x] [] []   
 4 RANDOLPH 2/17/21 [x] [] []   
 5 RANDOLPH 2/19/21 [x] [] []   
 6 RANDOLPH 2/24/21 [x] [] []   
 7 RANDOLPH 2/26/21 [x] [] [] RANDOLPH 3/4/21 [] [x] [] Thought appointment was 3/5  
 8 RANDOLPH 3/10/21 [x] [] [] PN due next week.   
 9 RANDOLPH 3/12/21 [x] [] [] PN next visit & anticipate discharge 10 RANDOLPH 3/16/21 [x] [] [] Discharge today  
    [] [] []   

## 2021-03-16 NOTE — THERAPY DISCHARGE
Loren Jean : 1940 [place] / [place] / Product Type: Medicare /  [address]  at [facility] [address], [address] [facility], [address] [phone]   [phone] OUTPATIENT PHYSICAL THERAPY:Discharge 3/16/2021 ICD-10: Treatment Diagnosis:  
M25.561 Pain in Right Knee R26.89 Other Abnormalities of Gait 
M25.661 Stiffness Right Knee Precautions/Allergies:  
Latex, Ceftin [cefuroxime axetil], and Sulfa (sulfonamide antibiotics) Fall Risk Score: 1 (? 5 = High Risk) MD Orders: Eval and Treat MEDICAL/REFERRING DIAGNOSIS: 
Unilateral primary osteoarthritis, right knee [M17.11] DATE OF ONSET: 2 months REFERRING PHYSICIAN: Elver Marquez MD 
RETURN PHYSICIAN APPOINTMENT: TBD by pt Discharge Summary:  Pt has been seen for a total of 10 PT sessions to date. Pt has met 2/3 STG this session and 1/5 LTG this session. Pt has reached max rehab potential at this time and will be discharged with HEP. Pt making progress in R LE strength but noted regression in ROM. Pt has consult with ortho surgeon in April with high probability of needing TKA. Will discharge from PT at this time. ASSESSMENT:  Ms. Chelsie Short has attended 1 physical therapy session including the initial evaluation. Pt presents with c/o of R knee pain secondary to OA. Pt presents with decreased ROM and decreased strength at this time. Recommending skilled PT: manual therapeutic techniques (as appropriate), therapeutic exercises and activities, balance interventions, and a comprehensive home exercise program to address the current impairments, as listed above. Loren Jean will benefit from skilled PT (medically necessary) to address above deficits affecting participation in basic ADLs and overall functional tolerance. PROBLEM LIST (Impacting functional limitations): 1. Decreased Strength 2. Decreased ADL/Functional Activities 3.  Decreased Transfer Abilities 4. Decreased Ambulation Ability/Technique 5. Decreased Balance 6. Increased Pain 7. Decreased Flexibility/Joint Mobility 8. Decreased Swain with Home Exercise Program INTERVENTIONS PLANNED: 
1. Balance Exercise 2. Cold 3. Cryotherapy 4. Electrical Stimulation 5. Family Education 6. Gait Training 7. Heat 8. Home Exercise Program (HEP) 9. Manual Therapy 10. Neuromuscular Re-education/Strengthening 11. Range of Motion (ROM) 12. Therapeutic Activites 13. Therapeutic Exercise/Strengthening 14. Transcutaneous Electrical Nerve Stimulation (TENS) 15. Transfer Training TREATMENT PLAN: 
TREATMENT PLAN: 
Effective Dates: 2/2/2021 TO 5/3/2021 (90 days). Frequency/Duration: 2 times a week for 90 Days GOALS: (Goals have been discussed and agreed upon with patient.) Short-Term Functional Goals: Time Frame: 4 weeks 1. Jean Jarquin will be compliant with HEP. Goal met 3/16/21 
2. Jean Needy will demonstrate knee ROM 5-125 degrees in order to improve gait mechanics for stair negotiation Not met 3/16/21 
3. Jean Needy will demonstrate 4/5 R LE strength to improve standing and walking tolerance Goal met 3/16/21 Discharge Goals: Time Frame: 12 weeks 1. Jean Sharmay will be independent with HEP. Goal met 3/16/21 
2. Jean Needy will demonstrate knee ROM 0-130 degrees in order to improve gait mechanics for stair negotiation Not met 3/16/21 
3. Jean Needy will demonstrate 4+/5 R LE strength to improve standing and walking tolerance Progressing 3/16/21 4. Jean Needy will score 75/80 on LEFS demonstrating overall improvements in functional mobility Not met 3/16/21 5. Pt will demonstrate 30 seconds SLS for improvement in balance to reduce falls risk Not met 3/16/21 (7 seconds) Rehabilitation Potential For Stated Goals: Fair Regarding Jayla Greenfield's therapy, I certify that the treatment plan above will be carried out by a therapist or under their direction. Thank you for this referral, Reilly Costello, PT, DPT Referring Physician Signature: Yokasta Reddy MD   
    
 
 
The information in this section was collected on 2/2/21 (except where otherwise noted). HISTORY:  
History of Present Injury/Illness (Reason for Referral): Pt with c/o R knee pain. Pt received cortisone injection for knee pain due to OA and reports finding fair relief so far. Pt reports having difficulty with walking (reporting she is very slow), difficulty getting comfortable to sleep. Pt reports walking up to 2 miles for exercise at this time. CC/Primary Concern: R knee pain Treatment Side: Right Past Medical History/Comorbidities: Ms. Leila Arteaga  has a past medical history of CAD (coronary artery disease) (4/26/2016), Gait disorder (4/26/2016), Hypertension, Hypertriglyceridemia (4/26/2016), Psychotic disorder ([facility] [address] and Small vessel disease, cerebrovascular (4/26/2016). Ms. Leila Arteaga  has no past surgical history on file. Social History/Living Environment:  
Lives with . Pain/Symptom Location: Point to lateral knee joint line Worst Pain: 3/10 Current Pain: 0/10 Aggravating Factors: Squat and wearing heavy shoes Alleviating Factors/Positions/Motions: Heat Diagnostic Imaging: Xray: OA at R knee (unilateral) Prior Level of Function/Work/Activity: 
Progressive knee pain Patient Goals:  
[place] of expectations Current Medications:   
  
Current Outpatient Medications:  
  pantoprazole (PROTONIX) 40 mg tablet, TAKE 1 TABLET BY MOUTH EVERY DAY, Disp: 90 Tab, Rfl: 1 
  loratadine (Claritin) 10 mg tablet, Take 10 mg by mouth., Disp: , Rfl:  
  b complex vitamins tablet, Take 1 Tab by mouth daily. , Disp: , Rfl:  
  febuxostat (ULORIC) 40 mg tab tablet, TAKE 1 TABLET BY MOUTH EVERY DAY, Disp: 30 Tab, Rfl: 5 
  atenoloL (TENORMIN) 50 mg tablet, TAKE 2 TABLETS BY MOUTH DAILY INDICATIONS: FAST HEART BEAT DUE TO ANTIPSYCHOTIC MEDICATION, Disp: 180 Tab, Rfl: 3 
  FLUoxetine (PROzac) 20 mg tablet, TAKE 1 TABLET BY MOUTH EVERY DAY, Disp: 90 Tab, Rfl: 3 
  OTHER, Prevagen-- Take 1 tablet by mouth once daily, Disp: , Rfl:  
  valACYclovir (VALTREX) 1 gram tablet, Take 2 tablets twice a day for 1 day as needed for outbreak of fever blister, Disp: 30 Tab, Rfl: 1   conjugated estrogens (Premarin) 0.625 mg tablet, TAKE 1 TABLET BY MOUTH EVERY DAY, Disp: 90 Tab, Rfl: 3 
  naproxen (NAPROSYN) 500 mg tablet, Take 1 Tab by mouth two (2) times daily (with meals). , Disp: 30 Tab, Rfl: 1 
  furosemide (LASIX) 20 mg tablet, Take 1 tablet every other day as directed, Disp: 90 Tab, Rfl: 3 
  niacinamide 500 mg tablet, TK 1 T PO  BID, Disp: , Rfl: 3 
  fluticasone (FLONASE) 50 mcg/actuation nasal spray, nightly., Disp: , Rfl:   
Date Last Reviewed:  3/16/2021 Ambulatory/Rehab Services H2 Model Falls Risk Assessment Risk Factors: 
     No Risk Factors Identified Ability to Rise from Chair: 
     (1)  Pushes up, successful in one attempt Falls Prevention Plan: No modifications necessary Total: (5 or greater = High Risk): 1 ©2010 [facility] [address] States Patent #[id number]. Federal Law prohibits the replication, distribution or use without written permission from [facility] [place] Number of Personal Factors/Comorbidities that affect the Plan of Care: 3+: HIGH COMPLEXITY EXAMINATION:  
Inspection 
 
________________________________________________________________________________________________ Observation Gait: Antalgic, Decreased Gait Speed, Decreased Stride Length and Decreased Step Length Assistive Device: None Edema: Not assessed this session. Addition Comments:  
 
________________________________________________________________________________________________ Range of Motion Lower Joint: Active Active Right (3/16/21) Right (Degrees) Left (Degrees) Hip Flexion Hip Extension Hip Adduction Hip Abduction Hip Internal Rotation Hip External Rotation Knee Flexion 119 degrees 135 degrees 115 degrees Knee Extension -6 degrees -2 degrees -10 degrees Additional Comments:  
________________________________________________________________________________________________ Strength Lower Extremity Joint:   Right 3/16/21 RIGHT LEFT Hip Flexion 4/5 5/5 5/5 Hip Extension 3/5 4/5 4+-5/5 Hip Internal Rotation 4/5 5/5 5/5 Hip External Rotation 3/5 4+/5 4/5 Hip Abduction 4/5 5/5 4+/5 Hip Adduction NT/5 NT/5 Knee Flexion 4/5 5/5 5/5 Knee Extension 4+/5 5/5 5/5 Additional Comments:  
________________________________________________________________________________________________ Neruo-Vascular C/O Radicular Symptoms: No 
 
 
    Additional Comments:  
________________________________________________________________________________________________ 
________________________________________________________________________________________________ Palpation: TTP at distal ITB Joint mobilization: + medial patellar glide, hypomobility noted with superior and inferior glide Body Structures Involved: 1. Nerves 2. Bones 3. Joints 4. Muscles 5. Ligaments Body Functions Affected: 1. Sensory/Pain 2. Neuromusculoskeletal 
3. Movement Related Activities and Participation Affected: 1. General Tasks and Demands 2. Mobility 3. Self Care 4. Domestic Life 5. Interpersonal Interactions and Relationships 6. Community, Social and [place] Number of elements (examined above) that affect the Plan of Care: 4+: HIGH COMPLEXITY CLINICAL PRESENTATION:  
Presentation: Evolving clinical presentation with changing clinical characteristics: MODERATE COMPLEXITY CLINICAL DECISION MAKING:  
Outcome Measure:   
Tool Used: Lower Extremity Functional Scale (LEFS) Score:  Initial: 69/80 Most Recent: 59/80 (Date: 3/16/21 ) Interpretation of Score: 20 questions each scored on a 5 point scale with 0 representing \"extreme difficulty or unable to perform\" and 4 representing \"no difficulty\". The lower the score, the greater the functional disability. 80/80 represents no disability. Minimal detectable change is 9 points. Use of outcome tool(s) and clinical judgement create a POC that gives a: Clear prediction of patient's progress: LOW COMPLEXITY

## 2021-04-07 PROBLEM — M1A.9XX0 CHRONIC GOUT WITHOUT TOPHUS: Status: ACTIVE | Noted: 2021-01-01

## 2021-04-19 NOTE — PERIOP NOTES
Your patient recently had labs drawn during a hospital appointment due to an upcoming surgery. The results are attached. If you have any questions or concerns please reach out to your patient for a follow-up in your office. Please do not respond to this message as my mailbox is not monitored. You may call [phone] with questions or concerns.       Recent Results (from the past 12 hour(s))   CBC W/O DIFF    Collection Time: 04/19/21 11:07 AM   Result Value Ref Range    WBC 3.8 (L) 4.3 - 11.1 K/uL    RBC 3.67 (L) 4.05 - 5.2 M/uL    HGB 12.1 11.7 - 15.4 g/dL    HCT 35.9 35.8 - 46.3 %    MCV 97.8 79.6 - 97.8 FL    MCH 33.0 (H) 26.1 - 32.9 PG    MCHC 33.7 31.4 - 35.0 g/dL    RDW 13.3 11.9 - 14.6 %    PLATELET 443 730 - 366 K/uL    MPV 9.8 9.4 - 12.3 FL    ABSOLUTE NRBC 0.00 0.0 - 0.2 K/uL   METABOLIC PANEL, BASIC    Collection Time: 04/19/21 11:07 AM   Result Value Ref Range    Sodium 137 136 - 145 mmol/L    Potassium 3.7 3.5 - 5.1 mmol/L    Chloride 108 (H) 98 - 107 mmol/L    CO2 27 21 - 32 mmol/L    Anion gap 2 (L) 7 - 16 mmol/L    Glucose 85 65 - 100 mg/dL    BUN 33 (H) 8 - 23 MG/DL    Creatinine 1.18 (H) 0.6 - 1.0 MG/DL    GFR est AA 56 (L) >60 ml/min/1.73m2    GFR est non-AA 47 (L) >60 ml/min/1.73m2    Calcium 8.7 8.3 - 10.4 MG/DL

## 2021-04-19 NOTE — PERIOP NOTES
PLEASE CONTINUE TAKING ALL PRESCRIPTION MEDICATIONS UP TO THE DAY OF SURGERY UNLESS OTHERWISE DIRECTED BELOW. DISCONTINUE all vitamins and supplements 21 days prior to surgery. DISCONTINUE Non-Steriodal Anti-Inflammatory (NSAIDS) such as Advil and Aleve 5 days prior to surgery. Home Medications to take  the day of surgery      Premarin                        Pantoprazole      Flonase nasal spray                     Atenolol     Claritin                            Febuxostat     Home Medications   to Hold     Hold Naproxen for 5 days prior to surgery. Comments      Covid test 4/30/21 1020 am @ [address]              Please do not bring home medications with you on the day of surgery unless otherwise directed by your nurse. If you are instructed to bring home medications, please give them to your nurse as they will be administered by the nursing staff. If you have any questions, please call [address] [phone]     A copy of this note was provided to the patient for reference.

## 2021-04-19 NOTE — PERIOP NOTES
Patient verified name and . Order for consent not found in EHR ; patient verified. Type 3 surgery, Joint camp assessment complete. Labs per surgeon: CBC,BMP, mrsa swab ; results (within [facility] protocols)  Labs per anesthesia protocol: no additional  EKG: in EMR dated 10/15/20 along with echo 20 and stress 10/22/20 - all within [facility] protocols for surgery. Neuro - Dr. Pham Blend - office note and operative notes re:  shunt dated 3/6/18 in Care Everywhere if needed DOS. Patient COVID test date  1020; Patient aware. The testing center [facility], [place]  and telephone number [phone] provided to the patient if not appointment found. MRSA/MSSA swab collected; pharmacy to review and dose antibiotic as appropriate. Hospital approved surgical skin cleanser and instructions to return bottle on DOS given per hospital policy. Patient provided with handouts including Guide to Surgery, Pain Management, Hand Hygiene, Blood Transfusion Education, and [place] [facility] Brochure. Patient answered medical/surgical history questions at their best of ability. All prior to admission medications documented in [facility]. Original medication prescription bottle were visualized during patient appointment. Patient instructed to hold all vitamins 3 weeks prior to surgery and NSAIDS 5 days prior to surgery. Patient teach back successful and patient demonstrates knowledge of instruction.

## 2021-04-19 NOTE — PROGRESS NOTES
Ismael Cotton : 1940(80 y.o.) [address] at [address], [address] [place] [address] [phone] Physical Therapy Prehab Plan of Treatment and Evaluation Summary:2021 ICD-10: Treatment Diagnosis:  
· Pain in Right Knee (M25.561) · Stiffness of Right Knee, Not elsewhere classified (M25.661) · Difficulty in walking, Not elsewhere classified (R26.2) Precautions/Allergies:  
Latex, Ceftin [cefuroxime axetil], and Sulfa (sulfonamide antibiotics)  MEDICAL/REFERRING DIAGNOSIS: 
Unilateral primary osteoarthritis, right knee [M17.11] REFERRING PHYSICIAN: Levar Cruz MD 
DATE OF SURGERY: 21 Assessment:  
Comments:  Pt. Plans to go home with  who is here today. He has had bilateral tka's. She has a cane here but did not use it. Gait is somewhat unsteady and she reports NPH which causes her some balance issues. PROBLEM LIST (Impacting functional limitations): Ms. Jos Green presents with the following right lower extremity(s) problems: 1. Strength 2. Range of Motion 3. Home Exercise Program 
4. Pain INTERVENTIONS PLANNED: 
1. Home Exercise Program 
2. Educational Discussion TREATMENT PLAN: Effective Dates: 2021 TO 2021. Frequency/Duration: Patient to continue to perform home exercise program at least twice per day up until her surgery. GOALS: (Goals have been discussed and agreed upon with patient.) Discharge Goals: Time Frame: 1 Day 1. Patient will demonstrate independence with a home exercise program designed to increase strength, range of motion and pain control to minimize functional deficits and optimize patient for total joint replacement. Rehabilitation Potential For Stated Goals: Good Regarding Jayla Greenfield's therapy, I certify that the treatment plan above will be carried out by a therapist or under their direction.  
Thank you for this referral, 
Ketty Russell, PT     
    
 
 
HISTORY: Present Symptoms: 
Pain Location 1: Knee History of Present Injury/Illness (Reason for Referral): 
Medical/Referring Diagnosis: Unilateral primary osteoarthritis, right knee [M17.11] Past Medical History/Comorbidities: Ms. Eugenie Machuca  has a past medical history of CAD (coronary artery disease) (4/26/2016), Depression, Gait disorder (4/26/2016), Gout, Hypertension, Hypertriglyceridemia (4/26/2016), Normal pressure hydrocephalus ([address] Small vessel disease, cerebrovascular (4/26/2016), and Snores. Ms. Eugenie Machuca  has a past surgical history that includes hx hysterectomy; hx tonsillectomy; hx laparotomy; hx csf shunt (about 2018); and hx cataract removal. 
Social History/Living Environment:  
Home Environment: Private residence # Steps to Enter: 0 (elevator) One/Two Story Residence: One story Living Alone: No 
Support Systems: Spouse/Significant Other/Partner Patient Expects to be Discharged to[de-identified] Private residence Current DME Used/Available at Home: Shower chair;Cane, straight;Crutches Tub or Shower Type: Shower Work/Activity: 
retired Dominant Side: 
RIGHT Current Medications:  See Pre-assessment nursing note Number of Personal Factors/Comorbidities that affect the Plan of Care: 1-2: MODERATE COMPLEXITY EXAMINATION:  
ADLs (Current Functional Status):  
Ambulation: 
[x] Independent 
[] Walk Indoors Only 
[] Walk Outdoors [x] Use Assistive Device [] Use Wheelchair Only Dressing: 
[x] Independent Requires Assistance from Someone for: 
[] Sock/Shoes 
[] Pants 
[] Everything Bathing/Showering:  
[x] Independent 
[] Requires Assistance from Someone 
[] Sponge Bath Only Household Activities: 
[] Routine house and yard work [x] Light Housework Only 
[] None Observation/Orthostatic Postural Assessment:  
Exceptions to WDLGenu valgus left, Genu valgus right, Rounded shoulders ROM/Flexibility:  
Gross Assessment: Yes AROM: Generally decreased, functional(left LE) RLE Assessment RLE Assessment (WDL): Exceptions to WDL 
RLE AROM 
R Knee Flexion: 110 R Knee Extension: 15 Strength:  
Gross Assessment: Yes 
Strength: Generally decreased, functional(left LE) RLE Strength R Knee Flexion: 3 
R Knee Extension: 3 Functional Mobility:   
Gross Assessment: Yes 
 
Gait Description (WDL): Exceptions to [facility] Stand to Sit: Additional time, Independent Sit to Stand: Independent, Additional time Distance (ft): 200 Feet (ft) Ambulation - Level of Assistance: Supervision Speed/Whitney: Delayed Step Length: Left shortened;Right shortened Stance: Right decreased Gait Abnormalities: Antalgic; Shuffling gait; Decreased step clearance Balance:   
Sitting: Intact Standing: Impaired, Intact Standing - Static: Good Standing - Dynamic : Fair Body Structures Involved: 1. Bones 2. Joints 3. Muscles 4. Ligaments Body Functions Affected: 1. Movement Related Activities and Participation Affected: 1. Mobility Number of elements that affect the Plan of Care: 3: MODERATE COMPLEXITY CLINICAL PRESENTATION:  
Presentation: Stable and uncomplicated: LOW COMPLEXITY CLINICAL DECISION MAKING:  
Outcome Measure: Tool Used: Lower Extremity Functional Scale (LEFS) Score:  Initial: 65/80 Most Recent: X/80 (Date: -- ) Interpretation of Score: 20 questions each scored on a 5 point scale with 0 representing \"extreme difficulty or unable to perform\" and 4 representing \"no difficulty\". The lower the score, the greater the functional disability. 80/80 represents no disability. Minimal detectable change is 9 points. Medical Necessity:  
· Ms. Julene Fothergill is expected to optimize her lower extremity strength and ROM in preparation for joint replacement surgery. Reason for Services/Other Comments: · Achieve baseline assesment of musculoskeletal system, functional mobility and home environment. , educate in PT HEP in preparation for surgery, educate in hospital plan of care.   
Use of outcome tool(s) and clinical judgement create a POC that gives a: Clear prediction of patient's progress: LOW COMPLEXITY  
TREATMENT:  
Treatment/Session Assessment:  Patient was instructed in PT- HEP to increase strength and ROM in LEs. Answered all questions. · Post session pain:  Knee pain · Compliance with Program/Exercises: compliant most of the time. Total Treatment Duration: PT Patient Time In/Time Out Time In: 1115 Time Out: 1130 Gianfranco Prado, PT

## 2021-04-19 NOTE — ADVANCED PRACTICE NURSE
Total Joint Surgery Preoperative Chart Review      Patient ID:  Laura Deleon  [id number]  78 y.o.  1940  Surgeon: Dr. Vinnie Hines  Date of Surgery: 5/7/2021  Procedure: Total Right Knee Arthroplasty  Primary Care Physician: Christophe Charles [phone]  Specialty Physician(s):      Subjective:   Laura Deleon is a 78 y.o. WHITE female who presents for preoperative evaluation for Total Right Knee arthroplasty. This is a preoperative chart review note based on data collected by the nurse at the surgical Pre-Assessment visit. Past Medical History:   Diagnosis Date    CAD (coronary artery disease) 04/26/2016    No MI, No stents - followed by [facility]    Depression     Gait disorder 4/26/2016    Gout     Left foot    Hypertension     Hypertriglyceridemia 4/26/2016    Normal pressure hydrocephalus ([address] 03/06/2018    [facility] Celestine [facility]an [facility] -- s/p  shunt    Small vessel disease, cerebrovascular 4/26/2016    Snores       Past Surgical History:   Procedure Laterality Date   Joleenence Arleth - Dr. Chong Gitelman HX CSF SHUNT  about 2018    V/P shunt for NPH -- surgery at [address] [place] [address] HX LAPAROTOMY      for adhesions r/t hysterectomy    HX TONSILLECTOMY       Family History   Problem Relation Age of Onset    Heart Disease Other     Stroke Other     Cancer Other       Social History     Tobacco Use    Smoking status: Former Smoker    Smokeless tobacco: Never Used    Tobacco comment: quite 1974    Substance Use Topics    Alcohol use: Yes     Alcohol/week: 0.0 standard drinks     Comment: Social       Prior to Admission medications    Medication Sig Start Date End Date Taking? Authorizing Provider   vit C/E/Zn/coppr/lutein/zeaxan (PRESERVISION AREDS-2 PO) Take 1 Tab by mouth two (2) times a day.    Yes Provider, Historical   OTHER,NON-FORMULARY, Prevagen supplement   Yes Provider, Historical   aspirin delayed-release 81 mg tablet Take 81 mg by mouth every evening. Yes Provider, Historical   DISABLED PLACARD (DISABLED PLACARD) DMV The orthopedic condition creates a substantial limitation in routine walking. Permanent    MD Sc PDS#[id number] 7/56/19  Yes Starr Griffin MD   furosemide (LASIX) 20 mg tablet Take 1 tablet by mouth every other day 4/14/21  Yes Shahriar King MD   pantoprazole (PROTONIX) 40 mg tablet TAKE 1 TABLET BY MOUTH EVERY DAY 4/12/21  Yes Shahriar King MD   conjugated estrogens (Premarin) 0.625 mg tablet TAKE 1 TABLET BY MOUTH EVERY DAY 4/12/21  Yes Shahriar King MD   febuxostat (ULORIC) 40 mg tab tablet TAKE 1 TABLET BY MOUTH EVERY DAY 3/29/21  Yes Shahriar King MD   loratadine (Claritin) 10 mg tablet Take 10 mg by mouth. Yes Provider, Historical   b complex vitamins tablet Take 1 Tab by mouth daily. Yes Provider, Historical   atenoloL (TENORMIN) 50 mg tablet TAKE 2 TABLETS BY MOUTH DAILY INDICATIONS: FAST HEART BEAT DUE TO ANTIPSYCHOTIC MEDICATION 9/28/20  Yes Shahriar King MD   FLUoxetine (PROzac) 20 mg tablet TAKE 1 TABLET BY MOUTH EVERY DAY  Patient taking differently: every evening. TAKE 1 TABLET BY MOUTH EVERY DAY 9/8/20  Yes Shahriar King MD   valACYclovir (VALTREX) 1 gram tablet Take 2 tablets twice a day for 1 day as needed for outbreak of fever blister 9/4/20  Yes Shahriar King MD   naproxen (NAPROSYN) 500 mg tablet Take 1 Tab by mouth two (2) times daily (with meals). Patient taking differently: Take 500 mg by mouth two (2) times daily as needed. 9/5/19  Yes Landon Arriaga MD   fluticasone [facility]) 50 mcg/actuation nasal spray 2 Sprays by Both Nostrils route daily.    Yes Provider, Historical     Allergies   Allergen Reactions    Latex Unknown (comments)    Ceftin [Cefuroxime Axetil] Rash    Sulfa (Sulfonamide Antibiotics) Rash and Other (comments)     KIDNEY MALFUNCTION          Objective:     Physical Exam:   Patient Vitals for the past 24 hrs:   Temp Pulse Resp BP SpO2 04/19/21 1203 97.9 °F (36.6 °C) (!) 56 16 (!) 184/87 96 %       ECG:    EKG Results     None          Data Review:   Labs:   Recent Results (from the past 24 hour(s))   CBC W/O DIFF    Collection Time: 04/19/21 11:07 AM   Result Value Ref Range    WBC 3.8 (L) 4.3 - 11.1 K/uL    RBC 3.67 (L) 4.05 - 5.2 M/uL    HGB 12.1 11.7 - 15.4 g/dL    HCT 35.9 35.8 - 46.3 %    MCV 97.8 79.6 - 97.8 FL    MCH 33.0 (H) 26.1 - 32.9 PG    MCHC 33.7 31.4 - 35.0 g/dL    RDW 13.3 11.9 - 14.6 %    PLATELET 768 572 - 966 K/uL    MPV 9.8 9.4 - 12.3 FL    ABSOLUTE NRBC 0.00 0.0 - 0.2 K/uL   METABOLIC PANEL, BASIC    Collection Time: 04/19/21 11:07 AM   Result Value Ref Range    Sodium 137 136 - 145 mmol/L    Potassium 3.7 3.5 - 5.1 mmol/L    Chloride 108 (H) 98 - 107 mmol/L    CO2 27 21 - 32 mmol/L    Anion gap 2 (L) 7 - 16 mmol/L    Glucose 85 65 - 100 mg/dL    BUN 33 (H) 8 - 23 MG/DL    Creatinine 1.18 (H) 0.6 - 1.0 MG/DL    GFR est AA 56 (L) >60 ml/min/1.73m2    GFR est non-AA 47 (L) >60 ml/min/1.73m2    Calcium 8.7 8.3 - 10.4 MG/DL   MSSA/MRSA SC BY PCR, NASAL SWAB    Collection Time: 04/19/21 12:27 PM    Specimen: Nasal swab   Result Value Ref Range    Special Requests: NO SPECIAL REQUESTS      Culture result:        SA target not detected. A MRSA NEGATIVE, SA NEGATIVE test result does not preclude MRSA or SA nasal colonization. 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 9XX0       Total Joint Surgery Pre-Assessment Recommendations:           Recommend continuous saturation monitoring hours of sleep, during hospitalization. O2 prn per respiratory protocol.      Signed By: [address], NP-C    April 19, 2021

## 2021-04-20 NOTE — PROGRESS NOTES
21 1030   Oxygen Therapy   O2 Sat (%) 97 %   Pulse via Oximetry 57 beats per minute   O2 Device None (Room air)   Pre-Treatment   Breath Sounds Bilateral Clear;Diminished   Pre FEV1 (liters) 1.1 liters   % Predicted 64  (PT HAS ALLERGIES)     Initial respiratory Assessment completed with pt. Pt was interviewed and evaluated in Joint camp prior to surgery. Patient ID:  Nelda Kocher  [id number]  78 y.o.  1940  Surgeon: Dr. Brown Van  Date of Surgery: 2021  Procedure: Total Right Knee Arthroplasty  Primary Care Physician: Tracie Franklin [phone]  Specialists:     Pt. IS SOMEWHAT PRACTICING SOCIAL DISTANCING AND WEARING A MASK WHEN IN PUBLIC. Pt taught proper COUGH technique  DIAPHRAGMATIC BREATHING EXERCISE INSTRUCTIONS GIVEN    History of smokin/2 PPD FOR 8 YEARS                 Quit date:     80    Secondhand smoke:DENIES    Past procedures with Oxygen desaturation or delayed awakening:DENIES    Past Medical History:   Diagnosis Date    CAD (coronary artery disease) 2016    No MI, No stents - [facility] by [facility]    Depression     Gait disorder 2016    Gout     Left foot    Hypertension     Hypertriglyceridemia 2016    Normal pressure hydrocephalus ([address] 2018    followed by Dr. Chadwick Cj - [facility] -- s/p  shunt    Small vessel disease, cerebrovascular 2016    Snores         Respiratory history:                                 SOB  ON EXERTION                                    Respiratory meds:  DENIES    FAMILY PRESENT:             NO                                                   PAST SLEEP STUDY:                  DENIES  HX OF DIMITRY:                                     DENIES  DIMITRY assessment:                                               SLEEPS ON SIDE       &      BACK           PHYSICAL EXAM   Body mass index is 22.86 kg/m². Visit Vitals  BP (!) 184/87 (BP 1 Location: Left upper arm, BP Patient Position: Sitting; At rest) Pulse (!) 56   Temp 97.9 °F (36.6 °C)   Resp 16   Ht 5' 6.5\" (1.689 m)   Wt 65.2 kg (143 lb 12.8 oz)   SpO2 96%   BMI 22.86 kg/m²     Neck circumference: 37     cm    Loud snoring:                                            MINIMAL  Witnessed apnea or wakening gasping or choking:        DENIES        Awakens with headaches:                                               DENIES  Morning or daytime tiredness/ sleepiness:                         TIRED- NAPS DAILY 1 HOUR  Dry mouth or sore throat in morning:                     DENIES                                   Rangel stage:  3                                   SACS score:9  Stop Bang   STOP-BANG  Does the patient snore loudly (louder than talking or loud enough to be heard through closed doors)?: Yes  Does the patient often feel tired, fatigued, or sleepy during the daytime, even after a \"good\" night's sleep?: Yes  Has anyone ever observed the patient stop breathing during their sleep? : No  Does the patient have or are they being treated for high blood pressure?: Yes  Is the patient's BMI greater than 35?: No  Is your neck circumference greater than 17 inches (Male) or 16 inches (Female)?: No  Is the patient older than 48?: Yes  Is the patient male?: No  DIMITRY Score: 4  Has the patient been referred to Sleep Medicine?: No  Has the patient previously been diagnosed with Obstructive Sleep Apnea?: No                            [facility]                                        Referrals:    Pt. Phone Number:

## 2021-04-28 NOTE — H&P (VIEW-ONLY)
[employer] [facility] Pre Operative History and Physical Exam 
 
Patient ID: 
Rima Chris [id number] 
7 y.o. 
1940 Today: April 28, 2021 CC:  Right knee pain HPI:   The patient has end stage arthritis of the right knee. The patient was evaluated and examined during a consultation prior to this office visit. There have been no changes to the patient's orthopedic condition since the initial consultation. The patient has failed previous conservative treatment for this condition including antiinflammatories , and lifestyle modifications. The necessity for joint replacement is present. The patient will be admitted the day of surgery for right knee replacement Past Medical/Surgical History: 
Past Medical History:  
Diagnosis Date  CAD (coronary artery disease) 04/26/2016 No MI, No stents - followed by [address] [facility]  Depression  Gait disorder 4/26/2016  Gout Left foot  Hypertension  Hypertriglyceridemia 4/26/2016  Normal pressure hydro[address] [address] 03/06/2018  
 followed by Dr. Kathi Pina -- s/p  shunt  Small vessel disease, cerebrovascular 4/26/2016  Snores Past Surgical History:  
Procedure Laterality Date Junette Tate - Dr. Geoff Martinez  HX CSF SHUNT  about 2018 V/P shunt for NPH -- surgery at [place]  HX HYSTERECTOMY  HX LAPAROTOMY    
 for adhesions r/t hysterectomy  HX TONSILLECTOMY Allergies: Allergies Allergen Reactions  Latex Unknown (comments)  Ceftin [Cefuroxime Axetil] Rash  Sulfa (Sulfonamide Antibiotics) Rash and Other (comments) KIDNEY MALFUNCTION Physical Exam:  
General: NAD, Alert, Oriented, Appears their stated age HEENT: NC/AT Skin: No rashes, lesions or wounds seen Psych: normal affect Heart: Regular Rate, Rhythm Lungs: unlabored respirations, no wheezing Abdomen: Soft and non-distended Ortho: Pain with limited ROM of the right knee Neuro: no focal defects, moving extremities equally Lymph: no lymphadenopathy Meds:  
Current Outpatient Medications Medication Sig  
 vit C/E/Zn/coppr/lutein/zeaxan (PRESERVISION AREDS-2 PO) Take 1 Tab by mouth two (2) times a day. Joe Hasrichard, Prevagen supplement  aspirin delayed-release 81 mg tablet Take 81 mg by mouth every evening.  DISABLED PLACARD (DISABLED PLACARD) DMV The orthopedic condition creates a substantial limitation in routine walking. Permanent MD [facility]#[id number]  furosemide (LASIX) 20 mg tablet Take 1 tablet by mouth every other day  pantoprazole (PROTONIX) 40 mg tablet TAKE 1 TABLET BY MOUTH EVERY DAY  conjugated estrogens (Premarin) 0.625 mg tablet TAKE 1 TABLET BY MOUTH EVERY DAY  febuxostat (ULORIC) 40 mg tab tablet TAKE 1 TABLET BY MOUTH EVERY DAY  loratadine (Claritin) 10 mg tablet Take 10 mg by mouth.  b complex vitamins tablet Take 1 Tab by mouth daily.  atenoloL (TENORMIN) 50 mg tablet TAKE 2 TABLETS BY MOUTH DAILY INDICATIONS: FAST HEART BEAT DUE TO ANTIPSYCHOTIC MEDICATION  
 FLUoxetine (PROzac) 20 mg tablet TAKE 1 TABLET BY MOUTH EVERY DAY (Patient taking differently: every evening. TAKE 1 TABLET BY MOUTH EVERY DAY)  valACYclovir (VALTREX) 1 gram tablet Take 2 tablets twice a day for 1 day as needed for outbreak of fever blister  naproxen (NAPROSYN) 500 mg tablet Take 1 Tab by mouth two (2) times daily (with meals). (Patient taking differently: Take 500 mg by mouth two (2) times daily as needed.)  fluticasone (FLONASE) 50 mcg/actuation nasal spray 2 Sprays by Both Nostrils route daily. No current facility-administered medications for this visit. Labs: Hospital Outpatient Visit on 04/19/2021 Component Date Value Ref Range Status  WBC 04/19/2021 3.8* 4.3 - 11.1 K/uL Final  
 RBC 04/19/2021 3.67* 4.05 - 5.2 M/uL Final  
 HGB 04/19/2021 12.1  11.7 - 15.4 g/dL Final  
 HCT 04/19/2021 35.9  35.8 - 46.3 % Final  
 MCV 04/19/2021 97.8  79.6 - 97.8 FL Final  
 MCH 04/19/2021 33.0* 26.1 - 32.9 PG Final  
 MCHC 04/19/2021 33.7  31.4 - 35.0 g/dL Final  
 RDW 04/19/2021 13.3  11.9 - 14.6 % Final  
 PLATELET 69/85/1556 563  150 - 450 K/uL Final  
 MPV 04/19/2021 9.8  9.4 - 12.3 FL Final  
 ABSOLUTE NRBC 04/19/2021 0.00  0.0 - 0.2 K/uL Final  
 **Note: Absolute NRBC parameter is now reported with Hemogram**  Sodium 04/19/2021 137  136 - 145 mmol/L Final  
 Potassium 04/19/2021 3.7  3.5 - 5.1 mmol/L Final  
 Chloride 04/19/2021 108* 98 - 107 mmol/L Final  
 CO2 04/19/2021 27  21 - 32 mmol/L Final  
 Anion gap 04/19/2021 2* 7 - 16 mmol/L Final  
 Glucose 04/19/2021 85  65 - 100 mg/dL Final  
 Comment: 47 - 60 mg/dl Consistent with, but not fully diagnostic of hypoglycemia. 101 - 125 mg/dl Impaired fasting glucose/consistent with pre-diabetes mellitus 
> 126 mg/dl Fasting glucose consistent with overt diabetes mellitus  BUN 04/19/2021 33* 8 - 23 MG/DL Final  
 Creatinine 04/19/2021 1.18* 0.6 - 1.0 MG/DL Final  
 GFR est AA 04/19/2021 56* >60 ml/min/1.73m2 Final  
 GFR est non-AA 04/19/2021 47* >60 ml/min/1.73m2 Final  
 Comment: (NOTE) Estimated GFR is calculated using the Modification of Diet in Renal  
Disease (MDRD) Study equation, reported for both  [facility]) and non- Americans (GFRNA), and normalized to 1.73m2  
body surface area. The physician must decide which value applies to  
the patient. The MDRD study equation should only be used in  
individuals age 25 or older. It has not been validated for the  
following: pregnant women, patients with serious comorbid conditions,  
or on certain medications, or persons with extremes of body size,  
muscle mass, or nutritional status.  Calcium 04/19/2021 8.7  8.3 - 10.4 MG/DL Final  
 Special Requests: 04/19/2021 NO SPECIAL REQUESTS    Final  
 Culture result: 04/19/2021 SA target not detected. A MRSA NEGATIVE, SA NEGATIVE test result does not preclude MRSA or SA nasal colonization. Final  
Office Visit on 03/01/2021 Component Date Value Ref Range Status  WBC (POC) 03/01/2021 5.5  4.1 - 10.9 10^3/ul Final  
 ABS. LYMPHS (POC) 03/01/2021 1.7  0.6 - 4.1 10^3/ul Final  
 Mid # (POC) 03/01/2021 0.6  0.0 - 1.8 10^3/ul Final  
 ABS. GRANS (POC) 03/01/2021 3.2  2.0 - 7.8 10^3/ul Final  
 LYMPHOCYTES (POC) 03/01/2021 31.7  10.0 - 58.5 % Final  
 MID% POC 03/01/2021 10.9  0.1 - 24.0 % Final  
 GRANULOCYTES (POC) 03/01/2021 57.4  37.0 - 92.0 % Final  
 RBC (POC) 03/01/2021 3.76* 4.20 - 6.30 10^6/ul Final  
 HGB (POC) 03/01/2021 12.6  12.0 - 18.0 g/dL Final  
 HCT (POC) 03/01/2021 38.0  37.0 - 51.0 % Final  
 MCV (POC) 03/01/2021 101.0* 80.0 - 97.0 fL Final  
 MCH (POC) 03/01/2021 33.5* 26.0 - 32.0 pg Final  
 MCHC (POC) 03/01/2021 33.2  31.0 - 36.0 g/dL Final  
 RDW (POC) 03/01/2021 12.5  11.5 - 14.5 % Final  
 PLATELET (POC) 58/72/8039 258  140 - 440 10^3/ul Final  
 MPV (POC) 03/01/2021 7.0  0.0 - 49.9 fL Final  
 Glucose 03/01/2021 89  65 - 99 mg/dL Final  
 BUN 03/01/2021 30* 8 - 27 mg/dL Final  
 Creatinine 03/01/2021 1.45* 0.57 - 1.00 mg/dL Final  
 GFR est non-AA 03/01/2021 34* >59 mL/min/1.73 Final  
 GFR est AA 03/01/2021 39* >59 mL/min/1.73 Final  
 BUN/Creatinine ratio 03/01/2021 21  12 - 28 Final  
 Sodium 03/01/2021 138  134 - 144 mmol/L Final  
 Potassium 03/01/2021 4.5  3.5 - 5.2 mmol/L Final  
 Chloride 03/01/2021 103  96 - 106 mmol/L Final  
 CO2 03/01/2021 24  20 - 29 mmol/L Final  
 Calcium 03/01/2021 8.9  8.7 - 10.3 mg/dL Final  
 Protein, total 03/01/2021 8.0  6.0 - 8.5 g/dL Final  
 Albumin 03/01/2021 3.8  3.6 - 4.6 g/dL Final  
 GLOBULIN, TOTAL 03/01/2021 4.2  1.5 - 4.5 g/dL Final  
 A-G Ratio 03/01/2021 0.9* 1.2 - 2.2 Final  
 Bilirubin, total 03/01/2021 0.4  0.0 - 1.2 mg/dL Final  
 Alk.  phosphatase 03/01/2021 66  39 - 117 IU/L Final  
 AST (SGOT) 03/01/2021 18  0 - 40 IU/L Final  
 ALT (SGPT) 03/01/2021 17  0 - 32 IU/L Final  
 Cholesterol, total 03/01/2021 209* 100 - 199 mg/dL Final  
 Triglyceride 03/01/2021 233* 0 - 149 mg/dL Final  
 HDL Cholesterol 03/01/2021 81  >39 mg/dL Final  
 VLDL, calculated 03/01/2021 38  5 - 40 mg/dL Final  
 LDL, calculated 03/01/2021 90  0 - 99 mg/dL Final  
 TSH 03/01/2021 1.760  0.450 - 4.500 uIU/mL Final  
 Uric acid 03/01/2021 3.9  3.1 - 7.9 mg/dL Final  
            Therapeutic target for gout patients: <6.0 Patient Active Problem List  
Diagnosis Code  
 HTN (hypertension) I10  
 Small vessel disease, cerebrovascular I67.9  
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 Arthritis of the right knee 2. Cr. 1.18, GFR 40s-50s Plan: 1. Proceed with scheduled right knee replacement , pending discussion with Dr. Anthony Harris on kidney function. The patient was counseled at length about the risks of óscar Covid-19 during their perioperative period and any recovery window from their procedure. The patient was made aware that óscar Covid-19  may worsen their prognosis for recovering from their procedure and lend to a higher morbidity and/or mortality risk. All material risks, benefits, and reasonable alternatives including postponing the procedure were discussed. The patient does  wish to proceed with the procedure at this time. Signed By: CELY Spencer April 28, 2021

## 2021-05-07 PROBLEM — M17.11 OSTEOARTHRITIS OF RIGHT KNEE: Status: ACTIVE | Noted: 2021-01-01

## 2021-05-07 PROBLEM — Z96.651 STATUS POST RIGHT KNEE REPLACEMENT: Status: ACTIVE | Noted: 2021-01-01

## 2021-05-07 NOTE — ANESTHESIA PREPROCEDURE EVALUATION
Anesthetic History No history of anesthetic complications Review of Systems / Medical History Patient summary reviewed and pertinent labs reviewed Pulmonary Within defined limits Neuro/Psych Psychiatric history (Depression) Comments: NPH - has  shunt Cardiovascular Hypertension CAD and hyperlipidemia Exercise tolerance: >4 METS Comments: Echo 11/2020: normal LVEF, mild AI, moderate MR  
GI/Hepatic/Renal 
  
 
 
Renal disease: CRI Endo/Other Within defined limits Other Findings Physical Exam 
 
Airway Mallampati: II 
TM Distance: 4 - 6 cm Neck ROM: normal range of motion Mouth opening: Normal 
 
 Cardiovascular Regular rate and rhythm,  S1 and S2 normal,  no murmur, click, rub, or gallop Dental 
No notable dental hx Pulmonary Breath sounds clear to auscultation Abdominal 
GI exam deferred Other Findings Anesthetic Plan ASA: 3 Anesthesia type: spinal 
 
 
Post-op pain plan if not by surgeon: peripheral nerve block single Anesthetic plan and risks discussed with: Patient

## 2021-05-07 NOTE — OP NOTES
[facility] [employer] Manuel Robotic Assisted Total Knee Arthroplasty: Posterior Cruciate Retaining Patient:Jayla Vazquez : 1940 Medical Record [id number] Pre-operative Diagnosis:  Primary osteoarthritis of right knee [M17.11] Post-operative Diagnosis: Primary osteoarthritis of right knee [M17.11] Location: [address] [place] [address] Surgeon: Max Lam MD  
Assistant: Danetta Cowden assist 
 
Anesthesia: Spinal and ACB Indications: Patient has end stage arthritis. They have tried and failed conservative management. Procedure:Procedure(s) (LRB): 
RIGHT KNEE ARTHROPLASTY TOTAL ROBOTIC ASSISTED / Thornn Folds / (Right) CPT- 29808- Total knee arthroplasty 02902- Other procedures on musculoskeletal system 0791D- Computer assisted surgical navigation The complexity of the total joint surgery requires the use of a first assistant for positioning, retraction and expertise in closure. Tourniquet Time: 0 minutes EBL: 250 cc Findings: severe degenerative arthritis with loss of cartilage in weight bearing compartments of the knee, no patellar osteophytes with good patella cartilage, posterior femoral osteophytes BMI: Body mass index is 22.86 kg/m². Nelda Kocher was brought to the operating room and positioned on the operating table. She was anesthestized with anesthesia. IV antibiotics were administered. Prior to the incision being made a timeout was called identifying the patient, procedure ,operative side and surgeon The operative leg was prepped and draped in the usual sterile manner. An anterior longitudinal incision was accomplished just medial to the tibial tubercle and extending approximal 6 centimeters proximal to the superior pole of the patella. A medial parapatellar capsular incision was performed. The medial capsular flap was elevated around to the insertion of the semimembranous tendon.   The patella was everted and the knee flexed and externally rotated. The medial and external menisci were excised. The lateral half of the fat pad excised and the patella femoral ligament was released. The anterior cruciate ligament was resect and the posterior cruciate ligament was retained. The femoral and tibial arrays were pinned in place and registered with the [employer]. The patient landmarks were collected and the tibial and femoral checkpoints were registered and verified. The preresection balancing was performed. The distal femur was addressed first. Utilizing the [facility] robotic arm the distal femoral cut was made. The anterior and posterior cuts were then made. The osteophytes were removed from the tibial and femoral surfaces. The tibia was then addressed. The [employer] robotic arm was then used to make the measured resection of the tibia. The tibia was sized. The tibial base plate was pinned into place with the appropriate external rotation and stem site prepared. A trial femoral component and poly was placed. A preliminary range of motion was accomplished with the trial components. The patient was found to obtain full extension as well as appropriate flexion. The patient's ligaments were stable in flexion and extension to medial and lateral stressing and the alignment was through the appropriate mechanical axis. Additional surgical procedures included: none. A trial reduction of the patella revealed appropriate tracking through the patellofemoral groove with no lateral retinacular release being accomplished. All trial components were removed. The real implants were opened: Sizes listed below. The knee was irrigated. There were no femoral deficiencies. There were no tibial deficiencies. No augmentation was utilized. The tibial and femoral components were impacted into place. [facility] Jean Pierre knee was placed through range of motion and noted to be stable as mentioned above with the trail components. The wound was dry, therefore no drain was used. The operative knee was injected with 60 cc of Naropin, 10 cc's of morphine and 1 cc of 30 mg of Toradol. The knee was then soaked with a diluted betadine solution for approximately 3 min. This was then thoroughly irrigated. The capsular layer was closed using a #1 PDS suture. Then, 1 gram (100 mg/ml) of Transexamic Acid was injected into the joint space. The subcutaneous layers were closed using 2-0 Stratafix. Finally the skin was closed using 3-0 Vicryl and staples which were applied in occlusive fashion and sterile bandage applied. An Iceman cryo pad was applied on the operative leg. Sponge count and needle counts were correct. Gijoseph Meaghan left the operating room Implants:  
Implant Name Type Inv. Item Serial No.  Lot No. LRB No. Used Action COMPONENT FEM SZ 3 R KNEE CRUCE RET CEMENTLESS BEAD W/ LEONOR - [id number]  COMPONENT FEM SZ 3 R KNEE CRUCE RET CEMENTLESS BEAD W/ LEONOR  [employer] [facility]_ [id number] Right 1 Implanted BASEPLATE TIB SZ 3 [id number] ML67MM KNEE TRITANIUM 4 CRUCFRM - [id number]  BASEPLATE TIB SZ 3 [id number] ML67MM KNEE TRITANIUM 4 CRUCFRM  [employer] [facility]_ [id number] Right 1 Implanted INSERT TIB SZ 3 THK9MM UNIV KNEE POLYETH CNDYL STBL RENALDO NEUT - [id number]  INSERT TIB SZ 3 THK9MM UNIV KNEE POLYETH CNDYL STBL RENALDO NEUT  [employer] [facility]_ [id number] Right 1 Implanted Signed By: Ivan Rich MD  
5/7/2021,  11:06 AM

## 2021-05-07 NOTE — PROGRESS NOTES
Care Management Interventions PCP Verified by CM: Yes Mode of Transport at Discharge: Self Transition of Care Consult (CM Consult): Home Health, Discharge Planning Physical Therapy Consult: Yes Occupational Therapy Consult: Yes Current Support Network: Lives with Spouse Confirm Follow Up Transport: Family The Plan for Transition of Care is Related to the Following Treatment Goals : improve mobility The Patient and/or Patient Representative was Provided with a Choice of Provider and Agrees with the Discharge Plan?: Yes Freedom of Choice List was Provided with Basic Dialogue that Supports the Patient's Individualized Plan of Care/Goals, Treatment Preferences and Shares the Quality Data Associated with the Providers?: Yes Discharge Location Discharge Placement: Home with outpatient services Patient is a 78y.o. year old female admitted for Right TKA . Patient plans to return home on discharge. Order received to arrange home health. Patient without preference towards agency. Referral sent to [place] and accepted. This was arranged by surgeon's office. Patient requesting we arrange a walker and bedside commode. Referral sent to [address] [place] [address]. delivered to the hospital room prior to discharge. Will follow until discharge.

## 2021-05-07 NOTE — PROGRESS NOTES
05/07/21 1618 Oxygen Therapy O2 Sat (%) 96 % Pulse via Oximetry 62 beats per minute O2 Device None (Room air) Incentive Spirometry Treatment Actual Volume (ml) 1100 ml Number of Attempts 1 Joint Camp Notes Reviewed. Pt working on IS. Pt encouraged to do 10 breaths per hour while awake on IS. Good NPC. No respiratory distress noted at this time. No complications noted at this time.

## 2021-05-07 NOTE — PROGRESS NOTES
Spiritual Care visit. Initial Visit, Pre Surgery Consult. Visit and prayer before patient goes to surgery. Visit by Jhoana Le M.Ed., Th.B. ,Staff

## 2021-05-07 NOTE — PERIOP NOTES
TRANSFER - OUT REPORT: 
 
Verbal report given to kirit(name) on Driss Rodgers  being transferred to [facility](unit) for routine post - op Report consisted of patients Situation, Background, Assessment and  
Recommendations(SBAR). Information from the following report(s) SBAR, Kardex, OR Summary, Procedure Summary, Intake/Output and MAR was reviewed with the receiving nurse. Lines:  
Peripheral IV 05/07/21 Right Wrist (Active) Site Assessment Clean, dry, & intact 05/07/21 1225 Phlebitis Assessment 0 05/07/21 1225 Infiltration Assessment 0 05/07/21 1225 Dressing Status Clean, dry, & intact 05/07/21 1225 Dressing Type Transparent 05/07/21 1225 Hub Color/Line Status Green 05/07/21 1225 Opportunity for questions and clarification was provided. Patient transported with: 
 [employer]

## 2021-05-07 NOTE — ANESTHESIA POSTPROCEDURE EVALUATION
Procedure(s): RIGHT KNEE ARTHROPLASTY TOTAL ROBOTIC ASSISTED / Clevester Lindsey /. 
 
spinal 
 
Anesthesia Post Evaluation Multimodal analgesia: multimodal analgesia used between 6 hours prior to anesthesia start to PACU discharge Patient location during evaluation: PACU Patient participation: complete - patient participated Level of consciousness: awake Pain management: adequate Airway patency: patent Anesthetic complications: no 
Cardiovascular status: acceptable Respiratory status: spontaneous ventilation and acceptable Hydration status: acceptable Post anesthesia nausea and vomiting:  none INITIAL Post-op Vital signs:  
Vitals Value Taken Time /67 05/07/21 1225 Temp 36.2 °C (97.1 °F) 05/07/21 1147 Pulse 55 05/07/21 1225 Resp 18 05/07/21 1225 SpO2 98 % 05/07/21 1225

## 2021-05-07 NOTE — ANESTHESIA PROCEDURE NOTES
Peripheral Block Start time: 5/7/2021 9:08 AM 
End time: 5/7/2021 9:09 AM 
Performed by: Gianfranco Stratton MD 
Authorized by: Gianfranco Stratton MD  
 
 
Pre-procedure: Indications: at surgeon's request and post-op pain management Preanesthetic Checklist: patient identified, risks and benefits discussed, site marked, timeout performed, anesthesia consent given and patient being monitored Timeout Time: 09:07 Block Type:  
Block Type: Adductor canal 
Laterality:  Right Monitoring:  Standard ASA monitoring, continuous pulse ox, frequent vital sign checks, heart rate, responsive to questions and oxygen Injection Technique:  Single shot Procedures: ultrasound guided Patient Position: supine Prep: chlorhexidine Location:  Mid thigh Needle Type:  Stimuplex Needle Gauge:  22 G Needle Localization:  Ultrasound guidance Medication Injected:  Ropivacaine 0.2% with epinephrine 1:200,000 injection, 20 mL (Mixture components: ropivacaine 2 mg/mL (0.2 %) Soln, 1 mL; EPINEPHrine HCl (PF) 1 mg/mL (1 mL) Soln, . 005 mL) dexamethasone (DECADRON) 4 mg/mL injection, 4 mg Med Admin Time: 5/7/2021 9:09 AM 
 
Assessment: 
Number of attempts:  1 Injection Assessment:  Incremental injection every 5 mL, local visualized surrounding nerve on ultrasound, negative aspiration for CSF, negative aspiration for blood, no paresthesia, no intravascular symptoms and ultrasound image on chart Patient tolerance:  Patient tolerated the procedure well with no immediate complications

## 2021-05-07 NOTE — PROGRESS NOTES
Problem: Mobility Impaired (Adult and Pediatric) Goal: *Acute Goals and Plan of Care (Insert Text) Description: GOALS (1-4 days): 
(1.)Ms. Helen Hugo will move from supine to sit and sit to supine  in bed with INDEPENDENT. (2.)Ms. Helen Hugo will transfer from bed to chair and chair to bed with SUPERVISION using the least restrictive device. (3.)Ms. Helen Hugo will ambulate with SUPERVISION for 200 feet with the least restrictive device. (4.)Ms. Helen Hugo will ambulate up/down 3 steps with left railing with STAND BY ASSIST with device PRN. (for practice, pt has no stairs) (5.)Ms. Helen Hugo will increase right knee ROM to 5°-80°. 
________________________________________________________________________________________________ Outcome: Progressing Towards Goal 
  
PHYSICAL THERAPY JOINT CAMP TKA: Initial Assessment, Treatment Day: Day of Assessment, and PM 5/7/2021 INPATIENT: Hospital Day: 1 [place] / [place] / Product Type: Medicare /  
  
NAME/AGE/GENDER: Julian Burrows is a 78 y.o. female PRIMARY DIAGNOSIS:  Primary osteoarthritis of right knee [M17.11] Procedure(s) and Anesthesia Type: 
   * RIGHT KNEE ARTHROPLASTY TOTAL ROBOTIC ASSISTED / Carlton Dany / - Spinal (Right) ICD-10: Treatment Diagnosis:  
 · Pain in Right Knee (M25.561) · Stiffness of Right Knee, Not elsewhere classified (M25.661) · Other abnormalities of gait and mobility (R26.89) ASSESSMENT:  
 
Ms. Helen Hugo presents with impaired strength & mobility s/p right TKA. Pt also had decreased stability during out of bed activity. This pt will benefit from follow up therapy to help restore safe function prior to returning home with caregiver. This section established at most recent assessment PROBLEM LIST (Impairments causing functional limitations): 1. Decreased Strength 2. Decreased ADL/Functional Activities 3. Decreased Transfer Abilities 4. Decreased Ambulation Ability/Technique 5.  Decreased Balance 6. Increased Pain 7. Decreased Activity Tolerance 8. Decreased Flexibility/Joint Mobility 9. Decreased Starke with Home Exercise Program 
 INTERVENTIONS PLANNED: (Benefits and precautions of physical therapy have been discussed with the patient.) 1. Bed Mobility 2. Cold 3. Gait Training 4. Home Exercise Program (HEP) 5. Range of Motion (ROM) 6. Therapeutic Activites 7. Therapeutic Exercise/Strengthening 8. Transfer Training TREATMENT PLAN: Frequency/Duration: Follow patient BID for duration of hospital stay to address above goals. Rehabilitation Potential For Stated Goals: Good RECOMMENDED REHABILITATION/EQUIPMENT: (at time of discharge pending progress): Continue Skilled Therapy and Home Health: Physical Therapy. HISTORY:  
History of Present Injury/Illness (Reason for Referral): Right TKA Past Medical History/Comorbidities: Ms. Martha Thao  has a past medical history of CAD (coronary artery disease) (04/26/2016), Depression, Gait disorder (4/26/2016), Gout, Hypertension, Hypertriglyceridemia (4/26/2016), Normal pressure hydrocephalus ([facility] [address] (03/06/2018), Small vessel disease, cerebrovascular (4/26/2016), and Snores. Ms. Martha Thao  has a past surgical history that includes hx hysterectomy; hx tonsillectomy; hx laparotomy; hx csf shunt (about 2018); and hx cataract removal. 
Social History/Living Environment:  
Home Environment: Private residence # Steps to Enter: 0 One/Two Story Residence: One story Living Alone: No 
Support Systems: Spouse/Significant Other/Partner Patient Expects to be Discharged to[de-identified] Private residence Current DME Used/Available at Home: Shower chair;Cane, straight;Crutches Tub or Shower Type: Shower Prior Level of Function/Work/Activity: Pt was independent without an assistive device prior to this admission. Number of Personal Factors/Comorbidities that affect the Plan of Care: 3+: HIGH COMPLEXITY EXAMINATION:  
Most Recent Physical Functioning:  
Gross Assessment: Yes Gross Assessment AROM: Within functional limits(left LE) Strength: Within functional limits(left LE) Coordination: Within functional limits(left LE) RLE AROM 
R Knee Flexion: 60(~post op) R Knee Extension: -10(~post op) Bed Mobility Supine to Sit: Contact guard assistance Sit to Supine: Contact guard assistance Scooting: Contact guard assistance Transfers Sit to Stand: Contact guard assistance Stand to Sit: Contact guard assistance Bed to Chair: Contact guard assistance(with walker) Balance Sitting: Intact; Without support Standing: Impaired; With support(walker) Weight Bearing Status Right Side Weight Bearing: As tolerated Distance (ft): 100 Feet (ft) Ambulation - Level of Assistance: Contact guard assistance Assistive Device: Walker, rolling Speed/Whitney: Delayed Step Length: Left shortened Stance: Right decreased Gait Abnormalities: Antalgic;Decreased step clearance Braces/Orthotics: none Right Knee Cold Type: Cryocuff Body Structures Involved: 1. Joints 2. Muscles Body Functions Affected: 1. Sensory/Pain 2. Movement Related Activities and Participation Affected: 1. General Tasks and Demands 2. Mobility Number of elements that affect the Plan of Care: 4+: HIGH COMPLEXITY CLINICAL PRESENTATION:  
Presentation: Stable and uncomplicated: LOW COMPLEXITY CLINICAL DECISION MAKIN [facility] [address] [address] AM-PAC 6 Clicks Basic Mobility Inpatient Short Form How much difficulty does the patient currently have. .. Unable A Lot A Little None 1. Turning over in bed (including adjusting bedclothes, sheets and blankets)? [] 1   [] 2   [x] 3   [] 4  
2. Sitting down on and standing up from a chair with arms ( e.g., wheelchair, bedside commode, etc.)   [] 1   [] 2   [x] 3   [] 4  
3. Moving from lying on back to sitting on the side of the bed? [] 1   [] 2   [x] 3   [] 4 How much help from another person does the patient currently need. .. Total A Lot A Little None 4. Moving to and from a bed to a chair (including a wheelchair)? [] 1   [] 2   [x] 3   [] 4  
5. Need to walk in hospital room? [] 1   [] 2   [x] 3   [] 4  
6. Climbing 3-5 steps with a railing? [x] 1   [] 2   [] 3   [] 4  
© 2007, Trustees of [address] [place] [address] [address] [address], under license to [employer]. All rights reserved Score:  Initial: 16 Most Recent: X (Date: -- ) Interpretation of Tool:  Represents activities that are increasingly more difficult (i.e. Bed mobility, Transfers, Gait). Medical Necessity:    
· Patient is expected to demonstrate progress in  
· strength, range of motion, balance, coordination, and functional technique ·  to  
· decrease assistance required with bed mobility, transfers & gait · . Reason for Services/Other Comments: 
· Patient continues to require skilled intervention due to · Pt not independent with functional mobility · . Use of outcome tool(s) and clinical judgement create a POC that gives a: Clear prediction of patient's progress: LOW COMPLEXITY  
  
 
 
 
TREATMENT:  
(In addition to Assessment/Re-Assessment sessions the following treatments were rendered) Pre-treatment Symptoms/Complaints: none Pain Initial: numeric scale Pain Intensity 1: 2 Pain Location 1: Knee Pain Orientation 1: Right Pain Intervention(s) 1: Ambulation/Increased Activity, Cold pack  Post Session:  2/10 Therapeutic Exercise: (15 Minutes):  Exercises per grid below to improve mobility and dynamic movement of leg - right to improve functional endurance . Required minimal verbal cues to promote proper body alignment and promote proper body mechanics. Progressed range and repetitions as indicated. Assessment/ 14 min Date: 
5/7 Date: 
 Date: 
  
ACTIVITY/EXERCISE AM PM AM PM AM PM  
GROUP THERAPY  []  []  []  []  []  [] Ankle Pumps  10 Quad Sets  10 Gluteal Sets  10 Hip ABd/ADduction  10 Straight Leg Raises  10 Knee Slides  10 Short Arc Quads  10 [address] [facility] Chair Slides B = bilateral; AA = active assistive; A = active; P = passive Treatment/Session Assessment:   
 Response to Treatment:  tolerated well Education: 
[x] Home Exercises [x] Fall Precautions [x] Use of Cold Therapy Unit [] D/C Instruction Review 
[] Knee Prosthesis Review [x] Walker Management/Safety [] Adaptive Equipment as Needed Interdisciplinary Collaboration:  
o Registered Nurse After treatment position/precautions:  
o Up in chair 
o Bed/Chair-wheels locked 
o Bed in low position 
o Caregiver at bedside 
o Call light within reach 
o RN notified 
o Family at bedside Compliance with Program/Exercises: Will assess as treatment progresses. Recommendations/Intent for next treatment session:  Treatment next visit will focus on increasing Ms. Mendozas independence with bed mobility, transfers, gait training, strength/ROM exercises, modalities for pain, and patient education. Total Treatment Duration: PT Patient Time In/Time Out Time In: 2838 Time Out: 8856 Torsten Fails, PT

## 2021-05-07 NOTE — INTERVAL H&P NOTE
Update History & Physical 
 
The Patient's History and Physical of April 28, 2021 was reviewed with the patient and I examined the patient. There was no change. The surgical site was confirmed by the patient and me. Plan:  The risk, benefits, expected outcome, and alternative to the recommended procedure have been discussed with the patient. Patient understands and wants to proceed with the procedure.  
 
Electronically signed by CELY Reis on 5/7/2021 at 8:43 AM

## 2021-05-07 NOTE — ANESTHESIA PROCEDURE NOTES
Spinal Block Start time: 5/7/2021 9:38 AM 
End time: 5/7/2021 9:40 AM 
Performed by: Emir Mueller MD 
Authorized by: Emir Mueller MD  
 
Pre-procedure: Indications: primary anesthetic  Preanesthetic Checklist: patient identified, risks and benefits discussed, anesthesia consent, patient being monitored and timeout performed Timeout Time: 09:37 Spinal Block:  
Patient Position:  Seated Prep Region:  Lumbar Prep: chlorhexidine and patient draped Location:  L3-4 Technique:  Single shot Local Dose (mL):  2 Needle:  
Needle Type:  Pencan Needle Gauge:  25 G Attempts:  1 Events: CSF confirmed, no blood with aspiration and no paresthesia Assessment: 
Insertion:  Uncomplicated Patient tolerance:  Patient tolerated the procedure well with no immediate complications

## 2021-05-07 NOTE — PROGRESS NOTES
Problem: Self Care Deficits Care Plan (Adult) Goal: *Acute Goals and Plan of Care (Insert Text) Description: GOALS:  
DISCHARGE GOALS (in preparation for going home/rehab):  3 days 1. Ms. Helen Hugo will perform one lower body dressing activity with minimal assistance required to demonstrate improved functional mobility and safety. 2.  Ms. Helen Hugo will perform one lower body bathing activity with minimal assistance required to demonstrate improved functional mobility and safety. 3.  Ms. Helen Hugo will perform toileting/toilet transfer with contact guard assistance to demonstrate improved functional mobility and safety. 4.  Ms. Helen Hugo will perform shower transfer with contact guard assistance to demonstrate improved functional mobility and safety. Outcome: Progressing Towards Goal 
  
JOINT CAMP OCCUPATIONAL THERAPY TKA: Initial Assessment, Daily Note, Treatment Day: Day of Assessment, and PM 5/7/2021 INPATIENT: Hospital Day: 1 [place] / [place] / Product Type: Medicare /  
  
NAME/AGE/GENDER: Julian Burrows is a 78 y.o. female PRIMARY DIAGNOSIS:  Primary osteoarthritis of right knee [M17.11] Procedure(s) and Anesthesia Type: 
   * RIGHT KNEE ARTHROPLASTY TOTAL ROBOTIC ASSISTED / Carlton Dany / - Spinal (Right) ICD-10: Treatment Diagnosis:  
 Pain in Right Knee (M25.561) Stiffness of Right Knee, Not elsewhere classified (M25.661) ASSESSMENT:  
 
Ms. Helen Hugo is s/p right TKA and presents with decreased weight bearing on right LE and decreased independence with functional mobility and activities of daily living as compared to baseline level of function and safety. Patient would benefit from skilled Occupational Therapy to maximize independence and safety with self-care task and functional mobility. Pt would also benefit from education on adaptive equipment and safety precautions in preparation for going home.    
 
Pt up in room, to bathroom, completed toileting and dressing. Pt moves well and should progress well tomorrow. This section established at most recent assessment PROBLEM LIST (Impairments causing functional limitations): 
Decreased Strength Decreased ADL/Functional Activities Decreased Transfer Abilities Increased Pain Increased Fatigue Decreased Flexibility/Joint Mobility Decreased Knowledge of Precautions INTERVENTIONS PLANNED: (Benefits and precautions of occupational therapy have been discussed with the patient.) Activities of daily living training Adaptive equipment training Balance training Clothing management Donning&doffing training Theraputic activity TREATMENT PLAN: Frequency/Duration: Follow patient 1-2 times to address above goals. Rehabilitation Potential For Stated Goals: Good RECOMMENDED REHABILITATION/EQUIPMENT: (at time of discharge pending progress): Continue Skilled Therapy. OCCUPATIONAL PROFILE AND HISTORY:  
History of Present Injury/Illness (Reason for Referral): Pt presents this date s/p (right) TKA. Past Medical History/Comorbidities: Ms. Zuri Whiting  has a past medical history of CAD (coronary artery disease) (04/26/2016), Depression, Gait disorder (4/26/2016), Gout, Hypertension, Hypertriglyceridemia (4/26/2016), Normal pressure hydrocephalus ([facility] [address] (03/06/2018), Small vessel disease, cerebrovascular (4/26/2016), and Snores. Ms. Zuri Whiting  has a past surgical history that includes hx hysterectomy; hx tonsillectomy; hx laparotomy; hx csf shunt (about 2018); and hx cataract removal. 
Social History/Living Environment:  
Home Environment: Private residence # Steps to Enter: 0 One/Two Story Residence: One story Living Alone: No 
Support Systems: Spouse/Significant Other/Partner Patient Expects to be Discharged to[de-identified] Private residence Current DME Used/Available at Home: Shower chair;Cane, straight;Crutches Tub or Shower Type: Shower Prior Level of Function/Work/Activity: 
Independent Number of Personal Factors/Comorbidities that affect the Plan of Care: Brief history (0):  LOW COMPLEXITY ASSESSMENT OF OCCUPATIONAL PERFORMANCE[de-identified]  
Most Recent Physical Functioning:  
Balance Sitting: Intact; Without support Standing: Impaired; With support(walker) Gross Assessment: Yes Gross Assessment AROM: Within functional limits(left LE) Strength: Within functional limits(left LE) Coordination: Within functional limits(left LE) LLE Assessment LLE Assessment (WDL): Within defined limits Coordination Fine Motor Skills-Upper: Left Intact; Right Intact Gross Motor Skills-Upper: Left Intact; Right Intact Mental Status Neurologic State: Alert Orientation Level: Oriented X4 Cognition: Appropriate decision making; Appropriate for age attention/concentration Perception: Appears intact Safety/Judgement: Awareness of environment; Fall prevention RLE Assessment RLE Assessment (WDL): Exceptions to WDL 
RLE AROM 
R Knee Flexion: 60(~post op) R Knee Extension: -10(~post op) Basic ADLs (From Assessment) Complex ADLs (From Assessment) Basic ADL Feeding: Independent Oral Facial Hygiene/Grooming: Supervision Bathing: Moderate assistance Upper Body Dressing: Supervision Lower Body Dressing: Moderate assistance Toileting: Minimum assistance Grooming/Bathing/Dressing Activities of Daily Living Cognitive Retraining Safety/Judgement: Awareness of environment; Fall prevention Functional Transfers Bathroom Mobility: Contact guard assistance Toilet Transfer : Minimum assistance Shower Transfer: Minimum assistance Bed/Mat Mobility Supine to Sit: Contact guard assistance Sit to Supine: Contact guard assistance Sit to Stand: Contact guard assistance Stand to Sit: Contact guard assistance Bed to Chair: Contact guard assistance(with walker) Scooting: Contact guard assistance Physical Skills Involved: 
Range of Motion Balance Strength Cognitive Skills Affected (resulting in the inability to perform in a timely and safe manner): 
none Psychosocial Skills Affected: 
Environmental Adaptation Number of elements that affect the Plan of Care: 3-5:  MODERATE COMPLEXITY CLINICAL DECISION MAKING:  
[employer] AM-PAC 6 Clicks Daily Activity Inpatient Short Form How much help from another person does the patient currently need. .. Total A Lot A Little None 1. Putting on and taking off regular lower body clothing? [] 1   [x] 2   [] 3   [] 4  
2. Bathing (including washing, rinsing, drying)? [] 1   [x] 2   [] 3   [] 4  
3. Toileting, which includes using toilet, bedpan or urinal?   [] 1   [] 2   [x] 3   [] 4  
4. Putting on and taking off regular upper body clothing? [] 1   [] 2   [] 3   [x] 4  
5. Taking care of personal grooming such as brushing teeth? [] 1   [] 2   [] 3   [x] 4  
6. Eating meals? [] 1   [] 2   [] 3   [x] 4  
© 2007, Trustees of [facility] [employer], under license to [employer]. All rights reserved Score:  Initial: 19 Most Recent: X (Date: -- ) Interpretation of Tool:  Represents activities that are increasingly more difficult (i.e. Bed mobility, Transfers, Gait). Use of outcome tool(s) and clinical judgement create a POC that gives a: LOW COMPLEXITY  
  
 
 
 
TREATMENT:  
(In addition to Assessment/Re-Assessment sessions the following treatments were rendered) Pre-treatment Symptoms/Complaints:  0 Pain: Initial:  
  0 Post Session:  0 Self Care: (10 min): Procedure(s) (per grid) utilized to improve and/or restore self-care/home management as related to dressing, toileting, and grooming. Required minimal verbal, manual, and   cueing to facilitate activities of daily living skills and compensatory activities. OT evaluation completed Treatment/Session Assessment:   
 Response to Treatment:  pt up in room tolerated well. Education: 
[] Home Exercises [x] Fall Precautions [] Hip Precautions [] Going Home Video [x] Knee/Hip Prosthesis Review [x] Walker Management/Safety [x] Adaptive Equipment as Needed Interdisciplinary Collaboration:  
Physical Therapist 
Occupational Therapist 
Registered Nurse After treatment position/precautions:  
Up in chair Bed/Chair-wheels locked Call light within reach RN notified Family at bedside Compliance with Program/Exercises: Compliant all of the time, Will assess as treatment progresses. Recommendations/Intent for next treatment session:  Treatment next visit will focus on increasing Ms. Greenfield's independence with bed mobility, transfers, self care, functional mobility, modalities for pain, and patient education. Total Treatment Duration: OT Patient Time In/Time Out Time In: 1440 Time Out: 9299 Lupe Stock, OT

## 2021-05-07 NOTE — CONSULTS
Hospitalist Consult NAME:  Jenniffer Humphrey Age:  78 y.o. 
:   1940 MRN:   [id number] PCP: Hamlet Combs MD 
Consulting MD: Treatment Team: Attending Provider: Billy Key MD; Physical Therapist: Reina Berry PT; Consulting Provider: Richelle Stephenson MD; Occupational Therapist: Renata Carlton OT No chief complaint on file. HPI:  
Patient is a 78 y.o. female who presented as direct admit for right total knee replacement on . Hx of HTN, depression, normal pressure hydrocephalus, chronic LE edema to which she only takes lasix as needed. Past Medical History:  
Diagnosis Date  CAD (coronary artery disease) 2016 No MI, No stents - followed by [address] [place] [address] [address] [facility]  Depression  Gait disorder 2016  Gout Left foot  Hypertension  Hypertriglyceridemia 2016  Normal pressure hydrocephalus ([address] 2018  
 followed by Dr. Lynsey Mccall -- s/p  shunt  Small vessel disease, cerebrovascular 2016  Snores Past Surgical History:  
Procedure Laterality Date Josue Vasquez - Dr. Dmitriy Cristina  HX CSF SHUNT  about  V/P shunt for NPH -- surgery at [facility]  HX HYSTERECTOMY  HX LAPAROTOMY    
 for adhesions r/t hysterectomy  HX TONSILLECTOMY Family History Problem Relation Age of Onset  Heart Disease Other  Stroke Other  Cancer Other Social History Social History Narrative  Not on file Social History Tobacco Use  Smoking status: Former Smoker  Smokeless tobacco: Never Used  Tobacco comment: quite 63 Substance Use Topics  Alcohol use: Yes Alcohol/week: 0.0 standard drinks Comment: Social  
  
 
Social History Substance and Sexual Activity Drug Use Never Allergies Allergen Reactions  Latex Unknown (comments)  Ceftin [Cefuroxime Axetil] Rash  Sulfa (Sulfonamide Antibiotics) Rash and Other (comments) KIDNEY MALFUNCTION Prior to Admission medications Medication Sig Start Date End Date Taking? Authorizing Provider  
oxyCODONE IR (ROXICODONE) 5 mg immediate release tablet Take 1-2 Tabs by mouth every four (4) hours as needed for Pain for up to 5 days. Max Daily Amount: 60 mg. 5/7/21 5/12/21 Yes CELY Askew  
aspirin delayed-release 81 mg tablet Take 1 Tab by mouth every twelve (12) hours for 35 days. Indications: DVT prophylaxis 5/7/21 6/11/21 Yes CELY Askew  
furosemide (LASIX) 20 mg tablet Take 1 tablet by mouth every other day 4/14/21  Yes Donnell Olivarez MD  
pantoprazole (PROTONIX) 40 mg tablet TAKE 1 TABLET BY MOUTH EVERY DAY 4/12/21  Yes Donnell Olivarez MD  
conjugated estrogens (Premarin) 0.625 mg tablet TAKE 1 TABLET BY MOUTH EVERY DAY 4/12/21  Yes Donnell Olivarez MD  
febuxostat (ULORIC) 40 mg tab tablet TAKE 1 TABLET BY MOUTH EVERY DAY 3/29/21  Yes Donnell Olivarez MD  
loratadine (Claritin) 10 mg tablet Take 10 mg by mouth. Yes Provider, Historical  
atenoloL (TENORMIN) 50 mg tablet TAKE 2 TABLETS BY MOUTH DAILY INDICATIONS: FAST HEART BEAT DUE TO ANTIPSYCHOTIC MEDICATION 9/28/20  Yes Donnell Olivarez MD  
FLUoxetine (PROzac) 20 mg tablet TAKE 1 TABLET BY MOUTH EVERY DAY Patient taking differently: every evening. TAKE 1 TABLET BY MOUTH EVERY DAY 9/8/20  Yes Donnell Olivarez MD  
valACYclovir (VALTREX) 1 gram tablet Take 2 tablets twice a day for 1 day as needed for outbreak of fever blister 9/4/20  Yes Donnell Olivarez MD  
vit C/E/Zn/coppr/lutein/zeaxan (PRESERVISION AREDS-2 PO) Take 1 Tab by mouth two (2) times a day. Provider, Historical  
OTHER,NON-FORMULARY, Prevagen supplement    Provider, Historical  
aspirin delayed-release 81 mg tablet Take 81 mg by mouth every evening. Provider, Historical  
DISABLED PLACARD (DISABLED PLACARD) DMV The orthopedic condition creates a substantial limitation in routine walking. Permanent MD [place] [id number] 0/15/62   Hari Chowdhury MD  
b complex vitamins tablet Take 1 Tab by mouth daily. Provider, Historical  
naproxen (NAPROSYN) 500 mg tablet Take 1 Tab by mouth two (2) times daily (with meals). Patient taking differently: Take 500 mg by mouth two (2) times daily as needed. 9/5/19   Juanita Hernandez MD  
fluticasone (FLONASE) 50 mcg/actuation nasal spray 2 Sprays by Both Nostrils route daily. Provider, Historical  
 
 
 
 
Review of Systems Constitutional: NAD Eyes: PERRLA, accomodation symmetric, sclera not icteric, conjunctivae clear Ears, nose, mouth, throat, and face: normocephalic, no thrush, lesions or exudate. Moist mucous membranes Respiratory: no SOB, PATRICK or hemoptysis. No chronic cough Cardiovascular: no CP, palpitations or SOB Gastrointestinal: no abdominal pain, nausea, vomiting or diarrhea. No constipation. No hematemesis, hematochezia or BRBPR Genitourinary:no urgency, frequency or dysuria, no nocturia Integument/breast: no skin rashes, lesions Hematologic/lymphatic: negative for known bleeding disorders Musculoskeletal:Right knee pain that is controlled Neurological: negative for dizziness lightheadedness, syncopal or presyncopal events, no seizure or h/o CVA Behavioral/Psych: no depression, anxiety or suicidal ideation Endocrine: negative for polydipsea, polyuria or intolerance to heat or cold Allergic/Immunologic: negative for allergic responses Objective:  
 
Visit Vitals BP (!) 179/80 Pulse (!) 58 Temp 97.1 °F (36.2 °C) Resp 18 Ht 5' 6.5\" (1.689 m) Wt 65.2 kg (143 lb 12.8 oz) SpO2 91% BMI 22.86 kg/m² 05/07 0701 - 05/07 1900 In: 1100 [I.V.:1100] Out: 200 No intake/output data recorded. Data Review: No results found for this or any previous visit (from the past 24 hour(s)). Physical Exam:  
 
General:  Alert, cooperative, no distress, appears stated age. Eyes:  Conjunctivae/corneas clear.    
Ears:  Normal TMs and external ear canals both ears. Nose: Nares normal. Septum midline. Mouth/Throat: Lips, mucosa, and tongue normal. Teeth and gums normal.  
Neck: no JVD. Back:   deferred Lungs:   Clear to auscultation bilaterally. Heart:  Regular rate and rhythm, S1, S2 normal  
Abdomen:   Soft, non-tender. Bowel sounds normal. No masses,  No organomegaly. Extremities: Right knee in bandage. Good sensation to right foot Pulses: 2+ and symmetric all extremities. Skin: Skin color, texture, turgor normal. No rashes or lesions Lymph nodes: Cervical, supraclavicular, and axillary nodes normal.  
Neurologic: CNII-XII intact. Normal strength, sensation and reflexes throughout. Assessment and Plan Principal Problem: 
  Status post right knee replacement (5/7/2021) Active Problems: 
  Osteoarthritis of right knee (5/7/2021) S/p right knee replacement - per primary NPH and chronic edema - lasix 20mg I will hold since she only takes as needed. Last taken one week ago. Gout - uloric HTN - BB Depression - Prozac Will continue to follow Signed By: Manuela Lomeli DO May 7, 2021

## 2021-05-08 NOTE — DISCHARGE INSTRUCTIONS
[facility]   Patient Discharge Instructions    Freddy Orantes / [id number] : 1940    Admitted 2021 Discharged: 2021     IF YOU HAVE ANY PROBLEMS ONCE YOU ARE AT HOME CALL THE FOLLOWING NUMBERS:   Main office number: [phone]      Medications    · The medications you are to continue on are listed on the medication reconciliation sheet. · Narcotic pain medications as well as supplemental iron can cause constipation. If this occurs try stopping the narcotic pain medication and/or the iron. · It is important that you take the medication exactly as they are prescribed. · Medications which increase your risk of blood clots are listed to stop for 5 weeks after surgery as well as medications or supplements which increase your risk of bleeding complications. · Keep your medication in the bottles provided by the pharmacist and keep a list of the medication names, dosages, and times to be taken in your wallet. · Do not take other medications without consulting your doctor. Important Information    Do NOT smoke as this will greatly increase your risk of infection! Resume your prehospital diet. If you have excessive nausea or vomitting call your doctor's office     Leg swelling and warmth is normal for 6 months after surgery. If you experience swelling in your leg elevate you leg while laying down with your toes above your heart. If you have sudden onset severe swelling with leg pain call our office. Use Salty Hose stockings until we see you in the office for your follow up appointment. The stitches deep inside take approximately 6 months to dissolve. There will be sharp shooting, stinging and burning pain. This is normal and will resolve between 3-6 months after surgery. Difficulty sleeping is normal following total Knee and Hip replacement. You may try melatonin, an over-the-counter sleep aid or benadryl to help with sleep.  Most patients will resume sleeping through the night 8 weeks after surgery. Home Physical Therapy is arranged. Home Health will contact you within 48 hrs of discharge that you have chosen. If you have not received a call within this time frame please contact that provider you chose. You should be given this information before you leave the hospital.     You are at a risk for falls. Use the rolling walker when walking. Patients who have had a joint replacement should not drive if they are still taking narcotic pain mediation during the daytime hours. Most patients wean themselves off of pain medication within 2-5 weeks after surgery. When to Call the office    - If you have a temperature greater then 101  - Uncontrolled vomiting   - Loose control of your bladder or bowel function  - Are unable to bear any wieght   - Need a pain medication refill     Information obtained by :  I understand that if any problems occur once I am at home I am to contact my physician. I understand and acknowledge receipt of the instructions indicated above. [de-identified] or R.N.'s Signature                                                                  Date/Time                                                                                                                                              Patient or Representative Signature                                                          Date/Time    Patient Education        Total Knee Replacement: What to Expect at [address] [place] [address] Drive had a total knee replacement. The doctor replaced the worn ends of the bones that connect to your knee (thighbone and lower leg bone) with plastic and metal parts. When you leave the hospital, you should be able to move around with a walker or crutches.  But you will need someone to help you at home for the next few weeks or until you have more energy and can move around better. You will go home with a bandage and stitches, staples, skin glue, or tape strips. Change the bandage as your doctor tells you to. If you have stitches or staples, your doctor will remove them 10 to 21 days after your surgery. Glue or tape strips will fall off on their own over time. You may still have some mild pain, and the area may be swollen for 3 to 6 months after surgery. Your knee will continue to improve for 6 to 12 months. You will probably use a walker for 1 to 3 weeks and then use crutches. When you are ready, you can use a cane. You will probably be able to walk on your own in 4 to 8 weeks. You will need to do months of physical rehabilitation (rehab) after a knee replacement. Rehab will help you strengthen the muscles of the knee and help you regain movement. After you recover, your artificial knee will allow you to do normal daily activities with less pain or no pain at all. You may be able to hike, dance, ride a bike, and play golf. Talk to your doctor about whether you can do more strenuous activities. Always tell your caregivers that you have an artificial knee. How long it will take to walk on your own, return to normal activities, and go back to work depends on your health and how well your rehabilitation (rehab) program goes. The better you do with your rehab exercises, the quicker you will get your strength and movement back. This care sheet gives you a general idea about how long it will take for you to recover. But each person recovers at a different pace. Follow the steps below to get better as quickly as possible. How can you care for yourself at home? Activity    · Rest when you feel tired. You may take a nap, but don't stay in bed all day. When you sit, use a chair with arms. You can use the arms to help you stand up.     · Work with your physical therapist to find the best way to exercise.  What you can do as your knee heals will depend on whether your new knee is cemented or uncemented. You may not be able to do certain things for a while if your new knee is uncemented.     · After your knee has healed enough, you can do more strenuous activities with caution. ? You can golf, but use a golf cart. And don't wear shoes with spikes. ? You can bike on a flat road or on a stationary bike. Avoid biking up hills. ? Your doctor may suggest that you stay away from activities that put stress on your knee. These include tennis, badminton, contact sports like football, jumping (such as in basketball), jogging, and running. ? Avoid activities where you might fall.     · Do not sit for more than 1 hour at a time. Get up and walk around for a while before you sit again. If you must sit for a long time, prop up your leg with a chair or footstool. This will help you avoid swelling.     · Ask your doctor when you can drive again. It may take up to 8 weeks after knee replacement surgery before it's safe for you to drive.     · When you get into a car, sit on the edge of the seat. Then pull in your legs, and turn to face the front.     · You should be able to do many everyday activities 3 to 6 weeks after your surgery. You will probably need to take 4 to 16 weeks off from work. When you can go back to work depends on the type of work you do and how you feel.     · Ask your doctor when it is okay for you to have sex.     · For 12 weeks, do not lift anything heavier than 10 pounds and do not lift weights. Diet    · By the time you leave the hospital, you should be eating your normal diet. If your stomach is upset, try bland, low-fat foods like plain rice, broiled chicken, toast, and yogurt. Your doctor may suggest that you take iron and vitamin supplements.     · Drink plenty of fluids (unless your doctor tells you not to).   · Eat healthy foods, and watch your portion sizes. Try to stay at your ideal weight.  Too much weight puts more stress on your new knee.     · You may notice that your bowel movements are not regular right after your surgery. This is common. Try to avoid constipation and straining with bowel movements. You may want to take a fiber supplement every day. If you have not had a bowel movement after a couple of days, ask your doctor about taking a mild laxative. Medicines    · Your doctor will tell you if and when you can restart your medicines. You will also get instructions about taking any new medicines.     · If you take aspirin or some other blood thinner, ask your doctor if and when to start taking it again. Make sure that you understand exactly what your doctor wants you to do.     · Your doctor may give you a blood-thinning medicine to prevent blood clots. If you take a blood thinner, be sure you get instructions about how to take your medicine safely. Blood thinners can cause serious bleeding problems. This medicine could be in pill form or as a shot (injection). If a shot is needed, your doctor will tell you how to do this.     · Be safe with medicines. Take pain medicines exactly as directed. ? If the doctor gave you a prescription medicine for pain, take it as prescribed. ? If you are not taking a prescription pain medicine, ask your doctor if you can take an over-the-counter medicine. ? Plan to take your pain medicine 30 minutes before exercises. It is easier to prevent pain before it starts than to stop it after it has started.     · If you think your pain medicine is making you sick to your stomach:  ? Take your medicine after meals (unless your doctor has told you not to). ? Ask your doctor for a different pain medicine.     · If your doctor prescribed antibiotics, take them as directed. Do not stop taking them just because you feel better. You need to take the full course of antibiotics. Incision care    · If your doctor told you how to care for your cut (incision), follow your doctor's instructions.  You will have a dressing over the cut. A dressing helps the incision heal and protects it. Your doctor will tell you how to take care of this.     · If you did not get instructions, follow this general advice:  ? If you have strips of tape on the cut the doctor made, leave the tape on for a week or until it falls off.  ? If you have stitches or staples, your doctor will tell you when to come back to have them removed. ? If you have skin glue on the cut, leave it on until it falls off. Skin glue is also called skin adhesive or liquid stitches. ? Change the bandage every day. ? Wash the area daily with warm water, and pat it dry. Don't use hydrogen peroxide or alcohol. They can slow healing. ? You may cover the area with a gauze bandage if it oozes fluid or rubs against clothing. ? You may shower 24 to 48 hours after surgery. Pat the incision dry. Don't swim or take a bath for the first 2 weeks, or until your doctor tells you it is okay. Exercise    · Your rehab program will give you a number of exercises to do to help you get back your knee's range of motion and strength. Always do them as your therapist tells you. Ice    · For pain and swelling, put ice or a cold pack on the area for 10 to 20 minutes at a time. Put a thin cloth between the ice and your skin. Other instructions    · Keep wearing your compression stockings as your doctor says. These help to prevent blood clots. How long you'll have to wear them depends on your activity level and the amount of swelling.     · Carry a medical alert card that says you have an artificial joint. You have metal pieces in your knee. These may set off some airport metal detectors. Follow-up care is a key part of your treatment and safety. Be sure to make and go to all appointments, and call your doctor if you are having problems. It's also a good idea to know your test results and keep a list of the medicines you take. When should you call for help?    Call 911 anytime you think you may need emergency care. For example, call if:    · You passed out (lost consciousness).     · You have severe trouble breathing.     · You have sudden chest pain and shortness of breath, or you cough up blood. Call your doctor now or seek immediate medical care if:    · You have signs of infection, such as:  ? Increased pain, swelling, warmth, or redness. ? Red streaks leading from the incision. ? Pus draining from the incision. ? A fever.     · You have signs of a blood clot, such as:  ? Pain in your calf, back of the knee, thigh, or groin. ? Redness and swelling in your leg or groin.     · Your incision comes open and begins to bleed, or the bleeding increases.     · You have pain that does not get better after you take pain medicine. Watch closely for changes in your health, and be sure to contact your doctor if:    · You do not have a bowel movement after taking a laxative. Where can you learn more? Go to http://www.gray.com/  Enter T054 in the search box to learn more about \"Total Knee Replacement: What to Expect at Home. \"  Current as of: November 16, 2020               Content Version: 12.8  © 0185-7191 [employer]. Care instructions adapted under license by [employer] (which disclaims liability or warranty for this information). If you have questions about a medical condition or this instruction, always ask your healthcare professional. [place] [address] any warranty or liability for your use of this information.

## 2021-05-08 NOTE — PROGRESS NOTES
[facility] Progress Note Name:  Jenniffer Humphrey  Age:80 y.o. Sex:female :  1940 MRN:  [id number] Admit Date:  2021 Reason for Admission: 
Primary osteoarthritis of right knee [M17.11] Osteoarthritis of right knee [M17.11] Hospital Course/Interval history:  
 
Patient is a 78 y.o. female who presented as direct admit for right total knee replacement on . Hx of HTN, depression, normal pressure hydrocephalus, chronic LE edema to which she only takes lasix as needed. Subjective (21): Says doing fine Some times has dizzy feeling if she gets up fast,not new problem, happens at home too, says take her time to get and when she does that doesn't have dizzy feeling. Says already worked with occupational therapy. No chest pain or dizziness or shortness of breath now Review of Systems: 14 point review of systems is otherwise negative with the exception of the elements mentioned above. Assessment & Plan Status post right knee replacement (2021) Osteoarthritis of right knee (2021) 
  
S/p right knee replacement - per primary 
  
NPH and chronic edema - cont home medications. Says dizzy when she gets up fast- happens at home too, not a new problem, says if she takes time to get up ,no dizziness. Diet:  DIET REGULAR 
DVT PPx: as per primary. Code status: No Order Disposition/Expected LOS:  
 
 
 
 
 
Objective:  
 
Patient Vitals for the past 24 hrs: 
 Temp Pulse Resp BP SpO2  
21 0300 97.7 °F (36.5 °C) 61 16 (!) 159/53 97 % 21 2046     97 % 21 1900 97.5 °F (36.4 °C) 65 18 (!) 156/76 96 % 21 1618     96 % 21 1256  (!) 58 18 (!) 179/80 91 % 21 1225  (!) 55 18 (!) 163/67 98 % 21 1220  (!) 55 16 (!) 171/77 97 % 21 1215  (!) 54 16 (!) 179/74 98 % 05/07/21 1210  (!) 54 16 (!) 165/74 98 % 21 1205  (!) 55 16 (!) 162/74 98 % 21 1200  (!) 55 16 (!) 161/71 100 % 05/07/21 1155  (!) 57 16 136/63 100 % 05/07/21 1150  (!) 54 16 (!) 149/67   
05/07/21 1147 97.1 °F (36.2 °C) (!) 54 16 139/65 99 % 05/07/21 0919  66  (!) 179/82 99 % 05/07/21 0914  68  (!) 187/86 100 % 05/07/21 0900  60  (!) 174/81 100 % 05/07/21 0812 98.4 °F (36.9 °C) 61   96 % Oxygen Therapy O2 Sat (%): 97 % (05/08/21 0300) Pulse via Oximetry: 58 beats per minute (05/07/21 2046) O2 Device: None (Room air) (05/07/21 2046) O2 Flow Rate (L/min): 0 l/min (05/07/21 2046) Body mass index is 22.86 kg/m². Physical Exam:  
General:  No acute distress, speaking in full sentences, no use of accessory muscles HEENT- pupils equal reacting to ligh Lungs:  Clear to auscultation bilaterally CV:  Regular rate and rhythm with normal S1 and S2 Abdomen:  Soft, nontender, nondistended, normoactive bowel sounds Extremities:  No cyanosis clubbing. Dressing rt knee and leg intact. nontender Neuro:  Nonfocal, A&O x3  
Psych:  Normal affect Data Review: 
I have reviewed all labs, meds, and studies from the last 24 hours: 
 
Labs: 
 
Recent Results (from the past 24 hour(s)) HEMOGLOBIN Collection Time: 05/07/21  6:36 PM  
Result Value Ref Range HGB 11.6 (L) 11.7 - 15.4 g/dL HEMOGLOBIN Collection Time: 05/08/21  3:29 AM  
Result Value Ref Range HGB 10.2 (L) 11.7 - 15.4 g/dL All Micro Results None EKG Results None Other Studies: No results found. Current Meds:  
Current Facility-Administered Medications Medication Dose Route Frequency  atenoloL (TENORMIN) tablet 100 mg  100 mg Oral DAILY  febuxostat (ULORIC) tablet 40 mg (Patient Supplied)  40 mg Oral DAILY  FLUoxetine (PROzac) capsule 20 mg  20 mg Oral DAILY  fluticasone propionate (FLONASE) 50 mcg/actuation nasal spray 2 Spray  2 Spray Both Nostrils DAILY  [Held by provider] furosemide (LASIX) tablet 20 mg  20 mg Oral EVERY OTHER DAY  loratadine (CLARITIN) tablet 10 mg  10 mg Oral DAILY  pantoprazole (PROTONIX) tablet 40 mg  40 mg Oral ACB&D  
 alcohol 62% (NOZIN) nasal  1 Ampule  1 Ampule Topical Q12H  
 0.9% sodium chloride infusion  100 mL/hr IntraVENous CONTINUOUS  
 sodium chloride (NS) flush 5-40 mL  5-40 mL IntraVENous Q8H  
 sodium chloride (NS) flush 5-40 mL  5-40 mL IntraVENous PRN  
 acetaminophen (TYLENOL) tablet 1,000 mg  1,000 mg Oral Q6H  
 oxyCODONE IR (ROXICODONE) tablet 10 mg  10 mg Oral Q4H PRN  
 HYDROmorphone (DILAUDID) injection 1 mg  1 mg IntraVENous Q3H PRN  
 naloxone (NARCAN) injection 0.2-0.4 mg  0.2-0.4 mg IntraVENous Q10MIN PRN  
 dexamethasone (DECADRON) 10 mg/mL injection 10 mg  10 mg IntraVENous ONCE  
 ondansetron (ZOFRAN ODT) tablet 4 mg  4 mg Oral Q4H PRN  
 diphenhydrAMINE (BENADRYL) capsule 25 mg  25 mg Oral Q4H PRN  
 senna-docusate (PERICOLACE) 8.6-50 mg per tablet 2 Tab  2 Tab Oral DAILY  aspirin delayed-release tablet 81 mg  81 mg Oral Q12H Problem List: 
Hospital Problems as of 5/8/2021 Date Reviewed: 4/15/2021 Codes Class Noted - Resolved POA * (Principal) Status post right knee replacement ICD-10-CM: Q93.290 ICD-9-CM: V43.65  5/7/2021 - Present Unknown Osteoarthritis of right knee ICD-10-CM: M17.11 ICD-9-CM: 715.96  5/7/2021 - Present Unknown Signed By: Eri Morris MD  
[facility] May 8, 2021

## 2021-05-08 NOTE — PROGRESS NOTES
Orthopedic Joint Progress Note May 8, 2021 Admit Date: 2021 Admit Diagnosis: Primary osteoarthritis of right knee [M17.11]; Osteoarthritis of right knee [M17.11] 1 Day Post-Op Subjective:  
 
Nilda Chad awake and alert Review of Systems: Pertinent items are noted in HPI. Objective:  
 
PT/OT:  
 
PATIENT MOBILITY Bed Mobility Supine to Sit: Independent Sit to Supine: Independent Scooting: Contact guard assistance Transfers Sit to Stand: Independent Stand to Sit: Independent Bed to Chair: Modified independent Gait Speed/Whitney: Delayed Step Length: Left shortened Stance: Right decreased Gait Abnormalities: Antalgic, Decreased step clearance Ambulation - Level of Assistance: Contact guard assistance Distance (ft): 100 Feet (ft) Assistive Device: Walker, rolling Weight Bearing Status Right Side Weight Bearing: As tolerated Vital Signs:   
Blood pressure (!) 159/53, pulse 61, temperature 97.7 °F (36.5 °C), resp. rate 16, height 5' 6.5\" (1.689 m), weight 143 lb 12.8 oz (65.2 kg), SpO2 97 %. Temp (24hrs), Av.4 °F (36.3 °C), Min:97.1 °F (36.2 °C), Max:97.7 °F (36.5 °C) Pain Control:  
Pain Assessment Pain Scale 1: Numeric (0 - 10) Pain Intensity 1: 0 Pain Location 1: Knee Pain Orientation 1: Right Pain Description 1: Aching Pain Intervention(s) 1: Medication (see MAR) Meds: 
Current Facility-Administered Medications Medication Dose Route Frequency  atenoloL (TENORMIN) tablet 100 mg  100 mg Oral DAILY  febuxostat (ULORIC) tablet 40 mg (Patient Supplied)  40 mg Oral DAILY  FLUoxetine (PROzac) capsule 20 mg  20 mg Oral DAILY  fluticasone propionate (FLONASE) 50 mcg/actuation nasal spray 2 Spray  2 Spray Both Nostrils DAILY  [Held by provider] furosemide (LASIX) tablet 20 mg  20 mg Oral EVERY OTHER DAY  loratadine (CLARITIN) tablet 10 mg  10 mg Oral DAILY  pantoprazole (PROTONIX) tablet 40 mg  40 mg Oral ACB&D  
 alcohol 62% (NOZIN) nasal  1 Ampule  1 Ampule Topical Q12H  
 0.9% sodium chloride infusion  100 mL/hr IntraVENous CONTINUOUS  
 sodium chloride (NS) flush 5-40 mL  5-40 mL IntraVENous Q8H  
 sodium chloride (NS) flush 5-40 mL  5-40 mL IntraVENous PRN  
 acetaminophen (TYLENOL) tablet 1,000 mg  1,000 mg Oral Q6H  
 oxyCODONE IR (ROXICODONE) tablet 10 mg  10 mg Oral Q4H PRN  
 HYDROmorphone (DILAUDID) injection 1 mg  1 mg IntraVENous Q3H PRN  
 naloxone (NARCAN) injection 0.2-0.4 mg  0.2-0.4 mg IntraVENous Q10MIN PRN  
 dexamethasone (DECADRON) 10 mg/mL injection 10 mg  10 mg IntraVENous ONCE  
 ondansetron (ZOFRAN ODT) tablet 4 mg  4 mg Oral Q4H PRN  
 diphenhydrAMINE (BENADRYL) capsule 25 mg  25 mg Oral Q4H PRN  
 senna-docusate (PERICOLACE) 8.6-50 mg per tablet 2 Tab  2 Tab Oral DAILY  aspirin delayed-release tablet 81 mg  81 mg Oral Q12H  
  
 
LAB:   
No results found for: INR, INREXT, INREXT Lab Results Component Value Date/Time HGB 10.2 (L) 05/08/2021 03:29 AM  
 HGB 11.6 (L) 05/07/2021 06:36 PM  
 HGB 12.1 04/19/2021 11:07 AM  
 
 
Wound Leg lower Right small skin tear on right lower shin (Active) Dressing Status Intact;Breakthrough drainage noted 05/07/21 2000 Dressing/Treatment Other (Comment) 05/07/21 2000 Number of days: 1 Incision 05/07/21 Knee Right (Active) Dressing Status Clean;Dry; Intact 05/07/21 1225 Number of days: 1 Physical Exam: 
Calves soft/ neuro intact Assessment:  
  
Principal Problem: 
  Status post right knee replacement (5/7/2021) Active Problems: 
  Osteoarthritis of right knee (5/7/2021) Plan:  
 
Continue PT/OT/Rehab Consult: Rehab team including PT, OT, recreational therapy, and  Patient Expects to be Discharged to[de-identified] Private residence

## 2021-05-08 NOTE — PROGRESS NOTES
05/07/21 2046 Oxygen Therapy O2 Sat (%) 97 % Pulse via Oximetry 58 beats per minute O2 Device None (Room air) O2 Flow Rate (L/min) 0 l/min Pt on continuous monitor for HS. Alarm limits set. Pt working on IS.

## 2021-05-08 NOTE — PROGRESS NOTES
Problem: Mobility Impaired (Adult and Pediatric) Goal: *Acute Goals and Plan of Care (Insert Text) Description: GOALS (1-4 days): 
(1.)Ms. Noreen Gutierres will move from supine to sit and sit to supine  in bed with INDEPENDENT. (2.)Ms. Noreen Gutierres will transfer from bed to chair and chair to bed with SUPERVISION using the least restrictive device. Met 5/8 
(3.)Ms. Noreen Gutierres will ambulate with SUPERVISION for 200 feet with the least restrictive device. Met 5/8 
(4.)Ms. Noreen Gutierres will ambulate up/down 3 steps with right railing with STAND BY ASSIST with device PRN. (for practice, pt has no stairs) Met 5/8 
(5.)Ms. Noreen Gutierres will increase right knee ROM to 5°-80°. Met 5/8 
________________________________________________________________________________________________ Outcome: Progressing Towards Goal 
  
PHYSICAL THERAPY JOINT CAMP TKA: Initial Assessment, Treatment Day: 1st and AM 5/8/2021 INPATIENT: Hospital Day: 2 [place] / Product Type: Medicare /  
  
NAME/AGE/GENDER: Nilda Bonilla is a 78 y.o. female PRIMARY DIAGNOSIS:  Primary osteoarthritis of right knee [M17.11] Procedure(s) and Anesthesia Type: 
   * RIGHT KNEE ARTHROPLASTY TOTAL ROBOTIC ASSISTED / Shawn Roers / - Spinal (Right) ICD-10: Treatment Diagnosis:  
 · Pain in Right Knee (M25.561) · Stiffness of Right Knee, Not elsewhere classified (M25.661) · Other abnormalities of gait and mobility (R26.89) ASSESSMENT:  
 
Ms. Noreen Gutierres showed increased gait distance & improved level of assist for tranfers & ait. This pt is ready for the next phase of her rehab program. 
This section established at most recent assessment PROBLEM LIST (Impairments causing functional limitations): 1. Decreased Strength 2. Decreased ADL/Functional Activities 3. Decreased Transfer Abilities 4. Decreased Ambulation Ability/Technique 5. Decreased Balance 6. Increased Pain 7. Decreased Activity Tolerance 8.  Decreased Flexibility/Joint Mobility 9. Decreased [place] with Home Exercise Program 
 INTERVENTIONS PLANNED: (Benefits and precautions of physical therapy have been discussed with the patient.) 1. Bed Mobility 2. Cold 3. Gait Training 4. Home Exercise Program (HEP) 5. Range of Motion (ROM) 6. Therapeutic Activites 7. Therapeutic Exercise/Strengthening 8. Transfer Training TREATMENT PLAN: Frequency/Duration: Follow patient BID for duration of hospital stay to address above goals. Rehabilitation Potential For Stated Goals: Good RECOMMENDED REHABILITATION/EQUIPMENT: (at time of discharge pending progress): Continue Skilled Therapy and Home Health: Physical Therapy. HISTORY:  
History of Present Injury/Illness (Reason for Referral): Right TKA Past Medical History/Comorbidities: Ms. Tesha Waller  has a past medical history of CAD (coronary artery disease) (04/26/2016), Depression, Gait disorder (4/26/2016), Gout, Hypertension, Hypertriglyceridemia (4/26/2016), Normal pressure hydrocephalus ([facility] [address] (03/06/2018), Small vessel disease, cerebrovascular (4/26/2016), and Snores. Ms. Tesha Waller  has a past surgical history that includes hx hysterectomy; hx tonsillectomy; hx laparotomy; hx csf shunt (about 2018); and hx cataract removal. 
Social History/Living Environment:  
Home Environment: Private residence # Steps to Enter: 0 One/Two Story Residence: One story Living Alone: No 
Support Systems: Spouse/Significant Other/Partner Patient Expects to be Discharged to[de-identified] Private residence Current DME Used/Available at Home: Shower chair;Cane, straight;Crutches Tub or Shower Type: Shower Prior Level of Function/Work/Activity: Pt was independent without an assistive device prior to this admission. Number of Personal Factors/Comorbidities that affect the Plan of Care: 3+: HIGH COMPLEXITY EXAMINATION:  
Most Recent Physical Functioning:     RLE AROM 
R Knee Flexion: 91 
R Knee Extension: -3    
 
  
 
Bed Mobility Supine to Sit: (NT) Sit to Supine: (NT) Scooting: Independent Transfers Sit to Stand: Supervision Stand to Sit: Supervision Bed to Chair: Supervision(with walker) Balance Sitting: Intact; Without support Standing: Intact; With support(walker) Weight Bearing Status Right Side Weight Bearing: As tolerated Distance (ft): 250 Feet (ft) Ambulation - Level of Assistance: Supervision Assistive Device: Walker, rolling Speed/Whitney: Delayed Step Length: Left shortened Stance: Right decreased Gait Abnormalities: Antalgic;Decreased step clearance Number of Stairs Trained: 5 Stairs - Level of Assistance: Stand-by assistance Rail Use: Right Braces/Orthotics: none Right Knee Cold Type: Cryocuff Body Structures Involved: 1. Joints 2. Muscles Body Functions Affected: 1. Sensory/Pain 2. Movement Related Activities and Participation Affected: 1. General Tasks and Demands 2. Mobility Number of elements that affect the Plan of Care: 4+: HIGH COMPLEXITY CLINICAL PRESENTATION:  
Presentation: Stable and uncomplicated: LOW COMPLEXITY CLINICAL DECISION MAKIN[address] [place] [address] AM-PAC 6 Clicks Basic Mobility Inpatient Short Form How much difficulty does the patient currently have. .. Unable A Lot A Little None 1. Turning over in bed (including adjusting bedclothes, sheets and blankets)? [] 1   [] 2   [x] 3   [] 4  
2. Sitting down on and standing up from a chair with arms ( e.g., wheelchair, bedside commode, etc.)   [] 1   [] 2   [x] 3   [] 4  
3. Moving from lying on back to sitting on the side of the bed? [] 1   [] 2   [x] 3   [] 4 How much help from another person does the patient currently need. .. Total A Lot A Little None 4. Moving to and from a bed to a chair (including a wheelchair)? [] 1   [] 2   [x] 3   [] 4  
5. Need to walk in hospital room? [] 1   [] 2   [x] 3   [] 4  
6. Climbing 3-5 steps with a railing?    [x] 1 [] 2   [] 3   [] 4  
© 2007, Trustees of [address] [place] [address] [address] [address], under license to [employer]. All rights reserved Score:  Initial: 16 Most Recent: X (Date: -- ) Interpretation of Tool:  Represents activities that are increasingly more difficult (i.e. Bed mobility, Transfers, Gait). Medical Necessity:    
· Patient is expected to demonstrate progress in  
· strength, range of motion, balance, coordination, and functional technique ·  to  
· decrease assistance required with bed mobility, transfers & gait · . Reason for Services/Other Comments: 
· Patient continues to require skilled intervention due to · Pt not independent with functional mobility · . Use of outcome tool(s) and clinical judgement create a POC that gives a: Clear prediction of patient's progress: LOW COMPLEXITY  
  
 
 
 
TREATMENT:  
(In addition to Assessment/Re-Assessment sessions the following treatments were rendered) Pre-treatment Symptoms/Complaints: pt eager to return home Pain Initial: numeric scale Pain Intensity 1: 2 Pain Location 1: Knee Pain Orientation 1: Right Pain Intervention(s) 1: Ambulation/Increased Activity, Cold pack  Post Session:  2/10 Therapeutic Exercise: (15 Minutes):  Exercises per grid below to improve mobility and dynamic movement of leg - right to improve functional endurance . Required minimal verbal cues to promote proper body alignment and promote proper body mechanics. Progressed range and repetitions as indicated. Gait Training (15 Minutes):  Gait training to improve and/or restore physical functioning as related to mobility, strength, balance, coordination and dynamic movement of leg - right to improve functional gait. Ambulated 250 Feet (ft) with Supervision using a Walker, rolling and minimal cues related to their stride length and heel strike to promote proper body alignment, promote proper body posture and promote proper body mechanics. I 
 Date: 
5/7 Date: 
5/8 Date: ACTIVITY/EXERCISE AM PM AM PM AM PM  
GROUP THERAPY  []  []  []  []  []  [] Ankle Pumps  10 20 Quad Sets  10 20 Gluteal Sets  10 20 Hip ABd/ADduction  10 20 Straight Leg Raises  10 20 Knee Slides  10 20 Short Arc Quads  10 2 [address] Chair Slides   20 B = bilateral; AA = active assistive; A = active; P = passive Treatment/Session Assessment:   
 Response to Treatment: no concerns Education: 
[x] Home Exercises [x] Fall Precautions [x] Use of Cold Therapy Unit [x] D/C Instruction Review 
[] Knee Prosthesis Review [x] Walker Management/Safety [] Adaptive Equipment as Needed Interdisciplinary Collaboration:  
o Registered Nurse After treatment position/precautions:  
o Up in chair 
o Bed/Chair-wheels locked 
o Call light within reach 
o RN notified Compliance with Program/Exercises: Will assess as treatment progresses. Recommendations/Intent for next treatment session:  Treatment next visit will focus on increasing Ms. Greenfield's independence with bed mobility, transfers, gait training, strength/ROM exercises, modalities for pain, and patient education. Total Treatment Duration: PT Patient Time In/Time Out Time In: 0845 Time Out: [address], PT

## 2021-05-08 NOTE — PROGRESS NOTES
Care Management Interventions PCP Verified by CM: Yes Mode of Transport at Discharge: Self Transition of Care Consult (CM Consult): Home Health, Discharge Planning Physical Therapy Consult: Yes Occupational Therapy Consult: Yes Current Support Network: Lives with Spouse Confirm Follow Up Transport: Family The Plan for Transition of Care is Related to the Following Treatment Goals : improve mobility The Patient and/or Patient Representative was Provided with a Choice of Provider and Agrees with the Discharge Plan?: Yes Freedom of Choice List was Provided with Basic Dialogue that Supports the Patient's Individualized Plan of Care/Goals, Treatment Preferences and Shares the Quality Data Associated with the Providers?: Yes Discharge Location Discharge Placement: Home with outpatient services [facility]

## 2021-05-08 NOTE — PROGRESS NOTES
Problem: Self Care Deficits Care Plan (Adult) Goal: *Acute Goals and Plan of Care (Insert Text) Description: GOALS:  
DISCHARGE GOALS (in preparation for going home/rehab):  3 days 1. Ms. Sandra Smith will perform one lower body dressing activity with minimal assistance required to demonstrate improved functional mobility and safety. GOAL MET 5/8/2021 2. Ms. Sandra Smith will perform one lower body bathing activity with minimal assistance required to demonstrate improved functional mobility and safety. GOAL MET 5/8/2021 3. Ms. Sandra Smith will perform toileting/toilet transfer with contact guard assistance to demonstrate improved functional mobility and safety. GOAL MET 5/8/2021 4. Ms. Sandra Smith will perform shower transfer with contact guard assistance to demonstrate improved functional mobility and safety. GOAL MET 5/8/2021 Outcome: Progressing Towards Goal 
  
JOINT CAMP OCCUPATIONAL THERAPY TKA: Daily Note, Discharge, Treatment Day: 2nd and AM 5/8/2021 INPATIENT: Hospital Day: 2 [place] / Product Type: Medicare /  
  
NAME/AGE/GENDER: Kait Burkett is a 78 y.o. female PRIMARY DIAGNOSIS:  Primary osteoarthritis of right knee [M17.11] Procedure(s) and Anesthesia Type: 
   * RIGHT KNEE ARTHROPLASTY TOTAL ROBOTIC ASSISTED / Arlander Doom / - Spinal (Right) ICD-10: Treatment Diagnosis:  
 · Pain in Right Knee (M25.561) · Stiffness of Right Knee, Not elsewhere classified (M25.661) ASSESSMENT:  
 
 Ms. Sandra Smith is s/p R TKA and presents with decreased weight bearing on R LE and decreased independence with functional mobility and activities of daily living. Patient completed shower and dressing as charted below in ADL grid and is ambulating with rolling walker with supervision. Patient has met 4/4 goals and plans to return home with good family support. Family able to provide patient with appropriate level of assistance at this time.   OT reviewed safety precautions throughout session and therapy schedule for the remainder of today. Patient instructed to call for assistance when needing to get up from recliner and all needs in reach. Patient verbalized understanding of call light. This section established at most recent assessment PROBLEM LIST (Impairments causing functional limitations): 1. Decreased Strength 2. Decreased ADL/Functional Activities 3. Decreased Transfer Abilities 4. Increased Pain 5. Increased Fatigue 6. Decreased Flexibility/Joint Mobility 7. Decreased Knowledge of Precautions INTERVENTIONS PLANNED: (Benefits and precautions of occupational therapy have been discussed with the patient.) 1. Activities of daily living training 2. Adaptive equipment training 3. Balance training 4. Clothing management 5. Donning&doffing training 6. Theraputic activity TREATMENT PLAN: Frequency/Duration: Follow patient 1-2 times to address above goals. Rehabilitation Potential For Stated Goals: Good RECOMMENDED REHABILITATION/EQUIPMENT: (at time of discharge pending progress): Continue Skilled Therapy. OCCUPATIONAL PROFILE AND HISTORY:  
History of Present Injury/Illness (Reason for Referral): Pt presents this date s/p (right) TKA. Past Medical History/Comorbidities: Ms. Choco Benton  has a past medical history of CAD (coronary artery disease) (04/26/2016), Depression, Gait disorder (4/26/2016), Gout, Hypertension, Hypertriglyceridemia (4/26/2016), Normal pressure hydrocephalus ([facility] [address] (03/06/2018), Small vessel disease, cerebrovascular (4/26/2016), and Snores. Ms. Choco Benton  has a past surgical history that includes hx hysterectomy; hx tonsillectomy; hx laparotomy; hx csf shunt (about 2018); and hx cataract removal. 
Social History/Living Environment:  
Home Environment: Private residence # Steps to Enter: 0 One/Two Story Residence: One story Living Alone: No 
Support Systems: Spouse/Significant Other/Partner Patient Expects to be Discharged to[de-identified] Private residence Current DME Used/Available at Home: Shower chair;Cane, straight;Crutches Tub or Shower Type: Shower Prior Level of Function/Work/Activity: 
Independent Number of Personal Factors/Comorbidities that affect the Plan of Care: Brief history (0):  LOW COMPLEXITY ASSESSMENT OF OCCUPATIONAL PERFORMANCE[de-identified]  
Most Recent Physical Functioning:  
Balance Sitting: Intact Standing: Intact Mental Status Neurologic State: Alert Orientation Level: Oriented X4 Cognition: Appropriate decision making; Appropriate for age attention/concentration Perception: Appears intact Perseveration: No perseveration noted Safety/Judgement: Fall prevention Basic ADLs (From Assessment) Complex ADLs (From Assessment) Basic ADL Feeding: Independent Oral Facial Hygiene/Grooming: Supervision Bathing: Moderate assistance Type of Bath: Chlorhexidine (CHG), Full, Shower Upper Body Dressing: Supervision Lower Body Dressing: Moderate assistance Toileting: Minimum assistance Grooming/Bathing/Dressing Activities of Daily Living Cognitive Retraining Safety/Judgement: Fall prevention Upper Body Bathing Bathing Assistance: Modified independent Position Performed: Seated in chair Adaptive Equipment: Shower chair Lower Body Bathing Bathing Assistance: Modified independent Perineal  : Independent Lower Body : Modified independent Adaptive Equipment: Shower chair Upper Body Dressing Assistance Dressing Assistance: Independent Bra: Independent Pullover Shirt: Independent Functional Transfers Shower Transfer: Modified independent Lower Body Dressing Assistance Dressing Assistance: Independent Underpants: Independent Pants With Elastic Waist: Independent Socks: Independent Slip on Shoes with Back: Independent Bed/Mat Mobility Supine to Sit: Independent Sit to Supine: Independent Sit to Stand: Independent Stand to Sit: Independent Bed to Chair: Modified independent Physical Skills Involved: 1. Range of Motion 2. Balance 3. Strength Cognitive Skills Affected (resulting in the inability to perform in a timely and safe manner): 1. none Psychosocial Skills Affected: 1. Environmental Adaptation Number of elements that affect the Plan of Care: 3-5:  MODERATE COMPLEXITY CLINICAL DECISION MAKIN[address] [place] [address] AM-PAC 6 Clicks Daily Activity Inpatient Short Form How much help from another person does the patient currently need. .. Total A Lot A Little None 1. Putting on and taking off regular lower body clothing? [] 1   [x] 2   [] 3   [] 4  
2. Bathing (including washing, rinsing, drying)? [] 1   [x] 2   [] 3   [] 4  
3. Toileting, which includes using toilet, bedpan or urinal?   [] 1   [] 2   [x] 3   [] 4  
4. Putting on and taking off regular upper body clothing? [] 1   [] 2   [] 3   [x] 4  
5. Taking care of personal grooming such as brushing teeth? [] 1   [] 2   [] 3   [x] 4  
6. Eating meals? [] 1   [] 2   [] 3   [x] 4  
© , Trustees of [address] [place] [address], under license to [employer]. All rights reserved Score:  Initial: 19 Most Recent: X (Date: -- ) Interpretation of Tool:  Represents activities that are increasingly more difficult (i.e. Bed mobility, Transfers, Gait). Use of outcome tool(s) and clinical judgement create a POC that gives a: LOW COMPLEXITY  
  
 
 
 
TREATMENT:  
(In addition to Assessment/Re-Assessment sessions the following treatments were rendered) Pre-treatment Symptoms/Complaints:  0 Pain: Initial:  
Pain Intensity 1: 0 0 Post Session:  0 Self Care: (25 min): Procedure(s) (per grid) utilized to improve and/or restore self-care/home management as related to dressing and bathing. Required no cueing to facilitate activities of daily living skills and compensatory activities.  
 
Treatment/Session Assessment:   
 Response to Treatment:  pt up in room tolerated well. Education: 
[] Home Exercises [x] Fall Precautions [] Hip Precautions [] Going Home Video [x] Knee/Hip Prosthesis Review [x] Walker Management/Safety [x] Adaptive Equipment as Needed Interdisciplinary Collaboration:  
o Physical Therapist 
o Certified Occupational Therapy Assistant 
o Registered Nurse After treatment position/precautions:  
o Up in chair 
o Bed/Chair-wheels locked 
o Call light within reach 
o RN notified Compliance with Program/Exercises: Compliant all of the time, Will assess as treatment progresses. Pt doing well all goals met and will do well at home with support from family. Patient will be discharged home with home health PT. No further Occupational Therapy warranted, will discharge Occupational Therapy services. Total Treatment Duration: OT Patient Time In/Time Out Time In: 8351 Time Out: 0800 [address]

## 2021-05-08 NOTE — PROGRESS NOTES
Pt resting in recliner, appears calm and joyful. Pt states she will be discharging today after her  arrives. Small amount of old drainage noted on bandage on surgical incision on right knee. Ice pack in place. Pt able to dorsi/plantar flex bilaterally. No needs stated

## 2021-05-26 PROBLEM — L57.8 OTHER SKIN CHANGES DUE TO CHRONIC EXPOSURE TO NONIONIZING RADIATION: Status: ACTIVE | Noted: 2021-01-01

## 2021-05-26 PROBLEM — L82.0 INFLAMED SEBORRHEIC KERATOSIS: Status: ACTIVE | Noted: 2021-01-01

## 2021-05-26 PROBLEM — L85.3 XEROSIS CUTIS: Status: ACTIVE | Noted: 2021-01-01

## 2021-05-26 PROBLEM — Z85.828 PERSONAL HISTORY OF OTHER MALIGNANT NEOPLASM OF SKIN: Status: ACTIVE | Noted: 2021-01-01

## 2021-05-26 PROBLEM — L57.0 ACTINIC KERATOSIS: Status: ACTIVE | Noted: 2021-01-01

## 2021-05-26 PROBLEM — J30.1 ALLERGIC RHINITIS DUE TO POLLEN: Status: ACTIVE | Noted: 2021-01-01

## 2021-05-26 PROBLEM — L81.4 OTHER MELANIN HYPERPIGMENTATION: Status: ACTIVE | Noted: 2021-01-01

## 2021-05-26 PROBLEM — L82.1 OTHER SEBORRHEIC KERATOSIS: Status: ACTIVE | Noted: 2021-01-01

## 2021-05-26 PROBLEM — D22.5 MELANOCYTIC NEVI OF TRUNK: Status: ACTIVE | Noted: 2021-01-01

## 2021-05-26 NOTE — PROCEDURE: OTHER
Other (Free Text): Recheck nose in 2-3 months
Render Risk Assessment In Note?: no
Note Text (......Xxx Chief Complaint.): This diagnosis correlates with the
Other (Free Text): Ln2
Detail Level: Zone
Detail Level: Simple

## 2021-05-26 NOTE — PROCEDURE: LIQUID NITROGEN
Consent: The patient's verbal consent was obtained including but not limited to risks of crusting, scabbing, blistering, scarring, darker or lighter pigmentary change, recurrence, incomplete removal and infection.
Detail Level: Detailed
Render Post-Care Instructions In Note?: no
Medical Necessity Clause: This procedure was medically necessary because the lesions that were treated were irritated.
Number Of Freeze-Thaw Cycles: 1 freeze-thaw cycle
Post-Care Instructions: I reviewed with the patient in detail post-care instructions. Patient is to wear sunprotection, and avoid picking at any of the treated lesions. Pt may apply Vaseline to crusted or scabbing areas.
Medical Necessity Information: It is in your best interest to select a reason for this procedure from the list below. All of these items fulfill various CMS LCD requirements except the new and changing color options.
Duration Of Freeze Thaw-Cycle (Seconds): 5
Post-Care Instructions: I reviewed with the patient in detail post-care instructions. Patient is to wear sunprotection, and avoid picking at any of the treated lesions. Pt may apply Vaseline to crusted or scabbing areas. Will re-check at follow up

## 2021-08-11 PROBLEM — Z85.828 PERSONAL HISTORY OF OTHER MALIGNANT NEOPLASM OF SKIN: Status: ACTIVE | Noted: 2021-01-01

## 2021-08-11 PROBLEM — I10 ESSENTIAL (PRIMARY) HYPERTENSION: Status: ACTIVE | Noted: 2021-01-01

## 2021-08-11 PROBLEM — L72.0 EPIDERMAL CYST: Status: ACTIVE | Noted: 2021-01-01

## 2021-08-11 PROBLEM — D22.5 MELANOCYTIC NEVI OF TRUNK: Status: ACTIVE | Noted: 2021-01-01

## 2021-08-11 PROBLEM — L81.4 OTHER MELANIN HYPERPIGMENTATION: Status: ACTIVE | Noted: 2021-01-01

## 2021-08-11 PROBLEM — L82.1 OTHER SEBORRHEIC KERATOSIS: Status: ACTIVE | Noted: 2021-01-01

## 2021-08-11 PROBLEM — L82.0 INFLAMED SEBORRHEIC KERATOSIS: Status: ACTIVE | Noted: 2021-01-01

## 2021-08-11 PROBLEM — D18.01 HEMANGIOMA OF SKIN AND SUBCUTANEOUS TISSUE: Status: ACTIVE | Noted: 2021-01-01

## 2021-08-11 PROBLEM — L57.0 ACTINIC KERATOSIS: Status: ACTIVE | Noted: 2021-01-01

## 2021-08-11 PROBLEM — J30.1 ALLERGIC RHINITIS DUE TO POLLEN: Status: ACTIVE | Noted: 2021-01-01

## 2021-08-11 PROBLEM — L57.8 OTHER SKIN CHANGES DUE TO CHRONIC EXPOSURE TO NONIONIZING RADIATION: Status: ACTIVE | Noted: 2021-01-01

## 2021-08-11 NOTE — PROCEDURE: LIQUID NITROGEN
Post-Care Instructions: I reviewed with the patient in detail post-care instructions. Patient is to wear sunprotection, and avoid picking at any of the treated lesions. Pt may apply Vaseline to crusted or scabbing areas. Will re-check at follow up
Number Of Freeze-Thaw Cycles: 1 freeze-thaw cycle
Show Applicator Variable?: Yes
Render Note In Bullet Format When Appropriate: No
Detail Level: Detailed
Medical Necessity Information: It is in your best interest to select a reason for this procedure from the list below. All of these items fulfill various CMS LCD requirements except the new and changing color options.
Consent: The patient's verbal consent was obtained including but not limited to risks of crusting, scabbing, blistering, scarring, darker or lighter pigmentary change, recurrence, incomplete removal and infection.
Duration Of Freeze Thaw-Cycle (Seconds): 5
Post-Care Instructions: I reviewed with the patient in detail post-care instructions. Patient is to wear sunprotection, and avoid picking at any of the treated lesions. Pt may apply Vaseline to crusted or scabbing areas.
Medical Necessity Clause: This procedure was medically necessary because the lesions that were treated were irritated.

## 2021-09-18 NOTE — ED NOTES
I have reviewed discharge instructions with the patient and spouse. The patient and spouse verbalized understanding. Patient left ED via Discharge Method: wheelchair to Home with (). Opportunity for questions and clarification provided. Patient given 0 scripts. To continue your aftercare when you leave the hospital, you may receive an automated call from our care team to check in on how you are doing. This is a free service and part of our promise to provide the best care and service to meet your aftercare needs.  If you have questions, or wish to unsubscribe from this service please call [phone]. Thank you for Choosing our [facility] Emergency Department.

## 2021-09-18 NOTE — ED PROVIDER NOTES
23-year-old female who presents with chief complaint of Covid symptoms that started 5 days ago. She had a positive test 3 days ago. Her symptoms include shortness of breath, loss of appetite, cough, nausea and loose stool. She has been hydrating at home with Pedialyte. States she does not feel as if she is improving. Denies any chest pain, dizziness, abdominal pain, fever or chills. Past Medical History:   Diagnosis Date    CAD (coronary artery disease) 04/26/2016    No MI, No stents - followed by [facility]    Depression     Gait disorder 4/26/2016    Gout     Left foot    Hypertension     Hypertriglyceridemia 4/26/2016    Normal pressure hydrocephalus ([address] 03/06/2018    followed by Dr. Berenice Moore - [facility] -- s/p  shunt    Small vessel disease, cerebrovascular 4/26/2016    Snores        Past Surgical History:   Procedure Laterality Date   Radn Nugent - Dr. Diaz Drilling HX CSF SHUNT  about 2018    V/P shunt for NPH -- surgery at [address] HX LAPAROTOMY      for adhesions r/t hysterectomy    HX TONSILLECTOMY           Family History:   Problem Relation Age of Onset    Heart Disease Other     Stroke Other     Cancer Other        Social History     Socioeconomic History    Marital status:      Spouse name: Not on file    Number of children: Not on file    Years of education: Not on file    Highest education level: Not on file   Occupational History    Not on file   Tobacco Use    Smoking status: Former Smoker    Smokeless tobacco: Never Used    Tobacco comment: quite 1974    Substance and Sexual Activity    Alcohol use:  Yes     Alcohol/week: 0.0 standard drinks     Comment: Social    Drug use: Never    Sexual activity: Not on file   Other Topics Concern    Not on file   Social History Narrative    Not on file     Social Determinants of Health     Financial Resource Strain:     Difficulty of Paying Living Expenses: Food Insecurity:     Worried About Running Out of Food in the Last Year:     [address] in the Last Year:    Transportation Needs:     Lack of Transportation (Medical):  Lack of Transportation (Non-Medical):    Physical Activity:     Days of Exercise per Week:     Minutes of Exercise per Session:    Stress:     Feeling of Stress :    Social Connections:     Frequency of Communication with Friends and Family:     Frequency of Social Gatherings with Friends and Family:     Attends [employer] Services:     Active Member of Clubs or Organizations:     Attends Club or Organization Meetings:     Marital Status:    Intimate Partner Violence:     Fear of Current or Ex-Partner:     Emotionally Abused:     Physically Abused:     Sexually Abused: ALLERGIES: Latex, Ceftin [cefuroxime axetil], and Sulfa (sulfonamide antibiotics)    Review of Systems   Constitutional: Positive for chills and fever. HENT: Positive for sore throat. Respiratory: Positive for cough and shortness of breath. Cardiovascular: Negative for chest pain. Gastrointestinal: Positive for nausea. Negative for abdominal distention and abdominal pain. Genitourinary: Negative for dysuria. Musculoskeletal: Positive for myalgias. There were no vitals filed for this visit. Physical Exam  Constitutional:       Appearance: Normal appearance. HENT:      Head: Normocephalic and atraumatic. Mouth/Throat:      Mouth: Mucous membranes are moist.   Eyes:      Pupils: Pupils are equal, round, and reactive to light. Cardiovascular:      Rate and Rhythm: Normal rate and regular rhythm. Pulmonary:      Effort: No respiratory distress. Breath sounds: Normal breath sounds. Abdominal:      Palpations: Abdomen is soft. Tenderness: There is no abdominal tenderness. There is no guarding. Skin:     General: Skin is warm and dry.    Neurological:      Mental Status: She is alert and oriented to person, place, and time. Mental status is at baseline. Psychiatric:         Mood and Affect: Mood normal.          MDM  Number of Diagnoses or Management Options  COVID-19: minor  Diagnosis management comments: Maintain satisfactory oxygen saturations. Patient was given Regeneron here in the emergency room, observed for period time afterwards after no infusion reaction was observed patient was discharged home with instructions to return if she becomes hypoxic. Patient verbalized understanding. Voice dictation software was used during the making of this note. This software is not perfect and grammatical and other typographical errors may be present. This note has been proofread, but may still contain errors.    Bebeto Arce PA-C        Amount and/or Complexity of Data Reviewed  Clinical lab tests: ordered    Risk of Complications, Morbidity, and/or Mortality  Presenting problems: moderate  Diagnostic procedures: low  Management options: low           Procedures

## 2021-09-22 PROBLEM — B17.9 ACUTE HEPATITIS: Status: ACTIVE | Noted: 2021-01-01

## 2021-09-22 PROBLEM — U07.1 COVID-19: Status: ACTIVE | Noted: 2021-01-01

## 2021-09-22 PROBLEM — E87.5 HYPERKALEMIA: Status: ACTIVE | Noted: 2021-01-01

## 2021-09-22 PROBLEM — N17.9 AKI (ACUTE KIDNEY INJURY) (HCC): Status: ACTIVE | Noted: 2021-01-01

## 2021-09-22 NOTE — PROGRESS NOTES
SW met with patient who confirmed demographic information, states that she lives with her  Russ Casas and has local support. Patient is seen by Dr. Reinier Youngblood and is current. Patient uses a cane to ambulation occassionally, does not require ADL assistance, and states her only recent fall was a month ago. Patient states that she was playing bridge at a friend's home who does not have a railing for the bottom two steps. Her  called her name, at which time she looked up, got distracted, and missed the bottom step without a railing to catch herself. Patient denies any other falls. Patient is S/P TKA by Dr. Eliceo Honeycutt in May. No new needs identified. ACP discussion completed.      Mireya Tinajero LMSW    [facility]    * [email or web address]

## 2021-09-22 NOTE — ED NOTES
Pt + covid x 2 weeks ago, c/o distended  Colon for 2 weeks,no otc meds taken, h/o hysterectomy, no fever, vomited 2 weeks ago,none since, passing mucus per per rectum, had peach milk shake this am. Concerned for sbo  Patient evaluated initially in triage. Rapid Medical Evaluation was conducted and necessary orders have been placed. I have performed a medical screening exam.  Care will now be transferred to the attending physician in the emergency department.   CELY Turk 10:51 AM

## 2021-09-22 NOTE — ED TRIAGE NOTES
Pt states she has had COVID X 2 weeks, states she feels she has a distended colon. Bowel movements contain mucous.

## 2021-09-22 NOTE — ACP (ADVANCE CARE PLANNING)
Advance Care Planning     Advance Care Planning Activator (Inpatient)  Conversation Note      Date of ACP Conversation: 09/22/21     Conversation Conducted with:   Patient with Decision Making Capacity    ACP Activator: Andrew Decision Maker:    Current Designated Health Care Decision Maker:     Click here to complete [address] including selection of the [address] Relationship (ie \"Primary\")  Today we documented Decision Maker(s) consistent with Legal Next of Kin hierarchy. Care Preferences    Ventilation: \"If you were in your present state of health and suddenly became very ill and were unable to breathe on your own, what would your preference be about the use of a ventilator (breathing machine) if it were available to you? \"      If patient would desire the use of a ventilator (breathing machine), answer UNSURE, LEAVES TO THE DISCRETION OF HER  AND PRIMARY DECISION MAKER KIKI Leon    \"If your health worsens and it becomes clear that your chance of recovery is unlikely, what would your preference be about the use of a ventilator (breathing machine) if it were available to you? \"     Would the patient desire the use of a ventilator (breathing machine)? UNSURE, LEAVES TO THE DISCRETION OF HER  AND PRIMARY DECISION MAKER KIKI Leon      Resuscitation  \"CPR works best to restart the heart when there is a sudden event, like a heart attack, in someone who is otherwise healthy. Unfortunately, CPR does not typically restart the heart for people who have serious health conditions or who are very sick. \"    \"In the event your heart stopped as a result of an underlying serious health condition, would you want attempts to be made to restart your heart (answer \"yes\" for attempt to resuscitate) or would you prefer a natural death UNSURE, LEAVES TO THE DISCRETION OF HER  AND PRIMARY DECISION [address]      [x] Yes  [] No   Educated Patient / Demetra Chaya regarding differences between Advance Directives and portable DNR orders.     Length of ACP Conversation in minutes:  15    Conversation Outcomes:  [x] ACP discussion completed  [] Existing advance directive reviewed with patient; no changes to patient's previously recorded wishes     [] New Advance Directive completed   [] Portable Do Not Resuscitate prepared for Provider review and signature  [] POLST/POST/MOLST/MOST prepared for Provider review and signature      Follow-up plan:    [] Schedule follow-up conversation to continue planning  [x] Referred individual to Provider for additional questions/concerns   [] Advised patient/agent/surrogate to review completed ACP document and update if needed with changes in condition, patient preferences or care setting     [] This note routed to one or more involved healthcare providers

## 2021-09-22 NOTE — H&P
Hospitalist Admission History and Physical     NAME:  Bryant Nair   Age:  78 y.o.  :   1940   MRN:   [id number]  PCP: Kasey Mejia MD  Consulting MD:  Treatment Team: Attending Provider: Lo Parra DO; Primary Nurse: Chente Louis, RN; : Francheska Jurador    Chief Complaint   Patient presents with   Georgine Thompson Pain    Positive For Covid-19         HPI:   Patient is a 78 y.o. female who presented to the ED for cc increased abdominal pain, bloating over the past few days. Hx of gout, HTN, HLD, normal pressure hydrocephalus s/p  shunt and recent diagnosis of COVID 14 days ago per the patient. She does not have proof with her of COVID diagnosis. Vitals - stable    Labs- platelets 033, K 5.8, CO2 20, BN 80, creatine 2.4 from baseline 1.4, total bili 1.5, , , lipase 2,227    RUQ US showed Diffusely decreased echogenicity in the liver which can be seen with acute hepatitis. Trace perihepatic ascites. Diffuse gallbladder wall thickening is likely reactive to underlying hepatocellular dysfunction. Acute cholecystitis is also a consideration but felt less likely given lack of other supporting features.   Past Medical History:   Diagnosis Date    CAD (coronary artery disease) 2016    No MI, No stents - [facility] by [facility]    Depression     Gait disorder 2016    Gout     Left foot    Hypertension     Hypertriglyceridemia 2016    Normal pressure hydrocephalus ([address] 2018    follow[facility] Kannan Rapp - [facility] -- s/p  shunt    Small vessel disease, cerebrovascular 2016    Snores         Past Surgical History:   Procedure Laterality Date    Munira Lindsey - Dr. Abdulaziz Torrez HX CSF SHUNT  about     V/P shunt for NPH -- surgery at [address] HX LAPAROTOMY      for adhesions r/t hysterectomy    HX TONSILLECTOMY          Family History   Problem Relation Age of Onset    Heart Disease Other     Stroke Other     Cancer Other        Social History     Social History Narrative    Not on file        Social History     Tobacco Use    Smoking status: Former Smoker    Smokeless tobacco: Never Used    Tobacco comment: quite 1974    Substance Use Topics    Alcohol use: Yes     Alcohol/week: 0.0 standard drinks     Comment: Social        Social History     Substance and Sexual Activity   Drug Use Never         Allergies   Allergen Reactions    Latex Unknown (comments)    Ceftin [Cefuroxime Axetil] Rash    Sulfa (Sulfonamide Antibiotics) Rash and Other (comments)     KIDNEY MALFUNCTION       Prior to Admission medications    Medication Sig Start Date End Date Taking? Authorizing Provider   febuxostat (ULORIC) 40 mg tab tablet TAKE 1 TABLET BY MOUTH EVERY DAY 9/20/21   Emely Rich MD   fluorouraciL (EFUDEX) 5 % chemo cream APPLY TO AFFECTED AREA IN A THIN LAYER EVERY EVENING FOR 3 WEEKS. [address] OFF EACH MORNING. 8/11/21   Provider, Historical   valACYclovir (VALTREX) 1 gram tablet Take 2 tablets twice a day for 1 day as needed for outbreak of fever blister 9/8/21   mEely Rich MD   FLUoxetine (PROzac) 20 mg tablet TAKE 1 TABLET BY MOUTH EVERY DAY 9/7/21   Emely Rich MD   atenoloL (TENORMIN) 50 mg tablet TAKE 2 TABLETS BY MOUTH DAILY INDICATIONS: FAST HEART BEAT DUE TO ANTIPSYCHOTIC MEDICATION  Indications: fast heart beat due to antipsychotic medication 7/13/21   Emely Rich MD   DISABLED PLACARD ([address] [place] [address]) DMV The orthopedic condition creates a substantial limitation in routine walking. Permanent    MD [facility]#[id number] 9/09/73   Chinmay Castillo MD   furosemide (LASIX) 20 mg tablet Take 1 tablet by mouth every other day 4/14/21   Emely Rich MD   pantoprazole (PROTONIX) 40 mg tablet TAKE 1 TABLET BY MOUTH EVERY DAY 4/12/21   Emely Rcih MD   loratadine (Claritin) 10 mg tablet Take 10 mg by mouth.     Provider, Historical   fluticasone (FLONASE) 50 mcg/actuation nasal spray 2 Sprays by Both Nostrils route daily. Provider, Historical           Review of Systems    Constitutional:NAD  Eyes:  no change in visual acuity, no photophobia  Ears, nose, mouth, throat, and face: no  Odynphagia, dysphagia, no thrush or exudate, negative for chronic sinus congestion, recurrent headaches  Respiratory: negative for SOB, hemoptysis or cough  Cardiovascular: negative for CP, palpitations, or PND  Gastrointestinal: diffuse abdominal pain with discomfort   Genitourinary: no urgency, frequency, or dysuria, no nocturia  Integument/breast: negative for skin rash or skin lesions  Hematologic/lymphatic: negative for known bleeding disorder  Musculoskeletal:negative for joint pain or joint tenderness  Neurological: negative for lightheadedness, syncope or presyncopal events, no seizure or CVA history  Behavioral/Psych: negative for depression or chronic anxiety,   Endocrine: negative for polydyspia, polyuria or intolerance to heat or cold  Allergic/Immunologic: negative for chronic allergic rhinitis, or known connective tissue disorder      Objective:     Patient Vitals for the past 24 hrs:   Temp Pulse Resp BP SpO2   09/22/21 1123  99  126/81 94 %   09/22/21 1052 97.7 °F (36.5 °C) 100 16 124/87 98 %        No intake/output data recorded. No intake/output data recorded.     Data Review:   Recent Results (from the past 24 hour(s))   CBC WITH AUTOMATED DIFF    Collection Time: 09/22/21 11:23 AM   Result Value Ref Range    WBC 7.4 4.3 - 11.1 K/uL    RBC 3.97 (L) 4.05 - 5.2 M/uL    HGB 12.6 11.7 - 15.4 g/dL    HCT 41.0 35.8 - 46.3 %    .3 (H) 79.6 - 97.8 FL    MCH 31.7 26.1 - 32.9 PG    MCHC 30.7 (L) 31.4 - 35.0 g/dL    RDW 15.7 (H) 11.9 - 14.6 %    PLATELET 717 (L) 697 - 450 K/uL    MPV 11.8 9.4 - 12.3 FL    ABSOLUTE NRBC 0.05 0.0 - 0.2 K/uL    DF AUTOMATED      NEUTROPHILS 65 43 - 78 %    LYMPHOCYTES 22 13 - 44 %    MONOCYTES 10 4.0 - 12.0 %    EOSINOPHILS 2 0.5 - 7.8 % BASOPHILS 0 0.0 - 2.0 %    IMMATURE GRANULOCYTES 0 0.0 - 5.0 %    ABS. NEUTROPHILS 4.9 1.7 - 8.2 K/UL    ABS. LYMPHOCYTES 1.7 0.5 - 4.6 K/UL    ABS. MONOCYTES 0.7 0.1 - 1.3 K/UL    ABS. EOSINOPHILS 0.1 0.0 - 0.8 K/UL    ABS. BASOPHILS 0.0 0.0 - 0.2 K/UL    ABS. IMM. GRANS. 0.0 0.0 - 0.5 K/UL   METABOLIC PANEL, COMPREHENSIVE    Collection Time: 09/22/21 11:23 AM   Result Value Ref Range    Sodium 135 (L) 136 - 145 mmol/L    Potassium 5.8 (H) 3.5 - 5.1 mmol/L    Chloride 106 98 - 107 mmol/L    CO2 20 (L) 21 - 32 mmol/L    Anion gap 9 7 - 16 mmol/L    Glucose 127 (H) 65 - 100 mg/dL    BUN 80 (H) 8 - 23 MG/DL    Creatinine 2.47 (H) 0.6 - 1.0 MG/DL    GFR est AA 24 (L) >60 ml/min/1.73m2    GFR est non-AA 20 (L) >60 ml/min/1.73m2    Calcium 8.5 8.3 - 10.4 MG/DL    Bilirubin, total 1.5 (H) 0.2 - 1.1 MG/DL    ALT (SGPT) 636 (H) 12 - 65 U/L    AST (SGOT) 376 (H) 15 - 37 U/L    Alk. phosphatase 134 50 - 136 U/L    Protein, total 7.6 6.3 - 8.2 g/dL    Albumin 2.7 (L) 3.2 - 4.6 g/dL    Globulin 4.9 (H) 2.3 - 3.5 g/dL    A-G Ratio 0.6 (L) 1.2 - 3.5     MAGNESIUM    Collection Time: 09/22/21 11:23 AM   Result Value Ref Range    Magnesium 2.3 1.8 - 2.4 mg/dL   LIPASE    Collection Time: 09/22/21 11:23 AM   Result Value Ref Range    Lipase 2,227 (H) 73 - 393 U/L       Physical Exam:     General:  Alert, cooperative, no distress, appears stated age. Eyes:  Conjunctivae/corneas clear. Ears:  Normal TMs and external ear canals both ears. Nose: Nares normal. Septum midline. Mouth/Throat: Slight dry tongue   Neck: no JVD. Back:   deferred   Lungs:   Clear to auscultation bilaterally. Heart:  Regular rate and rhythm, S1, S2 normal   Abdomen:   Soft, non-tender. Bowel sounds normal. No masses,  No organomegaly. No rebound tenderness   Extremities: 1+ edema bilaterally    Pulses: 2+ and symmetric all extremities.    Skin: Skin color, texture, turgor normal. No rashes or lesions   Lymph nodes: Cervical, supraclavicular, and axillary nodes normal.   Neurologic: CNII-XII intact. Assessment and Plan     Principal Problem:    Acute hepatitis (9/22/2021)    Active Problems:    HTN (hypertension) (4/26/2016)      NPH (normal pressure hydrocephalus) ([facility] (10/31/2017)      COVID-19 (9/22/2021)      JODY (acute kidney injury) ([facility] (9/22/2021)      Hyperkalemia (9/22/2021)    Acute hepatitis with elevated lipase and poor oral intake over the past few days - Likely from [facility]. Check hepatitis panel. Symptomatic management. If LFTs continue to worsen, may consult GI. No obvious obstruction seen on US. COVID - per patient, 14 days since diagnosed but she has no proof. States documents are with her . I have stated for him to text her a picture of proof and we can place in our documents. Since within 21 day period of diagnosis, I have discussed with Dr. Kurt Cooper who has accepted as transfer to [facility]. Currently there are no COVID beds so will have to wait in ER. JODY - Likely from [facility]. Ensure eating and drinking well. Avoiding IV fluids since she states her liquid intake is good and already with edema on exam (which is chronic)    Hyperkalemia - From JODY. Insulin/dextrose. HTN- atenolol 100mg daily      Normal pressure hydrocephalus with  shunt - No AMS on exam though slow to answer at times. States only takes lasix as needed.      DVT prophylaxis - SCDs  Signed By: Blain Rinne, DO   September 22, 2021

## 2021-09-23 NOTE — PROGRESS NOTES
Hospitalist Progress Note   Admit Date:  2021 11:15 AM   Name:  Bryant Nair   Age:  78 y.o. Sex:  female  :  1940   MRN:  [id number]   Room:  ER05/    Presenting Complaint: Anal Pain    Reason(s) for Admission: COVID-19 [U07.1]  Acute hepatitis [B17.9]     Hospital Course & Interval History:   Ms. Celine Cota is a 79 yo female with PMH of HTN, gout, HLD, NPH with  shunt and recent dx of COVID 14 days ago who presented with c/o abd pain and bloating over a few days. Found to have elevated LFTs. RUQ abd US showed findings seen with acute hepatitis. Hep panel pending. Subjective (21):  Resp viral panel and COVID NEGATIVE. Pt resting comfortably. Denies CP, SOB, n/v/d, abd pain. Assessment & Plan:     Acute hepatitis with elevated lipase and poor oral intake over the past few days - Likely from [facility]. Check hepatitis panel. Symptomatic management. If LFTs continue to worsen, may consult GI. No obvious obstruction seen on US.    CMP in AM.  LFTs with slight improvement. Will consult GI.        COVID - per patient, 14 days since diagnosed but she has no proof. States documents are with her . I have stated for him to text her a picture of proof and we can place in our documents. Since within 21 day period of diagnosis, I have discussed with Dr. Carolyn Cinseros who has accepted as transfer to [facility]. Currently there are no COVID beds so will have to wait in ER.   Resp viral panel with COVID NEGATIVE     JODY - Likely from [facility]. Ensure eating and drinking well. Avoiding IV fluids since she states her liquid intake is good and already with edema on exam (which is chronic)   improving     Hyperkalemia - From JODY. Insulin/dextrose.  resolved      HTN- atenolol 100mg daily       Normal pressure hydrocephalus with  shunt - No AMS on exam though slow to answer at times.        Dispo/Discharge Planning:      Pending clinical course    Diet:  ADULT DIET Regular; GI Anayeli (GERD/Peptic Ulcer)  DVT PPx: SCD  Code status: Full Code    Hospital Problems as of 9/23/2021 Date Reviewed: 9/9/2021        Codes Class Noted - Resolved POA    * (Principal) Acute hepatitis ICD-10-CM: B17.9  ICD-9-CM: 570  9/22/2021 - Present Unknown        COVID-19 ICD-10-CM: U07.1  ICD-9-CM: 079.89  9/22/2021 - Present Unknown        JODY (acute kidney injury) ([facility] [address] ICD-10-CM: N17.9  ICD-9-CM: 584.9  9/22/2021 - Present Unknown        Hyperkalemia ICD-10-CM: E87.5  ICD-9-CM: 276.7  9/22/2021 - Present Unknown        NPH (normal pressure hydrocephalus) (HCC) ICD-10-CM: G91.2  ICD-9-CM: 331.5  10/31/2017 - Present Yes        HTN (hypertension) ICD-10-CM: I10  ICD-9-CM: 401.9  4/26/2016 - Present Yes              Objective:     Patient Vitals for the past 24 hrs:   Pulse BP SpO2   09/23/21 1040 (!) 113 117/80 98 %   09/23/21 0939 92 (!) 132/92    09/23/21 0841 96  99 %   09/23/21 0548 (!) 104  98 %   09/23/21 0500 (!) 117 (!) 126/94 100 %   09/23/21 0448 95 119/83 100 %   09/23/21 0200 (!) 108 (!) 119/90 90 %   09/23/21 0143 75 125/83 98 %   09/22/21 2000 (!) 108 133/70 96 %   09/22/21 1605 (!) 122 (!) 140/83 98 %     Oxygen Therapy  O2 Sat (%): 98 % (09/23/21 1040)  Pulse via Oximetry: 113 beats per minute (09/23/21 1040)    Estimated body mass index is 22.6 kg/m² as calculated from the following:    Height as of this encounter: 5' 6\" (1.676 m). Weight as of this encounter: 63.5 kg (140 lb). No intake or output data in the 24 hours ending 09/23/21 1247      Physical Exam:     General:    Well nourished. No overt distress  Head:  Normocephalic, atraumatic  Eyes:  Sclerae appear normal.  Pupils equally round. ENT:  Nares appear normal, no drainage. Moist oral mucosa  Neck:  No restricted ROM. Trachea midline   CV:   RRR. No m/r/g. No jugular venous distension. 1 + LE edema bilaterally. Lungs:   CTAB. No wheezing, rhonchi, or rales. Respirations even, unlabored  Abdomen: Bowel sounds present. Soft, nontender, nondistended. Extremities: No cyanosis or clubbing. Skin:     No rashes and normal coloration. Warm and dry. Neuro:  Cranial nerves II-XII grossly intact. Sensation intact  Psych:  Normal mood and affect.   Alert and oriented x3    I have reviewed ordered lab tests and independently visualized imaging below:    Last 24hr Labs:  Recent Results (from the past 24 hour(s))   COVID-19 RAPID TEST    Collection Time: 09/22/21  4:03 PM   Result Value Ref Range    Specimen source NASAL SWAB      COVID-19 rapid test Not detected NOTD     PROTHROMBIN TIME + INR    Collection Time: 09/22/21  5:20 PM   Result Value Ref Range    Prothrombin time 17.6 (H) 12.6 - 14.5 sec    INR 1.4     SARS-COV-2    Collection Time: 09/22/21  5:20 PM   Result Value Ref Range    SARS-CoV-2 Please find results under separate order     RESPIRATORY VIRUS PANEL W/COVID-19, PCR    Collection Time: 09/22/21  5:35 PM    Specimen: Nasopharyngeal   Result Value Ref Range    Adenovirus NOT DETECTED NOTDET      Coronavirus 229E NOT DETECTED NOTDET      Coronavirus HKU1 NOT DETECTED NOTDET      Coronavirus CVNL63 NOT DETECTED NOTDET      Coronavirus OC43 NOT DETECTED NOTDET      SARS-CoV-2, PCR NOT DETECTED NOTDET      Metapneumovirus NOT DETECTED NOTDET      Rhinovirus and Enterovirus NOT DETECTED NOTDET      Influenza A NOT DETECTED NOTDET      Influenza B NOT DETECTED NOTDET      Parainfluenza 1 NOT DETECTED NOTDET      Parainfluenza 2 NOT DETECTED NOTDET      Parainfluenza 3 NOT DETECTED NOTDET      Parainfluenza virus 4 NOT DETECTED NOTDET      RSV by PCR NOT DETECTED NOTDET      B. parapertussis, PCR NOT DETECTED NOTDET      Bordetella pertussis - PCR NOT DETECTED NOTDET      Chlamydophila pneumoniae DNA, QL, PCR NOT DETECTED NOTDET      Mycoplasma pneumoniae DNA, QL, PCR NOT DETECTED NOTDET     METABOLIC PANEL, COMPREHENSIVE    Collection Time: 09/23/21  4:25 AM   Result Value Ref Range    Sodium 138 136 - 145 mmol/L Potassium 5.1 3.5 - 5.1 mmol/L    Chloride 109 (H) 98 - 107 mmol/L    CO2 21 21 - 32 mmol/L    Anion gap 8 7 - 16 mmol/L    Glucose 110 (H) 65 - 100 mg/dL    BUN 78 (H) 8 - 23 MG/DL    Creatinine 2.35 (H) 0.6 - 1.0 MG/DL    GFR est AA 25 (L) >60 ml/min/1.73m2    GFR est non-AA 21 (L) >60 ml/min/1.73m2    Calcium 8.1 (L) 8.3 - 10.4 MG/DL    Bilirubin, total 1.3 (H) 0.2 - 1.1 MG/DL    ALT (SGPT) 668 (H) 12 - 65 U/L    AST (SGOT) 328 (H) 15 - 37 U/L    Alk.  phosphatase 120 50 - 136 U/L    Protein, total 7.1 6.3 - 8.2 g/dL    Albumin 2.7 (L) 3.2 - 4.6 g/dL    Globulin 4.4 (H) 2.3 - 3.5 g/dL    A-G Ratio 0.6 (L) 1.2 - 3.5     CBC W/O DIFF    Collection Time: 09/23/21  4:25 AM   Result Value Ref Range    WBC 7.6 4.3 - 11.1 K/uL    RBC 3.82 (L) 4.05 - 5.2 M/uL    HGB 12.2 11.7 - 15.4 g/dL    HCT 39.3 35.8 - 46.3 %    .9 (H) 79.6 - 97.8 FL    MCH 31.9 26.1 - 32.9 PG    MCHC 31.0 (L) 31.4 - 35.0 g/dL    RDW 15.7 (H) 11.9 - 14.6 %    PLATELET 559 (L) 951 - 450 K/uL    MPV 10.9 9.4 - 12.3 FL    ABSOLUTE NRBC 0.03 0.0 - 0.2 K/uL   HEPATITIS PANEL, ACUTE    Collection Time: 09/23/21  4:25 AM   Result Value Ref Range    Hepatitis A, IgM NONREACTIVE NR      Hepatitis B core, IgM NONREACTIVE NR      Hep B Surface Ag NONREACTIVE NR      Hepatitis C virus Ab NONREACTIVE NR         All Micro Results     Procedure Component Value Units Date/Time    RESPIRATORY VIRUS PANEL W/COVID-19, PCR [[id number]] Collected: 09/22/21 1735    Order Status: Completed Specimen: Nasopharyngeal Updated: 09/22/21 2051     Adenovirus NOT DETECTED        Coronavirus 229E NOT DETECTED        Coronavirus HKU1 NOT DETECTED        Coronavirus CVNL63 NOT DETECTED        Coronavirus OC43 NOT DETECTED        SARS-CoV-2, PCR NOT DETECTED        Metapneumovirus NOT DETECTED        Rhinovirus and Enterovirus NOT DETECTED        Influenza A NOT DETECTED        Influenza B NOT DETECTED        Parainfluenza 1 NOT DETECTED        Parainfluenza 2 NOT DETECTED Parainfluenza 3 NOT DETECTED        Parainfluenza virus 4 NOT DETECTED        RSV by PCR NOT DETECTED        B. parapertussis, PCR NOT DETECTED        Bordetella pertussis - PCR NOT DETECTED        Chlamydophila pneumoniae DNA, QL, PCR NOT DETECTED        Mycoplasma pneumoniae DNA, QL, PCR NOT DETECTED       SARS-COV-2, PCR [[id number]] Collected: 09/22/21 1720    Order Status: Canceled     COVID-19 RAPID TEST [[id number]] Collected: 09/22/21 1603    Order Status: Completed Specimen: Nasopharyngeal Updated: 09/22/21 1632     Specimen source NASAL SWAB        COVID-19 rapid test Not detected        Comment:      The specimen is NEGATIVE for SARS-CoV-2, the novel coronavirus associated with COVID-19. A negative result does not rule out COVID-19. This test has been authorized by the FDA under an Emergency Use Authorization (EUA) for use by authorized laboratories. Fact sheet for Healthcare Providers: [employer]  Fact sheet for Patients: [employer]       Methodology: Isothermal Nucleic Acid Amplification               Other Studies:  US ABD LTD    Result Date: 9/22/2021  Exam: Right upper quadrant ultrasound. Indication: Abdominal pain, elevated liver enzymes, elevated lipase Comparison: None. Findings: Pancreas: Visualized portion of the pancreas is within normal limits. Liver: Diffusely decreased in echogenicity with echogenic portal triads fat. Homogeneous echotexture and smooth contour. No suspicious hepatic masses. The main portal vein is patent with pulsatile flow and an appropriate direction of flow and velocity. The pulsatility is likely related to underlying acute hepatocellular dysfunction. Gallbladder: Diffuse gallbladder wall thickening without gallbladder distention or pericholecystic fluid. Sonographic Lousi sign was negative. No cholelithiasis. Biliary: No intra or extrahepatic biliary duct dilatation.  Right kidney: The right kidney is normal in echogenicity and normal in size measuring 9.5 cm. No contour deforming mass. No hydronephrosis or perinephric fluid. Abdominal Aorta: Nonaneurysmal in the visualized portion. Inferior Vena Cava: Patent. Ascites: Trace perihepatic ascites. 1. Diffusely decreased echogenicity in the liver which can be seen with acute hepatitis. 2. Trace perihepatic ascites. 3. Diffuse gallbladder wall thickening is likely reactive to underlying hepatocellular dysfunction. Acute cholecystitis is also a consideration but felt less likely given lack of other supporting features. Current Meds:  Current Facility-Administered Medications   Medication Dose Route Frequency    sodium chloride (NS) flush 5-40 mL  5-40 mL IntraVENous Q8H    sodium chloride (NS) flush 5-40 mL  5-40 mL IntraVENous PRN    polyethylene glycol (MIRALAX) packet 17 g  17 g Oral DAILY PRN    ondansetron (ZOFRAN ODT) tablet 4 mg  4 mg Oral Q8H PRN    Or    ondansetron (ZOFRAN) injection 4 mg  4 mg IntraVENous Q6H PRN    pantoprazole (PROTONIX) tablet 40 mg  40 mg Oral DAILY    oxyCODONE IR (ROXICODONE) tablet 5 mg  5 mg Oral Q6H PRN    atenoloL (TENORMIN) tablet 100 mg  100 mg Oral DAILY    sodium chloride (NS) flush 5-40 mL  5-40 mL IntraVENous Q8H    sodium chloride (NS) flush 5-40 mL  5-40 mL IntraVENous PRN     Current Outpatient Medications   Medication Sig    febuxostat (ULORIC) 40 mg tab tablet TAKE 1 TABLET BY MOUTH EVERY DAY    fluorouraciL (EFUDEX) 5 % chemo cream APPLY TO AFFECTED AREA IN A THIN LAYER EVERY EVENING FOR 3 WEEKS. [address] OFF EACH MORNING.     valACYclovir (VALTREX) 1 gram tablet Take 2 tablets twice a day for 1 day as needed for outbreak of fever blister    FLUoxetine (PROzac) 20 mg tablet TAKE 1 TABLET BY MOUTH EVERY DAY    atenoloL (TENORMIN) 50 mg tablet TAKE 2 TABLETS BY MOUTH DAILY INDICATIONS: FAST HEART BEAT DUE TO ANTIPSYCHOTIC MEDICATION  Indications: fast heart beat due to antipsychotic medication    DISABLED PLACARD (DISABLED PLACARD) DMV The orthopedic condition creates a substantial limitation in routine walking. Permanent    MD Forbes VMO#[id number]    furosemide (LASIX) 20 mg tablet Take 1 tablet by mouth every other day    pantoprazole (PROTONIX) 40 mg tablet TAKE 1 TABLET BY MOUTH EVERY DAY    loratadine (Claritin) 10 mg tablet Take 10 mg by mouth.  fluticasone (FLONASE) 50 mcg/actuation nasal spray 2 Sprays by Both Nostrils route daily.        Signed:  Samira Scales NP    Notes, labs, VS, diagnostic testing reviewed  Case discussed with pt, care team, Dr. Regla Can

## 2021-09-23 NOTE — PROGRESS NOTES
TRANSFER - IN REPORT:    Verbal report received from Marisabel Jewell RN(name) on Raisa Matute  being received from ER(unit) for routine progression of care      Report consisted of patients Situation, Background, Assessment and   Recommendations(SBAR). Information from the following report(s) ED Summary was reviewed with the receiving nurse. Opportunity for questions and clarification was provided. Assessment completed upon patients arrival to unit and care assumed.

## 2021-09-23 NOTE — ED NOTES
TRANSFER - OUT REPORT:    Verbal report given to GUERRERO Briggs on Samuel Knife  being transferred to [address] [place] [address] for routine progression of care       Report consisted of patients Situation, Background, Assessment and   Recommendations(SBAR). Information from the following report(s) ED Summary, MAR and Recent Results was reviewed with the receiving nurse. Lines:   Peripheral IV 09/22/21 Left Forearm (Active)   Site Assessment Clean, dry, & intact 09/22/21 1127   Phlebitis Assessment 0 09/22/21 1127   Infiltration Assessment 0 09/22/21 1127   Dressing Status Clean, dry, & intact 09/22/21 1127        Opportunity for questions and clarification was provided.       Patient transported with:   [facility]

## 2021-09-23 NOTE — CONSULTS
[facility] Consult Note       Primary GI Physician: Amauri Mack - Dr. Elfida Halsted    Referring Provider:  Kristina De Jesus    Consult Date:  9/23/2021    Admit Date:  9/22/2021    Chief Complaint:  Elevated LFT's    Subjective:     History of Present Illness:  Patient is a 78 y.o. female with PMH including but not limited to gout, HTN, HLD, normal pressure hydrocephalus s/p  shunt in 2018, CAD - no MI or stents, ECHO Nov 2020 with EF 55-60%, recent COVID infection with positive rapid test on 9 Sept 2021, who is seen in consultation at the request of Kristina De Jesus for elevated LFT's. Her  is at the bedside and brought in the labs from [place], [place] and [employer] on 9 Sept 2021 which was negative. She presented to the ER for increased abdominal pain, especially over the epigastric area, with pressure discomfort, worsening over the past few days, with associated bloating, decreased appetite, increased fatigue, and increased SOB. She has not weighed herself, but feels she has lost weight over this past month or so. She has a history of heartburn and chronic hoarseness, with recent increase in symptoms, improved with Protonix PRN, taking it less than once a week. She denies any chest pain, chronic cough, difficulty swallowing, vomiting, diarrhea, constipation, bloody or black stools. She has been noticing increased SOB and fatigue over the past couple of months, beginning while in [place] at the end of July, and continuing since then, especially during recent trip to [place], [place], when noted to be Covid positive. In the ER, she was noted on labs to have WBC 7.4, hgb 12.6, platelets 862, K 5.8, CO2 20, BUN 80, creatine 2.4 (baseline 1.4), total bili 1.5, , , lipase 2,227. LFTs were normal 1 March 2021. COVID and resp viral panel negative 9/22/21. Acute hepatitis panel negative.   RUQ US noted diffusely decreased echogenicity in the liver which can be seen with acute hepatitis, trace perihepatic ascites, and diffuse gallbladder wall thickening is likely reactive to underlying hepatocellular dysfunction. Abd x-ray was negative for ileus or obstruction. She denies any recent medication changes or ATBXs, ETOH use, drug use, blood transfusions. She denies any jaundice, hx of liver disease, or family history of liver disease. She takes 1 dose of Tylenol 500 mg a day since knee replacement in May 2021. She received Regneron monoconal Ab infusion in ER 9/20/21 for Covid. PMH:  Past Medical History:   Diagnosis Date    CAD (coronary artery disease) 04/26/2016    No MI, No stents - [facility]    Depression     Gait disorder 4/26/2016    Gout     Left foot    Hypertension     Hypertriglyceridemia 4/26/2016    Normal pressure hydrocephalus ([address] 03/06/2018    [facility] -- s/p  shunt    Small vessel disease, cerebrovascular 4/26/2016    Snores      Colonoscopy with hx of colon polyps - last colonoscopy possibly 2021    PSH:  Past Surgical History:   Procedure Laterality Date    Naomi Gayle - Dr. Vikash Villegas HX CSF SHUNT  about 2018    V/P shunt for NPH -- surgery at [address] [place] [address] HX LAPAROTOMY      for adhesions r/t hysterectomy    HX TONSILLECTOMY         Allergies: Allergies   Allergen Reactions    Latex Unknown (comments)    Ceftin [Cefuroxime Axetil] Rash    Sulfa (Sulfonamide Antibiotics) Rash and Other (comments)     KIDNEY MALFUNCTION       Home Medications:  Prior to Admission medications    Medication Sig Start Date End Date Taking? Authorizing Provider   febuxostat (ULORIC) 40 mg tab tablet TAKE 1 TABLET BY MOUTH EVERY DAY 9/20/21   Samuel Payne MD   fluorouraciL (EFUDEX) 5 % chemo cream APPLY TO AFFECTED AREA IN A THIN LAYER EVERY EVENING FOR 3 WEEKS.  [address] OFF EACH MORNING. 8/11/21   Provider, Historical   valACYclovir (VALTREX) 1 gram tablet Take 2 tablets twice a day for 1 day as needed for outbreak of fever blister 9/8/21   Shayy Rivas MD   FLUoxetine (PROzac) 20 mg tablet TAKE 1 TABLET BY MOUTH EVERY DAY 9/7/21   Shayy Rivas MD   atenoloL (TENORMIN) 50 mg tablet TAKE 2 TABLETS BY MOUTH DAILY INDICATIONS: FAST HEART BEAT DUE TO ANTIPSYCHOTIC MEDICATION  Indications: fast heart beat due to antipsychotic medication 7/13/21   Shayy Rivas MD   DISABLED PLACARD ([address] [place] [address]) DMV The orthopedic condition creates a substantial limitation in routine walking. Permanent    MD Sc CDD#[id number] 7/07/37   Joneen Libman, MD   furosemide (LASIX) 20 mg tablet Take 1 tablet by mouth every other day 4/14/21   Shayy Rivas MD   pantoprazole (PROTONIX) 40 mg tablet TAKE 1 TABLET BY MOUTH EVERY DAY 4/12/21   Shayy Rivas MD   loratadine (Claritin) 10 mg tablet Take 10 mg by mouth. Provider, Historical   fluticasone (FLONASE) 50 mcg/actuation nasal spray 2 Sprays by Both Nostrils route daily. Provider, Historical       Hospital Medications:  Current Facility-Administered Medications   Medication Dose Route Frequency    sodium chloride (NS) flush 5-40 mL  5-40 mL IntraVENous Q8H    sodium chloride (NS) flush 5-40 mL  5-40 mL IntraVENous PRN    polyethylene glycol (MIRALAX) packet 17 g  17 g Oral DAILY PRN    ondansetron (ZOFRAN ODT) tablet 4 mg  4 mg Oral Q8H PRN    Or    ondansetron (ZOFRAN) injection 4 mg  4 mg IntraVENous Q6H PRN    pantoprazole (PROTONIX) tablet 40 mg  40 mg Oral DAILY    oxyCODONE IR (ROXICODONE) tablet 5 mg  5 mg Oral Q6H PRN    atenoloL (TENORMIN) tablet 100 mg  100 mg Oral DAILY    sodium chloride (NS) flush 5-40 mL  5-40 mL IntraVENous Q8H    sodium chloride (NS) flush 5-40 mL  5-40 mL IntraVENous PRN       Social History:  Social History     Tobacco Use    Smoking status: Former Smoker    Smokeless tobacco: Never Used    Tobacco comment: quite 1974    Substance Use Topics    Alcohol use:  Yes     Alcohol/week: 0.0 standard drinks Comment: Social       Pt is  and denies any history of drug use, blood transfusions, or tattoos. Family History:  Family History   Problem Relation Age of Onset    Heart Disease Other     Stroke Other     Cancer Other      No family history of liver disease. Review of Systems:  A detailed 10 system ROS is obtained, with pertinent positives as listed above. All others are negative. Diet:  Regular diet    Objective:     Physical Exam:  Vitals:  Visit Vitals  /79 (BP 1 Location: Left upper arm, BP Patient Position: At rest)   Pulse (!) 116   Temp 97.6 °F (36.4 °C)   Resp 20   Ht 5' 6\" (1.676 m)   Wt 63.5 kg (140 lb)   SpO2 98%   BMI 22.60 kg/m²     Gen:  Pt is alert, cooperative, no acute distress, lying in bed  Skin:  Extremities and face reveal no rashes. HEENT: Sclerae anicteric. Extra-occular muscles are intact. No oral ulcers. No abnormal pigmentation of the lips. The neck is supple. Cardiovascular: Tachycardia. Respiratory:  Comfortable breathing with no accessory muscle use. Clear breath sounds anteriorly with no wheezes, rales, or rhonchi. GI:  Abdomen nondistended, soft, and mildly tender over epigastric area, with fullness over this area. Normal active bowel sounds. No enlargement of the liver or spleen. No masses palpable. Rectal:  Deferred  Musculoskeletal:  No pitting edema of the lower legs. Neurological:  Gross memory appears intact. Patient is alert and oriented. Psychiatric:  Mood appears appropriate with judgement intact. Lymphatic:  No cervical or supraclavicular adenopathy.     Laboratory:    Recent Labs     09/23/21  0425 09/22/21  1720 09/22/21  1123   WBC 7.6  --  7.4   HGB 12.2  --  12.6   HCT 39.3  --  41.0   *  --  138*   .9*  --  103.3*     --  135*   K 5.1  --  5.8*   *  --  106   CO2 21  --  20*   BUN 78*  --  80*   CREA 2.35*  --  2.47*   CA 8.1*  --  8.5   MG  --   --  2.3   *  --  127*     --  134   AST 328*  --  376*   *  --  636*   TBILI 1.3*  --  1.5*   ALB 2.7*  --  2.7*   TP 7.1  --  7.6   LPSE  --   --  2,227*   PTP  --  17.6*  --    INR  --  1.4  --       Ref. Range 9/23/2021 04:25   Hepatitis A, IgM Latest Ref Range: NR   NONREACTIVE   Hep B Surface Ag Latest Ref Range: NR   NONREACTIVE   Hepatitis B core, IgM Latest Ref Range: NR   NONREACTIVE   Hepatitis C virus Ab Latest Ref Range: NR   NONREACTIVE     COVID-19 RAPID TEST Order: [id number]  Collected:  9/22/2021 16:03 Status:  Final result   0 Result Notes   Ref Range & Units 9/22/21 1603   Specimen source   NASAL SWAB    COVID-19 rapid test NOTD   Not detected            RESPIRATORY VIRUS PANEL W/COVID-19, PCR Order: [id number]  Collected:  9/22/2021 17:35 Status:  Final result  Specimen Information: Nasopharyngeal         0 Result Notes   Ref Range & Units 9/22/21 1735   Adenovirus NOTDET   NOT DETECTED    Coronavirus 229E NOTDET   NOT DETECTED    Coronavirus HKU1 NOTDET   NOT DETECTED    Coronavirus CVNL63 NOTDET   NOT DETECTED    Coronavirus OC43 NOTDET   NOT DETECTED    SARS-CoV-2, PCR NOTDET   NOT DETECTED    Metapneumovirus NOTDET   NOT DETECTED    Rhinovirus and Enterovirus NOTDET   NOT DETECTED    Influenza A NOTDET   NOT DETECTED    Influenza B NOTDET   NOT DETECTED    Parainfluenza 1 NOTDET   NOT DETECTED    Parainfluenza 2 NOTDET   NOT DETECTED    Parainfluenza 3 NOTDET   NOT DETECTED    Parainfluenza virus 4 NOTDET   NOT DETECTED    RSV by PCR NOTDET   NOT DETECTED    B. parapertussis, PCR NOTDET   NOT DETECTED    Bordetella pertussis - PCR NOTDET   NOT DETECTED    Chlamydophila pneumoniae DNA, QL, PCR NOTDET   NOT DETECTED    Mycoplasma pneumoniae DNA, QL, PCR NOTDET    [facility]         Specimen Collected: 09/22/21 17:35 Last Resulted: 09/22/21 20:51              RUQ US 9/22/21  Findings:  Pancreas: Visualized portion of the pancreas is within normal limits.   Liver: Diffusely decreased in echogenicity with echogenic portal triads fat. Homogeneous echotexture and smooth contour. No suspicious hepatic masses. The  main portal vein is patent with pulsatile flow and an appropriate direction of  flow and velocity. The pulsatility is likely related to underlying acute  hepatocellular dysfunction. Gallbladder: Diffuse gallbladder wall thickening without gallbladder distention  or pericholecystic fluid. Sonographic Louis sign was negative. No  cholelithiasis. Biliary: No intra or extrahepatic biliary duct dilatation. Right kidney: The right kidney is normal in echogenicity and normal in size  measuring 9.5 cm. No contour deforming mass. No hydronephrosis or perinephric  fluid. Abdominal Aorta: Nonaneurysmal in the visualized portion. Inferior Vena Cava: Patent. Ascites: Trace perihepatic ascites. IMPRESSION  1. Diffusely decreased echogenicity in the liver which can be seen with acute  hepatitis. 2. Trace perihepatic ascites. 3. Diffuse gallbladder wall thickening is likely reactive to underlying  hepatocellular dysfunction. Acute cholecystitis is also a consideration but felt  less likely given lack of other supporting features. KUB 9/22/21  FINDINGS:  Chest: The cardiac silhouette is mildly enlarged. Mediastinal contours are  within normal limits. No pleural effusion or pneumothorax. No focal parenchymal  process. Osseous structures are intact with thoracic dextroscoliosis. Abdomen: Nonobstructive bowel gas pattern. No pneumoperitoneum or abnormal  air-fluid levels. No abnormal calcifications. Osseous structures are intact. Partially visualized  shunt catheter terminates in the pelvis. Atherosclerotic  calcifications. IMPRESSION  1. No acute radiographic abnormality in the chest or abdomen. 2. Cardiomegaly. 3. Partially visualized  shunt catheter terminating in the pelvis.     Assessment:     Principal Problem:    Acute hepatitis (9/22/2021)    Active Problems:    HTN (hypertension) (4/26/2016)      NPH (normal pressure hydrocephalus) ([facility] [address] (10/31/2017)      COVID-19 (9/22/2021)      JODY (acute kidney injury) ([facility] (9/22/2021)      Hyperkalemia (9/22/2021)      79 yo female, now pt of Dr Farideh Lane, with PMH including but not limited to gout, HTN, HLD, normal pressure hydrocephalus s/p  shunt in 2018, CAD - no MI or stents, ECHO Nov 2020 with EF 55-60%, recent COVID infection with positive rapid test on 9 Sept 2021, who is seen in consultation at the request of Nancy Smallwood for elevated LFT's, who was admitted after presenting with increased bloating and epi abd pain, with increased fatigue and SOB. Lab with elevated lipase 2227, TB 1.5 --> 1.3,  --> 120,  --> 328,  --> 668. RUQ US with acute hepatitis, trace perihepatic ascites and diffuse gallbladder wall thickening is likely reactive to underlying hepatocellular dysfunction. KUB negative. Elevated LFTs may be associated with recent Covid illness, medications, biliary obstruction. Viral hepatitis panel is negative. Lipase elevation may be associated with pancreatitis. JODY noted, with slight decrease in Cr today. Plan:     - Supportive care, maintain electrolytes, IVF. - Will get tylenol level. - Consider MRI to further evaluate liver and bile ducts. Unable to have contrast with CT given JODY.  - NPO. - Monitor labs. - Follow. Patient is seen and examined in collaboration with Dr. Heri Barba. Assessment and plan as per Dr. Farideh Lane. Svetlana Guerin  [facility]

## 2021-09-23 NOTE — PROGRESS NOTES
09/23/21 1601   Dual Skin Pressure Injury Assessment   Dual Skin Pressure Injury Assessment WDL   Second Care Provider (Based on [address] [place] [address]) Stephane Monroe, RN   Skin Integumentary   Skin Integumentary (WDL) X    Pressure  Injury Documentation No Pressure Injury Noted-Pressure Ulcer Prevention Initiated   Skin Color Appropriate for ethnicity   Skin Condition/Temp Flaky;Fragile   Skin Integrity Intact   Turgor Epidermis thin w/ loss of subcut tissue   Hair Growth Present   Nails WDL   Varicosities Absent   Wound Prevention and Protection Methods   Orientation of Wound Prevention Posterior   Location of Wound Prevention Sacrum/Coccyx   Dressing Present  No   Wound Offloading (Prevention Methods) Bed, pressure reduction mattress

## 2021-09-24 NOTE — PROGRESS NOTES
[facility] Progress Note         Admit Date:  9/22/2021    Today's Date:  9/24/2021    CC:  Elevated LFT's    Subjective:     Patient reports abd ttp and passed several small stools today. Denies nausea. MRCP pending. Medications:   Current Facility-Administered Medications   Medication Dose Route Frequency    lactated Ringers infusion  175 mL/hr IntraVENous CONTINUOUS    sodium chloride (NS) flush 5-40 mL  5-40 mL IntraVENous Q8H    sodium chloride (NS) flush 5-40 mL  5-40 mL IntraVENous PRN    polyethylene glycol (MIRALAX) packet 17 g  17 g Oral DAILY PRN    ondansetron (ZOFRAN ODT) tablet 4 mg  4 mg Oral Q8H PRN    Or    ondansetron (ZOFRAN) injection 4 mg  4 mg IntraVENous Q6H PRN    pantoprazole (PROTONIX) tablet 40 mg  40 mg Oral DAILY    oxyCODONE IR (ROXICODONE) tablet 5 mg  5 mg Oral Q6H PRN    atenoloL (TENORMIN) tablet 100 mg  100 mg Oral DAILY    sodium chloride (NS) flush 5-40 mL  5-40 mL IntraVENous Q8H    sodium chloride (NS) flush 5-40 mL  5-40 mL IntraVENous PRN       Review of Systems:  ROS was obtained, with pertinent positives as listed above. No chest pain or SOB. Diet:  Clear liquid diet    Objective:   Vitals:  Visit Vitals  BP (!) 125/96 (BP 1 Location: Right upper arm, BP Patient Position: Supine)   Pulse 94   Temp 97.6 °F (36.4 °C)   Resp 20   Ht 5' 6\" (1.676 m)   Wt 63 kg (138 lb 14.2 oz)   SpO2 100%   BMI 22.42 kg/m²     Intake/Output:  No intake/output data recorded. 09/22 1901 - 09/24 0700  In: 118 [P.O.:118]  Out: 100 [Urine:100]  Exam:  General appearance: alert, cooperative, no distress  Lungs: clear to auscultation bilaterally anteriorly  Heart: regular rate and rhythm  Abdomen: soft, mild ttp in mid abd.  Bowel sounds normal. No masses, no organomegaly  Extremities: extremities normal, atraumatic, no cyanosis or edema  Neuro:  alert and oriented    Data Review (Labs):    Recent Labs     09/24/21  0658 09/23/21  1828 09/23/21  2049 09/22/21  1720 09/22/21  1123   WBC 7.3  --  7.6  --  7.4   HGB 12.2  --  12.2  --  12.6   HCT 40.0  --  39.3  --  41.0   PLT 59*  --  107*  --  138*   .4*  --  102.9*  --  103.3*     --  138  --  135*   K 4.7  --  5.1  --  5.8*   *  --  109*  --  106   CO2 18*  --  21  --  20*   BUN 80*  --  78*  --  80*   CREA 2.34*  --  2.35*  --  2.47*   CA 8.7  --  8.1*  --  8.5   MG  --   --   --   --  2.3   GLU 98  --  110*  --  127*     --  120  --  134   *  --  328*  --  376*   *  --  668*  --  636*   TBILI 1.4*  --  1.3*  --  1.5*   CBIL 0.8*  --   --   --   --    ALB 2.6*  --  2.7*  --  2.7*   TP 6.6  --  7.1  --  7.6   LPSE 1,605*  --   --   --  2,227*   PTP 18.2* 18.4*  --  17.6*  --    INR 1.5 1.5  --  1.4  --                   Ref.  Range 9/23/2021 04:25   Hepatitis A, IgM Latest Ref Range: NR   NONREACTIVE    Hep B Surface Ag Latest Ref Range: NR   NONREACTIVE    Hepatitis B core, IgM Latest Ref Range: NR   NONREACTIVE    Hepatitis C virus Ab Latest Ref Range: NR   NONREACTIVE       COVID-19 RAPID TEST Order: [id number]  Collected:  9/22/2021 16:03 Status:  Final result   0 Result Notes    Ref Range & Units 9/22/21 1603   Specimen source   NASAL SWAB    COVID-19 rapid test NOTD   Not detected             RESPIRATORY VIRUS PANEL W/COVID-19, PCR Order: [id number]  Collected:  9/22/2021 17:35 Status:  Final result  Specimen Information: Nasopharyngeal           0 Result Notes    Ref Range & Units 9/22/21 1735   Adenovirus NOTDET   NOT DETECTED    Coronavirus 229E NOTDET   NOT DETECTED    Coronavirus HKU1 NOTDET   NOT DETECTED    Coronavirus CVNL63 NOTDET   NOT DETECTED    Coronavirus OC43 NOTDET   NOT DETECTED    SARS-CoV-2, PCR NOTDET   NOT DETECTED    Metapneumovirus NOTDET   NOT DETECTED    Rhinovirus and Enterovirus NOTDET   NOT DETECTED    Influenza A NOTDET   NOT DETECTED    Influenza B NOTDET   NOT DETECTED    Parainfluenza 1 NOTDET   NOT DETECTED    Parainfluenza 2 NOTDET   NOT DETECTED    Parainfluenza 3 NOTDET   NOT DETECTED    Parainfluenza virus 4 NOTDET   NOT DETECTED    RSV by PCR NOTDET   NOT DETECTED    B. parapertussis, PCR NOTDET   NOT DETECTED    Bordetella pertussis - PCR NOTDET   NOT DETECTED    Chlamydophila pneumoniae DNA, QL, PCR NOTDET   NOT DETECTED    Mycoplasma pneumoniae DNA, QL, PCR NOTDET   NOT DETECTED    Resulting Agency   [facility]          Specimen Collected: 09/22/21 17:35 Last Resulted: 09/22/21 20:51                RUQ US 9/22/21  Findings:  Pancreas: Visualized portion of the pancreas is within normal limits. Liver: Diffusely decreased in echogenicity with echogenic portal triads fat. Homogeneous echotexture and smooth contour. No suspicious hepatic masses. The  main portal vein is patent with pulsatile flow and an appropriate direction of  flow and velocity. The pulsatility is likely related to underlying acute  hepatocellular dysfunction. Gallbladder: Diffuse gallbladder wall thickening without gallbladder distention  or pericholecystic fluid. Sonographic Louis sign was negative. No  cholelithiasis. Biliary: No intra or extrahepatic biliary duct dilatation. Right kidney: The right kidney is normal in echogenicity and normal in size  measuring 9.5 cm. No contour deforming mass. No hydronephrosis or perinephric  fluid. Abdominal Aorta: Nonaneurysmal in the visualized portion. Inferior Vena Cava: Patent. Ascites: Trace perihepatic ascites. IMPRESSION  1. Diffusely decreased echogenicity in the liver which can be seen with acute  hepatitis. 2. Trace perihepatic ascites. 3. Diffuse gallbladder wall thickening is likely reactive to underlying  hepatocellular dysfunction. Acute cholecystitis is also a consideration but felt  less likely given lack of other supporting features.     KUB 9/22/21  FINDINGS:  Chest: The cardiac silhouette is mildly enlarged. Mediastinal contours are  within normal limits.  No pleural effusion or pneumothorax. No focal parenchymal  process. Osseous structures are intact with thoracic dextroscoliosis. Abdomen: Nonobstructive bowel gas pattern. No pneumoperitoneum or abnormal  air-fluid levels. No abnormal calcifications. Osseous structures are intact. Partially visualized  shunt catheter terminates in the pelvis. Atherosclerotic  calcifications. IMPRESSION  1. No acute radiographic abnormality in the chest or abdomen. 2. Cardiomegaly. 3. Partially visualized  shunt catheter terminating in the pelvis. Assessment:     Principal Problem:    Acute hepatitis (9/22/2021)    Active Problems:    HTN (hypertension) (4/26/2016)      NPH (normal pressure hydrocephalus) ([facility] [address] (10/31/2017)      COVID-19 (9/22/2021)      JODY (acute kidney injury) ([facility] [address] (9/22/2021)      Hyperkalemia (9/22/2021)      79 yo female, now pt of Dr Azalea Mcknight, with PMH including but not limited to gout, HTN, HLD, normal pressure hydrocephalus s/p  shunt in 2018, CAD - no MI or stents, ECHO Nov 2020 with EF 55-60%, recent COVID infection with positive rapid test on 9 Sept 2021, who is seen in consultation at the request of Jacqueline Su for elevated LFT's, who was admitted after presenting with increased bloating and epi abd pain, with increased fatigue and SOB. Lab with elevated lipase 2227, TB 1.5 --> 1.3,  --> 120,  --> 328,  --> 668. RUQ US with acute hepatitis, trace perihepatic ascites and diffuse gallbladder wall thickening is likely reactive to underlying hepatocellular dysfunction. KUB negative. Elevated LFTs may be associated with recent Covid illness, medications, biliary obstruction. Viral hepatitis panel is negative. Lipase elevation may be associated with pancreatitis. JODY noted, with slight decrease in Cr today. Plan:     - Clear liquid diet  - Continue IVF with LR  - Monitor LFT's - stable today  - Checking CORNELIA and ASMA  - MCRP pending today  - PRN pain meds.  PRN anti-emetic  - Protonix 40 mg daily  - UA negative  - KAELYN Ennis NP  Patient is seen and examined in collaboration with Dr. Marylene Beery. Assessment and plan as per Dr. Alysa Umana. Whit Zelaya

## 2021-09-24 NOTE — PROGRESS NOTES
Discharge Location  Discharge Placement: Unable to determine at this time    SW made an attempt to speak w/ pt, pt is unable to answer questions due to being short of breath, nurses are in the room with pt.  SW will try at another time    BRODY Ross

## 2021-09-24 NOTE — PROGRESS NOTES
Problem: Falls - Risk of  Goal: *Absence of Falls  Description: Document Mariella Richardson Fall Risk and appropriate interventions in the flowsheet.   Outcome: Progressing Towards Goal  Note: Fall Risk Interventions:  Mobility Interventions: Communicate number of staff needed for ambulation/transfer

## 2021-09-24 NOTE — PROGRESS NOTES
Patient has right programmable  shunt placed by Dr. Naty Griffin from 2018. Shunt needs to be checked/reset within 4 hours after having an MRI. I contacted Dr. Burleigh Klinefelter office, he will not be in office until Monday 9/27 to reset. Stated patient would have to come in office to reset. I contacted neurosurgery (Dr. Steven Tam) here to see if possible to reset, they stated they do not reset shunts or round/see consults at [facility]. Also tried to contact [facility] to see if anyone could check shunt. No answer. Contacted GI to discuss shunt. GI would like to wait until Monday to have MRI completed then. Contacted MRI and updated them on patient status. MRI will need to be completed on Monday 9/27 if shunt is able to be checked after.

## 2021-09-24 NOTE — PROGRESS NOTES
Hospitalist Progress Note   Admit Date:  2021 11:15 AM   Name:  Clark Greenfield   Age:  78 y.o. Sex:  female  :  1940   MRN:  [id number]   Room:  [facility]/    Presenting Complaint: Anal Pain    Initial Admission Diagnosis: COVID-19 [U07.1]  Acute hepatitis [B17.9]     Assessment and Plan:   # Transaminitis/possible acute hepatitis   - Hep panel negative. GI consulted and further labs sent, appreciate assistance. MRCP ordered but she has a  shunt which needs to be reset after, so this will be sorted out and completed early next week. Daily CMP, IVFs. # JODY   - Cr stable, did recently have COVID, just put on IVFs last night so will repeat Cr tomorrow in hopes of improvement. If no improvement will get renal US and urine studies. # Thrombocytopenia   - Acute, no bleeding, likely from acute liver issues, worse today, add peripheral smear, CBC tomorrow. # HyperK   - Resolved. # H/o COVID   - Reportedly >2 weeks since diagnosis, she was given an infusion outpatient (likely Regeneron) and was never hospitalized. On RA. COVID negative on viral panel done at admission. # NPH s/p  shunt   - Needs reprogramming post MRI as above. # HTN   - Atenolol    Discharge Planning: Pending. Diet:  ADULT DIET Clear Liquid  DVT PPx: SCDs only.   Code status: Full Code    Hospital Problems as of 2021 Date Reviewed: 2021        Codes Class Noted - Resolved POA    * (Principal) Acute hepatitis ICD-10-CM: B17.9  ICD-9-CM: 570  2021 - Present Unknown        COVID-19 ICD-10-CM: U07.1  ICD-9-CM: 079.89  2021 - Present Unknown        JODY (acute kidney injury) ([facility] [address] ICD-10-CM: N17.9  ICD-9-CM: 584.9  2021 - Present Unknown        Hyperkalemia ICD-10-CM: E87.5  ICD-9-CM: 276.7  2021 - Present Unknown        NPH (normal pressure hydrocephalus) (HCC) ICD-10-CM: G91.2  ICD-9-CM: 331.5  10/31/2017 - Present Yes        HTN (hypertension) ICD-10-CM: I10  ICD-9-CM: 401.9  2016 - Present Yes              Hospital Course:   Ms. Jesse Witt is a 79 yo female with PMH of HTN, gout, HLD, NPH with  shunt and recent dx of COVID 14 days ago who presented with c/o abd pain and bloating over a few days. Labs with JODY, transaminitis and elevated bilirubin. GI consulted. RUQ abd US showed findings seen with acute hepatitis. Hep panel neg. 24hr Events/Subjective (09/24/21):   9/24: Napping, wakes up and says she feels ok, no abdo pain, afebrile overnight, lunch tray at bedside untouched. Labs stable. ROS neg otherwise. Objective:     Patient Vitals for the past 24 hrs:   Temp Pulse Resp BP SpO2   09/24/21 1540 97.7 °F (36.5 °C) (!) 107 20 (!) 140/84 100 %   09/24/21 1228  (!) 125 26 (!) 115/91 100 %   09/24/21 1045 97.4 °F (36.3 °C) (!) 102 24 111/80 100 %   09/24/21 0730 97.6 °F (36.4 °C) 94 20 (!) 125/96 100 %   09/24/21 0332 97.9 °F (36.6 °C) 98 18 125/88 100 %   09/23/21 2322 97.8 °F (36.6 °C) (!) 109 18 120/80 99 %   09/23/21 2033    108/83    09/23/21 1933 97.5 °F (36.4 °C) 88 18 (!) 118/101 99 %     Oxygen Therapy  O2 Sat (%): 100 % (09/24/21 1540)  Pulse via Oximetry: 116 beats per minute (09/23/21 1503)  O2 Device: None (Room air) (09/23/21 1503)    Estimated body mass index is 22.42 kg/m² as calculated from the following:    Height as of this encounter: 5' 6\" (1.676 m). Weight as of this encounter: 63 kg (138 lb 14.2 oz). Intake/Output Summary (Last 24 hours) at 9/24/2021 1629  Last data filed at 9/24/2021 0618  Gross per 24 hour   Intake 118 ml   Output 100 ml   Net 18 ml         General:    Well nourished. No overt distress  Head:  Normocephalic, atraumatic  Eyes:  Sclerae appear normal.  Pupils equally round. HENT:  Nares appear normal, no drainage. Dry mucous membranes  Neck:  No restricted ROM. Trachea midline  CV:   RRR. No m/r/g. No JVD  Lungs:   CTAB. No wheezing, rhonchi, or rales. Appears even, unlabored  Abdomen: Bowel sounds present.   Soft, nontender, nondistended. Extremities: Warm and dry. No cyanosis or clubbing. No edema. Skin:     No rashes. Normal turgor. Normal coloration  Neuro:  Cranial nerves II-XII grossly intact. Sensation intact  Psych:  Normal mood and affect. Alert and oriented x3, tired. Data Ordered and Personally Reviewed:    Last 24hr Labs:  Recent Results (from the past 24 hour(s))   ACETAMINOPHEN    Collection Time: 09/23/21  6:28 PM   Result Value Ref Range    Acetaminophen level <2 (L) 10.0 - 30 ug/mL   PROTHROMBIN TIME + INR    Collection Time: 09/23/21  6:28 PM   Result Value Ref Range    Prothrombin time 18.4 (H) 12.6 - 14.5 sec    INR 1.5     CBC WITH AUTOMATED DIFF    Collection Time: 09/24/21  6:58 AM   Result Value Ref Range    WBC 7.3 4.3 - 11.1 K/uL    RBC 3.83 (L) 4.05 - 5.2 M/uL    HGB 12.2 11.7 - 15.4 g/dL    HCT 40.0 35.8 - 46.3 %    .4 (H) 79.6 - 97.8 FL    MCH 31.9 26.1 - 32.9 PG    MCHC 30.5 (L) 31.4 - 35.0 g/dL    RDW 15.9 (H) 11.9 - 14.6 %    PLATELET 59 (L) 877 - 450 K/uL    MPV 12.2 9.4 - 12.3 FL    ABSOLUTE NRBC 0.02 0.0 - 0.2 K/uL    DF AUTOMATED      NEUTROPHILS 68 43 - 78 %    LYMPHOCYTES 19 13 - 44 %    MONOCYTES 10 4.0 - 12.0 %    EOSINOPHILS 3 0.5 - 7.8 %    BASOPHILS 0 0.0 - 2.0 %    IMMATURE GRANULOCYTES 0 0.0 - 5.0 %    ABS. NEUTROPHILS 4.9 1.7 - 8.2 K/UL    ABS. LYMPHOCYTES 1.4 0.5 - 4.6 K/UL    ABS. MONOCYTES 0.7 0.1 - 1.3 K/UL    ABS. EOSINOPHILS 0.2 0.0 - 0.8 K/UL    ABS. BASOPHILS 0.0 0.0 - 0.2 K/UL    ABS. IMM.  GRANS. 0.0 0.0 - 0.5 K/UL   METABOLIC PANEL, COMPREHENSIVE    Collection Time: 09/24/21  6:58 AM   Result Value Ref Range    Sodium 139 136 - 145 mmol/L    Potassium 4.7 3.5 - 5.1 mmol/L    Chloride 108 (H) 98 - 107 mmol/L    CO2 18 (L) 21 - 32 mmol/L    Anion gap 13 7 - 16 mmol/L    Glucose 98 65 - 100 mg/dL    BUN 80 (H) 8 - 23 MG/DL    Creatinine 2.34 (H) 0.6 - 1.0 MG/DL    GFR est AA 26 (L) >60 ml/min/1.73m2    GFR est non-AA 21 (L) >60 ml/min/1.73m2    Calcium 8.7 8.3 - 10.4 MG/DL    Bilirubin, total 1.4 (H) 0.2 - 1.1 MG/DL    ALT (SGPT) 686 (H) 12 - 65 U/L    AST (SGOT) 338 (H) 15 - 37 U/L    Alk.  phosphatase 130 50 - 136 U/L    Protein, total 6.6 6.3 - 8.2 g/dL    Albumin 2.6 (L) 3.2 - 4.6 g/dL    Globulin 4.0 (H) 2.3 - 3.5 g/dL    A-G Ratio 0.7 (L) 1.2 - 3.5     LIPASE    Collection Time: 09/24/21  6:58 AM   Result Value Ref Range    Lipase 1,605 (H) 73 - 393 U/L   PROTHROMBIN TIME + INR    Collection Time: 09/24/21  6:58 AM   Result Value Ref Range    Prothrombin time 18.2 (H) 12.6 - 14.5 sec    INR 1.5     BILIRUBIN, DIRECT    Collection Time: 09/24/21  6:58 AM   Result Value Ref Range    Bilirubin, direct 0.8 (H) <0.4 MG/DL       All Micro Results     Procedure Component Value Units Date/Time    RESPIRATORY VIRUS PANEL W/COVID-19, PCR [[id number]] Collected: 09/22/21 1735    Order Status: Completed Specimen: Nasopharyngeal Updated: 09/22/21 2051     Adenovirus NOT DETECTED        Coronavirus 229E NOT DETECTED        Coronavirus HKU1 NOT DETECTED        Coronavirus CVNL63 NOT DETECTED        Coronavirus OC43 NOT DETECTED        SARS-CoV-2, PCR NOT DETECTED        Metapneumovirus NOT DETECTED        Rhinovirus and Enterovirus NOT DETECTED        Influenza A NOT DETECTED        Influenza B NOT DETECTED        Parainfluenza 1 NOT DETECTED        Parainfluenza 2 NOT DETECTED        Parainfluenza 3 NOT DETECTED        Parainfluenza virus 4 NOT DETECTED        RSV by PCR NOT DETECTED        B. parapertussis, PCR NOT DETECTED        Bordetella pertussis - PCR NOT DETECTED        Chlamydophila pneumoniae DNA, QL, PCR NOT DETECTED        Mycoplasma pneumoniae DNA, QL, PCR NOT DETECTED       SARS-COV-2, PCR [[id number]] Collected: 09/22/21 1720    Order Status: Canceled     COVID-19 RAPID TEST [[id number]] Collected: 09/22/21 1603    Order Status: Completed Specimen: Nasopharyngeal Updated: 09/22/21 1632     Specimen source NASAL SWAB        COVID-19 rapid test Not detected        Comment: The specimen is NEGATIVE for SARS-CoV-2, the novel coronavirus associated with COVID-19. A negative result does not rule out COVID-19. This test has been authorized by the FDA under an Emergency Use Authorization (EUA) for use by authorized laboratories. Fact sheet for Healthcare Providers: ConventionUpdate.co.nz  Fact sheet for Patients: ConventionUpdate.co.nz       Methodology: Isothermal Nucleic Acid Amplification               Current Meds:  Current Facility-Administered Medications   Medication Dose Route Frequency    lactated Ringers infusion  175 mL/hr IntraVENous CONTINUOUS    sodium chloride (NS) flush 5-40 mL  5-40 mL IntraVENous Q8H    sodium chloride (NS) flush 5-40 mL  5-40 mL IntraVENous PRN    polyethylene glycol (MIRALAX) packet 17 g  17 g Oral DAILY PRN    ondansetron (ZOFRAN ODT) tablet 4 mg  4 mg Oral Q8H PRN    Or    ondansetron (ZOFRAN) injection 4 mg  4 mg IntraVENous Q6H PRN    pantoprazole (PROTONIX) tablet 40 mg  40 mg Oral DAILY    oxyCODONE IR (ROXICODONE) tablet 5 mg  5 mg Oral Q6H PRN    atenoloL (TENORMIN) tablet 100 mg  100 mg Oral DAILY    sodium chloride (NS) flush 5-40 mL  5-40 mL IntraVENous Q8H    sodium chloride (NS) flush 5-40 mL  5-40 mL IntraVENous PRN       Other Studies:    No results found. Signed:  Katarzyna Garcias MD    Part of this note may have been written by using a voice dictation software. The note has been proof read but may still contain some grammatical/other typographical errors.

## 2021-09-25 NOTE — PROGRESS NOTES
GI DAILY PROGRESS NOTE    Admit Date:  9/22/2021    Today's Date:  9/25/2021    CC:  Elevated LFT's, abdominal pain    Subjective:     Patient with mild persistent abdominal pain. Tolerating clear liquids. +small bowel movement without melena and BRBPR. Medications:   Current Facility-Administered Medications   Medication Dose Route Frequency    lactated Ringers infusion  175 mL/hr IntraVENous CONTINUOUS    sodium chloride (NS) flush 5-40 mL  5-40 mL IntraVENous Q8H    sodium chloride (NS) flush 5-40 mL  5-40 mL IntraVENous PRN    polyethylene glycol (MIRALAX) packet 17 g  17 g Oral DAILY PRN    ondansetron (ZOFRAN ODT) tablet 4 mg  4 mg Oral Q8H PRN    Or    ondansetron (ZOFRAN) injection 4 mg  4 mg IntraVENous Q6H PRN    pantoprazole (PROTONIX) tablet 40 mg  40 mg Oral DAILY    oxyCODONE IR (ROXICODONE) tablet 5 mg  5 mg Oral Q6H PRN    atenoloL (TENORMIN) tablet 100 mg  100 mg Oral DAILY    sodium chloride (NS) flush 5-40 mL  5-40 mL IntraVENous Q8H    sodium chloride (NS) flush 5-40 mL  5-40 mL IntraVENous PRN       Review of Systems:  ROS was obtained, with pertinent positives as listed above. No chest pain or SOB. Diet:  Clear liquid diet    Objective:   Vitals:  Visit Vitals  BP (!) 125/97 (BP 1 Location: Left upper arm, BP Patient Position: Supine)   Pulse (!) 102   Temp 97.5 °F (36.4 °C)   Resp 20   Ht 5' 6\" (1.676 m)   Wt 63 kg (138 lb 14.2 oz)   SpO2 98%   BMI 22.42 kg/m²     Intake/Output:  No intake/output data recorded.   09/23 1901 - 09/25 0700  In: 3404 [I.V.:3404]  Out: 100 [Urine:100]  Exam:  General appearance: NAD  HEENT: anicteric sclera, mmm  Lungs: clear to auscultation bilaterally anteriorly  Heart: regular rate and rhythm  Abdomen: soft, ND, light BS, mild epigastric tenderness, neg rebound/guarding  Neuro:  alert and oriented x3  Data Review (Labs):    Recent Labs     09/25/21  0525 09/24/21  0658 09/23/21  1828 09/23/21  0425 09/22/21  1720   WBC 6.2 7.3  --  7.6  -- HGB 11.6* 12.2  --  12.2  --    HCT 38.1 40.0  --  39.3  --    PLT 99* 59*  --  107*  --    .5* 104.4*  --  102.9*  --     139  --  138  --    K 4.3 4.7  --  5.1  --    * 108*  --  109*  --    CO2 19* 18*  --  21  --    BUN 71* 80*  --  78*  --    CREA 2.12* 2.34*  --  2.35*  --    CA 8.6 8.7  --  8.1*  --    GLU 96 98  --  110*  --     130  --  120  --    CBIL  --  0.8*  --   --   --    ALB 2.5* 2.6*  --  2.7*  --    TP 6.4 6.6  --  7.1  --    LPSE  --  1,605*  --   --   --    PTP 18.5* 18.2* 18.4*  --  17.6*   INR 1.5 1.5 1.5  --  1.4       Assessment:     Principal Problem:    Acute hepatitis (9/22/2021)    Active Problems:    HTN (hypertension) (4/26/2016)      NPH (normal pressure hydrocephalus) (HCC) (10/31/2017)      COVID-19 (9/22/2021)      JODY (acute kidney injury) ([facility] [address] (9/22/2021)      Hyperkalemia (9/22/2021)      79 yo female with h/o gout, HTN, HLD, normal pressure hydrocephalus s/p  shunt in 2018, CAD - no MI or stents, ECHO Nov 2020 with EF 55-60%, recent COVID infection with positive rapid test on 9 Sept 2021, who is seen for elevated LFT's, epigastric pain, and elevation of lipase. Pattern of lipase elevation and LFT elevation is suggestive of choledocholithiasis with gallstone pancreatitis. However, RUQ US with GB wall thickening, negative biliary dilation, and trace perihepatic ascites. US did not identify gallstones. Viral hepatitis panel is negative. CORNELIA and ASMA pending. JODY. Lipase is slowly trending down. Tolerating clear liquids without increase in abdominal pain. LFT's remain elevated and are stable. On IVF with LR>    Plan:     1. Continue clear liquid diet  2. Continue IVF with LR  3. Daily LFT's  4. Follow-up CORNELIA and ASMA  5. MCRP to be done Monday as patient will need  shunt reset following MRI  6. PRN pain meds. PRN anti-emetics  7. Protonix 40 mg daily  8.    UA negative for infection  9. JODY slowly improving  10.   KAELYN alexey       Will follow     Dieter Arauz MD

## 2021-09-25 NOTE — PROGRESS NOTES
Hospitalist Progress Note   Admit Date:  2021 11:15 AM   Name:  Shawna Marking   Age:  78 y.o. Sex:  female  :  1940   MRN:  [id number]   Room:  [facility]/    Presenting Complaint: Anal Pain    Initial Admission Diagnosis: COVID-19 [U07.1]  Acute hepatitis [B17.9]     Assessment and Plan:   # Transaminitits/acute hepatitis              - Hep panel negative. GI consulted and further labs sent, appreciate assistance. Hep panel neg. MRCP ordered but she has a  shunt which needs to be reset after, may need to have this done downtown. Enzymes a little better, bili and INR the same.      # JODY              - Cr slightly improved after initiation of fluids, encourage PO intake.      # Thrombocytopenia              - Acute, no bleeding, likely from acute liver issues, peripheral smear pending, counts up today.     # HyperK              - Resolved.     # H/o COVID              - Reportedly >2 weeks since diagnosis, she was given an infusion outpatient (likely Regeneron) and was never hospitalized. On RA. COVID negative on viral panel done at admission.     # NPH s/p  shunt              - Needs reprogramming post MRI as above.     # HTN              - Atenolol    Discharge Planning: Pending, PT/OT evals today.   Diet:  ADULT DIET Clear Liquid  DVT PPx: SCDs  Code status: Full Code    Hospital Problems as of 2021 Date Reviewed: 2021        Codes Class Noted - Resolved POA    * (Principal) Acute hepatitis ICD-10-CM: B17.9  ICD-9-CM: 570  2021 - Present Unknown        COVID-19 ICD-10-CM: U07.1  ICD-9-CM: 079.89  2021 - Present Unknown        JODY (acute kidney injury) ([facility] [address] ICD-10-CM: N17.9  ICD-9-CM: 584.9  2021 - Present Unknown        Hyperkalemia ICD-10-CM: E87.5  ICD-9-CM: 276.7  2021 - Present Unknown        NPH (normal pressure hydrocephalus) (HCC) ICD-10-CM: G91.2  ICD-9-CM: 331.5  10/31/2017 - Present Yes        HTN (hypertension) ICD-10-CM: I10  ICD-9-CM: 401.9  2016 - Present Yes              Hospital Course:   Ms. Celine Cota is a 79 yo female with PMH of HTN, gout, HLD, NPH with  shunt and recent dx of COVID 14 days ago who presented with c/o abd pain and bloating over a few days. Labs with JODY, transaminitis and elevated bilirubin. GI was consulted. RUQ abd US showed findings seen with acute hepatitis. Hepatitis panel neg. CORNELIA and ASMA sent, pending. 24hr Events/Subjective (09/25/21):   9/25: Cr and enzymes a little improved today, bili and INR the same. She did well with her breakfast this AM, ate everything, no pain or N/V with food intake. Does still have some lower abdominal discomfort. No chest pain or SOB. Objective:     Patient Vitals for the past 24 hrs:   Temp Pulse Resp BP SpO2   09/25/21 0800 97.4 °F (36.3 °C) 100 18 118/84 100 %   09/25/21 0314 97.3 °F (36.3 °C) (!) 111 18 129/85 100 %   09/24/21 2359 97.4 °F (36.3 °C) (!) 117 20 (!) 126/93 99 %   09/24/21 2003 97.5 °F (36.4 °C) (!) 105 22 (!) 122/97 100 %   09/24/21 1540 97.7 °F (36.5 °C) (!) 107 20 (!) 140/84 100 %   09/24/21 1228  (!) 125 26 (!) 115/91 100 %     Oxygen Therapy  O2 Sat (%): 100 % (09/25/21 0800)  Pulse via Oximetry: 116 beats per minute (09/23/21 1503)  O2 Device: None (Room air) (09/23/21 1503)    Estimated body mass index is 22.42 kg/m² as calculated from the following:    Height as of this encounter: 5' 6\" (1.676 m). Weight as of this encounter: 63 kg (138 lb 14.2 oz). Intake/Output Summary (Last 24 hours) at 9/25/2021 1055  Last data filed at 9/25/2021 0314  Gross per 24 hour   Intake 3404 ml   Output    Net 3404 ml         General:    Well nourished. No overt distress  Head:  Normocephalic, atraumatic  Eyes:  Sclerae appear normal.  Pupils equally round. HENT:  Nares appear normal, no drainage. Moist mucous membranes  Neck:  No restricted ROM. Trachea midline  CV:   RRR. No m/r/g. No JVD  Lungs:   CTAB. No wheezing, rhonchi, or rales.   Appears even, unlabored  Abdomen: Bowel sounds present. Soft, nondistended. Mild lower abdominal tenderness. No rebound or guarding. Extremities: Warm and dry. No cyanosis or clubbing. No edema. Skin:     No rashes. Normal turgor. Normal coloration  Neuro:  Cranial nerves II-XII grossly intact. Sensation intact  Psych:  Normal mood and affect. Alert and oriented x3    Data Ordered and Personally Reviewed:    Last 24hr Labs:  Recent Results (from the past 24 hour(s))   PROTHROMBIN TIME + INR    Collection Time: 09/25/21  5:25 AM   Result Value Ref Range    Prothrombin time 18.5 (H) 12.6 - 14.5 sec    INR 1.5     METABOLIC PANEL, COMPREHENSIVE    Collection Time: 09/25/21  5:25 AM   Result Value Ref Range    Sodium 140 136 - 145 mmol/L    Potassium 4.3 3.5 - 5.1 mmol/L    Chloride 110 (H) 98 - 107 mmol/L    CO2 19 (L) 21 - 32 mmol/L    Anion gap 11 7 - 16 mmol/L    Glucose 96 65 - 100 mg/dL    BUN 71 (H) 8 - 23 MG/DL    Creatinine 2.12 (H) 0.6 - 1.0 MG/DL    GFR est AA 29 (L) >60 ml/min/1.73m2    GFR est non-AA 24 (L) >60 ml/min/1.73m2    Calcium 8.6 8.3 - 10.4 MG/DL    Bilirubin, total 1.5 (H) 0.2 - 1.1 MG/DL    ALT (SGPT) 628 (H) 12 - 65 U/L    AST (SGOT) 284 (H) 15 - 37 U/L    Alk.  phosphatase 114 50 - 130 U/L    Protein, total 6.4 6.3 - 8.2 g/dL    Albumin 2.5 (L) 3.2 - 4.6 g/dL    Globulin 3.9 (H) 2.3 - 3.5 g/dL    A-G Ratio 0.6 (L) 1.2 - 3.5     CBC W/O DIFF    Collection Time: 09/25/21  5:25 AM   Result Value Ref Range    WBC 6.2 4.3 - 11.1 K/uL    RBC 3.68 (L) 4.05 - 5.2 M/uL    HGB 11.6 (L) 11.7 - 15.4 g/dL    HCT 38.1 35.8 - 46.3 %    .5 (H) 79.6 - 97.8 FL    MCH 31.5 26.1 - 32.9 PG    MCHC 30.4 (L) 31.4 - 35.0 g/dL    RDW 15.9 (H) 11.9 - 14.6 %    PLATELET 99 (L) 508 - 450 K/uL    MPV 11.4 9.4 - 12.3 FL    ABSOLUTE NRBC 0.00 0.0 - 0.2 K/uL       All Micro Results     Procedure Component Value Units Date/Time    RESPIRATORY VIRUS PANEL W/COVID-19, PCR [[id number]] Collected: 09/22/21 6838    Order Status: Completed Specimen: Nasopharyngeal Updated: 09/22/21 2051     Adenovirus NOT DETECTED        Coronavirus 229E NOT DETECTED        Coronavirus HKU1 NOT DETECTED        Coronavirus CVNL63 NOT DETECTED        Coronavirus OC43 NOT DETECTED        SARS-CoV-2, PCR NOT DETECTED        Metapneumovirus NOT DETECTED        Rhinovirus and Enterovirus NOT DETECTED        Influenza A NOT DETECTED        Influenza B NOT DETECTED        Parainfluenza 1 NOT DETECTED        Parainfluenza 2 NOT DETECTED        Parainfluenza 3 NOT DETECTED        Parainfluenza virus 4 NOT DETECTED        RSV by PCR NOT DETECTED        B. parapertussis, PCR NOT DETECTED        Bordetella pertussis - PCR NOT DETECTED        Chlamydophila pneumoniae DNA, QL, PCR NOT DETECTED        Mycoplasma pneumoniae DNA, QL, PCR NOT DETECTED       SARS-COV-2, PCR [[id number]] Collected: 09/22/21 1720    Order Status: Canceled     COVID-19 RAPID TEST [[id number]] Collected: 09/22/21 1603    Order Status: Completed Specimen: Nasopharyngeal Updated: 09/22/21 1632     Specimen source NASAL SWAB        COVID-19 rapid test Not detected        Comment:      The specimen is NEGATIVE for SARS-CoV-2, the novel coronavirus associated with COVID-19. A negative result does not rule out COVID-19. This test has been authorized by the FDA under an Emergency Use Authorization (EUA) for use by authorized laboratories.         Fact sheet for Healthcare Providers: ConventionUpdate.co.nz  Fact sheet for Patients: ConventionUpdate.co.nz       Methodology: Isothermal Nucleic Acid Amplification               Current Meds:  Current Facility-Administered Medications   Medication Dose Route Frequency    lactated Ringers infusion  175 mL/hr IntraVENous CONTINUOUS    sodium chloride (NS) flush 5-40 mL  5-40 mL IntraVENous Q8H    sodium chloride (NS) flush 5-40 mL  5-40 mL IntraVENous PRN    polyethylene glycol (MIRALAX) packet 17 g  17 g Oral DAILY PRN    ondansetron (ZOFRAN ODT) tablet 4 mg  4 mg Oral Q8H PRN    Or    ondansetron (ZOFRAN) injection 4 mg  4 mg IntraVENous Q6H PRN    pantoprazole (PROTONIX) tablet 40 mg  40 mg Oral DAILY    oxyCODONE IR (ROXICODONE) tablet 5 mg  5 mg Oral Q6H PRN    atenoloL (TENORMIN) tablet 100 mg  100 mg Oral DAILY    sodium chloride (NS) flush 5-40 mL  5-40 mL IntraVENous Q8H    sodium chloride (NS) flush 5-40 mL  5-40 mL IntraVENous PRN       Other Studies:    No results found. Signed:  Yves Blake MD    Part of this note may have been written by using a voice dictation software. The note has been proof read but may still contain some grammatical/other typographical errors.

## 2021-09-25 NOTE — PROGRESS NOTES
Patient discussed during rounds. MD anticipates patient may need HH vs. STR. SW will order Pt/Ot evaluations to better inform dispo.      Valdo Uribe LMSW    [facility]    * [email or web address]

## 2021-09-26 PROBLEM — I46.9 CARDIAC ARREST (HCC): Status: ACTIVE | Noted: 2021-01-01

## 2021-09-26 PROBLEM — R04.89 PULMONARY HEMORRHAGE: Status: ACTIVE | Noted: 2021-01-01

## 2021-09-26 PROBLEM — I26.99 PULMONARY EMBOLISM (HCC): Status: ACTIVE | Noted: 2021-01-01

## 2021-09-26 PROBLEM — R57.9 SHOCK (HCC): Status: ACTIVE | Noted: 2021-01-01

## 2021-09-26 PROBLEM — E87.20 METABOLIC ACIDOSIS: Status: ACTIVE | Noted: 2021-01-01

## 2021-09-26 NOTE — DISCHARGE SUMMARY
Hospitalist Discharge Summary   Admit Date:  2021 11:15 AM   DC Note date: 2021  Name:  Chelsie Hines   Age:  78 y.o. Sex:  female  :  1940   MRN:  [id number]   Room:  [facility]  PCP:  Francis Lynn MD    Presenting Complaint: Anal Pain    Initial Admission Diagnosis: COVID-19 [U07.1]  Acute hepatitis [B17.9]     Problem List for this Hospitalization:  Hospital Problems as of 2021 Date Reviewed: 2021        Codes Class Noted - Resolved POA    Cardiac arrest ([facility] x 2 ICD-10-CM: I46.9  ICD-9-CM: 427.5  2021 - Present Unknown        Pulmonary embolism ([facility]- suspected ICD-10-CM: I26.99  ICD-9-CM: 415.19  2021 - Present Unknown        Metabolic acidosis CVE-72-YR: E87.2  ICD-9-CM: 276.2  2021 - Present Unknown        Shock ([facility] ICD-10-CM: R57.9  ICD-9-CM: 785.50  2021 - Present Unknown        Pulmonary hemorrhage ICD-10-CM: R04.89  ICD-9-CM: 786.30  2021 - Present Unknown        * (Principal) Acute hepatitis ICD-10-CM: B17.9  ICD-9-CM: 705  2021 - Present Unknown        COVID-19 ICD-10-CM: U07.1  ICD-9-CM: 079.89  2021 - Present Unknown        JODY (acute kidney injury) ([facility] ICD-10-CM: N17.9  ICD-9-CM: 584.9  2021 - Present Unknown        Hyperkalemia ICD-10-CM: E87.5  ICD-9-CM: 276.7  2021 - Present Unknown        NPH (normal pressure hydrocephalus) ([facility] ICD-10-CM: G91.2  ICD-9-CM: 331.5  10/31/2017 - Present Yes        HTN (hypertension) ICD-10-CM: I10  ICD-9-CM: 401.9  2016 - Present Yes            Did Patient have Sepsis (YES OR NO): No.    Hospital Course:  Mrs. Aliza Ortiz was admitted to our service on  with transaminitis and JODY. She had recently been diagnosed with COVID a few weeks prior. Was given an infusion and did not require hospitalization. She presented here on  with increasing abdominal pain and bloating for a few days. Labs showed transaminitis, elevated bilirubin, hyperkalemia and JODY.  RUQ US showed decreased echogenecity suggestive of acute hepatitis, GB wall thickening and trace perihepatic ascites. Hepatitis panel was negative. GI was consulted and autoimmune labs were sent. She was put on some IVFs with improvement in her sCr down to 2 from 2.5. Her transaminases demonstrated very mild improvement over a few days. MRCP was ordered however her  shunt would need to be reprogrammed so it had not yet been done. On the morning of 9/26 she ambulated to and from the bathroom. She was later found unresponsive and not breathing so a Code Blue was called. ACLS initiated with ROSC, she was intubated and transferred to ICU. D-dimer was elevated at 16 so she was empirically started on a heparin drip for presumed VTE. VBG with severe acidosis, vent settings adjusted and started on bicarbonate drip. She quickly began requiring multiple vasopressors that were maxed out. She was coded again twice after transfer to the ICU. She was seen by Pulmonology however she soon became bradycardic and then went into PEA again. At that point the decision was made to forgo any further ACLS. Time of death [phone]. Follow-up Information    None         Time spent in patient discharge and coordination 35 minutes. Discharge Info:   Current Discharge Medication List          Procedures done this admission:  * No surgery found *    Consults this admission:  IP CONSULT TO GASTROENTEROLOGY    Echocardiogram/EKG results:  No results found for this or any previous visit. Echo results may also be under imaging section below. EKG Results     None          Diagnostic Imaging/Tests:   XR CHEST SNGL V    Result Date: 9/26/2021  CHEST RADIOGRAPH, 1 views, 9/26/2021 History: CODE BLUE Technique: Portable frontal view of the chest. Comparison: Chest radiograph 9/22/2021 Findings: The patient is intubated. The endotracheal tube tip is in good position located 4 cm above the john.  A stable shunt catheter courses over the right chest. The heart is mild to moderately enlarged although stable. There is no pneumothorax. New moderate to severe bilateral lung infiltrates are seen, right greater than left. These favor the central lungs. The distribution along with the associated cardiomegaly suggests evolving pulmonary edema. 1.  Good position of endotracheal tube. 2. Evolving infiltrates favored to represent evolving pulmonary edema given the associated stable cardiomegaly and distribution. This report was made using voice transcription. Despite my best efforts to avoid any, transcription errors may persist. If there is any question about the accuracy of the report or need for clarification, then please call [phone], or text me through perfectserv for clarification or correction. XR ABD ACUTE W 1 V CHEST    Result Date: 9/22/2021  EXAM: Abdomen series with chest radiograph. INDICATION: Abdominal pain COMPARISON: None. FINDINGS: Chest: The cardiac silhouette is mildly enlarged. Mediastinal contours are within normal limits. No pleural effusion or pneumothorax. No focal parenchymal process. Osseous structures are intact with thoracic dextroscoliosis. Abdomen: Nonobstructive bowel gas pattern. No pneumoperitoneum or abnormal air-fluid levels. No abnormal calcifications. Osseous structures are intact. Partially visualized  shunt catheter terminates in the pelvis. Atherosclerotic calcifications. 1. No acute radiographic abnormality in the chest or abdomen. 2. Cardiomegaly. 3. Partially visualized  shunt catheter terminating in the pelvis. US ABD LTD    Result Date: 9/22/2021  Exam: Right upper quadrant ultrasound. Indication: Abdominal pain, elevated liver enzymes, elevated lipase Comparison: None. Findings: Pancreas: Visualized portion of the pancreas is within normal limits. Liver: Diffusely decreased in echogenicity with echogenic portal triads fat. Homogeneous echotexture and smooth contour. No suspicious hepatic masses.  The main portal vein is patent with pulsatile flow and an appropriate direction of flow and velocity. The pulsatility is likely related to underlying acute hepatocellular dysfunction. Gallbladder: Diffuse gallbladder wall thickening without gallbladder distention or pericholecystic fluid. Sonographic Louis sign was negative. No cholelithiasis. Biliary: No intra or extrahepatic biliary duct dilatation. Right kidney: The right kidney is normal in echogenicity and normal in size measuring 9.5 cm. No contour deforming mass. No hydronephrosis or perinephric fluid. Abdominal Aorta: Nonaneurysmal in the visualized portion. Inferior Vena Cava: Patent. Ascites: Trace perihepatic ascites. 1. Diffusely decreased echogenicity in the liver which can be seen with acute hepatitis. 2. Trace perihepatic ascites. 3. Diffuse gallbladder wall thickening is likely reactive to underlying hepatocellular dysfunction. Acute cholecystitis is also a consideration but felt less likely given lack of other supporting features.       All Micro Results     Procedure Component Value Units Date/Time    RESPIRATORY VIRUS PANEL W/COVID-19, PCR [[id number]] Collected: 09/22/21 1735    Order Status: Completed Specimen: Nasopharyngeal Updated: 09/22/21 2051     Adenovirus NOT DETECTED        Coronavirus 229E NOT DETECTED        Coronavirus HKU1 NOT DETECTED        Coronavirus CVNL63 NOT DETECTED        Coronavirus OC43 NOT DETECTED        SARS-CoV-2, PCR NOT DETECTED        Metapneumovirus NOT DETECTED        Rhinovirus and Enterovirus NOT DETECTED        Influenza A NOT DETECTED        Influenza B NOT DETECTED        Parainfluenza 1 NOT DETECTED        Parainfluenza 2 NOT DETECTED        Parainfluenza 3 NOT DETECTED        Parainfluenza virus 4 NOT DETECTED        RSV by PCR NOT DETECTED        B. parapertussis, PCR NOT DETECTED        Bordetella pertussis - PCR NOT DETECTED        Chlamydophila pneumoniae DNA, QL, PCR NOT DETECTED        Mycoplasma pneumoniae DNA, QL, PCR NOT DETECTED       SARS-COV-2, PCR [[id number]] Collected: 09/22/21 1720    Order Status: Canceled     COVID-19 RAPID TEST [[id number]] Collected: 09/22/21 1603    Order Status: Completed Specimen: Nasopharyngeal Updated: 09/22/21 1632     Specimen source NASAL SWAB        COVID-19 rapid test Not detected        Comment:      The specimen is NEGATIVE for SARS-CoV-2, the novel coronavirus associated with COVID-19. A negative result does not rule out COVID-19. This test has been authorized by the FDA under an Emergency Use Authorization (EUA) for use by authorized laboratories. Fact sheet for Healthcare Providers: Convention[facility]date.co.nz  Fact sheet for Patients: Only [employer]or[employer].co.nz       Methodology: Isothermal Nucleic Acid Amplification               Labs: Results:       BMP, Mg, Phos Recent Labs     09/26/21 1129 09/26/21  0522 09/25/21  0525    138 140   K 5.6* 4.3 4.3   * 111* 110*   CO2 10* 19* 19*   AGAP 19* 8 11   BUN 59* 64* 71*   CREA 2.41* 2.03* 2.12*   CA 8.6 8.5 8.6   * 117* 96      CBC Recent Labs     09/26/21 1129 09/26/21  0522 09/25/21  0525 09/24/21  0658 09/24/21  0658   WBC 12.1* 6.2 6.2   < > 7.3   RBC 4.15 3.75* 3.68*   < > 3.83*   HGB 13.1 11.6* 11.6*   < > 12.2   HCT 44.8 38.0 38.1   < > 40.0   PLT 66* 103* 99*   < > 59*   GRANS 32*  --   --   --  68   LYMPH 56*  --   --   --  19   EOS 2  --   --   --  3   MONOS 10  --   --   --  10   BASOS 0  --   --   --  0   IG 0  --   --   --  0   ANEU 3.9  --   --   --  4.9   ABL 6.8*  --   --   --  1.4   KALEY 0.2  --   --   --  0.2   ABM 1.2  --   --   --  0.7   ABB 0.0  --   --   --  0.0   AIG 0.0  --   --   --  0.0    < > = values in this interval not displayed.       LFT Recent Labs     09/26/21  1129 09/26/21  0522 09/25/21  0525   * 577* 628*    114 114   TP 6.7 6.6 6.4   ALB 2.4* 2.5* 2.5*   GLOB 4.3* 4.1* 3.9*   AGRAT 0.6* 0.6* 0.6*      Cardiac Testing No results found for: BNPP, BNP, CPK, RCK1, RCK2, RCK3, RCK4, CKMB, CKNDX, CKND1, TROPT, TROIQ   Coagulation Tests Lab Results   Component Value Date/Time    Prothrombin time 17.9 (H) 09/26/2021 05:22 AM    Prothrombin time 18.5 (H) 09/25/2021 05:25 AM    Prothrombin time 18.2 (H) 09/24/2021 06:58 AM    INR 1.4 09/26/2021 05:22 AM    INR 1.5 09/25/2021 05:25 AM    INR 1.5 09/24/2021 06:58 AM      A1c No results found for: HBA1C, [id number]   Lipid Panel Lab Results   Component Value Date/Time    Cholesterol, total 209 (H) 03/01/2021 12:26 PM    HDL Cholesterol 81 03/01/2021 12:26 PM    LDL, calculated 90 03/01/2021 12:26 PM    LDL, calculated 72 02/03/2020 11:25 AM    VLDL, calculated 38 03/01/2021 12:26 PM    VLDL, calculated 48 (H) 02/03/2020 11:25 AM    Triglyceride 233 (H) 03/01/2021 12:26 PM      Thyroid Panel Lab Results   Component Value Date/Time    TSH 1.760 03/01/2021 12:26 PM    TSH 2.450 02/03/2020 11:25 AM    T4, Total 12.8 09/25/2017 02:30 PM    T4, Free 1.0 09/25/2017 02:30 PM        Most Recent UA No results found for: COLOR, APPRN, REFSG, GANESH, PROTU, GLUCU, KETU, BILU, BLDU, UROU, JOSE, LEUKU, WBCU, RBCU, UEPI, BACTU, CASTS, UCRY, MUCUS, UCOM       All Labs from Last 24 Hrs:  Recent Results (from the past 24 hour(s))   PROTHROMBIN TIME + INR    Collection Time: 09/26/21  5:22 AM   Result Value Ref Range    Prothrombin time 17.9 (H) 12.6 - 14.5 sec    INR 1.4     METABOLIC PANEL, COMPREHENSIVE    Collection Time: 09/26/21  5:22 AM   Result Value Ref Range    Sodium 138 136 - 145 mmol/L    Potassium 4.3 3.5 - 5.1 mmol/L    Chloride 111 (H) 98 - 107 mmol/L    CO2 19 (L) 21 - 32 mmol/L    Anion gap 8 7 - 16 mmol/L    Glucose 117 (H) 65 - 100 mg/dL    BUN 64 (H) 8 - 23 MG/DL    Creatinine 2.03 (H) 0.6 - 1.0 MG/DL    GFR est AA 30 (L) >60 ml/min/1.73m2    GFR est non-AA 25 (L) >60 ml/min/1.73m2    Calcium 8.5 8.3 - 10.4 MG/DL    Bilirubin, total 1.6 (H) 0.2 - 1.1 MG/DL    ALT (SGPT) 577 (H) 12 - 65 U/L    AST (SGOT) 212 (H) 15 - 37 U/L    Alk. phosphatase 114 50 - 130 U/L    Protein, total 6.6 6.3 - 8.2 g/dL    Albumin 2.5 (L) 3.2 - 4.6 g/dL    Globulin 4.1 (H) 2.3 - 3.5 g/dL    A-G Ratio 0.6 (L) 1.2 - 3.5     CBC W/O DIFF    Collection Time: 09/26/21  5:22 AM   Result Value Ref Range    WBC 6.2 4.3 - 11.1 K/uL    RBC 3.75 (L) 4.05 - 5.2 M/uL    HGB 11.6 (L) 11.7 - 15.4 g/dL    HCT 38.0 35.8 - 46.3 %    .3 (H) 79.6 - 97.8 FL    MCH 30.9 26.1 - 32.9 PG    MCHC 30.5 (L) 31.4 - 35.0 g/dL    RDW 15.9 (H) 11.9 - 14.6 %    PLATELET 485 (L) 829 - 450 K/uL    MPV 11.2 9.4 - 12.3 FL    ABSOLUTE NRBC 0.05 0.0 - 0.2 K/uL   GLUCOSE, POC    Collection Time: 09/26/21 11:22 AM   Result Value Ref Range    Glucose (POC) 138 (H) 65 - 100 mg/dL    Performed by [facility]    METABOLIC PANEL, COMPREHENSIVE    Collection Time: 09/26/21 11:29 AM   Result Value Ref Range    Sodium 139 136 - 145 mmol/L    Potassium 5.6 (H) 3.5 - 5.1 mmol/L    Chloride 110 (H) 98 - 107 mmol/L    CO2 10 (L) 21 - 32 mmol/L    Anion gap 19 (H) 7 - 16 mmol/L    Glucose 182 (H) 65 - 100 mg/dL    BUN 59 (H) 8 - 23 MG/DL    Creatinine 2.41 (H) 0.6 - 1.0 MG/DL    GFR est AA 25 (L) >60 ml/min/1.73m2    GFR est non-AA 20 (L) >60 ml/min/1.73m2    Calcium 8.6 8.3 - 10.4 MG/DL    Bilirubin, total 1.5 (H) 0.2 - 1.1 MG/DL    ALT (SGPT) 590 (H) 12 - 65 U/L    AST (SGOT) 220 (H) 15 - 37 U/L    Alk.  phosphatase 129 50 - 136 U/L    Protein, total 6.7 6.3 - 8.2 g/dL    Albumin 2.4 (L) 3.2 - 4.6 g/dL    Globulin 4.3 (H) 2.3 - 3.5 g/dL    A-G Ratio 0.6 (L) 1.2 - 3.5     D DIMER    Collection Time: 09/26/21 11:29 AM   Result Value Ref Range    D DIMER 16.01 (H) <0.56 ug/ml(FEU)   CBC WITH AUTOMATED DIFF    Collection Time: 09/26/21 11:29 AM   Result Value Ref Range    WBC 12.1 (H) 4.3 - 11.1 K/uL    RBC 4.15 4.05 - 5.2 M/uL    HGB 13.1 11.7 - 15.4 g/dL    HCT 44.8 35.8 - 46.3 %    .0 (H) 79.6 - 97.8 FL    MCH 31.6 26.1 - 32.9 PG    MCHC 29.2 (L) 31.4 - 35.0 g/dL    RDW 15.9 (H) 11.9 - 14.6 %    PLATELET 66 (L) 183 - 450 K/uL    MPV 11.7 9.4 - 12.3 FL    ABSOLUTE NRBC 0.07 0.0 - 0.2 K/uL    NEUTROPHILS 32 (L) 43 - 78 %    LYMPHOCYTES 56 (H) 13 - 44 %    MONOCYTES 10 4.0 - 12.0 %    EOSINOPHILS 2 0.5 - 7.8 %    BASOPHILS 0 0.0 - 2.0 %    IMMATURE GRANULOCYTES 0 0.0 - 5.0 %    ABS. NEUTROPHILS 3.9 1.7 - 8.2 K/UL    ABS. LYMPHOCYTES 6.8 (H) 0.5 - 4.6 K/UL    ABS. MONOCYTES 1.2 0.1 - 1.3 K/UL    ABS. EOSINOPHILS 0.2 0.0 - 0.8 K/UL    ABS. BASOPHILS 0.0 0.0 - 0.2 K/UL    ABS. IMM.  GRANS. 0.0 0.0 - 0.5 K/UL    RBC COMMENTS NORMOCYTIC/NORMOCHROMIC      WBC COMMENTS Result Confirmed By Smear      PLATELET COMMENTS DECREASED      DF MANUAL     POC VENOUS BLOOD GAS    Collection Time: 09/26/21 11:59 AM   Result Value Ref Range    FIO2 (POC) 100 %    pH, venous (POC) 6.81 (L) 7.32 - 7.42      pCO2, venous (POC) 68.2 (H) 41 - 51 MMHG    pO2, venous (POC) 50 mmHg    HCO3, venous (POC) 10.9 (L) 23 - 28 MMOL/L    sO2, venous (POC) 50.7 (L) 65 - 88 %    Base deficit, venous (POC) 24.3 mmol/L    Mode Pressure regulated volume control      Tidal volume 340 ml    PEEP/CPAP (POC) 8 cmH2O    Site CENTRAL LINE      Specimen type (POC) VENOUS BLOOD      Performed by Luke)RTBS    BLOOD GAS, ARTERIAL POC    Collection Time: 09/26/21  2:29 PM   Result Value Ref Range    FIO2 (POC) 100 %    pH (POC) 6.79 (LL) 7.35 - 7.45      pCO2 (POC) 69.1 (HH) 35 - 45 MMHG    pO2 (POC) 76 75 - 100 MMHG    HCO3 (POC) 10.5 (L) 22 - 26 MMOL/L    sO2 (POC) 75.1 (L) 95 - 98 %    Base deficit (POC) 24.5 mmol/L    Mode Pressure regulated volume control      Tidal volume 340 ml    PEEP/CPAP (POC) 10 cmH2O    Allens test (POC) NOT APPLICABLE      Site DRAWN FROM ARTERIAL LINE      Specimen type (POC) ARTERIAL      Performed by Laury     Critical value read back NOHELIA        Recent Vital Data:  Patient Vitals for the past 24 hrs:   Temp Pulse Resp BP SpO2   09/26/21 1455  (!) 46 (!) 0     09/26/21 1452  (!) 47 (!) 7  (!) 55 %   09/26/21 1450  78 24  (!) 72 %   09/26/21 1449  82 25  (!) 75 %   09/26/21 1445  92 27  (!) 88 %   09/26/21 1441  86 26  (!) 89 %   09/26/21 1440  88 28     09/26/21 1437  88 27  (!) 82 %   09/26/21 1435  89 28     09/26/21 1432  89 27  (!) 81 %   09/26/21 1430  89 27  (!) 88 %   09/26/21 1425  87 28  (!) 87 %   09/26/21 1416  90 25  96 %   09/26/21 1415  88 26  97 %   09/26/21 1410  87 23  98 %   09/26/21 1405  90 22  (!) 84 %   09/26/21 1400  91 (!) 54  (!) 85 %   09/26/21 1359  91 (!) 56  (!) 85 %   09/26/21 1355  91 (!) 53  (!) 88 %   09/26/21 1344  88 (!) 55  97 %   09/26/21 1343  89 (!) 56  93 %   09/26/21 1342  87 (!) 32  (!) 87 %   09/26/21 1341  85 (!) 54  (!) 81 %   09/26/21 1340  86 (!) 55  (!) 79 %   09/26/21 1339  81 (!) 55  (!) 81 %   09/26/21 1338  88 (!) 49  (!) 82 %   09/26/21 1337  88 (!) 52  (!) 83 %   09/26/21 1336  88 (!) 50  (!) 84 %   09/26/21 1330  85 (!) 50  93 %   09/26/21 1327  84 (!) 50  96 %   09/26/21 1326  86 (!) 48  96 %   09/26/21 1325  78 26  94 %   09/26/21 1245  (!) 130 29  (!) 85 %   09/26/21 1235  (!) 122 9  (!) 80 %   09/26/21 1234  (!) 115 (!) 7  (!) 89 %   09/26/21 1229  68 21  (!) 83 %   09/26/21 1220  83 26  90 %   09/26/21 1215  85 25  91 %   09/26/21 1210  87 25  94 %   09/26/21 1205  92 24  90 %   09/26/21 1158  100 25     09/26/21 1154  (!) 105 26     09/26/21 1145  (!) 116 (!) 50     09/26/21 1142  (!) 120 (!) 46     09/26/21 1126  (!) 134  (!) 161/135 (!) 74 %   09/26/21 0737 97.3 °F (36.3 °C) 98 18 119/88 99 %   09/26/21 0354 97.7 °F (36.5 °C) (!) 108 18 118/78 99 %   09/25/21 2322 98.1 °F (36.7 °C) 95 20 110/75 98 %   09/25/21 1948 97.6 °F (36.4 °C) (!) 112 20 119/86 97 %   09/25/21 1536 97.3 °F (36.3 °C) (!) 115 16 118/86 100 %     Oxygen Therapy  O2 Sat (%): (!) 55 % (09/26/21 1452)  Pulse via Oximetry: 74 beats per minute (09/26/21 1452)  O2 Device: Endotracheal tube;Ventilator (09/26/21 1235)  Skin Assessment: Clean, dry, & intact (09/26/21 1230)  Skin Protection for O2 Device: N/A (09/26/21 1230)  FIO2 (%): 100 % (09/26/21 1230)    Estimated body mass index is 22.42 kg/m² as calculated from the following:    Height as of this encounter: 5' 6\" (1.676 m). Weight as of this encounter: 63 kg (138 lb 14.2 oz). No intake or output data in the 24 hours ending 09/26/21 1506      Physical Exam:  Cardiac: No palpable pulse, no audible heart sounds. Lungs: No spontaneous respirations. No breath sounds.     Current Med List in Hospital:   Current Facility-Administered Medications   Medication Dose Route Frequency    sodium bicarbonate (8.4%) 150 mEq in dextrose 5% 1,000 mL infusion   IntraVENous CONTINUOUS    NOREPINephrine (LEVOPHED) 4 mg/250 mL (16 mcg/mL) in NS infusion  0.5-30 mcg/min IntraVENous TITRATE    heparin 25,000 units in dextrose 500 mL infusion  18-36 Units/kg/hr IntraVENous TITRATE    PHENYLephrine (KELI-SYNEPHRINE) 40 mg in 250 mL NS infusion   mcg/min IntraVENous TITRATE    fentaNYL in normal saline (pf) 25 mcg/mL infusion  0-200 mcg/hr IntraVENous TITRATE    EPINEPHrine (ADRENALIN) 4 mg in 0.9% sodium chloride 250 mL infusion  1-10 mcg/min IntraVENous TITRATE    dexmedeTOMidine (PRECEDEX) 400 mcg in 0.9% sodium chloride 100 mL infusion  0.1-1.5 mcg/kg/hr IntraVENous TITRATE    sodium chloride (NS) flush 5-40 mL  5-40 mL IntraVENous Q8H    sodium chloride (NS) flush 5-40 mL  5-40 mL IntraVENous PRN    polyethylene glycol (MIRALAX) packet 17 g  17 g Oral DAILY PRN    ondansetron (ZOFRAN ODT) tablet 4 mg  4 mg Oral Q8H PRN    Or    ondansetron (ZOFRAN) injection 4 mg  4 mg IntraVENous Q6H PRN    [Held by provider] pantoprazole (PROTONIX) tablet 40 mg  40 mg Oral DAILY    oxyCODONE IR (ROXICODONE) tablet 5 mg  5 mg Oral Q6H PRN    [Held by provider] atenoloL (TENORMIN) tablet 100 mg  100 mg Oral DAILY    sodium chloride (NS) flush 5-40 mL  5-40 mL IntraVENous Q8H    sodium chloride (NS) flush 5-40 mL  5-40 mL IntraVENous PRN       Allergies   Allergen Reactions    Latex Unknown (comments)    Ceftin [Cefuroxime Axetil] Rash    Sulfa (Sulfonamide Antibiotics) Rash and Other (comments)     KIDNEY MALFUNCTION     Immunization History   Administered Date(s) Administered    Influenza High Dose Vaccine PF 09/26/2018, 09/27/2019    Influenza Vaccine 08/16/2016    Influenza, Quadrivalent, Adjuvanted (>65 Yrs FLUAD QUAD 44100) 09/01/2020    Pneumococcal Conjugate (PCV-13) 02/18/2016    Pneumococcal Polysaccharide (PPSV-23) 02/03/2020    Zoster Recombinant 01/15/2019, 03/15/2019    Zoster Vaccine, Live 05/05/2016       Signed:  Gokul Osborne MD    Part of this note may have been written by using a voice dictation software. The note has been proof read but may still contain some grammatical/other typographical errors.

## 2021-09-26 NOTE — PROCEDURES
Emergent Intubation  Performed by: Edwin Thomas MD  Authorized by: Edwin Thomas MD     Emergent Intubation:   Patient location: Bedside Rm 363. Date/Time:  9/26/2021 11:32 AM  Indications:  Respiratory failure    Spontaneous Ventilation: absent    Level of Consciousness: unresponsive  Preoxygenated: Yes (Being bag mask ventilated upon my arirval)      Airway Documentation:   Airway:  ETT - Cuffed  Technique:  Direct laryngoscopy  Insertion Site:  Oral  Blade Type:  Giorgi  Blade Size:  3  ETT size (mm):  7.0  ETT Line José Miguel:  Teeth  ETT Insertion depth (cm):  21  Placement verified by: auscultation, EtCO2 and BBS    Attempts:  2  Difficult airway: No    Code Blue called to Rm 363. CPR and bag mask ventilation in progress upon my arrival. DL x 2 with MAC 3 (unable to visualize cords upon first attempt due to oral secretions and suction being set up. 7.0 ETT placed on second attempt with grade 3 view of VC. ETT placement confirmed with EtCO2 and B=BS.

## 2021-09-26 NOTE — PROGRESS NOTES
Patient arrived to unit intubated and being bagged by RT. Unresponsive to voice and only clenches to pain, does not localize pain. Minimal assessment, then interventions begun.

## 2021-09-26 NOTE — PROGRESS NOTES
Patient made DNR by Dr. Aubree Rock at bedside with ; no pulse palpable; O2 sats dropping; no further intervention per 's wishes; no further respirations from patient; patient allowed to pass.

## 2021-09-26 NOTE — PROGRESS NOTES
Discussed with patient's  option of repeat suctioning and manual ventilation by BVM to see if O2 sats would improve (currently 85% on max vent) Patient's  Samuel Barnes requested staff try everything, despite previous attempts performing this with repeated desaturation, he request repeat attempt.

## 2021-09-26 NOTE — PROGRESS NOTES
Hospitalist Progress Note   Admit Date:  2021 11:15 AM   Name:  Mahogany Mike   Age:  78 y.o. Sex:  female  :  1940   MRN:  [id number]   Room:  [facility]/    Presenting Complaint: Anal Pain    Initial Admission Diagnosis: COVID-19 [U07.1]  Acute hepatitis [B17.9]     Assessment and Plan:   # PEA arrest   - Unclear etiology, became unresponsive after ambulating back from bathroom. Two rounds of epi with ROSC. Labs and CXR pending. Check b/l LE US, ideally would check CTA for PE but has sCr 2. Head CT when stable. Stat ABG pending. If D-dimer elevated can consider empiric heparin drip. Spoke to . Pressors if hypotensive. # Transaminitits/acute hepatitis/elevated bilirubin              - Hep panel negative. GI consulted and further labs sent, appreciate assistance. Hep panel neg. MRPC ordered but needs  shunt reprogrammed after, logistics pending.     # JODY              - Slightly improved again today.      # Thrombocytopenia              - Acute, no bleeding, likely from acute liver issues, peripheral smear pending, counts up today.     # HyperK              - Resolved.     # H/o COVID              - Reportedly >2 weeks since diagnosis, she was given an infusion outpatient (likely Regeneron) and was never hospitalized. On RA. COVID negative on viral panel done at admission.     # NPH s/p  shunt     # HTN              - Atenolol held. There is a high probability of acute organ impairment or life-threatening deterioration in the patient's condition from PEA arrest requiring RR/Code Blue, ACLS, intubation, ICU transfer, arterial line for continuous BP monitoring. Total critical care time spent: 40 minutes. Time is indicative of direct patient attendance at bedside and on the patient's floor nearby.   Includes time spent at bedside performing history and exam, performing chart review, discussing findings and treatment plan with patient and/or family, discussing patient with consultants and colleagues, ordering and reviewing pertinent laboratory and radiographic evaluations, and discussing patient with nursing staff. Time excludes procedures. Hospital Problems as of 9/26/2021 Date Reviewed: 9/9/2021        Codes Class Noted - Resolved POA    * (Principal) Acute hepatitis ICD-10-CM: B17.9  ICD-9-CM: 570  9/22/2021 - Present Unknown        COVID-19 ICD-10-CM: U07.1  ICD-9-CM: 079.89  9/22/2021 - Present Unknown        JODY (acute kidney injury) ([facility] ICD-10-CM: N17.9  ICD-9-CM: 584.9  9/22/2021 - Present Unknown        Hyperkalemia ICD-10-CM: E87.5  ICD-9-CM: 276.7  9/22/2021 - Present Unknown        NPH (normal pressure hydrocephalus) ([facility] ICD-10-CM: G91.2  ICD-9-CM: 331.5  10/31/2017 - Present Yes        HTN (hypertension) ICD-10-CM: I10  ICD-9-CM: 401.9  4/26/2016 - Present Yes              Hospital Course:   Ms. Celine Cota is a 77 yo female with PMH of HTN, gout, HLD, NPH with  shunt and recent dx of COVID 14 days ago who presented with c/o abd pain and bloating over a few days. Labs with JODY, transaminitis and elevated bilirubin. GI was consulted. RUQ abd US showed findings seen with acute hepatitis. Hepatitis panel neg. CORNELIA and ASMA sent, pending. Code Blue and moved to ICU on 9/26.    24hr Events/Subjective (09/26/21):   9/26: I rounded on Mrs. Celine Cota mid morning and she was doing well, still c/o some abdominal discomfort but has been tolerating her diet. Labs a little improved today. She has not had any N/V/D. Tentatively planned for MRCP tomorrow but needs  shunt re-programmed after. About 20-30 min after I rounded on her a RR was called. She ambulated to BR with assistance and was put back in bed. She was then found unresponsive. On my arrival to the room she was ashen, unresponsive with no spontaneous respirations and no palpable pulse. Code Blue was called and CPR/ACLS initiated immediately. She was bagged and then intubated by Anesthesia.  She was given two rounds of epinephrine and ROSC was achieved. Left EJ access was placed. She was stabilized and moved to the . Labs drawn and pending. Anesthesia to place A-line. I called and spoke to her  who is coming to the hospital now. Objective:     Patient Vitals for the past 24 hrs:   Temp Pulse Resp BP SpO2   09/26/21 1126  (!) 134  (!) 161/135 (!) 74 %   09/26/21 0737 97.3 °F (36.3 °C) 98 18 119/88 99 %   09/26/21 0354 97.7 °F (36.5 °C) (!) 108 18 118/78 99 %   09/25/21 2322 98.1 °F (36.7 °C) 95 20 110/75 98 %   09/25/21 1948 97.6 °F (36.4 °C) (!) 112 20 119/86 97 %   09/25/21 1536 97.3 °F (36.3 °C) (!) 115 16 118/86 100 %     Oxygen Therapy  O2 Sat (%): (!) 74 % (09/26/21 1126)  Pulse via Oximetry: 116 beats per minute (09/23/21 1503)  O2 Device: None (Room air) (09/25/21 2322)    Estimated body mass index is 22.42 kg/m² as calculated from the following:    Height as of this encounter: 5' 6\" (1.676 m). Weight as of this encounter: 63 kg (138 lb 14.2 oz). No intake or output data in the 24 hours ending 09/26/21 1139      General:    Unresponsive, intubated. Head:  Normocephalic, atraumatic. Eyes:  Sclerae appear normal.  Pupils equally round. HENT:  Nares appear normal, no drainage. Moist mucous membranes  Neck:  No restricted ROM. Trachea midline  CV:   RRR. No m/r/g. No JVD  Lungs:   CTAB. No wheezing, rhonchi, or rales. Appears even, unlabored  Abdomen: Bowel sounds present. Soft, nontender, nondistended. Extremities: Warm and dry. No cyanosis or clubbing. B/l LE pitting edema. Skin:     No rashes. Normal turgor. Normal coloration  Neuro:  Unable to assess. Psych:  Unable to assess.      Data Ordered and Personally Reviewed:    Last 24hr Labs:  Recent Results (from the past 24 hour(s))   PROTHROMBIN TIME + INR    Collection Time: 09/26/21  5:22 AM   Result Value Ref Range    Prothrombin time 17.9 (H) 12.6 - 14.5 sec    INR 1.4     METABOLIC PANEL, COMPREHENSIVE    Collection Time: 09/26/21  5:22 AM   Result Value Ref Range    Sodium 138 136 - 145 mmol/L    Potassium 4.3 3.5 - 5.1 mmol/L    Chloride 111 (H) 98 - 107 mmol/L    CO2 19 (L) 21 - 32 mmol/L    Anion gap 8 7 - 16 mmol/L    Glucose 117 (H) 65 - 100 mg/dL    BUN 64 (H) 8 - 23 MG/DL    Creatinine 2.03 (H) 0.6 - 1.0 MG/DL    GFR est AA 30 (L) >60 ml/min/1.73m2    GFR est non-AA 25 (L) >60 ml/min/1.73m2    Calcium 8.5 8.3 - 10.4 MG/DL    Bilirubin, total 1.6 (H) 0.2 - 1.1 MG/DL    ALT (SGPT) 577 (H) 12 - 65 U/L    AST (SGOT) 212 (H) 15 - 37 U/L    Alk.  phosphatase 114 50 - 130 U/L    Protein, total 6.6 6.3 - 8.2 g/dL    Albumin 2.5 (L) 3.2 - 4.6 g/dL    Globulin 4.1 (H) 2.3 - 3.5 g/dL    A-G Ratio 0.6 (L) 1.2 - 3.5     CBC W/O DIFF    Collection Time: 09/26/21  5:22 AM   Result Value Ref Range    WBC 6.2 4.3 - 11.1 K/uL    RBC 3.75 (L) 4.05 - 5.2 M/uL    HGB 11.6 (L) 11.7 - 15.4 g/dL    HCT 38.0 35.8 - 46.3 %    .3 (H) 79.6 - 97.8 FL    MCH 30.9 26.1 - 32.9 PG    MCHC 30.5 (L) 31.4 - 35.0 g/dL    RDW 15.9 (H) 11.9 - 14.6 %    PLATELET 036 (L) 411 - 450 K/uL    MPV 11.2 9.4 - 12.3 FL    ABSOLUTE NRBC 0.05 0.0 - 0.2 K/uL       All Micro Results     Procedure Component Value Units Date/Time    RESPIRATORY VIRUS PANEL W/COVID-19, PCR [[id number]] Collected: 09/22/21 1735    Order Status: Completed Specimen: Nasopharyngeal Updated: 09/22/21 2051     Adenovirus NOT DETECTED        Coronavirus 229E NOT DETECTED        Coronavirus HKU1 NOT DETECTED        Coronavirus CVNL63 NOT DETECTED        Coronavirus OC43 NOT DETECTED        SARS-CoV-2, PCR NOT DETECTED        Metapneumovirus NOT DETECTED        Rhinovirus and Enterovirus NOT DETECTED        Influenza A NOT DETECTED        Influenza B NOT DETECTED        Parainfluenza 1 NOT DETECTED        Parainfluenza 2 NOT DETECTED        Parainfluenza 3 NOT DETECTED        Parainfluenza virus 4 NOT DETECTED        RSV by PCR NOT DETECTED        B. parapertussis, PCR NOT DETECTED        Bordetella pertussis - PCR NOT DETECTED        Chlamydophila pneumoniae DNA, QL, PCR NOT DETECTED        Mycoplasma pneumoniae DNA, QL, PCR NOT DETECTED       SARS-COV-2, PCR [[id number]] Collected: 09/22/21 1720    Order Status: Canceled     COVID-19 RAPID TEST [[id number]] Collected: 09/22/21 1603    Order Status: Completed Specimen: Nasopharyngeal Updated: 09/22/21 1632     Specimen source NASAL SWAB        COVID-19 rapid test Not detected        Comment:      The specimen is NEGATIVE for SARS-CoV-2, the novel coronavirus associated with COVID-19. A negative result does not rule out COVID-19. This test has been authorized by the FDA under an Emergency Use Authorization (EUA) for use by authorized laboratories. Fact sheet for Healthcare Providers: ConventionUpdate.co.nz  Fact sheet for Patients: ConventionUpdate.co.nz       Methodology: Isothermal Nucleic Acid Amplification               Current Meds:  Current Facility-Administered Medications   Medication Dose Route Frequency    EPINEPHrine (ADRENALIN) 0.1 mg/mL syringe 1 mg  1 mg IntraVENous ONCE    lactated Ringers infusion  175 mL/hr IntraVENous CONTINUOUS    sodium chloride (NS) flush 5-40 mL  5-40 mL IntraVENous Q8H    sodium chloride (NS) flush 5-40 mL  5-40 mL IntraVENous PRN    polyethylene glycol (MIRALAX) packet 17 g  17 g Oral DAILY PRN    ondansetron (ZOFRAN ODT) tablet 4 mg  4 mg Oral Q8H PRN    Or    ondansetron (ZOFRAN) injection 4 mg  4 mg IntraVENous Q6H PRN    pantoprazole (PROTONIX) tablet 40 mg  40 mg Oral DAILY    oxyCODONE IR (ROXICODONE) tablet 5 mg  5 mg Oral Q6H PRN    atenoloL (TENORMIN) tablet 100 mg  100 mg Oral DAILY    sodium chloride (NS) flush 5-40 mL  5-40 mL IntraVENous Q8H    sodium chloride (NS) flush 5-40 mL  5-40 mL IntraVENous PRN       Other Studies:    No results found.     Signed:  Aldo Richter MD    Part of this note may have been written by using a voice dictation software. The note has been proof read but may still contain some grammatical/other typographical errors.

## 2021-09-26 NOTE — PROGRESS NOTES
Code Blue called CPR in progress BMV satrated at 100%. Patient was intubated by Anaesthesia with a size 7.5 ETT taped secure at the 23cm jacob @gums with bilat chest expansion , good color change on the easy cap with equal breath sounds. HSELL was achived and transported patient to ICU room being bagged with 100% O2. Placed on vent per setting in flow sheet.

## 2021-09-26 NOTE — PROGRESS NOTES
Care Management Interventions  Support Systems: Spouse/Significant Other  Discharge Location  Discharge Placement:     Zain Richardson, [address] Work   [address] [facility]    * [email or web address] [email or web address]

## 2021-09-26 NOTE — PROGRESS NOTES
called by staff for end of life     loving  was at the bedside    he is aware of the gravity of this situation    staff has been very purposeful in their communication with him    staff has shown great compassion as they have provided care       said \"they are Christians\"    prayer

## 2021-09-26 NOTE — PROGRESS NOTES
Pt was helped by the CNA to the bathroom and after using the bathroom, per CNA pt c/o being SOB while on 3 L NC, oxygen checked and pt's O2 sat was 100%. Pt immediately became unresponsive and code rapid response called, followed by code blue. Pt was transferred to the ICU.

## 2021-09-26 NOTE — PROCEDURES
Arterial Line Placement    Start time: 9/26/2021 1:01 PM  End time: 9/26/2021 1:06 PM  Performed by: Petra Grove MD  Authorized by: Petra Grove MD     Pre-Procedure  Indications:  Arterial pressure monitoring  Preanesthetic Checklist: patient identified, risks and benefits discussed, patient being monitored and patient being monitored    Timeout Time: 13:01 EDT        Procedure:   Prep:  Chlorhexidine  Seldinger Technique?: Yes    Orientation:  Left  Location:  Radial artery  Catheter size:  20 G  Number of attempts:  1  Cont Cardiac Output Sensor: No      Assessment:   Post-procedure:  Sterile dressing applied and line secured  Patient Tolerance:  Patient tolerated the procedure well with no immediate complications  Comment:   Requested to place A-line by hospitalist for pt with earlier cardiac arrest, who post-ROSC has difficulty in obtaining NIBP measurements.

## 2021-09-26 NOTE — PROGRESS NOTES
Spoke to  in ICU waiting room and explained the gravity of the situation. She is on 2 pressors and just added a third. Bicarb drip and heparin drips as well. HyperK treated with calcium, insulin and dextrose. Will repeat labs later this afternoon. I spoke to Intensivist at [facility] who agrees with the management and recommends to increase RR on the vent and repeat ABG; would not suggest systemic/empiric tPA given acute liver issues and  shunt. There is a high probability of further acute organ impairment or life-threatening deterioration in the patient's condition from metabolic and respiratory acidosis, possible pulmonary embolism, acute hypoxemic respiratory failure, ongoing hypotension    Additional critical care time spent: 45 minutes (total 85 minutes). Time is indicative of direct patient attendance at bedside and on the patient's floor nearby. Includes time spent at bedside performing history and exam, performing chart review, discussing findings and treatment plan with patient and/or family, discussing patient with consultants and colleagues, ordering and reviewing pertinent laboratory and radiographic evaluations, and discussing patient with nursing staff. Time excludes procedures.

## 2021-09-26 NOTE — CONSULTS
History and Physical Initial Visit NOTE           9/26/2021    Levi Ford                        Date of Admission:  9/22/2021    The patient's chart is reviewed and the patient is discussed with the staff. Subjective: The patient is an unfortunate 78 y.o.  female seen and evaluated at the request of Dr. Jeferson Cannon post cardiac arrest x 2. She has a hx of HTN, CAD, and NPH s/p  shunt in 2018. She recently was diagnosed with COVID in [place] but has had a negative viral panel here. She presented with abdominal pain on 9/22 and was felt to have gallstone pancreatitis by GI. Today developed cardiac arrest while undergoing PT. ROSC after 5 minutes but coded again on arrival to ICU and ROSC after 15 minutes. She has been empirically anticoagulated for a presumed pulmonary embolism but has had massive hemoptysis requiring frequent suctioning of ETT and manual bagging at times. ABG 6.78/68/65 on 100%. P_t given HCO3 x 2 slowly. Spoke with  about grim situation. He wanted everything done. While I was preparing to place CVL, pt became suddenly hypotensive and hypoxic and  brought into room. I advised no CPR given futility of situation and DNR established. Pt passed away at 14:50 with loss of pulse and respiration. Review of Systems    Review of systems not obtained due to patient factors. Prior to Admission Medications   Prescriptions Last Dose Informant Patient Reported? Taking? DISABLED PLACARD (DISABLED PLACARD) DMV   No No   Sig: The orthopedic condition creates a substantial limitation in routine walking.     Permanent    MD Sc GYS#[id number]   FLUoxetine (PROzac) 20 mg tablet   No No   Sig: TAKE 1 TABLET BY MOUTH EVERY DAY   atenoloL (TENORMIN) 50 mg tablet   No No   Sig: TAKE 2 TABLETS BY MOUTH DAILY INDICATIONS: FAST HEART BEAT DUE TO ANTIPSYCHOTIC MEDICATION  Indications: fast heart beat due to antipsychotic medication   febuxostat (ULORIC) 40 mg tab tablet   No No   Sig: TAKE 1 TABLET BY MOUTH EVERY DAY   fluorouraciL (EFUDEX) 5 % chemo cream   Yes No   Sig: APPLY TO AFFECTED AREA IN A THIN LAYER EVERY EVENING FOR 3 WEEKS. [address] OFF EACH MORNING. fluticasone (FLONASE) 50 mcg/actuation nasal spray   Yes No   Si Sprays by Both Nostrils route daily. furosemide (LASIX) 20 mg tablet   No No   Sig: Take 1 tablet by mouth every other day   loratadine (Claritin) 10 mg tablet   Yes No   Sig: Take 10 mg by mouth.   pantoprazole (PROTONIX) 40 mg tablet   No No   Sig: TAKE 1 TABLET BY MOUTH EVERY DAY   valACYclovir (VALTREX) 1 gram tablet   No No   Sig: Take 2 tablets twice a day for 1 day as needed for outbreak of fever blister      Facility-Administered Medications: None     Past Medical History:   Diagnosis Date    CAD (coronary artery disease) 2016    No MI, No stents - followed by [facility]    Depression     Gait disorder 2016    Gout     Left foot    Hypertension     Hypertriglyceridemia 2016    Normal pressure hydrocephalus ([address] 2018    followed by Dr. Marie Lancaster -- s/p  shunt    Small vessel disease, cerebrovascular 2016    Snores      Past Surgical History:   Procedure Laterality Date   Esha Lindsey - Dr. Tung Garcia HX CSF SHUNT  about     V/P shunt for NPH -- surgery at [address] [place] [address] HX LAPAROTOMY      for adhesions r/t hysterectomy    HX TONSILLECTOMY       Social History     Socioeconomic History    Marital status:      Spouse name: Not on file    Number of children: Not on file    Years of education: Not on file    Highest education level: Not on file   Occupational History    Not on file   Tobacco Use    Smoking status: Former Smoker    Smokeless tobacco: Never Used    Tobacco comment: quite 1974    Substance and Sexual Activity    Alcohol use:  Yes     Alcohol/week: 0.0 standard drinks     Comment: Social    Drug use: Never    Sexual activity: Not on file   Other Topics Concern    Not on file   Social History Narrative    Not on file     Social Determinants of Health     Financial Resource Strain:     Difficulty of Paying Living Expenses:    Food Insecurity:     Worried About Running Out of Food in the Last Year:     [address] in the Last Year:    Transportation Needs:     Lack of Transportation (Medical):      Lack of Transportation (Non-Medical):    Physical Activity:     Days of Exercise per Week:     Minutes of Exercise per Session:    Stress:     Feeling of Stress :    Social Connections:     Frequency of Communication with Friends and Family:     Frequency of Social Gatherings with Friends and Family:     Attends Mandaen Services:     Active Member of Clubs or Organizations:     Attends Club or Organization Meetings:     Marital Status:    Intimate Partner Violence:     Fear of Current or Ex-Partner:     Emotionally Abused:     Physically Abused:     Sexually Abused:      Family History   Problem Relation Age of Onset    Heart Disease Other     Stroke Other     Cancer Other      Allergies   Allergen Reactions    Latex Unknown (comments)    Ceftin [Cefuroxime Axetil] Rash    Sulfa (Sulfonamide Antibiotics) Rash and Other (comments)     KIDNEY MALFUNCTION     Current Facility-Administered Medications   Medication Dose Route Frequency    sodium bicarbonate (8.4%) 150 mEq in dextrose 5% 1,000 mL infusion   IntraVENous CONTINUOUS    NOREPINephrine (LEVOPHED) 4 mg/250 mL (16 mcg/mL) in NS infusion  0.5-30 mcg/min IntraVENous TITRATE    heparin 25,000 units in dextrose 500 mL infusion  18-36 Units/kg/hr IntraVENous TITRATE    PHENYLephrine (KELI-SYNEPHRINE) 40 mg in 250 mL NS infusion   mcg/min IntraVENous TITRATE    fentaNYL in normal saline (pf) 25 mcg/mL infusion  0-200 mcg/hr IntraVENous TITRATE    dexmedeTOMidine in 0.9 % NaCl (PRECEDEX) 400 mcg/100 mL (4 mcg/mL) infusion soln  0.1-1.5 mcg/kg/hr IntraVENous TITRATE    EPINEPHrine (ADRENALIN) 4 mg in 0.9% sodium chloride 250 mL infusion  1-10 mcg/min IntraVENous TITRATE    sodium chloride (NS) flush 5-40 mL  5-40 mL IntraVENous Q8H    sodium chloride (NS) flush 5-40 mL  5-40 mL IntraVENous PRN    polyethylene glycol (MIRALAX) packet 17 g  17 g Oral DAILY PRN    ondansetron (ZOFRAN ODT) tablet 4 mg  4 mg Oral Q8H PRN    Or    ondansetron (ZOFRAN) injection 4 mg  4 mg IntraVENous Q6H PRN    [Held by provider] pantoprazole (PROTONIX) tablet 40 mg  40 mg Oral DAILY    oxyCODONE IR (ROXICODONE) tablet 5 mg  5 mg Oral Q6H PRN    [Held by provider] atenoloL (TENORMIN) tablet 100 mg  100 mg Oral DAILY    sodium chloride (NS) flush 5-40 mL  5-40 mL IntraVENous Q8H    sodium chloride (NS) flush 5-40 mL  5-40 mL IntraVENous PRN     Objective:   Blood pressure (!) 161/135, pulse 90, temperature 97.3 °F (36.3 °C), resp. rate 25, height 5' 6\" (1.676 m), weight 138 lb 14.2 oz (63 kg), SpO2 96 %. No intake or output data in the 24 hours ending 09/26/21 1441  PHYSICAL EXAM     Constitutional:  the patient was agonal on initial eval  EENMT:  Pupils fixed and dilated  Respiratory: coarse rhonchi bilaterally  Cardiovascular:  RRR without M,G,R  Gastrointestinal: soft   Musculoskeletal: cold extremities with mottled skin.   Skin:  no jaundice or rashes  Neurologic: no corneals or response to pain  Psychiatric:  unresponsive        Recent Labs     09/26/21  1129 09/26/21  0522 09/25/21  0525 09/24/21  0658 09/24/21  0658   WBC 12.1* 6.2 6.2   < > 7.3   HGB 13.1 11.6* 11.6*   < > 12.2   HCT 44.8 38.0 38.1   < > 40.0   PLT 66* 103* 99*   < > 59*   INR  --  1.4 1.5  --  1.5    138 140   < > 139   K 5.6* 4.3 4.3   < > 4.7   * 111* 110*   < > 108*   CO2 10* 19* 19*   < > 18*   * 117* 96   < > 98   BUN 59* 64* 71*   < > 80*   CREA 2.41* 2.03* 2.12*   < > 2.34*   CA 8.6 8.5 8.6   < > 8.7   ALB 2.4* 2.5* 2.5*   < > 2.6*   * 212* 284*   < > 338*   * 577* 628*   < > 686*    114 114   < > 130    < > = values in this interval not displayed. ECHO: No results found for this or any previous visit. Results     Procedure Component Value Units Date/Time    RESPIRATORY VIRUS PANEL W/COVID-19, PCR [[id number]] Collected: 09/22/21 1735    Order Status: Completed Specimen: Nasopharyngeal Updated: 09/22/21 2051     Adenovirus NOT DETECTED        Coronavirus 229E NOT DETECTED        Coronavirus HKU1 NOT DETECTED        Coronavirus CVNL63 NOT DETECTED        Coronavirus OC43 NOT DETECTED        SARS-CoV-2, PCR NOT DETECTED        Metapneumovirus NOT DETECTED        Rhinovirus and Enterovirus NOT DETECTED        Influenza A NOT DETECTED        Influenza B NOT DETECTED        Parainfluenza 1 NOT DETECTED        Parainfluenza 2 NOT DETECTED        Parainfluenza 3 NOT DETECTED        Parainfluenza virus 4 NOT DETECTED        RSV by PCR NOT DETECTED        B. parapertussis, PCR NOT DETECTED        Bordetella pertussis - PCR NOT DETECTED        Chlamydophila pneumoniae DNA, QL, PCR NOT DETECTED        Mycoplasma pneumoniae DNA, QL, PCR NOT DETECTED       SARS-COV-2, PCR [[id number]] Collected: 09/22/21 1720    Order Status: Canceled     COVID-19 RAPID TEST [[id number]] Collected: 09/22/21 1603    Order Status: Completed Specimen: Nasopharyngeal Updated: 09/22/21 1632     Specimen source NASAL SWAB        COVID-19 rapid test Not detected        Comment:      The specimen is NEGATIVE for SARS-CoV-2, the novel coronavirus associated with COVID-19. A negative result does not rule out COVID-19. This test has been authorized by the FDA under an Emergency Use Authorization (EUA) for use by authorized laboratories.         Fact sheet for Healthcare Providers: ConventionUpdate.co.nz  Fact sheet for Patients: ConventionUpdate.co.nz       Methodology: Isothermal Nucleic Acid Amplification             Inpat Anti-Infectives (From admission, onward)    None        Assessment and Plan:  (Medical Decision Making)       Principal Problem:    JODY (acute kidney injury) ([address] (9/22/2021)          Hyperkalemia (9/22/2021)    From acidosis      Cardiac arrest ([address] x 2 (9/26/2021)    Now with refractory shock and DNR established prior to final arrest.       Pulmonary embolism ([address]- suspected (9/26/2021)    Suspected and placed on heparin. Bleeding from lung compromising oxygenation. Metabolic acidosis (3/73/3701)    Severe and not compatible with life      Shock ([address] (9/26/2021)    Refractory to pressors      Pulmonary hemorrhage (9/26/2021)    Compromised oxygenation and ventilation       Full Code    More than 50% of the time documented was spent in face-to-face contact with the patient and in the care of the patient on the floor/unit where the patient is located. Thank you very much for this referral.  We appreciate the opportunity to participate in this patient's care. Will follow along with above stated plan.     Ivette Mcgarry MD

## 2021-09-26 NOTE — PROGRESS NOTES
PT/OT note: Attempted therapy evaluations this am.  Patient had previously been up to bathroom and back in bed when therapists arrived. Patient would not verbally respond to therapists. Spoke with CNA who reported patient was speaking to him when he assisted her to the bathroom. CNA came back in room to check vitals again. No BP detected on vital monitor and OT called rapid. Nursing took over and progressed to code blue. Patient transferred to ICU and patient currently intubated. Therapy evaluations on hold at this time and can be re-ordered when patient medically stable.       Loan Bañuelos, PT

## 2021-09-26 NOTE — PROGRESS NOTES
Death    Staff notified  patient had      was with the  a few minutes ago    Staff checked and  did not want mishel to come back to campus    He was thankful for the care given

## 2021-09-26 NOTE — PROGRESS NOTES
Patient coded again (first time in ICU). See full documentation in code navigator. Patient had come to ICU unresponsive and mottled and was so at the time the code was called. At 1312, patient became significantly more hypotensive and pulses no palpable. Code Blue called. Already intubated and ventilated. Dr. Rachna Castillo at bedside.

## 2021-09-26 NOTE — PROGRESS NOTES
09/26/21 1229   Patient Observations   Pulse (Heart Rate) 68   Resp Rate 21   O2 Sat (%) (!) 83 %   Vent Settings   Flow Trigger 3   Ventilator Measurements   Resp Rate Observed 21   Vt Exhaled (Machine Breath) (ml) 349 ml   Vt Spont (ml) 354 ml   Ve Observed (l/min) 8.7 l/min   PIP Observed (cm H2O) 31 cm H2O   Plateau Pressure (cm H2O) 17 cm H2O   MAP (cm H2O) 17   I:E Ratio Actual 1:1.9   Auto PEEP Observed (cm H2O) 0 cm H2O   Safety & Alarms   Backup Mode Checked/Apnea Yes   Pressure Max 50 cm H2O   Pressure Min 2 cm H2O   Ve Min 2   Ve Max 24   Vt Min 200 ml   Vt Max 980 ml   RR Min 5   RR Max 45   Ambu Bag Yes   Ambu Mask Yes   Patient was intubated with a number 7.5 ET Tube. Tube placement verified by auscultation, by CXR and ETCO2 monitor. ET Tube is secured at the 23 cm jacob at the teeth and on the left side. Patient was intubated by DR Staci Mayer on the 1 attempt. Breath sounds are diminished. Patient is Negative for subcutaneous air and chest excursion is symmetric. Trachea is midline. Patient is also positive  for cyanosis and is Negative for pitting edema. Patient placed on ventilator on documented settings. All alarms are set and audible. Resuscitation bag is at the head of the bed. Ventilator Settings  Mode FIO2 Rate Tidal Volume Pressure PEEP I:E Ratio   (P) Assist control, VC+  (P) 100 %    (P) 340 ml     (P) 8 cm H20         Peak airway pressure: 31 cm H2O   Minute ventilation: 8.7 l/min     ABG: No results for input(s): PH, PCO2, PO2, HCO3 in the last 72 hours.

## 2021-10-18 NOTE — PROGRESS NOTES
Physician Progress Note      PATIENT:               Nikos Falcon  CSN #:                  [id number]  :                       1940  ADMIT DATE:       2021 11:15 AM  DISCH DATE:        2021 2:50 PM  RESPONDING  PROVIDER #:        Boston Costa MD          QUERY TEXT:    Patient presented with abdominal pain/bloating and was admitted with suspected acute hepatitis. It was noted that per patient report they were diagnosed with COVID-19 14 days ago. This hospital stay COVID-PCR was negative. Patient was maintained on droplet precautions. After study, please clarify whether the diagnosis of COVID-19 infection was clinically confirmed despite negative test:    The medical record reflects the following:  Risk Factors: diagnosed with COVID-19 14 days ago  Clinical Indicators: PCR was negative  Treatment: PCR testing  Options provided:  -- Yes, COVID-19 was present at the time of the order to admit to the hospital  -- No, COVID-19 was not present during admission  -- Other - I will add my own diagnosis  -- Disagree - Not applicable / Not valid  -- Disagree - Clinically unable to determine / Unknown  -- Refer to Clinical Documentation Reviewer    PROVIDER RESPONSE TEXT:    COVID-19 was not present during this admission and is past medical history only. Query created by: [employer] Winter on 10/14/2021 8:50 AM      QUERY TEXT:    Pt admitted with hepatitis. Patient ultimately  after having massive hemoptysis. If possible, please document in progress notes and discharge summary if you are evaluating and/or treating any of the following: The medical record reflects the following:  Risk Factors: cardiac arrest  Clinical Indicators: Progress notes stated, \"PEA arrest now w refractory shock. Unclear etiology, became unresponsive after  ambulating back from bathroom. Two rounds of epi with ROSC.  She has been empirically anticoagulated for a presumed  pulmonary embolism but has had massive hemoptysis requiring frequent suctioning of ETT and manual bagging at times. \"  Treatment: anticoagulation  Options provided:  -- Acute pulmonary embolism  -- Other - I will add my own diagnosis  -- Disagree - Not applicable / Not valid  -- Disagree - Clinically unable to determine / Unknown  -- Refer to Clinical Documentation Reviewer    PROVIDER RESPONSE TEXT:    Presumed PE but no imaging done due to clinical instability    Query created by: Prasanth Garrison on 10/14/2021 8:54 AM      Electronically signed by:  Hang Marino MD 10/18/2021 7:21 AM

## (undated) DEVICE — 3M™ TEGADERM™ TRANSPARENT FILM DRESSING FRAME STYLE, 1626W, 4 IN X 4-3/4 IN (10 CM X 12 CM), 50/CT 4CT/CASE: Brand: 3M™ TEGADERM™

## (undated) DEVICE — TOTAL KNEE DR JENNINGS: Brand: MEDLINE INDUSTRIES, INC.

## (undated) DEVICE — CORD RETRCT SIL

## (undated) DEVICE — KIT INT FIX FEM TIB CKPT MAKOPLASTY

## (undated) DEVICE — AMD ANTIMICROBIAL GAUZE SPONGES,12 PLY USP TYPE VII, 0.2% POLYHEXAMETHYLENE BIGUANIDE HCI (PHMB): Brand: CURITY

## (undated) DEVICE — SUTURE ABSRB X-1 REV CUT 1/2 CIR 22MM UD BRAID 27IN SZ 3-0 J458H

## (undated) DEVICE — STERILE PRESSURE PROTECTOR PAD® FOR DE MAYO UNIVERSAL DISTRACTOR® (10/CASE): Brand: DE MAYO UNIVERSAL DISTRACTOR®

## (undated) DEVICE — X-LARGE COTTON GLOVE: Brand: DEROYAL

## (undated) DEVICE — GUIDEPIN ORTHOPEDIC NAVIGATION 4X140 MM 2P SCREW STRL

## (undated) DEVICE — DISPOSABLE DRAPE, STERILE, FOR A CDS-3060 5 FOOT TABLE: Brand: PEDIGO PRODUCTS, INC.

## (undated) DEVICE — STRATAFIX

## (undated) DEVICE — GUIDEPIN ORTHOPEDIC NAVIGATION 4X110 MM 2P SCREW STRL

## (undated) DEVICE — SOLUTION IRRIG 3000ML 0.9% SOD CHL FLX CONT 0797208] ICU MEDICAL INC]

## (undated) DEVICE — TRAY PREP DRY W/ PREM GLV 2 APPL 6 SPNG 2 UNDPD 1 OVERWRAP

## (undated) DEVICE — BIPOLAR SEALER 23-112-1 AQM 6.0: Brand: AQUAMANTYS ®

## (undated) DEVICE — SUT ETHBND 2 30IN LR DA GRN --

## (undated) DEVICE — SYR 50ML LR LCK 1ML GRAD NSAF --

## (undated) DEVICE — 3M™ STERI-DRAPE™ INSTRUMENT POUCH 1018: Brand: STERI-DRAPE™

## (undated) DEVICE — T4 HOOD

## (undated) DEVICE — DRAPE,TOP,102X53,STERILE: Brand: MEDLINE

## (undated) DEVICE — KIT PROC KNE TRACKING PK/1 -- VIZADISC MAKO

## (undated) DEVICE — DRESSING,GAUZE,XEROFORM,CURAD,1"X8",ST: Brand: CURAD

## (undated) DEVICE — Z DISCONTINUED USE 2744636  DRESSING AQUACEL 14 IN ALG W3.5XL14IN POLYUR FLM CVR W/ HYDRCOLL

## (undated) DEVICE — SOLUTION IV 1000ML 0.9% SOD CHL

## (undated) DEVICE — BLADE SURG SAW STD S STL OSC W/ SERR EDGE DISP

## (undated) DEVICE — SOLUTION IV 250ML 0.9% SOD CHL CLR INJ FLX BG CONT PRT CLSR

## (undated) DEVICE — (D)PREP SKN CHLRAPRP APPL 26ML -- CONVERT TO ITEM 371833

## (undated) DEVICE — SUTURE 2 0 STRATAFIX SYMMETRIC PDS + 60CM CT 1 SXPP1A439

## (undated) DEVICE — KIT DRP FOR RIO ROBOTIC ARM ASST SYS

## (undated) DEVICE — HANDPIECE SET WITH COAXIAL HIGH FLOW TIP AND SUCTION TUBE: Brand: INTERPULSE

## (undated) DEVICE — Device